# Patient Record
Sex: MALE | Race: WHITE | NOT HISPANIC OR LATINO | Employment: OTHER | URBAN - METROPOLITAN AREA
[De-identification: names, ages, dates, MRNs, and addresses within clinical notes are randomized per-mention and may not be internally consistent; named-entity substitution may affect disease eponyms.]

---

## 2022-11-01 ENCOUNTER — HOSPITAL ENCOUNTER (INPATIENT)
Facility: CLINIC | Age: 62
LOS: 4 days | Discharge: ANOTHER HOSPITAL | DRG: 057 | End: 2022-11-05
Attending: PHYSICAL MEDICINE & REHABILITATION | Admitting: PHYSICAL MEDICINE & REHABILITATION
Payer: COMMERCIAL

## 2022-11-01 DIAGNOSIS — I61.9 HEMORRHAGIC STROKE (H): Primary | ICD-10-CM

## 2022-11-01 PROCEDURE — 99207 PR SC NO CHARGE VISIT: CPT | Performed by: PHYSICIAN ASSISTANT

## 2022-11-01 PROCEDURE — 250N000013 HC RX MED GY IP 250 OP 250 PS 637: Performed by: PHYSICIAN ASSISTANT

## 2022-11-01 PROCEDURE — 250N000011 HC RX IP 250 OP 636: Performed by: PHYSICIAN ASSISTANT

## 2022-11-01 PROCEDURE — 99223 1ST HOSP IP/OBS HIGH 75: CPT | Performed by: PHYSICIAN ASSISTANT

## 2022-11-01 PROCEDURE — 128N000003 HC R&B REHAB

## 2022-11-01 RX ORDER — POLYETHYLENE GLYCOL 3350 17 G/17G
17 POWDER, FOR SOLUTION ORAL DAILY PRN
Status: DISCONTINUED | OUTPATIENT
Start: 2022-11-01 | End: 2022-11-05 | Stop reason: HOSPADM

## 2022-11-01 RX ORDER — FLUTICASONE PROPIONATE 50 MCG
1 SPRAY, SUSPENSION (ML) NASAL DAILY
Status: DISCONTINUED | OUTPATIENT
Start: 2022-11-02 | End: 2022-11-05 | Stop reason: HOSPADM

## 2022-11-01 RX ORDER — HEPARIN SODIUM 5000 [USP'U]/.5ML
5000 INJECTION, SOLUTION INTRAVENOUS; SUBCUTANEOUS EVERY 12 HOURS
Status: DISCONTINUED | OUTPATIENT
Start: 2022-11-01 | End: 2022-11-05 | Stop reason: HOSPADM

## 2022-11-01 RX ORDER — METOPROLOL TARTRATE 25 MG/1
25 TABLET, FILM COATED ORAL 2 TIMES DAILY
Status: DISCONTINUED | OUTPATIENT
Start: 2022-11-01 | End: 2022-11-05 | Stop reason: HOSPADM

## 2022-11-01 RX ORDER — AMOXICILLIN 250 MG
1 CAPSULE ORAL 2 TIMES DAILY
Status: DISCONTINUED | OUTPATIENT
Start: 2022-11-01 | End: 2022-11-05 | Stop reason: HOSPADM

## 2022-11-01 RX ORDER — METOPROLOL TARTRATE 25 MG/1
25 TABLET, FILM COATED ORAL
Status: ON HOLD | COMMUNITY
Start: 2022-11-01 | End: 2022-11-05

## 2022-11-01 RX ORDER — POLYETHYLENE GLYCOL 3350 17 G/17G
17 POWDER, FOR SOLUTION ORAL
Status: ON HOLD | COMMUNITY
Start: 2022-11-01 | End: 2022-11-05

## 2022-11-01 RX ORDER — LANOLIN ALCOHOL/MO/W.PET/CERES
3 CREAM (GRAM) TOPICAL AT BEDTIME
Status: DISCONTINUED | OUTPATIENT
Start: 2022-11-01 | End: 2022-11-05 | Stop reason: HOSPADM

## 2022-11-01 RX ORDER — SENNOSIDES A AND B 8.6 MG/1
1 TABLET, FILM COATED ORAL 2 TIMES DAILY
Status: ON HOLD | COMMUNITY
Start: 2022-11-01 | End: 2022-11-05

## 2022-11-01 RX ORDER — ASPIRIN 81 MG/1
81 TABLET ORAL DAILY
Status: DISCONTINUED | OUTPATIENT
Start: 2022-11-02 | End: 2022-11-05 | Stop reason: HOSPADM

## 2022-11-01 RX ORDER — ACETAMINOPHEN 325 MG/1
325-650 TABLET ORAL EVERY 4 HOURS PRN
Status: DISCONTINUED | OUTPATIENT
Start: 2022-11-01 | End: 2022-11-05 | Stop reason: HOSPADM

## 2022-11-01 RX ADMIN — HEPARIN SODIUM 5000 UNITS: 5000 INJECTION, SOLUTION INTRAVENOUS; SUBCUTANEOUS at 20:29

## 2022-11-01 RX ADMIN — SENNOSIDES AND DOCUSATE SODIUM 1 TABLET: 50; 8.6 TABLET ORAL at 20:29

## 2022-11-01 RX ADMIN — METOPROLOL TARTRATE 25 MG: 25 TABLET, FILM COATED ORAL at 20:29

## 2022-11-01 ASSESSMENT — ACTIVITIES OF DAILY LIVING (ADL)
FALL_HISTORY_WITHIN_LAST_SIX_MONTHS: YES
SWALLOWING: 2-->DIFFICULTY SWALLOWING LIQUIDS/FOODS
ADLS_ACUITY_SCORE: 35
ADLS_ACUITY_SCORE: 26
TOILETING_ISSUES: NO
EATING: 1-->ASSISTANCE (EQUIPMENT/PERSON) NEEDED
SWALLOWING: 2-->DIFFICULTY SWALLOWING LIQUIDS/FOODS
DIFFICULTY_EATING/SWALLOWING: YES
ADLS_ACUITY_SCORE: 43
NUMBER_OF_TIMES_PATIENT_HAS_FALLEN_WITHIN_LAST_SIX_MONTHS: 1
EATING/SWALLOWING: SWALLOWING LIQUIDS;SWALLOWING SOLID FOOD
EATING: 1-->ASSISTANCE (EQUIPMENT/PERSON) NEEDED (NOT DEVELOPMENTALLY APPROPRIATE)
ADLS_ACUITY_SCORE: 26

## 2022-11-01 NOTE — PLAN OF CARE
"Goal Outcome Evaluation: Initial Assessment    VS:    /85   Pulse 109   Temp (!) 96.4  F (35.8  C) (Oral)   Resp 16   Ht 1.88 m (6' 2\")   SpO2 95%      O2: RA, no SOB reported   Output: Incontinent of bladder and bowel, urinal and toiletting offered.   Last BM: Today 11/1 small BM. Senna given at HS   Activity: 2x Gol[place] for transfers, 1-2x for bed mobility   Skin: Large bruise over left shoulder/back/abdominal area   Pain: Denies   CMS: Alert, difficult to assess orientation d/t expressive aphasia/word finding difficulty   Dressing: NA   Diet: Level 6, moderate thick liquids. Meds crushed in applesauce   LDA: None   Plan: First day of admission     Additional Info: Wife Alix helpful with admission and cares        "

## 2022-11-01 NOTE — H&P
[facility]   Acute Rehabilitation Unit  Admission History and Physical    CHIEF COMPLAINT   01.2 right body involvement, left brain stroke: left ICA and L MCA ischemic stroke, c/b hemorrhagic transformation     HISTORY OF PRESENT ILLNESS  Tobin Hua is a 62 year old right hand dominant male with past medical history of mitral valve repair (2011) and allergic rhinitis who presented to ED on 10/21/22 after acute change in mental status followed by fall at home.  CT/CTA head/neck revealed complete occlusion of left ICA.  Labs notable for mild hyponatremia.  Patient was administered tenecteplase and admitted for further evaluation and management.  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  S/p mechanical thrombectomy with neuro IR with recanalization.    Further stroke work-up with echo with normal EF, moderate dilation of aorta; LDL 96.    Unfortunately, on 10/24, patient noted to have worsening stroke deficits (R hemiparesis).  CT head with hemorrhagic transformation of prior CVA with intraparenchymal (left basal ganglia, left frontal and temporal lobes) and intraventricular hemorrhage with associated mass effect.  Per neurosurgery, no urgent surgical intervention.  Aspirin reversed with ddAVP and patient was admitted to ICU.  Follow up head CT showed stable hemorrhage.    Hospital course was also complicated by anemia, hyponatremia and later hypernatremia, left neck hematoma, non-sustained ventricular tachycardia, low grade fever, constipation, malnutrition (NG tube removed on 10/30), headache, and dysphagia.    During acute hospitalization, patient was seen and evaluated by PT and OT, who collectively recommended that patient would benefit from ongoing therapies in the acute inpatient rehabilitation setting.      In review of the therapy notes, patient currently performing sit to stand transfers with mod A, ambulation 4 steps in parallel  "bars with assist of 2, needing total A to advance FLE.  He is performing bed mobility with mod A, needed total A to don sling.  He demonstrates aphasia with moderate deficit for auditory comprehension and moderate to severe for verbal expression.  Also with moderate to severe dysarthria and dysphagia.    Upon arrival to the rehab unit, patient is accompanied by his wife and son, who assist with providing historical information given patient's aphasia.  Patient notes frustration with right-sided weakness and language deficits.  Family reports he is usually a good \"water drinker\" but struggling with the thickened liquids.   He has had intermittent, brief episodes of headache.  Also some mild cough, but patient and his wife were just recovering from a respiratory illness at the time of his stroke, and feels his symptoms are now improving.  Notes a tendency to eat/drink quickly and may \"hiccup\" or cough after.  BMs have been less frequent than at home.  Urination has been more urgent, so has had some incontinence.  States he is sleeping well.  Notes some difficulty with mood, feeling anxious or worried especially during evenings or overnights.    PAST MEDICAL HISTORY   Reviewed and updated in Epic.  No past medical history on file.    SURGICAL HISTORY  Reviewed and updated in Epic.  No past surgical history on file.    SOCIAL HISTORY  Reviewed and updated in Epic.  Marital Status:   Living situation: lives with wife, Alix, in house with 3 stairs to enter, 12 stairs to basement  Family support: supportive  Vocational History: retired  and theater director  Tobacco use: none  Alcohol use: occasionally \"for fun\"  Illicit drug use: none  Social History     Socioeconomic History     Marital status:      Spouse name: Not on file     Number of children: Not on file     Years of education: Not on file     Highest education level: Not on file   Occupational History     Not on file   Tobacco Use     " Smoking status: Not on file     Smokeless tobacco: Not on file   Substance and Sexual Activity     Alcohol use: Not on file     Drug use: Not on file     Sexual activity: Not on file   Other Topics Concern     Not on file   Social History Narrative     Not on file     Social Determinants of Health     Financial Resource Strain: Not on file   Food Insecurity: Not on file   Transportation Needs: Not on file   Physical Activity: Not on file   Stress: Not on file   Social Connections: Not on file   Intimate Partner Violence: Not on file   Housing Stability: Not on file       FAMILY HISTORY  Reviewed and updated in Epic.  No family history on file.      PRIOR FUNCTIONAL HISTORY   Pt was independent with all ADLs/IADLs, transfers, mobility and gait.      MEDICATIONS  Scheduled meds  No medications prior to admission.       ALLERGIES   Not on File      REVIEW OF SYSTEMS  A 10 point ROS was performed and negative unless otherwise noted in HPI.     Constitutional: Negative for fever/chills.  Eyes: Negative for double vision, blurry vision, field cut, other visual disturbance.  Ears, Nose, Throat: Negative for nasal congestion, sore throat.  Cardiovascular: Negative for chest pain, palpitations.  Respiratory: Positive for intermittent cough.  Negative for shortness of breath.  Gastrointestinal: Positive for constipation.  Negative for abdominal pain, nausea, vomiting, diarrhea, appetite disturbance.  Genitourinary: Positive for urgency and some related incontinence.  Negative for dysuria, frequency.  Musculoskeletal: Negative for pain.  Neurologic: Positive for right-sided weakness, speech disturbance, swallow disturbance, intermittent/mild headache, impaired cognition.  Negative for dizziness or lightheadedness, numbness, tingling.  Psychiatric: Positive for some mood disturbance, difficulty coping, anxiety.  Negative for sleep disturbance.      PHYSICAL EXAM  VITAL SIGNS:  BP (!) 117/93 (BP Location: Left arm)   Pulse 115    Temp (!) 96.4  F (35.8  C) (Oral)   Resp 16   SpO2 95%   BMI:  There is no height or weight on file to calculate BMI.     General: NAD, lying in bed  HEENT: NC/AT, dry mucous membranes  Pulmonary: non-labored on room air, lungs CTA bilaterally  Cardiovascular: RRR, +murmur, Ziopatch  Abdominal: soft, non-tender, non-distended, bowel sounds present  Extremities: warm, well perfused, no edema in bilateral lower extremities, no tenderness in calves  MSK/neuro:   Mental Status:  alert    Cranial Nerves:   1. 2nd CN: Pupils equal, round, reactive to light and visual fields intact to confrontation.   2. 3rd,4th,6th CN:  EOMI  3. 5th CN: facial sensation intact   4. 7th CN: right facial droop  5. 8th CN: functional hearing bilaterally  6. 9th, 10th CN: palate elevates symmetrically   7. 11th CN: weak right trapezii    8. 12th CN: tongue midline and without fasciculations     Sensory: Normal to light touch in bilateral upper and lower extremities   Strength: 5/5 in all muscle groups of left upper and lower extremities.  RUE 0/5 throughout.  RLE trace proximal movement but no other volitional movement.   Tone per modified Joseph Scale: none   Abnormal movements: None   Coordination: No dysmetria on left finger to nose, unable to perform on right due to hemiparesis   Speech: expressive aphasia, frequent word substitutions, dysarthria   Cognition: difficult to assess given language deficits, able to follow simple commands but difficulty with multistep   Gait: not tested      LABS            CT Head WO IV Cont - 10/21/22  IMPRESSION:   1. Hyperdensity in the left cavernous ICA and MCA suggestive of thrombus.   2. No other acute intracranial findings.    CTA head/neck 10/21/22  IMPRESSION:   1. Complete occlusion of the left ICA beginning 1 cm distal to the carotid   bifurcation.   2. No appreciable contrast opacification involving the left M1 segment,   MCA bifurcation, and proximal M2 branches. More distal arborization    appears patent, which may be secondary to collateral flow.   3. Tortuosity of the right ICA with focal ectasia/fusiform aneurysmal   dilation at the level of C1.     MRI head 10/22/22  IMPRESSION:     1.  Multifocal acute/subacute infarcts throughout the left MCA distribution, most prominently involving the left basal ganglia.   2. Tiny punctate acute/subacute infarcts within the right frontal lobe and left cerebellum.    MRA head 10/22/22  IMPRESSION:   1. No flow-limiting stenosis involving the major intracranial arterial vasculature.   2. Flow signal has been reestablished throughout the distal left ICA and proximal left MCA.     MRA neck 10/22/22  IMPRESSION:     1. There has been interval recanalization of the left cervical ICA, which appears tortuous. Associated luminal irregularity is present within the mid cervical ICA, consider series 13, image [id number].   2. Persistent tortuosity of the right cervical ICA, with focal ectasia/fusiform aneurysmal dilation again noted at the approximate level of C1.     CT head w/o 10/24/22  IMPRESSION:   1. New dense intraparenchymal hemorrhage centered about the left basal ganglia, but also involving the left frontal and temporal lobes. Associated mass effect results in partial effacement of the anterior horn and body of the left lateral ventricle. 3 mm of rightward midline shift.  Supratentorial Intracerebral Hemorrhage with intraventricular extension as detailed above measures 4.0 x 3.4 x 3.8 cm.   Estimated Intracerebral Hemorrhage Volume equals 26 (A x B x C/2) mL.   2. New intraventricular hemorrhage involving both lateral ventricles as well as the aqueduct and fourth ventricle.  3. Ongoing hypodense infarction involving the left temporal lobe.    CT head w/o 10/24/22  IMPRESSION:     1. Redemonstration of hemorrhagic transformation of recent infarcts along the left basal ganglia and corona radiata, with intraparenchymal hemorrhage which is similar versus slightly  increased since most recent exam. Associated mass effect with partial effacement of the left lateral ventricle and mild rightward midline shift.   2. Redemonstration of intraventricular hemorrhage most pronounced within the fourth ventricle which extends posteriorly out the foramen of Magendie and foramen of Luschka at the craniocervical junction and into the upper cervical spine posteriorly.   3. Redemonstration of other recent infarcts most pronounced along the anterior left temporal lobe.    CT head w/o 10/26/22  IMPRESSION:   1.  No significant interval change compared to CT head 10/24/2022.          IMPRESSION/PLAN:  Tobin Hua is a 62 year old right hand dominant male with a past medical history of mitral valve repair (2011) and allergic rhinitis who was admitted on 10/21/22 with acute ischemic stroke due to dissection/occlusion of left ICA with hospital course complicated by hemorrhagic transformation with intraparenchymal and intraventricular hemorrhage, anemia, non-sustained ventricular tachycardia, hypo/hypernatremia, dysphagia, constipation, and headache.  He is now admitted to ARU on 11/1/22 for multidisciplinary rehabilitation and ongoing medical management.      Admission to acute inpatient rehab 11/01/22.    Impairment group code: 01.2 right body involvement, left brain stroke: left ICA and left MCA ischemic stroke, c/b hemorrhagic transformation      1. PT, OT and SLP 60 minutes of each on a daily basis, in addition to rehab nursing and close management of physiatrist.      2. Impairment of ADL's: Noted to have impaired strength, impaired balance, and impaired weight shifting, all affecting his ability to safely and independently perform basic ADLs.  Goal for mod I with basic ADLs, with assist for bathing and related transfers.    3. Impairment of mobility:  Noted to have impaired strength, impaired balance, and impaired weight shifting, all affecting his ability to safely and independently perform  basic mobility.  Goal for mod I with bed mobility and transfers, anticipate to require wheelchair-based mobility.    4. Impairment of cognition/language/swallow:  Noted to have dysphagia, dysarthria, aphasia with goals for safe tolerance of least restrictive diet and improved communication skills to meet basic needs.  Would benefit from full cognitive evaluation at ARU.    5. Medical Conditions    Acute ischemic stroke due to ICA dissection and thrombosis/occlusion s/p tenecteplase, mechanical thrombectomy  C/b hemorrhagic transformation with intraparenchymal and intraventricular hemorrhage   R hemiparesis, R facial droop, dysphagia, aphasia, dysarthria  - Continue ASA 81 mg daily (resumed 10/28), indefinitely per neurology  - LDL 96, no indication for statin per neuro, no atherosclerosis on MRAs  - SBP goal <140  - R AFO recommended while inpatient, consult to orthotist if appropriate at ARU  - Ziopatch in place for 2 weeks at ARU admission  - Continue PT/OT/SLP  - Follow up with neurology (Dr. Edgard Dave) in 6 weeks    Moderate dilation of ascending aorta   Incidentally noted on echo for CVA work-up, involving the sinuses of Valsalva, maximal dimension 4.6 cm.  - Needs outptient follow up in 6 months with CT imaging, can be followed by PCP    Episodes of non-sustained ventricular tachycardia  On telemetry during IP stay.  Started on metoprolol with reduced frequency.  - Continue metoprolol 25 mg BID  - Ziopatch in place at ARU admission    Dysphagia  At risk for malnutrition  - RD consulted, appreciate assist.  - Continue SLP  - Continue Soft and Bite Sized (Level 6) diet with moderately thick (level 3) liquids.  - Meds in puree  - Swallow Recommendations per SLP: Upright for all eating and drinking.  Relaxed, leisurely rate when eating and drinking.  Check for pocketing on the right after meals/snacks/medications.  Continue oral care.   - Repeat VFSS as indicated  - Monitor labs, vitals, clinically for signs  of dehydration while on thickened liuqids.  Encourage fluids.    Normocytic anemia  Noted drop from Hgb 13 to yohana of 9.9 on 10/23, most recently stable at 13 on 10/31.  - Trend CBC    Hypernatremia  S/p D5W infusion due to Na 146 on 10/31, improved to 144 on 11/1.  - Trend BMP    Hx mitral valve repair (2012)  Mild mitral regurgitation  - Noted.  Not on PTA anticoagulation, just aspirin, resumed as above    Allergic rhinitis  - Continue PTA Flonase daily    6. Adjustment to disability:  Clinical psychology to eval and treat if indicated  7. FEN: soft and bite-sized (level 6) diet, moderately thick (level 3) liquids  8. Bowel: continent, recent constipation, monitor, on scheduled and PRN bowel meds  9. Bladder: continent/incontinent due to urgency, monitor PVRs at admission  10. DVT Prophylaxis: subcutaneous Heparin (ok'd per neuro on 10/27)  11. GI Prophylaxis: not indicated  12. Code: full  13. Disposition: goal for home with 24/7 assist  14. ELOS:  3 weeks  15. Rehab prognosis:  fair  16. Follow up Appointments on Discharge: PCP, neurology (Dr. Edgard Dave) in 6 weeks        Patient discussed with Dr. Jori Troncoso, PM&R staff physician     Ashley Campoverde PA-C  Physical Medicine & Rehabilitation

## 2022-11-02 ENCOUNTER — APPOINTMENT (OUTPATIENT)
Dept: PHYSICAL THERAPY | Facility: CLINIC | Age: 62
DRG: 057 | End: 2022-11-02
Attending: PHYSICAL MEDICINE & REHABILITATION
Payer: COMMERCIAL

## 2022-11-02 ENCOUNTER — APPOINTMENT (OUTPATIENT)
Dept: SPEECH THERAPY | Facility: CLINIC | Age: 62
DRG: 057 | End: 2022-11-02
Attending: PHYSICAL MEDICINE & REHABILITATION
Payer: COMMERCIAL

## 2022-11-02 ENCOUNTER — APPOINTMENT (OUTPATIENT)
Dept: OCCUPATIONAL THERAPY | Facility: CLINIC | Age: 62
DRG: 057 | End: 2022-11-02
Attending: PHYSICAL MEDICINE & REHABILITATION
Payer: COMMERCIAL

## 2022-11-02 LAB
ANION GAP SERPL CALCULATED.3IONS-SCNC: 6 MMOL/L (ref 3–14)
BUN SERPL-MCNC: 32 MG/DL (ref 7–30)
CALCIUM SERPL-MCNC: 9.4 MG/DL (ref 8.5–10.1)
CHLORIDE BLD-SCNC: 111 MMOL/L (ref 94–109)
CO2 SERPL-SCNC: 26 MMOL/L (ref 20–32)
CREAT SERPL-MCNC: 0.74 MG/DL (ref 0.66–1.25)
ERYTHROCYTE [DISTWIDTH] IN BLOOD BY AUTOMATED COUNT: 13.4 % (ref 10–15)
GFR SERPL CREATININE-BSD FRML MDRD: >90 ML/MIN/1.73M2
GLUCOSE BLD-MCNC: 107 MG/DL (ref 70–99)
HCT VFR BLD AUTO: 39 % (ref 40–53)
HGB BLD-MCNC: 12.8 G/DL (ref 13.3–17.7)
MAGNESIUM SERPL-MCNC: 2.6 MG/DL (ref 1.6–2.3)
MCH RBC QN AUTO: 29.4 PG (ref 26.5–33)
MCHC RBC AUTO-ENTMCNC: 32.8 G/DL (ref 31.5–36.5)
MCV RBC AUTO: 90 FL (ref 78–100)
PHOSPHATE SERPL-MCNC: 3 MG/DL (ref 2.5–4.5)
PLATELET # BLD AUTO: 262 10E3/UL (ref 150–450)
POTASSIUM BLD-SCNC: 3.7 MMOL/L (ref 3.4–5.3)
RBC # BLD AUTO: 4.35 10E6/UL (ref 4.4–5.9)
SODIUM SERPL-SCNC: 143 MMOL/L (ref 133–144)
WBC # BLD AUTO: 11.5 10E3/UL (ref 4–11)

## 2022-11-02 PROCEDURE — 92523 SPEECH SOUND LANG COMPREHEN: CPT | Mod: GN | Performed by: SPEECH-LANGUAGE PATHOLOGIST

## 2022-11-02 PROCEDURE — 80048 BASIC METABOLIC PNL TOTAL CA: CPT | Performed by: PHYSICIAN ASSISTANT

## 2022-11-02 PROCEDURE — 97535 SELF CARE MNGMENT TRAINING: CPT | Mod: GO | Performed by: OCCUPATIONAL THERAPIST

## 2022-11-02 PROCEDURE — 84100 ASSAY OF PHOSPHORUS: CPT | Performed by: PHYSICIAN ASSISTANT

## 2022-11-02 PROCEDURE — 99232 SBSQ HOSP IP/OBS MODERATE 35: CPT | Performed by: PHYSICIAN ASSISTANT

## 2022-11-02 PROCEDURE — 83735 ASSAY OF MAGNESIUM: CPT | Performed by: PHYSICIAN ASSISTANT

## 2022-11-02 PROCEDURE — 85027 COMPLETE CBC AUTOMATED: CPT | Performed by: PHYSICIAN ASSISTANT

## 2022-11-02 PROCEDURE — 97530 THERAPEUTIC ACTIVITIES: CPT | Mod: GP

## 2022-11-02 PROCEDURE — 128N000003 HC R&B REHAB

## 2022-11-02 PROCEDURE — 36415 COLL VENOUS BLD VENIPUNCTURE: CPT | Performed by: PHYSICIAN ASSISTANT

## 2022-11-02 PROCEDURE — 97167 OT EVAL HIGH COMPLEX 60 MIN: CPT | Mod: GO | Performed by: OCCUPATIONAL THERAPIST

## 2022-11-02 PROCEDURE — 250N000013 HC RX MED GY IP 250 OP 250 PS 637: Performed by: PHYSICIAN ASSISTANT

## 2022-11-02 PROCEDURE — 250N000011 HC RX IP 250 OP 636: Performed by: PHYSICIAN ASSISTANT

## 2022-11-02 PROCEDURE — 97163 PT EVAL HIGH COMPLEX 45 MIN: CPT | Mod: GP

## 2022-11-02 RX ADMIN — METOPROLOL TARTRATE 25 MG: 25 TABLET, FILM COATED ORAL at 21:10

## 2022-11-02 RX ADMIN — HEPARIN SODIUM 5000 UNITS: 5000 INJECTION, SOLUTION INTRAVENOUS; SUBCUTANEOUS at 21:10

## 2022-11-02 RX ADMIN — HEPARIN SODIUM 5000 UNITS: 5000 INJECTION, SOLUTION INTRAVENOUS; SUBCUTANEOUS at 08:00

## 2022-11-02 RX ADMIN — SENNOSIDES AND DOCUSATE SODIUM 1 TABLET: 50; 8.6 TABLET ORAL at 08:00

## 2022-11-02 RX ADMIN — MELATONIN TAB 3 MG 3 MG: 3 TAB at 21:10

## 2022-11-02 RX ADMIN — SENNOSIDES AND DOCUSATE SODIUM 1 TABLET: 50; 8.6 TABLET ORAL at 21:10

## 2022-11-02 RX ADMIN — ASPIRIN 81 MG: 81 TABLET ORAL at 08:00

## 2022-11-02 RX ADMIN — METOPROLOL TARTRATE 25 MG: 25 TABLET, FILM COATED ORAL at 08:00

## 2022-11-02 RX ADMIN — FLUTICASONE PROPIONATE 1 SPRAY: 50 SPRAY, METERED NASAL at 08:00

## 2022-11-02 ASSESSMENT — ACTIVITIES OF DAILY LIVING (ADL)
IADLS,_PREVIOUS_FUNCTIONAL_LEVEL: INDEPENDENT
ADLS_ACUITY_SCORE: 54
ADLS_ACUITY_SCORE: 54
ADLS_ACUITY_SCORE: 41
ADLS_ACUITY_SCORE: 54
ADLS_ACUITY_SCORE: 41
ADLS_ACUITY_SCORE: 54
BADLS,_PREVIOUS_FUNCTIONAL_LEVEL: INDEPENDENT
ADLS_ACUITY_SCORE: 43
ADLS_ACUITY_SCORE: 41
ADLS_ACUITY_SCORE: 41
ADLS_ACUITY_SCORE: 54
BADLS,_PREVIOUS_FUNCTIONAL_LEVEL: INDEPENDENT
IADLS,_PREVIOUS_FUNCTIONAL_LEVEL: INDEPENDENT
ADLS_ACUITY_SCORE: 41
ADLS_ACUITY_SCORE: 54

## 2022-11-02 NOTE — PLAN OF CARE
Patient is alert but disoriented to time place and situation, also seem to have some word finding difficulties. Did not void until at about 1330, scan for 325, after 45 mins voided 350 and PVR for 250. Need assist with setting up meals, use call light appropriately, will continue Poc  Goal Outcome Evaluation:

## 2022-11-02 NOTE — CARE PLAN
Postural Assessment for Stroke Scale (PASS)    Maintaining posture -   1) Sitting without support - 2 - favors L side  2) Standing with support (feet position free) - 1  3) Standing without support (feet position free) - 0  4) Standing on nonparetic leg - 0  5) Standing on paretic leg - 0    Changing posture -  6) Rolling supine -> affected side - 2 - assist to control RLE  7) Rolling supine -> unaffected side - 2 - vc's for positioning and to assist with RLE placement over EOB while pt using LLE to assist RLE.  8) Sup->sitting edge of bed - 2 - vc's for positioning using LLE to assist RLE, able to prop through LUE to push into sitting.  9) Sitting edge of bed -> sup - 1 - vc's and assist for RLE positioning up into bed.  10) Sit->stand without support - 1  11) Stand->sit without support - 1  12) Standing,  pencil from floor without support - 0 - unsafe    Total Score - 12/36    The PASS assesses a patient's ability to change and maintain posture during different balance activities. It is not associated with falls risk, but is used to track progress with the patient's ability to control their posture and balance throughout the course of the patient's admission.

## 2022-11-02 NOTE — PLAN OF CARE
Discharge Planner Post-Acute Rehab SLP:     Discharge Plan: home with wife, ongoing SLP at next level    Precautions: right dominant side hemiparesis    Current Status:  Hearing: WFL  Vision: WFL with readers  Communication: Moderate-severe anomic aphasia, mild receptive language deficits, and mild dysarthria  Cognition: Not formally evaluated due to language impairment  Swallow: Soft and bite sized (6)/moderately thick liquid (3); to be evaluated 11/03.    Assessment: Pt presents with moderate-severe anomic aphasia, mild receptive language deficits, and mild dysarthria characterized by decreased articulatory precision. Skilled SLP services indicated to improve language function to increase communication of wants, needs, and medical information.    Other Barriers to Discharge (Family Training, etc): family training in communication strategies, supervision

## 2022-11-02 NOTE — PLAN OF CARE
FOCUS/GOAL  Bowel management, Bladder management, Pain management, Mobility, Cognition/Memory/Judgment/Problem solving, and Safety management    ASSESSMENT, INTERVENTIONS AND CONTINUING PLAN FOR GOAL:  Pt is alert, disoriented to place. VSS. Pt has right facial droop, speaks in a whisper and has word finding difficulties. Right side is flaccid. Continent and incontinent of bladder this shift. Incontinent of medium BM overnight. Denied pain or discomfort. Appeared to be sleeping during rounds. Uses call light appropriately, able to make needs known. Bed alarm on for safety.

## 2022-11-02 NOTE — PROGRESS NOTES
CLINICAL NUTRITION SERVICES - ASSESSMENT NOTE     Nutrition Prescription    RECOMMENDATIONS FOR MDs/PROVIDERS TO ORDER:  None today     Malnutrition Status:    Unable to fully determine due to missing information    Recommendations already ordered by Registered Dietitian (RD):  Re-ordered weekly weight order and requested nursing staff obtain new weight    Villa Magic Cup BID at lunch and dinner meals    Future/Additional Recommendations:  Monitor meal intakes, supplement acceptance, wt/lab trends  Diet per SLP      REASON FOR ASSESSMENT  Tobin Hua is a/an 62 year old male assessed by the dietitian for Provider Order - assess/optimize nutrition    NUTRITION/MEDICAL HISTORY  Per chart review: Pt admits to ARU for rehabilitation in the setting of acute ischemic stroke due to dissection/occlusion of left ICA with hospital course complicated by hemorrhagic transformation with intraparenchymal and intraventricular hemorrhage, anemia, non-sustained ventricular tachycardia, hypo/hypernatremia, dysphagia, constipation, and headache. Other past medical history noted for mitral valve repair (2011) and allergic rhinitis.    Per pt visit: RD visited pt/wife at bedside earlier today, reviewed no current weight available, pt/wife assumed around 180-190 lbs, discussed have orders requesting nursing obtain. Pt/wife note pt taking meals fairly well even with modified diet, does have difficulty at times with water, gave suggested for milk/juice and to ask for these fluids between meals from unit to encourage hydration. Reviewed option for supplement, agreeing to above. Reviewed the importance of adequate nutrition/fluid intakes for continued recovery.     CURRENT NUTRITION ORDERS  Diet: Soft and Bite Sized (level 6), Moderately Thick Liquids (level 3) - per SLP - Upright for all eating and drinking, relaxed, leisurely rate when eating and drinking, check for pocketing on the right after meals/snacks/medications, continue oral  "care  Intake/Tolerance: 75% thus far per flow sheets     LABS  Labs reviewed    MEDICATIONS  Senna-docusate    ANTHROPOMETRICS  Height: 188 cm (6' 2\")  Most Recent Weight: N/A   IBW: 86.3 kg  UBW: 180-190 lbs per pt/family reports   Weight History:   76.1 kg (167 lb 12.3 oz) 10/24/2022 - per CE     Weight assessment: Unable to assess with no current weight and varying weight from above compared to pt/wife reports.     ASSESSED NUTRITION NEEDS - deferred to next assessment/once weight available    MALNUTRITION  % Intake: Decreased intake does not meet criteria  % Weight Loss: Unable to assess  Subcutaneous Fat Loss: Facial region: mild vs age related   Muscle Loss: Temporal: mild vs age related   Fluid Accumulation/Edema: None noted  Malnutrition Diagnosis: Unable to fully determine due to missing information     NUTRITION DIAGNOSIS  Predicted inadequate nutrient intake related to chewing/swallowing difficulty as evidenced by continued need for modified diet       INTERVENTIONS  Implementation  Nutrition Education: RD reviewed the importance of adequate nutritional intakes with pt/wife at bedside    Medical food supplement therapy - ordered as above      Goals  Patient to consume % of nutritionally adequate meal trays TID, or the equivalent with supplements/snacks.     Monitoring/Evaluation  Progress toward goals will be monitored and evaluated per protocol.    Joy Borrego RD, CNSC, LD  ARU RD pager: [phone]  "

## 2022-11-02 NOTE — PROGRESS NOTES
11/02/22 0800   Appointment Info   Signing Clinician's Name / Credentials (OT) TIANA Pham   Student Supervision Direct supervision provided;Direct Patient Contact Provided;Line of sight supervision provided   Living Environment   People in Home spouse   Current Living Arrangements house   Home Accessibility stairs to enter home   Number of Stairs, Main Entrance 3   Stair Railings, Main Entrance railings on both sides of stairs   Transportation Anticipated family or friend will provide   Living Environment Comments OT: Pt lives with spouse in [place]. Pt can live on main level. Pt has tub/shower combo and a raised toilet with vanity on right side.   Self-Care   Usual Activity Tolerance excellent   Current Activity Tolerance fair   Regular Exercise Yes   Activity/Exercise Type walking   Exercise Amount/Frequency daily   Equipment Currently Used at Home none   Fall history within last six months yes   Number of times patient has fallen within last six months 1   Activity/Exercise/Self-Care Comment OT: Pt was previously very active with a 11,000 steps/day, active with grandkids. Retired .   Instrumental Activities of Daily Living (IADL)   Previous Responsibilities meal prep;housekeeping;shopping;yardwork;finances;driving   IADL Comments OT: pt shared IADL responsibilities with spouse; spouse managed laundry   Post-Acute Assessment Only   Post-Acute Functional Assessment See below   Previous Level of Function/Home Environm   Bathing/Grooming, Premorbid Functional Level independent   Dressing, Premorbid Functional Level independent   Eating/Feeding, Premorbid Functional Level independent   Toileting, Premorbid Functional Level independent   BADLs, Premorbid Functional Level independent   IADLs, Premorbid Functional Level independent   Bed Mobility, Premorbid Functional Level independent   Transfers, Premorbid Functional Level independent   Household Ambulation, Premorbid Functional Level  "independent   Stairs, Premorbid Functional Level independent   Community Ambulation, Premorbid Functional Level independent   General Information   Onset of Illness/Injury or Date of Surgery 10/21/22   Referring Physician Dr. Troncoso   Patient/Family Therapy Goal Statement (OT) OT: To gain IND   Additional Occupational Profile Info/Pertinent History of Current Problem OT: Per chart review, pt \"male with past medical history of mitral valve repair (2011) and allergic rhinitis who presented to ED on 10/21/22 after acute change in mental status followed by fall at home. CT/CTA head/neck revealed complete occlusion of left ICA. Labs notable for mild hyponatremia. Patient was administered tenecteplase and admitted for further evaluation and management. 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 S/p mechanical thrombectomy with neuro IR with recanalization.\"   Performance Patterns (Routines, Roles, Habits) OT: retired English/   Existing Precautions/Restrictions fall   Limitations/Impairments aphasia;sensory;swallowing;safety/cognitive   Heart Disease Risk Factors Age;Gender;Medical history   General Observations and Info OT: Right 2 finger width subluxation   Cognitive Status Examination   Orientation Status orientation to person, place and time   Follows Commands 50-74% accuracy;verbal cues/prompting required   Visual Perception   Visual Impairment/Limitations other (see comments)   Visual Motor Impairment convergence, right;convergence, left   Impact of Vision Impairment on Function (Vision) OT: R/L convergence impairment   Sensory   Sensory Comments OT: LUE sensation intact. RUE light touch impaired, 5.18 monofilament; thermal sensation intact with hot/cold   Pain Assessment   Patient Currently in Pain No   Posture   Posture protracted shoulders;forward head position;other (see comments)   Posture Comments OT: Left lateral propulsion and leaning laterally   Range of " Motion Comprehensive   Comment, General Range of Motion OT: LUE ROM WNL, RUE flaccid   Strength Comprehensive (MMT)   Comment, General Manual Muscle Testing (MMT) Assessment OT: LUE sh. flex/ext/abd 4/5; elbow flex/ext, wrist flex/ext 5/5. RUE 0/5 all planes   Hand Strength   Left hand  (pounds) 89   Right hand  (pounds) 0   Muscle Tone Assessment   Muscle Tone Comments OT: Right side flaccid, no developing tone noted at eval   Clinical Impression   Criteria for Skilled Therapeutic Interventions Met (OT) Yes, treatment indicated   OT Diagnosis OT: decreased safety and IND with ADLs/IADLs   Influenced by the following impairments OT: impaired RUE strength, impaired balance, impaired RUE sensation, aphasia, impaired cognition, and impaired trunk control   OT Problem List-Impairments impacting ADL problems related to;cognition;communication;coordination;eating/swallowing;mobility;motor control;muscle tone;range of motion (ROM);sensation;strength;postural control;sensory feedback;fear & anxiety;balance;activity tolerance impaired;inability to direct their own care;other (see comments);vision   Assessment of Occupational Performance 5 or more Performance Deficits   Identified Performance Deficits OT: Performance deficits include dressing, toileting, bathing, g/h, feeding, all IADLs   Planned Therapy Interventions (OT) ADL retraining;IADL retraining;balance training;bed mobility training;cognition;E-stim;fine motor coordination training;groups;joint mobilization;neuromuscular re-education;ROM;strengthening;stretching;transfer training;home program guidelines;motor coordination training;orthoic fitting/training;visual perception;progressive activity/exercise;risk factor education;other (see comments)   Clinical Decision Making Complexity (OT) high complexity   Anticipated Equipment Needs Upon Discharge (OT) bathing equipment;dressing equipment;toileting equipment;wheelchair;wheelchair cushion   Risk & Benefits of  therapy have been explained evaluation/treatment results reviewed;care plan/treatment goals reviewed;risks/benefits reviewed;current/potential barriers reviewed;participants voiced agreement with care plan;participants included;spouse/significant other;patient   Clinical Impression Comments OT: Pt would benefit from skilled OT services d/t recent decline with safety with ADLs/IADLs and functional mobility following left ICA resulting in impaired right side weakness, impaired balance, impaired RUE sensation, impaired trunk control, aphasia, and impaired cognition. Est LOS 3.5 weeks with HC OT services   OT Total Evaluation Time   OT Celia High Complexity Minutes (95813) 35   OT Goals   Therapy Frequency (OT) Daily   OT Predicted Duration/Target Date for Goal Attainment 11/25/22   OT: Hygiene/Grooming supervision/stand-by assist;from wheelchair   OT: Upper Body Dressing Supervision/stand-by assist;including set-up/clothing retrieval;from wheelchair   OT: Lower Body Dressing Supervision/stand-by assist;using adaptive equipment;from wheelchair   OT: Upper Body Bathing Minimal assist;using adaptive equipment   OT: Lower Body Bathing Minimal assist;using adaptive equipment   OT: Toilet Transfer/Toileting Minimal assist;toilet transfer;using adaptive equipment;cleaning and garment management  (W/C based)   OT: Meal Preparation with simple meal preparation;from wheelchair;Supervision/stand-by assist   OT: Home Management Supervision/stand-by assist;with light demand household tasks;from wheelchair   OT: Goal 1 OT: Pt will safely complete tub transfers with min A utilizing appropriate AE/DME   OT: Goal 2 OT: Pt will complete RUE HEP with supervision to increase RUE ROM, strengthening needed for ADLs/IADLs.   Self-Care/Home Management   Self-Care/Home Mgmt/ADL, Compensatory, Meal Prep Minutes (68718) 25   Treatment Detail/Skilled Intervention OT: Educated pt and spouse on role of OT and goals for OT POC. completed partial  morning ADL routine, see details below   OT Discharge Planning   OT Plan OT: bozena steady to/from grey SCI shower chair, GG bathing, toileting, and oral cares   Total Session Time   Timed Code Treatment Minutes 25   Total Session Time (sum of timed and untimed services) 60   Post Acute Settings Only   What unit is patient on? Acute Rehab   OT - Acute Rehab Center Time   Individual Time (minutes) - enter zero if not applicable - OT 60  (1 high complexity eval, 2 ADL)   Group Time (minutes) - enter zero if not applicable  - OT 0   Concurrent Time (minutes) - enter zero if not applicable  - OT 0   Co-Treatment Time (minutes) - enter zero if not applicable  - OT 0   ARC Total Session Time (minutes) - OT 60   Grooming (except oral cares)   Grooming Task Performed Hair grooming;Washing face   Functional Performance Set-up of environment/equipment required   Grooming Comment OT: Pt performed g/h tasks seated EOB  w/ setup and SBA   Upper Body Dressing   Describe performance OT: Pt performed UBD supine elevated HOB with setup and max A to thread R arm and don overhead   Completely independent with Upper Body Dressing no   Environmental Assistance for dressing and undressing clothing for upper body Set-up assistance prior to the activity   Physical assistance to don/doff clothing above the waist Requires maximal assistance for donning/doffing upper body items, 51-75% assist provided by staff   Lower Body Dressing (Pants/Undergarments)   Complete independence with Lower Body Dressing  Pants/Undergarments no   Describe performance OT: performed LBD w/ setup and total A supine with rolling side to side   Environmental Assistance for dressing and undressing clothing for upper body Set-up assistance prior to the activity   Physical assistance to don/doff clothing below the waist, excluding shoes Requires total assist for donning/doffing pants/undergarments, 76-99% assist provided by staff   Lower Body Dressing putting on/taking  off footwear   Complete independence with Lower Body Dressing Footwear no   Describe performance OT: Pt dependent donning socks   Physical assistance to don/doff socks/shoes and other foorwear [place] does ALL of the effort to don socks/shoes and other footwear   Roll Left and Right   Assistance Needed Adaptive equipment;Set-up / clean-up;Supervision   Physical Assistance Level 50%-74%   Roll Left to Right CARE Score 2   Sit to Lying   Assistance Needed Set-up / clean-up;Adaptive equipment;Supervision   Physical Assistance Level 75% or more   Comment OT: assistance with BLE's and trunk with EOB to supine   Sit to Lying CARE Score 2   Lying to Sitting on Side of Bed   Assistance Needed Adaptive equipment  (bed rail)   Physical Assistance Level 75% or more   Comment OT: Pt max A supine to sitting EOB to the right   Lying to Sitting CARE Score 2   Sit to Stand   Assistance Needed Adaptive equipment   Physical Assistance Level 25%-49%   Comment OT: pt mod A STS from elevated bed w/ bozena steady   Sitting to Standing CARE Score 3

## 2022-11-02 NOTE — PHARMACY-MEDICATION REGIMEN REVIEW
Pharmacy Medication Regimen Review  Tobin Hua is a 62 year old male who is currently in the Acute Rehab Unit.    Assessment:   All medications have appropriate indications, durations and no unnecessary use was found    Plan:   Attending provider will be sent this note for review.  If there are any emergent issues noted above, pharmacist will contact provider directly by phone.      Pharmacy will periodically review the resident's medication regimen for any PRN medications not administered in > 72 hours and discontinue them. The pharmacist will discuss gradual dose reductions of psychopharmacologic medications with interdisciplinary team on a regular basis.    Please contact pharmacy if the above does not answer specific medication questions/concerns.    Background:  A pharmacist has reviewed all medications and pertinent medical history today.  Medications were reviewed for appropriate use and any irregularities found are listed with recommendations.      Current Facility-Administered Medications:      acetaminophen (TYLENOL) tablet 325-650 mg, 325-650 mg, Oral, Q4H PRN, Ashley Campoverde PA-C     aspirin EC tablet 81 mg, 81 mg, Oral, Daily, Ashley Campoverde PA-C, 81 mg at 11/02/22 0800     fluticasone (FLONASE) 50 MCG/ACT spray 1 spray, 1 spray, Both Nostrils, Daily, Ashley Campoverde PA-C, 1 spray at 11/02/22 0800     heparin ANTICOAGULANT injection 5,000 Units, 5,000 Units, Subcutaneous, Q12H, Ashley Campoverde PA-C, 5,000 Units at 11/02/22 0800     melatonin tablet 3 mg, 3 mg, Oral, At Bedtime, Ashley Campoverde PA-C     metoprolol tartrate (LOPRESSOR) tablet 25 mg, 25 mg, Oral, BID, Ashley Campoverde PA-C, 25 mg at 11/02/22 0800     polyethylene glycol (MIRALAX) Packet 17 g, 17 g, Oral, Daily PRN, Ashley Campoverde PA-C     senna-docusate (SENOKOT-S/PERICOLACE) 8.6-50 MG per tablet 1 tablet, 1 tablet, Oral, BID, Ashley Campoverde PA-C, 1 tablet at 11/02/22 0800  No current outpatient  prescriptions on file.   Roselyn Mcdonough, [facility]  PharmD,BCPS  November 2, 2022

## 2022-11-02 NOTE — DISCHARGE INSTRUCTIONS
PCP --Pt PCP retired. Pt wife is going to look at PCPs at [facility] and see if there is a preference for who to follow up with. Claudia BROWNING         [place] [facility] [place] [facility] Association  Additional resources and contact information online   Www.[email or web address] & www.braininjurymn.org  Types of services that Stroke/Brain Injury Resource Facilitation offers include:  Emotional support in coping with a  new normal   Finding mental health support and counselors who understand brain injury and stroke  Finding a support group  Finding or re-connecting to rehabilitation services  Finding where and how to get a brain injury assessed  Finding or re-connecting with a primary care physician  Supporting caregivers by connecting them with resources  Education about diagnosis and symptoms -  Is this normal   Helping educate family and loved ones of the survivor s  invisible  symptoms  Figuring out the logistics of returning to work, vocational rehab and understanding ADA rights  If not going back to work, support in finding meaningful ways to structure your day and exploring volunteer options  Coaching on navigation the federal, state and [facility] health and disability service system with additional support and conference calls as needed  Help finding appropriate legal support for appealing and navigating Social Security Disability, providing advocacy on the individual s behalf as needed and connecting with cost effective alternatives to  as needed  Supporting parents/students with how to discuss return to school and sports with educators and connecting to a TBI specialist or parent advocate group if needed  Connecting to addiction (alcohol/drug/gambling/smoking) support and treatment services  Support with creative problem solving to life barriers.  *These are just a few examples of common things that Resource Facilitation can help with. They are not limited to what is listed here so if you are curious if  they can help, just ask.   Call us at [phone] or [phone].

## 2022-11-02 NOTE — PROGRESS NOTES
"   11/02/22 0912   Appointment Info   Signing Clinician's Name / Credentials (PT) Noe Waldrop DPT   Living Environment   People in Home spouse   Current Living Arrangements house   Home Accessibility stairs to enter home   Number of Stairs, Main Entrance 3   Stair Railings, Main Entrance railings on both sides of stairs  (can reach both sides)   Transportation Anticipated family or friend will provide   Living Environment Comments PT: bathroom - raised toilet, vanity on R side, tub shower with fixed shower head. Living room - couch that they report is \"relatively firm.\"   Self-Care   Usual Activity Tolerance excellent   Current Activity Tolerance fair   Regular Exercise Yes   Activity/Exercise Type walking   Exercise Amount/Frequency daily   Equipment Currently Used at Home none   Fall history within last six months yes   Number of times patient has fallen within last six months 1   Activity/Exercise/Self-Care Comment PT; goal had been to amb >11,000 steps daily, worked with yardwork.   Post-Acute Assessment Only   Post-Acute Functional Assessment See below   Previous Level of Function/Home Environm   Bathing/Grooming, Premorbid Functional Level independent   Dressing, Premorbid Functional Level independent   Eating/Feeding, Premorbid Functional Level independent   Toileting, Premorbid Functional Level independent   BADLs, Premorbid Functional Level independent   IADLs, Premorbid Functional Level independent   Bed Mobility, Premorbid Functional Level independent   Transfers, Premorbid Functional Level independent   Household Ambulation, Premorbid Functional Level independent   Stairs, Premorbid Functional Level independent   Community Ambulation, Premorbid Functional Level independent   General Information   Onset of Illness/Injury or Date of Surgery 10/21/22  (date of CVA)   Referring Physician Aba   Patient/Family Therapy Goals Statement (PT) To return home safely   Pertinent History of Current Problem " (include personal factors and/or comorbidities that impact the POC) CMH: 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 PMH: MVR 2011   Existing Precautions/Restrictions fall   Heart Disease Risk Factors Medical history;Gender;Age   Cognition   Affect/Mental Status (Cognition) WFL   Follows Commands (Cognition) WFL   Safety Deficit (Cognition) minimal deficit   Cognitive Status Comments PT: mild impulsivity.   Pain Assessment   Patient Currently in Pain No   Integumentary/Edema   Integumentary/Edema no deficits were identifed   Posture    Posture Forward head position;Protracted shoulders;Kyphosis   Posture Comments PT: flexed thoracic posture with ant subluxed R GH joint.   Strength (Manual Muscle Testing)   Strength (Manual Muscle Testing) MMT: Shoulder;MMT: Elbow/Forearm;MMT: Wrist;MMT: Hand (General);MMT: Hip;MMT: Knee;MMT: Ankle   Left Shoulder (Manual Muscle Testing)   Shoulder Flexion - Left Side (5/5) normal,left   Shoulder ABduction - Left Side (5/5) normal,left   Right Shoulder (Manual Muscle Testing)   Shoulder Flexion - Right Side (0/5) zero, right   Shoulder ABduction - Right Side (0/5) zero, right   Left Elbow/Forearm (Manual Muscle Testing)   Elbow Flexion - Left Side (5/5) normal,left   Elbow Extension - Left Side (5/5) normal,left   Right Elbow/Forearm (Manual Muscle Testing)   Elbow Flexion - Right Side (0/5) zero, right   Elbow Extension - Right Side (0/5) zero, right   Right Wrist (Manual Muscle Testing)   Wrist Muscle Testing Results: Right Wrist R 0/5   Hand (Manual Muscle Testing)   Hand Muscle Testing Results PT: R 0/5   Left Hip (Manual Muscle Testing)   Hip Flexion - Left Side (5/5) normal,left   Hip Extension - Left Side (5/5) normal,left   Hip ABduction - Left Side (5/5) normal,left   Hip ADduction - Left Side (5/5) normal,left   Right Hip (Manual Muscle Testing)   Hip Flexion - Right Side (1+/5) trace plus, right  (in 90/90 sup)   Hip Extension - Right Side (1/5) trace, right   Hip  ABduction - Right Side (0/5) zero, right   Hip ADduction - Right Side (0/5) zero, right   Left Knee (Manual Muscle Testing)   Knee Flexion - Left Side (5/5) normal,left   Knee Extension - Left Side (5/5) normal,left   Right Knee (Manual Muscle Testing)   Knee Flexion - Right Side (0/5) zero, right   Knee Extension - Right Side (0/5) zero, right   Left Ankle/Foot (Manual Muscle Testing)   Ankle Dorsiflexion - Left Side 5/5) normal,left   Ankle Plantarflexion - Left Side 5/5) normal,left   Right Ankle/Foot (Manual Muscle Testing)   Ankle Dorsiflexion - Right Side (0/5) zero, right   Ankle Plantarflexion - Right Side (0/5) zero, right   Balance   Balance other (describe)   Sitting Balance: Static   (sitting with slight lean to L, L hand on bed)   Sitting Balance: Dynamic   (fair pelvic trunk dissociation on L trunk, poor on R trunk)   Sit-to-Stand Balance   (leans R into standing, requires modA to correct)   Standing Balance: Static   (requires constant assist to maintain midline d/t R lean and needing to block R knee)   Standing Balance: Dynamic   (poor weightshifting ability over RLE during pivoting)   Systems Impairment Contributing to Balance Disturbance somatosensory;neuromuscular   Identified Impairments Contributing to Balance Disturbance impaired coordination;impaired motor control;impaired postural control;decreased ROM;decreased sensation;impaired sensory feedback;decreased strength   Balance Quick Add Sitting balance: Static;Sitting balance: dynamic;Sit to stand balance;Standing balance: static;Systems impairment contributing to balance disturbance;Standing balance: dynamic;Identified impairments contributing to balance disturbance   Sensory Examination   Sensory Perception other (describe)   Sensory Perception Quick Adds Proprioception   Sensation Light Touch   LUE w/i normal limits   LLE w/i normal limits   RUE mild impairment   RLE w/i normal limits   Head/Neck other (see comments)  (reports  hypersensitivity at R cheek)   Trunk other (see comments)  (Reports hypersensitivity at R pec)   Proprioception    LUE w/i normal limits   LLE w/i normal limits   RUE mild impairment  (4/6 at thumb, 5/5 wrist)   RLE w/i normal limits  (6/6 at great toe)   Coordination   Coordination other (see comments)   Coordination Comments PT: limited by R hemiplegia   Muscle Tone   Muscle Tone other (see comments)   Muscle Tone Comments PT: hypotonic on R side   Clinical Impression   Criteria for Skilled Therapeutic Intervention Yes, treatment indicated   PT Diagnosis (PT) R hemiplegia, balance and coordination deficits 2/2 L CVA   Influenced by the following impairments Hemiplegia, balance, coordination   Functional limitations due to impairments Bed mob, transfers, gait, stairs   Clinical Presentation (PT Evaluation Complexity) Evolving/Changing   Clinical Presentation Rationale R hemiplegia, home setup with stairs to enter home, past MVR   Clinical Decision Making (Complexity) high complexity   Planned Therapy Interventions (PT) balance training;bed mobility training;E-stim;gait training;groups;home exercise program;manual therapy techniques;neuromuscular re-education;orthotic fitting/training;patient/family education;postural re-education;ROM (range of motion);stair training;strengthening;stretching;transfer training;wheelchair management/propulsion training;progressive activity/exercise;risk factor education;home program guidelines   Anticipated Equipment Needs at Discharge (PT) wheelchair;wheelchair cushion;cane, quad   Risk & Benefits of therapy have been explained evaluation/treatment results reviewed;care plan/treatment goals reviewed;risks/benefits reviewed;current/potential barriers reviewed;participants included;participants voiced agreement with care plan;patient;spouse/significant other   Clinical Impression Comments Tobin will benefit from PT to progress his R sided strength, coordination, and balance to decrease  "assist needed to perform transfers. Goal for w/c based mobility at d/c. Will need to figure out if able to have ramp installed. Recommend  PT.   Physical Therapy Goals   PT Frequency Daily   PT Predicted Duration/Target Date for Goal Attainment 11/28/22   PT Goals Bed Mobility;Transfers;Gait;Stairs;Wheelchair Mobility;PT Goal 1   PT: Bed Mobility Rolling;Supine to/from sit;Supervision/stand-by assist  (bed rail)   PT: Transfers Sit to/from stand;Bed to/from chair;Supervision/stand-by assist  (squat pivot L and R)   PT: Gait Moderate assist;Quad cane;25 feet   PT: Stairs Moderate assist;3 stairs;Rail on left   PT: Wheelchair Mobility Greater than 500 feet;manual wheelchair   PT: Goal 1 Car transfer, WBQC, modA   Interventions   Interventions Quick Adds Neuromuscular Re-ed;Therapeutic Activity;Therapeutic Procedure   Therapeutic Activity   Treatment Detail/Skilled Intervention PT: time taken to appropriately size DME for pt - TIS w/c 18x18\" with Roho cushion, added R shayy lap tray and adjusted head rest position to properly support occiput when seated   Physical Performance Test or Measurement (Report Req)    Treatment Detail/Skilled Intervention PT: PASS, see note   PT Discharge Planning   PT Plan PT: initiate FES for RLE tomorrow or Friday. Tall kneeling on mat with hands on ex ball focusing on weightshifting laterally in tall kneel; standing weightshifting in plantigrade, STS from high mat table   PT - Acute Rehab Center Time   Individual Time (minutes) - enter zero if not applicable - PT 75  (45 EVAL, 30 TA)   Group Time (minutes) - enter zero if not applicable  - PT 0   Concurrent Time (minutes) - enter zero if not applicable  - PT 0   Co-Treatment Time (minutes) - enter zero if not applicable  - PT 0   ARC Total Session Time (minutes) - PT 75   Roll Left and Right   Assistance Needed Adaptive equipment;Set-up / clean-up;Verbal cues   Physical Assistance Level 25%-49%   Comment PT: modA with vc's for sequencing " to roll to R, bed rail for support.   Roll Left to Right CARE Score 3   Sit to Lying   Assistance Needed Set-up / clean-up;Verbal cues   Physical Assistance Level 50%-74%   Comment PT: modA for RLE placement then pt able to place L foot under RLE to bring RLE into bed before rolling onto back; maxA for RLE placement.   Sit to Lying CARE Score 2   Lying to Sitting on Side of Bed   Assistance Needed Set-up / clean-up;Verbal cues   Physical Assistance Level 50%-74%   Comment PT: Rabia for trunk support sitting up to EOB on R side and using LLE to place RLE over EOB; maxA for RLE management to L side   Lying to Sitting CARE Score 2   Sit to Stand   Assistance Needed Adaptive equipment;Set-up / clean-up;Verbal cues   Physical Assistance Level 25%-49%   Comment PT: modA to stand and maintain midline   Sitting to Standing CARE Score 3   Locomotion   Locomotion Comment PT: unsafe d/t R hemiparesis   Walk 10 Feet   Reason if not Attempted Safety concerns   Walk 10 Ft. CARE Score 88   Walk 50 Feet with Two Turns   Reason if not Attempted Safety concerns   Walk 50 Ft. CARE Score 88   Walk 150 Feet   Reason if not Attempted Safety concerns   Walk 150 Ft. CARE Score 88   Walking 10 Feet on Uneven Surfaces   Reason if not Attempted Safety concerns   Walking 10 Feet on Uneven Surfaces CARE Score 88   Wheel 50 Feet with Two Turns   Assistance Needed Adaptive equipment   Physical Assistance Level Total assistance   Comment PT: using TIS for positioning seated in chair during day, totalA to propel down to gym   Wheel 50 Feet with Two Turns CARE Score 1   Wheel 150 Feet   Assistance Needed Adaptive equipment   Physical Assistance Level Total assistance   Wheel 150 Feet CARE Score 1   1 Step (Curb)   Reason if not Attempted Safety concerns   1 Step CARE Score 88   4 Steps   Reason if not Attempted Safety concerns   4 Steps CARE Score 88   12 Steps   Reason if not Attempted Safety concerns   12 Steps CARE Score 88   Picking Up Object    Reason if not Attempted Safety concerns    CARE Score 88   Car Transfer   Reason if not Attempted Safety concerns   Car Transfer CARE Score 88

## 2022-11-02 NOTE — CARE PLAN
Discharge Planner Post-Acute Rehab OT:     Discharge Plan: discharge w/ HC OT and assistance. ELOS: 3 1/2 weeks    Precautions: falls, aphasia, dysarthria, right side weakness, do not leave alone at EOB or commode    Current Status:  ADLs:    Mobility: Bed mobility- max A supine <>sit , STS mod A w/ bozena steady. W/C based and bozena steady Ax2 for transfers with nsg.     Grooming: setup and seated EOB w/ SBA, oral cares TBA    Dressing: UBD- max A to thread and don overhead supine w/ elevated HOB; LBD- total A to thread and don over hips supine, Feet- dependent    Bathing: TBA    Toileting: Ax2 with bozena steady to [place]from [facility]; toilet hygiene TBA  IADLs: Pt physically IND with all IADLs prior to admission, shared IADL tasks w/ spouse. Spouse is supportive and can assist upon discharge.   Vision/Cognition: impaired convergence. Limited assessment d/t aphasia    Assessment:  Pt would benefit from skilled OT services d/t recent decline with safety and IND with ADLs/IADLs following left ICA resulting in impaired right side weakness, impaired balance, impaired RUE sensation, impaired trunk control, aphasia, and impaired cognition. Est LOS 3.5 weeks with HC OT services    Other Barriers to Discharge (DME, Family Training, etc):  AE/DME likely needed prior to discharge: wheelchair, extended tub bench, toilet grab bars, shower grab bars.    Family training prior to discharge with spouse

## 2022-11-02 NOTE — CONSULTS
"Social Work: Initial Assessment with Discharge Plan    Patient Name: Tobin Hua  : 1960  Age: 62 year old  MRN: [id number]  Completed assessment with: Chart review and interview with patient and wife at bedside.   Admitted to ARU: 2022    Presenting Information   Date of SW assessment: 2022  Health Care Directive: Health Care Directive Agent (if patient not able to make decisions)  Primary Health Care Agent: Patient/self   Secondary Health Care Agent: Pt spouse, Alix NICHOLS   Living Situation: Lives in a home with spouse in [place], [place]. 3 YULIANA and 12 STI to the basement. Doesn't need to access basement, bed/bath on main level. Tub/shower. No pets in the home.   Previous Functional Status: Indep with all ADLs and IADLs PTA. 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 Enjoys walking and reading in free-time. Manages own medications, finances, and drives PTA. Denied any falls in last 6 months.   DME available: See therapy evaluation for more information   Patient and family understanding of hospitalization: Alert but cognition and speech appear impaired.   Cultural/Language/Spiritual Considerations: 63 y/o  male, english speaking, , and Gnosticist-tenzin.     Physical Health  Reason for admission: 2 right body involvement, left brain stroke: left ICA and L MCA ischemic stroke, c/b hemorrhagic transformation     Provider Information   Primary Care Physician: Pt wife shared that pt's PCP retired and pt had two physicals scheduled but got COVID and stroke resulted in cancelling both apts. Pt wife is going to look at providers at  [facility] and will update [facility] on preferred provider. [facility] notified of this by this writer.   [facility] will schedule PCP apt at discharge.   : None reported     Mental Health/Chemical Dependency:   Diagnosis: Pt denied. Per H&P--Notes some difficulty with mood, feeling anxious or worried especially " during evenings or overnights.  Alcohol/Tobacco/Narcotis: Pt denied   Support/Services in Place: None reported   Services Needed/Recommended: [facility] support while on ARU available.   Sexuality/Intimacy: Not discussed     Support System  Marital Status: , wife supportive and able to A at discharge. Retired in March. Appears healthy and well.   Family support: 2 adult children. Son and dtr, both local and supportive/involved. Two grandchildren, 3 and 2 y/o.    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   Income Source: not discussed   Education: not discussed   Financial Concerns: pt denied   Insurance: [employer] OPEN ACCESS    Discharge Plan   Patient and family discharge goal: Home   Provided Education on discharge plan: Evaluations and discharge recommendations pending.   Patient agreeable to discharge plan:  Pending further discussion. Evaluations and discharge recommendations pending.   Provided education and attained signature for Medicare IM and IRF Patient Rights and Privacy Information provided to patient : N/A  Provided patient with [facility]: YES-agreeable to referral, will complete closer to discharge.   Barriers to discharge: None identified     Discharge Recommendations   Disposition: See above   Transportation Needs: Patient, family/friends, paid transport, insurance transport (if applicable)     Additional comments   Discharge [place] [facility]. Alejandra cruzussed team rounds with pt wife. Pt wife given SW direct contact information if needs arise. No immediate needs at this time. SW will remain available and continue to follow as needs  "arise.    -------------------------------------------------------------------------------------------------------------  JERAMY Pain Assessment    Pain Effect on Sleep  Over the past 5 days, how much of the time has pain made it hard for you to sleep at night?\"    1. Rarely or not at all    Pain Interference with Therapy Activities  \"Over the past 5 days, how often have you limited your participation in rehabilitation therapy sessions due to pain?\"  1. Rarely or not at all    Pain Interference with Day-to-Day Activities  \"Over the past 5 days, how often have you limited your day-to-day activities (excluding rehabilitation therapy sessions) because of pain?\"  1. Rarely or not at all  -------------------------------------------------------------------------------------------------------------    Claudia Gonzáles [facility], Acute Inpatient Rehab Unit   [address] [place] [address], [address] [place] [address]  Phone: [phone], Fax: [phone], Pager: [phone]               "

## 2022-11-02 NOTE — PROGRESS NOTES
"   11/02/22 1030   Appointment Info   Signing Clinician's Name / Credentials (SLP) Lynette Muniz MS, CCC-SLP   General Information   Onset of Illness/Injury or Date of Surgery 10/21/22   Referring Physician Ashley Campoverde PA-C   Pertinent History of Current Problem Per H&P: Patient is \"62 year old right hand dominant male with past medical history of mitral valve repair (2011) and allergic rhinitis who presented to ED on 10/21/22 after acute change in mental status followed by fall at home.  CT/CTA head/neck revealed complete occlusion of left ICA.  Labs notable for mild hyponatremia.  Patient was administered tenecteplase and admitted for further evaluation and management.  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  S/p mechanical thrombectomy with neuro IR with recanalization. Unfortunately, on 10/24, patient noted to have worsening stroke deficits (R hemiparesis).  CT head with hemorrhagic transformation of prior CVA with intraparenchymal (left basal ganglia, left frontal and temporal lobes) and intraventricular hemorrhage with associated mass effect.  Per neurosurgery, no urgent surgical intervention.  Aspirin reversed with ddAVP and patient was admitted to ICU.  Follow up head CT showed stable hemorrhage.\" SLP Consult received for dysphagia, speech/language evaluations and treatment.   General Observations Pt sitting up in wheelchair finishing with physical therapy when SLP arrived, wife Alix at bedside. Pt pleasant and cooperative with speech and language evaluation tasks.   Type of Evaluation   Type of Evaluation Speech, Language, Cognition   Oral Motor   Oral Musculature generally intact   Dentition (Oral Motor)   Dentition (Oral Motor) natural dentition;adequate dentition   Facial Symmetry (Oral Motor)   Facial Symmetry (Oral Motor) right side impairment   Comment, Facial Symmetry (Oral Motor) At  rest   Right Side Facial Asymmetry minimal impairment   Lip Function " (Oral Motor)   Lip Range of Motion (Oral Motor) retraction impairment   Lip Coordination (Oral Motor) minimal impairment   Comment, Lip Function (Oral Motor) No air loss right labials with cheek puff task.   Retraction, Lip Range of Motion right side;moderate impairment   Tongue Function (Oral Motor)   Tongue Sensitivity (Oral Motor) right side decreased   Tongue Coordination/Speed (Oral Motor) reduced rate   Tongue ROM (Oral Motor) lateralization is impaired   Lateralization, Tongue ROM Impairment (Oral Motor) right side   Jaw Function (Oral Motor)   Jaw Function (Oral Motor) WNL   Cough/Swallow/Gag Reflex (Oral Motor)   Soft Palate/Velum (Oral Motor) WNL   Vocal Quality/Secretion Management (Oral Motor)   Vocal Quality (Oral Motor) WNL   Motor Speech   Vocal Loudness (Motor Speech) reduced loudness   Speech Intelligibility (Motor Speech) word level;phrase/sentence level;conversational level   Speech Fluency (Motor Speech) nonfluent, moderate impairment   Articulation (Motor Speech) imprecise articulation   Word Level, Speech Intelligibility (Motor Speech) intact   Conversational Level, Speech Intelligibility (Motor Speech) moderate impairment   Phrase/Sentence Level, Speech Intelligibility (Motor Speech) minimal impairment   Western Aphasia Battery- Revised Bedside Record From   Spontaneous Speech Content Score (out of 10) 4   Spontaneous Speech Fluency Score (out of 10) 3   Auditory Verbal Comprehension Score (out of 10) 9   Sequential Commands Score (out of 10) 8   Repetition Score (out of 10) 10   Object Naming Score (out of 10) 10   Bedside Aphasia Sum 44   WAB-R Bedside Aphasia Score 73.33   Reading Score (out of 10) 10   Writing Score (out of 10) 0   Bedside Language Sum 54   Bedside Language Score 67.5   Bedside Aphasia Classification Anomic Aphasia   Aphasia Severity Level Moderate Aphasia   Auditory Comprehension   Follows Commands (Auditory Comprehension) 1-step command;2-step commands   Yes/No Questions  (Auditory Comprehension) WFL;simple/factual questions;biographical/personal questions   1 Step, Follows Commands (Auditory Comprehension) intact   2 Step, Follows Commands (Auditory Comprehension) intact  (at basic, sequential level.)   Biographical/Personal Questions (Auditory Comprehension) intact   Simple/Factual Questions (Auditory Comprehension) intact   Verbal Expression   Confrontational Naming (Verbal Expression) objects;body parts   Conversational Speech (Verbal Expression) word level;phrase level;sentence level   Automatic Speech (Verbal Expression) WFL   Repetition Skills (Verbal Expression) words;phrases;sentences   Objects, Confrontational Naming (Verbal Expression) impaired   Sentences, Repetition Skills (Verbal Expression) intact   Words, Repetition Skills (Verbal Expression) intact   Phrases, Repetition Skills (Verbal Expression) intact   Sentence Level, Conversational Speech (Verbal Expression) severe impairment   Phrase Level, Conversational Speech (Verbal Expression) moderate impairment   Word Level, Conversational Speech (Verbal Expression) intact   Reading Comprehension   Oral Reading Ability (Reading Comprehension) WFL;paragraph level   Comprehension Level (Reading Comprehension) paragraph level tasks   Paragraph Level, Oral Reading Ability (Reading Comprehension) intact   Paragraph Level, Comprehension Level (Reading Comprehension) intact   Written Language   Written Expression (Written Language) word level;phrase level   Phrase Level, Written Expression (Written Language) impaired   Word Level, Written Expression (Written Language) impaired   Cognition   Orientation Status (Cognition) oriented to;person   General Therapy Interventions   Planned Therapy Interventions Language   Language Verbal expression;Auditory comprehension   Clinical Impression   Criteria for Skilled Therapeutic Interventions Met (SLP Eval) Yes, treatment indicated   SLP Diagnosis Mild dysarthria, moderate-severe  expressive aphasia, mild receptive aphasia.   Functional Limitations Related to Problem List (SLP) Communication of wants/needs   Risks & Benefits of therapy have been explained evaluation/treatment results reviewed;risks/benefits reviewed;participants voiced agreement with care plan;participants included;patient;spouse/significant other   Clinical Impression Comments SLP: Pt presents with moderate-severe anomic aphasia, mild receptive language deficits, and mild dysarthria characterized by decreased articulatory precision. Skilled SLP services indicated to improve language function to increase communication of wants, needs, and medical information.   SLP Total Evaluation Time   Eval: Sound production with lang comprehension and expression Minutes (68746) 60   SLP Goals   Therapy Frequency (SLP Eval) daily   SLP Predicted Duration/Target Date for Goal Attainment 11/30/22   SLP Goals Communication;SLP Goal 1   SLP: Communicate basic wants and needs moderate assist;verbally   SLP: Goal 1 With use of trained motor speech strategies, pt will increase speech intelligibility to 85% or greater at connected speech level.   SLP Discharge Planning   SLP Plan SLP: swallow evaluation, pull to note. Train pt and wife in word finding, dysarthria strategies. Tx basic level expressive language- RET, LILIBETH, VNeST may be appropriate.   Total Session Time   Total Session Time (sum of timed and untimed services) 60   Post Acute Settings Only   What unit is patient on? Acute Rehab   SLP - Acute Rehab Center Time   Individual Time (minutes) - enter zero if not applicable - SLP 60   Group Time (minutes) - enter zero if not applicable  - SLP 0   Concurrent Time (minutes) - enter zero if not applicable  - SLP 0   Co-Treatment Time (minutes) - enter zero if not applicable  - SLP 0   ARC Total Session Time (minutes) - SLP 60

## 2022-11-02 NOTE — PROGRESS NOTES
"  [facility]   Acute Rehabilitation Unit  Daily progress note    INTERVAL HISTORY  Tobin Hua was seen and examined at bedside.  No acute events reported overnight.  Patient was sleeping at the time of my visit, wife was present.  Notes patient had a full morning of therapies and was quite tired afterwards.  He had reported not having a great night of sleep last night, as he had noted yesterday some increased anxiety during night.  Noted that patient continues to urgent urination, and this afternoon, was unable to void despite that urge.  Per nursing, he was eventually able to void but still with some residual (~250ml).  Wife otherwise denied additional concerns or questions at this time.    Functionally, therapy evals underway today.    MEDICATIONS    aspirin  81 mg Oral Daily     fluticasone  1 spray Both Nostrils Daily     heparin ANTICOAGULANT  5,000 Units Subcutaneous Q12H     melatonin  3 mg Oral At Bedtime     metoprolol tartrate  25 mg Oral BID     senna-docusate  1 tablet Oral BID        acetaminophen, polyethylene glycol     PHYSICAL EXAM  BP (!) 122/92 (BP Location: Left arm)   Pulse 104   Temp 98.3  F (36.8  C) (Oral)   Resp 16   Ht 1.88 m (6' 2\")   SpO2 94%   Gen: NAD, lying in bed  HEENT: NC/AT  Cardio: appears well-perfused  Pulm: non-labored on room air  Abd: soft, non-distended  Ext: no appreciable edema in lower extremities  Neuro/MSK: sleeping, snoring    LABS  CBC RESULTS: Recent Labs   Lab Test 11/02/22  0658   WBC 11.5*   RBC 4.35*   HGB 12.8*   HCT 39.0*   MCV 90   MCH 29.4   MCHC 32.8   RDW 13.4        Last Basic Metabolic Panel:  Recent Labs   Lab Test 11/02/22  0658      POTASSIUM 3.7   CHLORIDE 111*   CO2 26   ANIONGAP 6   *   BUN 32*   CR 0.74   GFRESTIMATED >90   JAILENE 9.4       Rehabilitation - continue comprehensive acute inpatient rehabilitation program with multidisciplinary approach including therapies, rehab nursing, and " physiatry following. See interval history for updates.      ASSESSMENT AND PLAN  Tobin Hua is a 62 year old right hand dominant male with a past medical history of mitral valve repair (2011) and allergic rhinitis who was admitted on 10/21/22 with acute ischemic stroke due to dissection/occlusion of left ICA with hospital course complicated by hemorrhagic transformation with intraparenchymal and intraventricular hemorrhage, anemia, non-sustained ventricular tachycardia, hypo/hypernatremia, dysphagia, constipation, and headache.  He is now admitted to ARU on 11/1/22 for multidisciplinary rehabilitation and ongoing medical management.        Admission to acute inpatient rehab 11/01/22.    Impairment group code: 01.2 right body involvement, left brain stroke: left ICA and left MCA ischemic stroke, c/b hemorrhagic transformation        1. PT, OT and SLP 60 minutes of each on a daily basis, in addition to rehab nursing and close management of physiatrist.       2. Impairment of ADL's: Noted to have impaired strength, impaired balance, and impaired weight shifting, all affecting his ability to safely and independently perform basic ADLs.  Goal for mod I with basic ADLs, with assist for bathing and related transfers.     3. Impairment of mobility:  Noted to have impaired strength, impaired balance, and impaired weight shifting, all affecting his ability to safely and independently perform basic mobility.  Goal for mod I with bed mobility and transfers, anticipate to require wheelchair-based mobility.     4. Impairment of cognition/language/swallow:  Noted to have dysphagia, dysarthria, aphasia with goals for safe tolerance of least restrictive diet and improved communication skills to meet basic needs.  Would benefit from full cognitive evaluation at ARU.     5. Medical Conditions  New actions/orders/updates for today are in blue.     Acute ischemic stroke due to ICA dissection and thrombosis/occlusion s/p tenecteplase,  mechanical thrombectomy  C/b hemorrhagic transformation with intraparenchymal and intraventricular hemorrhage   R hemiparesis, R facial droop, dysphagia, aphasia, dysarthria  - Continue ASA 81 mg daily (resumed 10/28), indefinitely per neurology  - LDL 96, no indication for statin per neuro, no atherosclerosis on MRAs  - SBP goal <140  - R AFO recommended while inpatient, consult to orthotist if appropriate at ARU  - Ziopatch in place for 2 weeks at ARU admission  - Continue PT/OT/SLP  - Follow up with neurology (Dr. Edgard Dave) in 6 weeks     Moderate dilation of ascending aorta   Incidentally noted on echo for CVA work-up, involving the sinuses of Valsalva, maximal dimension 4.6 cm.  - Needs outptient follow up in 6 months with CT imaging, can be followed by PCP     Episodes of non-sustained ventricular tachycardia  On telemetry during IP stay.  Started on metoprolol with reduced frequency.  - Continue metoprolol 25 mg BID  - Ziopatch in place at ARU admission     Dysphagia  At risk for malnutrition  - RD consulted, appreciate assist.  - Continue SLP  - Continue Soft and Bite Sized (Level 6) diet with moderately thick (level 3) liquids.  - Meds in puree  - Swallow Recommendations per SLP: Upright for all eating and drinking.  Relaxed, leisurely rate when eating and drinking.  Check for pocketing on the right after meals/snacks/medications.  Continue oral care.   - Repeat VFSS as indicated  - Monitor labs, vitals, clinically for signs of dehydration while on thickened liuqids.  Encourage fluids.     Normocytic anemia  Noted drop from Hgb 13 to yohana of 9.9 on 10/23, most recently stable at 13 on 10/31.  - Trend CBC.  Hgb stable today at 12.8     Hypernatremia  S/p D5W infusion due to Na 146 on 10/31, improved to 144 on 11/1.  - Trend BMP.  Na improved to 143 today     Mild leukocytosis  WBC 11.5 on 11/1.  Afebrile, VSS.  No localizing sign/symptoms of infection with exception of mild urinary urgency/retention.   Suspect 2/2 stress, dehydration.  - Consider UA/UC if ongoing urinary symptoms, though suspect moreso neurogenic  - Encourage fluids  - Monitor vitals, clinically for signs of infection.  Repeat CBC to trend    Hx mitral valve repair (2012)  Mild mitral regurgitation  - Noted.  Not on PTA anticoagulation, just aspirin, resumed as above     Allergic rhinitis  - Continue PTA Flonase daily       6. Adjustment to disability:  Clinical psychology to eval and treat if indicated  7. FEN: soft and bite-sized (level 6) diet, moderately thick (level 3) liquids  8. Bowel: continent, recent constipation, monitor, on scheduled and PRN bowel meds  9. Bladder: continent/incontinent due to urgency, PVRs at admission with some mild retention, continue to monitor  10. DVT Prophylaxis: subcutaneous Heparin (ok'd per neuro on 10/27)  11. GI Prophylaxis: not indicated  12. Code: full  13. Disposition: goal for home with 24/7 assist  14. ELOS:  3 weeks  15. Rehab prognosis:  fair  16. Follow up Appointments on Discharge: PCP, neurology (Dr. Edgard Dave) in 6 weeks        Patient discussed with Dr. Jori Troncoso, PM&R Staff Physician who did evaluate patient on this date.    Ashley Campoverde PA-C  Physical Medicine & Rehabilitation

## 2022-11-03 ENCOUNTER — APPOINTMENT (OUTPATIENT)
Dept: OCCUPATIONAL THERAPY | Facility: CLINIC | Age: 62
DRG: 057 | End: 2022-11-03
Attending: PHYSICAL MEDICINE & REHABILITATION
Payer: COMMERCIAL

## 2022-11-03 ENCOUNTER — APPOINTMENT (OUTPATIENT)
Dept: PHYSICAL THERAPY | Facility: CLINIC | Age: 62
DRG: 057 | End: 2022-11-03
Attending: PHYSICAL MEDICINE & REHABILITATION
Payer: COMMERCIAL

## 2022-11-03 ENCOUNTER — APPOINTMENT (OUTPATIENT)
Dept: SPEECH THERAPY | Facility: CLINIC | Age: 62
DRG: 057 | End: 2022-11-03
Attending: PHYSICAL MEDICINE & REHABILITATION
Payer: COMMERCIAL

## 2022-11-03 LAB
ALBUMIN UR-MCNC: 10 MG/DL
ANION GAP SERPL CALCULATED.3IONS-SCNC: 6 MMOL/L (ref 3–14)
APPEARANCE UR: CLEAR
BACTERIA #/AREA URNS HPF: ABNORMAL /HPF
BILIRUB UR QL STRIP: NEGATIVE
BUN SERPL-MCNC: 23 MG/DL (ref 7–30)
CALCIUM SERPL-MCNC: 9 MG/DL (ref 8.5–10.1)
CHLORIDE BLD-SCNC: 110 MMOL/L (ref 94–109)
CO2 SERPL-SCNC: 25 MMOL/L (ref 20–32)
COLOR UR AUTO: YELLOW
CREAT SERPL-MCNC: 0.69 MG/DL (ref 0.66–1.25)
ERYTHROCYTE [DISTWIDTH] IN BLOOD BY AUTOMATED COUNT: 13.5 % (ref 10–15)
GFR SERPL CREATININE-BSD FRML MDRD: >90 ML/MIN/1.73M2
GLUCOSE BLD-MCNC: 133 MG/DL (ref 70–99)
GLUCOSE UR STRIP-MCNC: NEGATIVE MG/DL
HCT VFR BLD AUTO: 37.5 % (ref 40–53)
HGB BLD-MCNC: 12.3 G/DL (ref 13.3–17.7)
HGB UR QL STRIP: NEGATIVE
HOLD SPECIMEN: NORMAL
HOLD SPECIMEN: NORMAL
KETONES UR STRIP-MCNC: NEGATIVE MG/DL
LACTATE SERPL-SCNC: 2 MMOL/L (ref 0.7–2)
LACTATE SERPL-SCNC: 2 MMOL/L (ref 0.7–2)
LEUKOCYTE ESTERASE UR QL STRIP: NEGATIVE
MAGNESIUM SERPL-MCNC: 2.4 MG/DL (ref 1.6–2.3)
MCH RBC QN AUTO: 29.5 PG (ref 26.5–33)
MCHC RBC AUTO-ENTMCNC: 32.8 G/DL (ref 31.5–36.5)
MCV RBC AUTO: 90 FL (ref 78–100)
MUCOUS THREADS #/AREA URNS LPF: PRESENT /LPF
NITRATE UR QL: NEGATIVE
PH UR STRIP: 6 [PH] (ref 5–7)
PHOSPHATE SERPL-MCNC: 2.6 MG/DL (ref 2.5–4.5)
PLATELET # BLD AUTO: 226 10E3/UL (ref 150–450)
POTASSIUM BLD-SCNC: 3.7 MMOL/L (ref 3.4–5.3)
RBC # BLD AUTO: 4.17 10E6/UL (ref 4.4–5.9)
RBC URINE: 1 /HPF
SODIUM SERPL-SCNC: 141 MMOL/L (ref 133–144)
SP GR UR STRIP: 1.03 (ref 1–1.03)
UROBILINOGEN UR STRIP-MCNC: NORMAL MG/DL
WBC # BLD AUTO: 12.3 10E3/UL (ref 4–11)
WBC URINE: 1 /HPF

## 2022-11-03 PROCEDURE — 83605 ASSAY OF LACTIC ACID: CPT | Performed by: PHYSICIAN ASSISTANT

## 2022-11-03 PROCEDURE — 92610 EVALUATE SWALLOWING FUNCTION: CPT | Mod: GN

## 2022-11-03 PROCEDURE — 36415 COLL VENOUS BLD VENIPUNCTURE: CPT | Performed by: PHYSICAL MEDICINE & REHABILITATION

## 2022-11-03 PROCEDURE — 128N000003 HC R&B REHAB

## 2022-11-03 PROCEDURE — 80048 BASIC METABOLIC PNL TOTAL CA: CPT | Performed by: PHYSICIAN ASSISTANT

## 2022-11-03 PROCEDURE — 36415 COLL VENOUS BLD VENIPUNCTURE: CPT | Performed by: PHYSICIAN ASSISTANT

## 2022-11-03 PROCEDURE — 97112 NEUROMUSCULAR REEDUCATION: CPT | Mod: GP

## 2022-11-03 PROCEDURE — 83735 ASSAY OF MAGNESIUM: CPT | Performed by: PHYSICIAN ASSISTANT

## 2022-11-03 PROCEDURE — 250N000013 HC RX MED GY IP 250 OP 250 PS 637: Performed by: PHYSICIAN ASSISTANT

## 2022-11-03 PROCEDURE — 250N000009 HC RX 250: Performed by: STUDENT IN AN ORGANIZED HEALTH CARE EDUCATION/TRAINING PROGRAM

## 2022-11-03 PROCEDURE — 92507 TX SP LANG VOICE COMM INDIV: CPT | Mod: GN

## 2022-11-03 PROCEDURE — 81001 URINALYSIS AUTO W/SCOPE: CPT | Performed by: PHYSICIAN ASSISTANT

## 2022-11-03 PROCEDURE — 85027 COMPLETE CBC AUTOMATED: CPT | Performed by: PHYSICIAN ASSISTANT

## 2022-11-03 PROCEDURE — 86140 C-REACTIVE PROTEIN: CPT | Performed by: PHYSICIAN ASSISTANT

## 2022-11-03 PROCEDURE — 97535 SELF CARE MNGMENT TRAINING: CPT | Mod: GO | Performed by: OCCUPATIONAL THERAPIST

## 2022-11-03 PROCEDURE — 99232 SBSQ HOSP IP/OBS MODERATE 35: CPT | Performed by: PHYSICIAN ASSISTANT

## 2022-11-03 PROCEDURE — 83605 ASSAY OF LACTIC ACID: CPT | Performed by: PHYSICAL MEDICINE & REHABILITATION

## 2022-11-03 PROCEDURE — 250N000011 HC RX IP 250 OP 636: Performed by: PHYSICIAN ASSISTANT

## 2022-11-03 PROCEDURE — 84100 ASSAY OF PHOSPHORUS: CPT | Performed by: PHYSICIAN ASSISTANT

## 2022-11-03 PROCEDURE — 258N000003 HC RX IP 258 OP 636: Performed by: PHYSICIAN ASSISTANT

## 2022-11-03 RX ORDER — LIDOCAINE 40 MG/G
CREAM TOPICAL
Status: DISCONTINUED | OUTPATIENT
Start: 2022-11-03 | End: 2022-11-05 | Stop reason: HOSPADM

## 2022-11-03 RX ORDER — BISACODYL 10 MG
10 SUPPOSITORY, RECTAL RECTAL DAILY
Status: DISCONTINUED | OUTPATIENT
Start: 2022-11-03 | End: 2022-11-05 | Stop reason: HOSPADM

## 2022-11-03 RX ORDER — LIDOCAINE HYDROCHLORIDE 20 MG/ML
JELLY TOPICAL EVERY 4 HOURS PRN
Status: DISCONTINUED | OUTPATIENT
Start: 2022-11-03 | End: 2022-11-05 | Stop reason: HOSPADM

## 2022-11-03 RX ADMIN — LIDOCAINE HYDROCHLORIDE: 20 JELLY TOPICAL at 18:59

## 2022-11-03 RX ADMIN — FLUTICASONE PROPIONATE 1 SPRAY: 50 SPRAY, METERED NASAL at 08:00

## 2022-11-03 RX ADMIN — SENNOSIDES AND DOCUSATE SODIUM 1 TABLET: 50; 8.6 TABLET ORAL at 20:06

## 2022-11-03 RX ADMIN — SODIUM CHLORIDE 1000 ML: 9 INJECTION, SOLUTION INTRAVENOUS at 10:35

## 2022-11-03 RX ADMIN — ASPIRIN 81 MG: 81 TABLET ORAL at 07:38

## 2022-11-03 RX ADMIN — MELATONIN TAB 3 MG 3 MG: 3 TAB at 20:06

## 2022-11-03 RX ADMIN — SENNOSIDES AND DOCUSATE SODIUM 1 TABLET: 50; 8.6 TABLET ORAL at 08:00

## 2022-11-03 RX ADMIN — LIDOCAINE HYDROCHLORIDE: 20 JELLY TOPICAL at 09:33

## 2022-11-03 RX ADMIN — METOPROLOL TARTRATE 25 MG: 25 TABLET, FILM COATED ORAL at 08:00

## 2022-11-03 RX ADMIN — HEPARIN SODIUM 5000 UNITS: 5000 INJECTION, SOLUTION INTRAVENOUS; SUBCUTANEOUS at 20:06

## 2022-11-03 RX ADMIN — HEPARIN SODIUM 5000 UNITS: 5000 INJECTION, SOLUTION INTRAVENOUS; SUBCUTANEOUS at 07:39

## 2022-11-03 RX ADMIN — METOPROLOL TARTRATE 25 MG: 25 TABLET, FILM COATED ORAL at 20:06

## 2022-11-03 ASSESSMENT — ACTIVITIES OF DAILY LIVING (ADL)
ADLS_ACUITY_SCORE: 52
ADLS_ACUITY_SCORE: 50
ADLS_ACUITY_SCORE: 54
ADLS_ACUITY_SCORE: 52
ADLS_ACUITY_SCORE: 50
ADLS_ACUITY_SCORE: 50
ADLS_ACUITY_SCORE: 52
ADLS_ACUITY_SCORE: 48
ADLS_ACUITY_SCORE: 50
ADLS_ACUITY_SCORE: 54
ADLS_ACUITY_SCORE: 50
ADLS_ACUITY_SCORE: 50

## 2022-11-03 NOTE — PROGRESS NOTES
"   11/03/22 0834   Appointment Info   Signing Clinician's Name / Credentials (SLP) Cieloisaiah Johnson MS CCC-SLP   General Information   Onset of Illness/Injury or Date of Surgery 10/21/22   Referring Physician Ashley Campoverde PA-C   Pertinent History of Current Problem Per H&P: Patient is \"62 year old right hand dominant male with past medical history of mitral valve repair (2011) and allergic rhinitis who presented to ED on 10/21/22 after acute change in mental status followed by fall at home.  CT/CTA head/neck revealed complete occlusion of left ICA.  Labs notable for mild hyponatremia.  Patient was administered tenecteplase and admitted for further evaluation and management.  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  S/p mechanical thrombectomy with neuro IR with recanalization. Unfortunately, on 10/24, patient noted to have worsening stroke deficits (R hemiparesis).  CT head with hemorrhagic transformation of prior CVA with intraparenchymal (left basal ganglia, left frontal and temporal lobes) and intraventricular hemorrhage with associated mass effect.  Per neurosurgery, no urgent surgical intervention.  Aspirin reversed with ddAVP and patient was admitted to ICU.  Follow up head CT showed stable hemorrhage.\" SLP Consult received for dysphagia, speech/language evaluations and treatment.   General Observations Pt upright in bed upon arrival, agreeable and participatory in assessment. Pt seen by SLP for dysphagia and aphasia during acute hospitalization. Repeated VFSS completed 11/1, see below for details. Pt admitted to inpatient rehab on soft bite size (6) texture, moderately thick (3) liquid. NO straw   Pain Assessment   Patient Currently in Pain No   Type of Evaluation   Type of Evaluation Swallow Evaluation   Oral Motor   Oral Musculature generally intact   Dentition (Oral Motor)   Dentition (Oral Motor) natural dentition;adequate dentition   Facial Symmetry (Oral Motor) " "  Facial Symmetry (Oral Motor) right side impairment   Right Side Facial Asymmetry minimal impairment   Lip Function (Oral Motor)   Lip Range of Motion (Oral Motor) retraction impairment   Lip Sensitivity (Oral Motor) right lower lip decreased   Lip Coordination (Oral Motor) minimal impairment   Retraction, Lip Range of Motion right side;moderate impairment   Tongue Function (Oral Motor)   Tongue Sensitivity (Oral Motor) right side decreased   Tongue Strength (Oral Motor) strength decreased   Tongue Coordination/Speed (Oral Motor) reduced rate   Tongue ROM (Oral Motor) lateralization is impaired   Lateralization, Tongue ROM Impairment (Oral Motor) right side   Jaw Function (Oral Motor)   Jaw Function (Oral Motor) WNL   Cough/Swallow/Gag Reflex (Oral Motor)   Soft Palate/Velum (Oral Motor) WNL   Volitional Throat Clear/Cough (Oral Motor) WNL   Vocal Quality/Secretion Management (Oral Motor)   Vocal Quality (Oral Motor) WNL   General Swallowing Observations   Past History of Dysphagia VFSS repeated 11/1 indicating:\"Silent aspiration of thin liquid. Deep laryngeal penetration of mildly thick liquid. No penetration or aspiration of moderately thick liquid, puree, and solids.\" pt advanced to softe bite size (6) texture, level 3 liquid   Respiratory Support (General Swallowing Observations) none   Current Diet/Method of Nutritional Intake (General Swallowing Observations, NIS) soft & bite-sized (level 6);moderately thick liquids/liquidized (level 3)   Swallowing Evaluation Clinical swallow evaluation   Clinical Swallow Evaluation   Feeding Assistance set up only required   Additional evaluation(s) completed today Recommended   Rationale for completing additional evaluation VFSS\   Clinical Swallow Evaluation Textures Trialed moderately thick liquids/liquidized;soft & bite-sized   Clinical Swallow Eval: Moderately Thick Liquids   Mode of Presentation self-fed   Volume Presented 12 oz   Oral Phase delayed AP movement   Oral " Residue right lip drooling   Pharyngeal Phase intact   Clinical Swallow Eval: Soft & Bite Sized   Mode of Presentation self-fed   Oral Phase impaired mastication;delayed AP movement;residue in oral cavity   Oral Residue right lip drooling   Pharyngeal Phase intact   Esophageal Phase of Swallow   Patient reports or presents with symptoms of esophageal dysphagia No   Swallowing Recommendations   Diet Consistency Recommendations soft & bite-sized (level 6);moderately thick liquids/liquidized (level 3)   Supervision Level for Intake close supervision needed   Mode of Delivery Recommendations bolus size, small;no straws;slow rate of intake;place food on left side of mouth   Swallowing Maneuver Recommendations alternate food and liquid intake   Monitoring/Assistance Required (Eating/Swallowing) check mouth frequently for oral residue/pocketing;stop eating activities when fatigue is present;cue for finger/lingual sweep if oral pocketing present;monitor for cough or change in vocal quality with intake   Recommended Feeding/Eating Techniques (Swallow Eval) maintain upright sitting position for eating;minimize distractions during oral intake;set-up and prepare tray   Medication Administration Recommendations, Swallowing (SLP) crushed in mazin   Instrumental Assessment Recommendations VFSS (videofluoroscopic swallowing study)   General Therapy Interventions   Planned Therapy Interventions Dysphagia Treatment   Dysphagia treatment Oropharyngeal exercise training;Modified diet education;Instruction of safe swallow strategies;Compensatory strategies for swallowing   Clinical Impression   Criteria for Skilled Therapeutic Interventions Met (SLP Eval) Yes, treatment indicated   SLP Diagnosis mod oropharyngeal dysphagia   Functional Limitations Related to Problem List (SLP) PO intake, aspiration   Risks & Benefits of therapy have been explained evaluation/treatment results reviewed;risks/benefits reviewed;participants voiced  agreement with care plan;participants included;patient;spouse/significant other   Clinical Impression Comments SLP: Pt seen for dysphagia assessment following L ICA + MCA CVA. Oral motor functions notable for reduced R oral movement/sensation. Pt seen with meal soft bite size (6) texture, moderately thick (3) liquid by cup. Pt with prolonged mastication, delayed AP transit, mild-mod oral residuals. Pt able to clear residuals with min cues from SLP to alternate solids/liquids and wait until oral cavity clearance prior to another bite solid. Pt able to follow these directions but required intermittent cues to continue through course of meal. no notable impulivity noted, likely inattention to R buccal cavity. no s/sx aspiration. Demonstrates below baseline level of function and would benefit from skilled SLP to tx dysphagia and modify diet as clinically and instrumentally indicated. Recommend pt continue level 6 texture, 3 liquid. NO straw. Ensure upright all PO, set up assist and intermittent cueing during meals for oral clearance. Pt educated re: clinical impressions and plan for therapy. Recommend repeat VFSS in ~2 weeks or earlier pending dc date and progress.   SLP Total Evaluation Time   Eval: oral/pharyngeal swallow function, clinical swallow Minutes (87451) 45   SLP Goals   Therapy Frequency (SLP Eval) daily   SLP Predicted Duration/Target Date for Goal Attainment 11/30/22   SLP Goals Swallow   SLP Discharge Planning   SLP Plan SLP: intro pharyngeal exercises, f/u diet with strategies. intro word finding strategies, dysarthria strategies. Tx basic level expressive language- RET, LILIBETH, VNeST may be appropriate.   Total Session Time   Total Session Time (sum of timed and untimed services) 45   SLP - Acute Rehab Center Time   Individual Time (minutes) - enter zero if not applicable - SLP 45   Group Time (minutes) - enter zero if not applicable  - SLP 0   Concurrent Time (minutes) - enter zero if not applicable  -  SLP 0   Co-Treatment Time (minutes) - enter zero if not applicable  - SLP 0   ARC Total Session Time (minutes) - SLP 45

## 2022-11-03 NOTE — PLAN OF CARE
Goal Outcome Evaluation:      Plan of Care Reviewed With: spouse    Overall Patient Progress: no changeOverall Patient Progress: no change    Outcome Evaluation: Pt using call light appropriately, and has waited for assistance with transfers. No new skin issues noted. Pt did not report any pain tonight. Remains alert and oriented to self only. He has continued to retain urine, PVRs monitored per orders. SIC completed at 1945, 500 ml output.

## 2022-11-03 NOTE — PROGRESS NOTES
Individualized Overall Plan Of Care (IOPOC)      Rehab diagnosis/Impairment Group Code: 01.2 right body involvement, left brain stroke: left ica and l mca ischemic stroke, c/b hemorrhagic transformation.  Acute ischemic left ica stroke (h)       Expected functional outcome: reach a level of mod I     Clinical Impression Comments:  R hemiplegia and aphasia post CVA    Mobility: Tobin will benefit from PT to progress his R sided strength, coordination, and balance to decrease assist needed to perform transfers. Goal for w/c based mobility at d/c. Will need to figure out if able to have ramp installed. Recommend HH PT.    ADL: OT: Pt would benefit from skilled OT services d/t recent decline with safety with ADLs/IADLs and functional mobility following left ICA resulting in impaired right side weakness, impaired balance, impaired RUE sensation, impaired trunk control, aphasia, and impaired cognition. Est LOS 3.5 weeks with HC OT services    Communication/Cognition/Swallow: SLP: Pt seen for dysphagia assessment following L ICA + MCA CVA. Oral motor functions notable for reduced R oral movement/sensation. Pt seen with meal soft bite size (6) texture, moderately thick (3) liquid by cup. Pt with prolonged mastication, delayed AP transit, mild-mod oral residuals. Pt able to clear residuals with min cues from SLP to alternate solids/liquids and wait until oral cavity clearance prior to another bite solid. Pt able to follow these directions but required intermittent cues to continue through course of meal. no notable impulivity noted, likely inattention to R buccal cavity. no s/sx aspiration. Demonstrates below baseline level of function and would benefit from skilled SLP to tx dysphagia and modify diet as clinically and instrumentally indicated. Recommend pt continue level 6 texture, 3 liquid. NO straw. Ensure upright all PO, set up assist and intermittent cueing during meals for oral clearance. Pt educated re: clinical  impressions and plan for therapy. Recommend repeat VFSS in ~2 weeks or earlier pending dc date and progress.     Intensity of therapy:   PT 60 minutes, Daily, for 20 days   OT 60 minutes, Daily, for 20 days   SLP 60 minutes, daily, for 20 days     Orthotics AFO  Education stroke  Neuropsychology Testing: No        Medical Prognosis: good     Physician summary statement: R hemiplegia and aphasia post CVA, goal is to reach a level of mod I     Discharge destination: prior home  Discharge rehabilitation needs: home care, PT, OT and SLP      Estimated length of stay: 20 days       Rehabilitation Physician Jori Troncoso DO

## 2022-11-03 NOTE — PLAN OF CARE
Discharge Planner Post-Acute Rehab PT:     Discharge Plan: home with HH PT, w/c based, assist from wife    Precautions: falls with intermittent R lean.    Current Status:  Bed Mobility: sup<>sit modA, pt using LLE well to move RLE  Transfer: Florence Paez Ax2 with RNs; in therapies, mod-maxAx1 with SPT and skilled setup  Gait: unsafe  Stairs: unsafe    PASS:  11/2 - 12/36 indicating w/c based at d/c    Assessment:  Tobin will benefit from PT to progress his R sided strength, coordination, and balance to decrease assist needed to perform transfers. Goal for w/c based mobility at d/c. Will need to figure out if able to have ramp installed. Recommend HH PT.    Other Barriers to Discharge (DME, Family Training, etc):   Family training - to be scheduled  DME - needs K4 rental w/c, possibly ramp, possibly hospital bed

## 2022-11-03 NOTE — PROGRESS NOTES
Discharge Planner Post-Acute Rehab OT:      Discharge Plan: discharge w/ HC OT and assistance. ELOS: 3 1/2 weeks     Precautions: falls, aphasia, dysarthria, right side weakness, do not leave alone at EOB or commode     Current Status:  ADLs:    Mobility: Bed mobility- max A supine <>sit , STS mod A w/ bozena steady. W/C based and bozena steady Ax2 for transfers with nsg.     Grooming: setup and seated EOB w/ min A, oral cares with min A seated    Dressing: UBD- max A to thread and don overhead supine w/ elevated HOB; LBD-Ax2 with bozena steady, Feet- dependent    Bathing: Ax2 with bozena steady to/from grey dependent SCI shower chair/commode. Pt requires assistance with washing/rinsing/drying 6/10 body parts.     Toileting: Ax2 with bozena steady to /from BSC; toilet hygiene with Ax2 with bozena steady  IADLs: Pt physically IND with all IADLs prior to admission, shared IADL tasks w/ spouse. Spouse is supportive and can assist upon discharge.   Vision/Cognition: impaired convergence. Limited assessment d/t aphasia     Assessment: Completed full shower with use of grey dependent shower chair/commode for TIS component with bozena steady Ax2 for transfers. Will set-up FES tomorrow for RUE neuro re-education and sensorimotor skills.      Other Barriers to Discharge (DME, Family Training, etc):  AE/DME likely needed prior to discharge: wheelchair, extended tub bench, toilet grab bars, shower grab bars.     Family training prior to discharge with spouse

## 2022-11-03 NOTE — PLAN OF CARE
Discharge Planner Post-Acute Rehab SLP:     Discharge Plan: home with wife, ongoing SLP at next level    Precautions: right dominant side hemiparesis    Current Status:  Hearing: WFL  Vision: WFL with readers  Communication: Moderate-severe anomic aphasia, mild receptive language deficits, and mild dysarthria  Cognition: Not formally evaluated d/t extent of language impairments  Swallow: Soft and bite sized (6), moderately thick  (3) liquid. NO straw. Ensure upright all PO, set up assist and intermittent cueing during meals for oral clearance.    Assessment:  Family education re: appropriate types of tasks patient can complete in a more independent setting. Discussed music, poetry and singing/music influences being positive. Patient engaged in choral singing with historically favorite music to which he was able to sing along. When unable to express a choice, successful and accurate in selecting from written options re: music he enjoys.    Other Barriers to Discharge (Family Training, etc): family training in communication strategies, supervision

## 2022-11-03 NOTE — PLAN OF CARE
Discharge Planner Post-Acute Rehab SLP:     Discharge Plan: home with wife, ongoing SLP at next level    Precautions: right dominant side hemiparesis    Current Status:  Hearing: WFL  Vision: WFL with readers  Communication: Moderate-severe anomic aphasia, mild receptive language deficits, and mild dysarthria  Cognition: Not formally evaluated d/t extent of language impairments  Swallow: Soft and bite sized (6), moderately thick  (3) liquid. NO straw. Ensure upright all PO, set up assist and intermittent cueing during meals for oral clearance.    Assessment:  Pt seen for dysphagia assessment following L ICA + MCA CVA. Oral motor functions notable for reduced R oral movement/sensation. Pt seen with meal soft bite size (6) texture, moderately thick (3) liquid by cup. Pt with prolonged mastication, delayed AP transit, mild-mod oral residuals. Pt able to clear residuals with min cues from SLP to alternate solids/liquids and wait until oral cavity clearance prior to another bite solid. Pt able to follow these directions but required intermittent cues to continue through course of meal. no notable impulivity noted, likely inattention to R buccal cavity. no s/sx aspiration. Demonstrates below baseline level of function and would benefit from skilled SLP to tx dysphagia and modify diet as clinically and instrumentally indicated. Recommend pt continue level 6 texture, 3 liquid. NO straw. Ensure upright all PO, set up assist and intermittent cueing during meals for oral clearance. Pt educated re: clinical impressions and plan for therapy. Recommend repeat VFSS in ~2 weeks or earlier pending dc date and progress.    Other Barriers to Discharge (Family Training, etc): family training in communication strategies, supervision

## 2022-11-03 NOTE — PLAN OF CARE
"Goal Outcome Evaluation: No change       VS:     Pt A/O to self further orientation difficult to assess due to expressive aphasia. Afebrile. Lungs- clear bilaterally with both anterior and lateral. Denies nausea, dizziness, fever,shortness of breath, chest pain, or newly occurring numbness/tingling. /85   Pulse 102   Temp 97  F (36.1  C) (Axillary)   Resp 16   Ht 1.88 m (6' 2\")   Wt 75 kg (165 lb 5.5 oz)   SpO2 96%   BMI 21.23 kg/m        Output:     Bowels- active in all four quadrants. Last BM 11/2. Pt as been unable to void and needed ISC x 2. Difficult catheterization, coude catheter should be used. Provider paged and URO-Jet order placed to prevent pain during ISC. Notable blood at tip of catheter and coming from meatus post Catheterization for 450 dark julio urine.    Activity:     A of 2 with Florence Paez to transfer but can turn in bed using side rail without physical assist.      Skin: Skin intact     Pain:     Pain reported during ISC. URO-Jet now prescribed to prevent further pt discomfort during ISC. No other pain noted.    CMS:     Pt reports tingling on right upper and lower extremities, present since admission.      Dressing:     No dressings in place     Diet:     Level 6 food with level 3 moderately thickened fluids. Medications crushed in applesauce.      LDA:     None      Equipment:     Florence Paez      Plan:     Call light in reach,  continue to monitor.       Additional Info:              "

## 2022-11-03 NOTE — PLAN OF CARE
"Discharge Planner Post-Acute Rehab PT:     Discharge Plan: home with HH PT, w/c based, assist from wife    Precautions: falls with intermittent R lean.    Current Status:  Bed Mobility: sup<>sit modA, pt using LLE well to move RLE  Transfer: Florence Paez Ax2 with RNs; in therapies, mod-maxAx1 with SPT and skilled setup  Gait: unsafe  Stairs: unsafe    PASS:  11/2 - 12/36 indicating w/c based at d/c    Assessment:  Focused on STS with assist from [employer]. Noting good RLE muscle activation with stim.    X[employer] stim unit for Activity Based Therapy.     Skilled set up to place electrodes on R gluts, quads, hams, tib ant and gastroc; determined appropriate FES parameters for each muscle group based off of strong tetanic response of muscle test and/or sensory feedback.    [employer] Activities Included: STS from 27\" mat    Intervention and patient response: no adverse reaction to stim. Noting good muscle activation with stim. Posting over LLE, unable to functionally extend R knee in stance to weightbear without assist.    Please see [email or web address] for further details on patient's stimulation parameters.       Other Barriers to Discharge (DME, Family Training, etc):   Family training - to be scheduled  DME - needs K4 rental w/c, possibly ramp, possibly hospital bed    "

## 2022-11-03 NOTE — PROGRESS NOTES
"  [facility]   Acute Rehabilitation Unit  Daily progress note    INTERVAL HISTORY  Tobin Hua was seen and examined at bedside this morning.  No acute events reported overnight.  However, patient did require straight catheterization due to urinary retention.  Today, he reports that he slept well but did have some discomfort with catheterization.  Notes \"a little cough\" and some stomach upset earlier.  He was unsure when last BM, per nursing was on 11/2.  He denies any chest pain, palpitations, shortness of breath, dizziness or lightheadedness.  He agrees that it has been difficult to keep up with hydration on thickened liquids, agreeable to IVF bolus today.  Triggered sepsis/SIRS protocol due to mild leukocytosis and mild tachycardia, lactic acid returned at 2.0.    Functionally, he is currently needing mod A for sup<>sit, bozena judd with assist of 2 with nursing for transfers, but able to do pivot with mod to max assist with skilled therapist.  PASS 12.36 indicating wheelchair based at discharge.    MEDICATIONS    aspirin  81 mg Oral Daily     fluticasone  1 spray Both Nostrils Daily     heparin ANTICOAGULANT  5,000 Units Subcutaneous Q12H     melatonin  3 mg Oral At Bedtime     metoprolol tartrate  25 mg Oral BID     senna-docusate  1 tablet Oral BID        acetaminophen, lidocaine, polyethylene glycol     PHYSICAL EXAM  /80 (BP Location: Left arm)   Pulse 104   Temp 96.9  F (36.1  C) (Oral)   Resp 16   Ht 1.88 m (6' 2\")   Wt 75 kg (165 lb 5.5 oz)   SpO2 97%   BMI 21.23 kg/m     Gen: NAD, lying in bed  HEENT: NC/AT, dry mucous membranes  Cardio: RRR, +murmur, Ziopatch  Pulm: non-labored on room air, lungs CTA bilaterally  Abd: soft, non-distended, non-tender, bowel sounds present  Ext: no appreciable edema in lower extremities  Neuro/MSK: sleepy but responds appropriately to voice, follows simple commands, +aphasia, +dysarthria, PERRL, EOMI, right facial droop, " RUE 0/5, trace movement proximally in RLE    LABS  CBC RESULTS:   Recent Labs   Lab Test 11/03/22  0721 11/02/22  0658   WBC 12.3* 11.5*   RBC 4.17* 4.35*   HGB 12.3* 12.8*   HCT 37.5* 39.0*   MCV 90 90   MCH 29.5 29.4   MCHC 32.8 32.8   RDW 13.5 13.4    262     Last Basic Metabolic Panel:  Recent Labs   Lab Test 11/03/22  0721 11/02/22  0658    143   POTASSIUM 3.7 3.7   CHLORIDE 110* 111*   CO2 25 26   ANIONGAP 6 6   * 107*   BUN 23 32*   CR 0.69 0.74   GFRESTIMATED >90 >90   JAILENE 9.0 9.4       Rehabilitation - continue comprehensive acute inpatient rehabilitation program with multidisciplinary approach including therapies, rehab nursing, and physiatry following. See interval history for updates.      ASSESSMENT AND PLAN  Tobin Hua is a 62 year old right hand dominant male with a past medical history of mitral valve repair (2011) and allergic rhinitis who was admitted on 10/21/22 with acute ischemic stroke due to dissection/occlusion of left ICA with hospital course complicated by hemorrhagic transformation with intraparenchymal and intraventricular hemorrhage, anemia, non-sustained ventricular tachycardia, hypo/hypernatremia, dysphagia, constipation, and headache.  He is now admitted to ARU on 11/1/22 for multidisciplinary rehabilitation and ongoing medical management.        Admission to acute inpatient rehab 11/01/22.    Impairment group code: 01.2 right body involvement, left brain stroke: left ICA and left MCA ischemic stroke, c/b hemorrhagic transformation        1. PT, OT and SLP 60 minutes of each on a daily basis, in addition to rehab nursing and close management of physiatrist.       2. Impairment of ADL's: Noted to have impaired strength, impaired balance, and impaired weight shifting, all affecting his ability to safely and independently perform basic ADLs.  Goal for mod I with basic ADLs, with assist for bathing and related transfers.     3. Impairment of mobility:  Noted to have  impaired strength, impaired balance, and impaired weight shifting, all affecting his ability to safely and independently perform basic mobility.  Goal for mod I with bed mobility and transfers, anticipate to require wheelchair-based mobility.     4. Impairment of cognition/language/swallow:  Noted to have dysphagia, dysarthria, aphasia with goals for safe tolerance of least restrictive diet and improved communication skills to meet basic needs.  Would benefit from full cognitive evaluation at ARU.     5. Medical Conditions  New actions/orders/updates for today are in blue.     Acute ischemic stroke due to ICA dissection and thrombosis/occlusion s/p tenecteplase, mechanical thrombectomy  C/b hemorrhagic transformation with intraparenchymal and intraventricular hemorrhage   R hemiparesis, R facial droop, dysphagia, aphasia, dysarthria  - Continue ASA 81 mg daily (resumed 10/28), indefinitely per neurology  - LDL 96, no indication for statin per neuro, no atherosclerosis on MRAs  - SBP goal <140  - R AFO recommended while inpatient, consult to orthotist if appropriate at ARU  - Ziopatch in place for 2 weeks at ARU admission  - Continue PT/OT/SLP  - Follow up with neurology (Dr. Edgard Dave) in 6 weeks     Moderate dilation of ascending aorta   Incidentally noted on echo for CVA work-up, involving the sinuses of Valsalva, maximal dimension 4.6 cm.  - Needs outptient follow up in 6 months with CT imaging, can be followed by PCP     Episodes of non-sustained ventricular tachycardia  On telemetry during IP stay.  Started on metoprolol with reduced frequency.  - Continue metoprolol 25 mg BID  - Ziopatch in place at ARU admission     Dysphagia  At risk for malnutrition  - RD consulted, appreciate assist.  - Continue SLP  - Continue soft and bite-sized (level 6) diet with moderately thick (level 3) liquids.  - Meds in puree  - Swallow recs per SLP: Upright for all eating and drinking.  Relaxed, leisurely rate when eating  and drinking.  Check for pocketing on the right after meals/snacks/medications.  Continue oral care.   - Repeat VFSS as indicated  - Monitor labs, vitals, clinically for signs of dehydration while on thickened liuqids.  Encourage fluids.     Normocytic anemia  Noted drop from Hgb 13 to yohana of 9.9 on 10/23, most recently stable at 13 on 10/31.  - Trend CBC.  Hgb stable today at 12.3     Hypernatremia  S/p D5W infusion due to Na 146 on 10/31 -> 144 -> 143.  - Trend BMP.  Na improved to 141 today     Mild leukocytosis  WBC 11.5 on 11/1.  Afebrile, VSS.  No localizing sign/symptoms of infection with exception of mild urinary urgency/retention.  Suspect 2/2 stress, dehydration.  - WBC slightly uptrending at 12.3 today.  Mild tachycardia.  Afebrile.  - Obtain UA/UC  - Triggered sepsis/SIRS protocol, lactic acid 2.0.  Suspect dehydration in setting of thickened liquids.  Will give 1L NS IV, recheck lactic acid at 1500.  Encourage fluids  - Monitor vitals, clinically for signs of infection.  Consider further infectious work-up if not improved with hydration    Urinary retention  Noted since ARU admission, requiring intermittent straight catheterization.  Wife had noted urinary urgency and some incontinence while at hospital.  - Continue bladder scans and ISC, Urojet added for comfort  - Obtain UA/UC as above to rule out infection  - If negative for infection, can consider trial of Flomax    Hx mitral valve repair (2012)  Mild mitral regurgitation  - Noted.  Not on PTA anticoagulation, just aspirin, resumed as above     Allergic rhinitis  - Continue PTA Flonase daily       6. Adjustment to disability:  Clinical psychology to eval and treat if indicated  7. FEN: soft and bite-sized (level 6) diet, moderately thick (level 3) liquids  8. Bowel: continent, recent constipation, monitor, on scheduled and PRN bowel meds  9. Bladder: continent/incontinent due to urgency, now with some retention as above, continue to  monitor  10. DVT Prophylaxis: subcutaneous Heparin (ok'd per neuro on 10/27)  11. GI Prophylaxis: not indicated  12. Code: full  13. Disposition: goal for home with 24/7 assist  14. ELOS:  4 weeks  15. Rehab prognosis:  fair  16. Follow up Appointments on Discharge: PCP, neurology (Dr. Edgard Dave) in 6 weeks        Patient discussed with Dr. Jori Troncoso, PM&R Staff Physician    ESTELA Benavides-C  Physical Medicine & Rehabilitation

## 2022-11-03 NOTE — PLAN OF CARE
Goal Outcome Evaluation:      Plan of Care Reviewed With: patient    Overall Patient Progress: no changeOverall Patient Progress: no change         VS Vital signs:  Temp: (!) 96  F (35.6  C) Temp src: Oral BP: 122/74 Pulse: 94   Resp: 16 SpO2: 98 % O2 Device: None (Room air)        Activity : A-2 with bozena pleitezdy      Diet : Soft bite with moderately thick liquid. Takes pill as whole w/ thick liquid     Skin : Skin dry and intact.     Neuro : Pt is alert, [place] orientation due to aphasia. Difficulty finding words.      Cardiac : WDL, denies CP     Respiratory : WDL, denies SOB     GI/ : Incontinent of bowel and bladder. Bowel sounds active in all quadrants. Brief in place. Bedside urinal available. LBM 11/3/2022. Pt has two loose BM with therapy. UA collected this shift. Plan to start bowel program in the evening.      Lines :  L forearm PIV. Saline locked.      MISC :  Bladder scan for 384. Uro-Jet used prior to straight cath. Output 400. Sepsis protocol trigger. Lactic Acid 2.0. One time IV bolus administered. Wife at bedside.

## 2022-11-04 ENCOUNTER — APPOINTMENT (OUTPATIENT)
Dept: OCCUPATIONAL THERAPY | Facility: CLINIC | Age: 62
DRG: 057 | End: 2022-11-04
Attending: PHYSICAL MEDICINE & REHABILITATION
Payer: COMMERCIAL

## 2022-11-04 ENCOUNTER — APPOINTMENT (OUTPATIENT)
Dept: GENERAL RADIOLOGY | Facility: CLINIC | Age: 62
DRG: 057 | End: 2022-11-04
Attending: PHYSICIAN ASSISTANT
Payer: COMMERCIAL

## 2022-11-04 ENCOUNTER — APPOINTMENT (OUTPATIENT)
Dept: SPEECH THERAPY | Facility: CLINIC | Age: 62
DRG: 057 | End: 2022-11-04
Attending: PHYSICAL MEDICINE & REHABILITATION
Payer: COMMERCIAL

## 2022-11-04 ENCOUNTER — APPOINTMENT (OUTPATIENT)
Dept: PHYSICAL THERAPY | Facility: CLINIC | Age: 62
DRG: 057 | End: 2022-11-04
Attending: PHYSICAL MEDICINE & REHABILITATION
Payer: COMMERCIAL

## 2022-11-04 LAB
ALBUMIN SERPL-MCNC: 3.1 G/DL (ref 3.4–5)
ALP SERPL-CCNC: 99 U/L (ref 40–150)
ALT SERPL W P-5'-P-CCNC: 133 U/L (ref 0–70)
ANION GAP SERPL CALCULATED.3IONS-SCNC: 6 MMOL/L (ref 3–14)
AST SERPL W P-5'-P-CCNC: 50 U/L (ref 0–45)
BASOPHILS # BLD AUTO: 0 10E3/UL (ref 0–0.2)
BASOPHILS NFR BLD AUTO: 0 %
BILIRUB SERPL-MCNC: 0.5 MG/DL (ref 0.2–1.3)
BUN SERPL-MCNC: 22 MG/DL (ref 7–30)
CALCIUM SERPL-MCNC: 9.5 MG/DL (ref 8.5–10.1)
CHLORIDE BLD-SCNC: 106 MMOL/L (ref 94–109)
CO2 SERPL-SCNC: 26 MMOL/L (ref 20–32)
CREAT SERPL-MCNC: 0.65 MG/DL (ref 0.66–1.25)
CRP SERPL-MCNC: 12 MG/L (ref 0–8)
EOSINOPHIL # BLD AUTO: 0.1 10E3/UL (ref 0–0.7)
EOSINOPHIL NFR BLD AUTO: 0 %
ERYTHROCYTE [DISTWIDTH] IN BLOOD BY AUTOMATED COUNT: 13.7 % (ref 10–15)
GFR SERPL CREATININE-BSD FRML MDRD: >90 ML/MIN/1.73M2
GLUCOSE BLD-MCNC: 125 MG/DL (ref 70–99)
HCT VFR BLD AUTO: 37.1 % (ref 40–53)
HGB BLD-MCNC: 12.4 G/DL (ref 13.3–17.7)
HOLD SPECIMEN: NORMAL
IMM GRANULOCYTES # BLD: 0.2 10E3/UL
IMM GRANULOCYTES NFR BLD: 1 %
LACTATE SERPL-SCNC: 1.4 MMOL/L (ref 0.7–2)
LYMPHOCYTES # BLD AUTO: 1.4 10E3/UL (ref 0.8–5.3)
LYMPHOCYTES NFR BLD AUTO: 9 %
MCH RBC QN AUTO: 30 PG (ref 26.5–33)
MCHC RBC AUTO-ENTMCNC: 33.4 G/DL (ref 31.5–36.5)
MCV RBC AUTO: 90 FL (ref 78–100)
MONOCYTES # BLD AUTO: 1.4 10E3/UL (ref 0–1.3)
MONOCYTES NFR BLD AUTO: 9 %
NEUTROPHILS # BLD AUTO: 12.2 10E3/UL (ref 1.6–8.3)
NEUTROPHILS NFR BLD AUTO: 81 %
NRBC # BLD AUTO: 0 10E3/UL
NRBC BLD AUTO-RTO: 0 /100
PLATELET # BLD AUTO: 229 10E3/UL (ref 150–450)
POTASSIUM BLD-SCNC: 4.3 MMOL/L (ref 3.4–5.3)
PROCALCITONIN SERPL-MCNC: <0.05 NG/ML
PROT SERPL-MCNC: 7 G/DL (ref 6.8–8.8)
RBC # BLD AUTO: 4.14 10E6/UL (ref 4.4–5.9)
SODIUM SERPL-SCNC: 138 MMOL/L (ref 133–144)
WBC # BLD AUTO: 13.8 10E3/UL (ref 4–11)
WBC # BLD AUTO: 15.2 10E3/UL (ref 4–11)

## 2022-11-04 PROCEDURE — 84145 PROCALCITONIN (PCT): CPT | Performed by: PHYSICIAN ASSISTANT

## 2022-11-04 PROCEDURE — 97110 THERAPEUTIC EXERCISES: CPT | Mod: GP | Performed by: STUDENT IN AN ORGANIZED HEALTH CARE EDUCATION/TRAINING PROGRAM

## 2022-11-04 PROCEDURE — 92507 TX SP LANG VOICE COMM INDIV: CPT | Mod: GN | Performed by: SPEECH-LANGUAGE PATHOLOGIST

## 2022-11-04 PROCEDURE — 250N000011 HC RX IP 250 OP 636: Performed by: PHYSICIAN ASSISTANT

## 2022-11-04 PROCEDURE — 99232 SBSQ HOSP IP/OBS MODERATE 35: CPT | Performed by: PHYSICIAN ASSISTANT

## 2022-11-04 PROCEDURE — 97112 NEUROMUSCULAR REEDUCATION: CPT | Mod: GO | Performed by: OCCUPATIONAL THERAPIST

## 2022-11-04 PROCEDURE — 85048 AUTOMATED LEUKOCYTE COUNT: CPT | Performed by: PHYSICIAN ASSISTANT

## 2022-11-04 PROCEDURE — 71046 X-RAY EXAM CHEST 2 VIEWS: CPT

## 2022-11-04 PROCEDURE — 85014 HEMATOCRIT: CPT | Performed by: PHYSICIAN ASSISTANT

## 2022-11-04 PROCEDURE — 250N000009 HC RX 250: Performed by: STUDENT IN AN ORGANIZED HEALTH CARE EDUCATION/TRAINING PROGRAM

## 2022-11-04 PROCEDURE — 71046 X-RAY EXAM CHEST 2 VIEWS: CPT | Mod: 26 | Performed by: STUDENT IN AN ORGANIZED HEALTH CARE EDUCATION/TRAINING PROGRAM

## 2022-11-04 PROCEDURE — 97530 THERAPEUTIC ACTIVITIES: CPT | Mod: GP | Performed by: STUDENT IN AN ORGANIZED HEALTH CARE EDUCATION/TRAINING PROGRAM

## 2022-11-04 PROCEDURE — 82040 ASSAY OF SERUM ALBUMIN: CPT | Performed by: PHYSICIAN ASSISTANT

## 2022-11-04 PROCEDURE — 83605 ASSAY OF LACTIC ACID: CPT | Performed by: PHYSICIAN ASSISTANT

## 2022-11-04 PROCEDURE — 36415 COLL VENOUS BLD VENIPUNCTURE: CPT | Performed by: PHYSICIAN ASSISTANT

## 2022-11-04 PROCEDURE — 92526 ORAL FUNCTION THERAPY: CPT | Mod: GN

## 2022-11-04 PROCEDURE — 80053 COMPREHEN METABOLIC PANEL: CPT | Performed by: PHYSICIAN ASSISTANT

## 2022-11-04 PROCEDURE — 128N000003 HC R&B REHAB

## 2022-11-04 PROCEDURE — 250N000013 HC RX MED GY IP 250 OP 250 PS 637: Performed by: PHYSICIAN ASSISTANT

## 2022-11-04 RX ADMIN — ASPIRIN 81 MG: 81 TABLET ORAL at 07:40

## 2022-11-04 RX ADMIN — HEPARIN SODIUM 5000 UNITS: 5000 INJECTION, SOLUTION INTRAVENOUS; SUBCUTANEOUS at 07:40

## 2022-11-04 RX ADMIN — HEPARIN SODIUM 5000 UNITS: 5000 INJECTION, SOLUTION INTRAVENOUS; SUBCUTANEOUS at 20:48

## 2022-11-04 RX ADMIN — LIDOCAINE HYDROCHLORIDE: 20 JELLY TOPICAL at 12:11

## 2022-11-04 RX ADMIN — METOPROLOL TARTRATE 25 MG: 25 TABLET, FILM COATED ORAL at 20:55

## 2022-11-04 RX ADMIN — METOPROLOL TARTRATE 25 MG: 25 TABLET, FILM COATED ORAL at 07:41

## 2022-11-04 RX ADMIN — SENNOSIDES AND DOCUSATE SODIUM 1 TABLET: 50; 8.6 TABLET ORAL at 07:41

## 2022-11-04 RX ADMIN — FLUTICASONE PROPIONATE 1 SPRAY: 50 SPRAY, METERED NASAL at 07:41

## 2022-11-04 RX ADMIN — SENNOSIDES AND DOCUSATE SODIUM 1 TABLET: 50; 8.6 TABLET ORAL at 20:55

## 2022-11-04 RX ADMIN — MELATONIN TAB 3 MG 3 MG: 3 TAB at 20:55

## 2022-11-04 RX ADMIN — LIDOCAINE HYDROCHLORIDE: 20 JELLY TOPICAL at 03:17

## 2022-11-04 RX ADMIN — BISACODYL 10 MG: 10 SUPPOSITORY RECTAL at 19:26

## 2022-11-04 ASSESSMENT — ACTIVITIES OF DAILY LIVING (ADL)
ADLS_ACUITY_SCORE: 48

## 2022-11-04 NOTE — PLAN OF CARE
Discharge Planner Post-Acute Rehab PT:     Discharge Plan: home with HH PT, w/c based, assist from wife    Precautions: swallow - moderately thick, falls, intermittent R lean. Orthostatic- encourage fluids, TEDs and ab.binder OOB    Current Status:  Bed Mobility: sup<>sit modA, pt using LLE well to move RLE  Transfer: Florence Paez Ax2 with RNs; in therapies, mod-maxAx1 with SPT and skilled setup  Gait: unsafe  Stairs: unsafe    PASS:  11/2 - 12/36 indicating w/c based at d/c    Assessment:  Pt motivated, participates well. Standing limited today d/t orthostatic. Completed SPT with MIN A today.    Other Barriers to Discharge (DME, Family Training, etc):   Family training - to be scheduled  DME - needs K4 rental w/c, possibly ramp, possibly hospital bed

## 2022-11-04 NOTE — PLAN OF CARE
Discharge Planner Post-Acute Rehab SLP:     Discharge Plan: home with wife, ongoing SLP at next level    Precautions: right dominant side hemiparesis    Current Status:  Hearing: WFL  Vision: WFL with readers  Communication: Moderate-severe anomic aphasia, mild receptive language deficits, and mild dysarthria  Cognition: Not formally evaluated d/t extent of language impairments  Swallow: Soft and bite sized (6), moderately thick  (3) liquid. NO straw. Ensure upright all PO, set up assist and intermittent cueing during meals for oral clearance. Upright in WC all meals    Assessment:   Pt seen with meal current diet soft bite size (6) texture, moderately thick (3) liquid by cup. Pt with extended mastication, delayed AP transit, mild-mod collection oral residuals post initial swallow. Prior to PO, SLP reviewed strategies to ensure oral clearance. Pt able to implement following education and re education during session. Spouse also present for session and educated re: strategies to ensure clearance during meals. Recommend continue current diet (6,3), NO straws, upright WC all meals, intermittent cues + set up assist, alternate solids/liquids, check oral cavity before reclining pt. Educated spouse on pharyngeal exercise for effortful swallow and axel. Encouraged  pt and spouse tocomplete desired # total/day    Other Barriers to Discharge (Family Training, etc): family training in communication strategies, supervision

## 2022-11-04 NOTE — PLAN OF CARE
Pt is alert and oriented with severe expressive aphasia, correct when answering yes/no orientation questions and is able to follow directions , denies fever, chills, chest pain, SOB, N/V, abdominal pain, or new weakness/numbness/tingling. Unable to void overnight. ISC for 650 at 3 am.  continent of bowel and bladder, active bowel sounds and flatulence. Transferring with assist of 1-2 with bozena whaley, sleeping well throughout the night, PIV patent in LUE, dressing reinforced.  no further care concerns at this time continue with POC.

## 2022-11-04 NOTE — PLAN OF CARE
Goal Outcome Evaluation:      Plan of Care Reviewed With: patient    Overall Patient Progress: no changeOverall Patient Progress: no change    Outcome Evaluation: Pt uses call light to make needs known. He did not report any pain this shift. Continues to be unable to void, PVRs completed per orders. SIC completed x1 with 550 ml output.

## 2022-11-04 NOTE — PLAN OF CARE
Goal Outcome Evaluation:      Plan of Care Reviewed With: patient, spouse    Overall Patient Progress: no changeOverall Patient Progress: no change    Outcome Evaluation: Still no void, SIC x1 this shift.  continent results of bowel on BSC this evening.    Orientation:self, hard to assess rest due to expressive aphasia.  VSS on RA.    Bowel:continent this shift on BSC.   Bladder: no void, per orders straight catheterized at 1900 for 550.    Pain:no c/o pain, no prn meds given.    Ambulation/Transfers:up with assist two stand pivot or assist two with bozena whaley.  W/c based. Tolerated assist two stand pivot from bed to BSC this evening but was max assist two.    Diet/ Liquids: soft and bite size, moderately thick liquids. Takes pills well whole crushed in applesauce.    Tubes/ Lines/ Drains: Left FA PIV SL.    Bed alarm on for safety, call light within reach. Continue with POC.

## 2022-11-04 NOTE — PROGRESS NOTES
Discharge Planner Post-Acute Rehab OT:      Discharge Plan: discharge w/ HC OT and assistance.     Precautions: falls, aphasia, dysarthria, right side weakness, do not leave alone at EOB or commode     Current Status:  ADLs:    Mobility: Bed mobility- max A supine <>sit , STS mod A w/ bozena steady. W/C based and bozena steady Ax2 for transfers with nsg.     Grooming: setup and seated EOB w/ min A, oral cares with min A seated    Dressing: UBD- max A to thread and don overhead supine w/ elevated HOB; LBD-Ax2 with bozena steady, Feet- dependent    Bathing: Ax2 with bozena steady to/from grey dependent SCI shower chair/commode. Pt requires assistance with washing/rinsing/drying 6/10 body parts.     Toileting: Ax2 with bozena steady to /from BSC; toilet hygiene with Ax2 with bozena steady  IADLs: Pt physically IND with all IADLs prior to admission, shared IADL tasks w/ spouse. Spouse is supportive and can assist upon discharge.   Vision/Cognition: impaired convergence. Limited assessment d/t aphasia     Assessment: Initiated FES bike for RUE sensorimotor retraining. See POC note for further information.      Other Barriers to Discharge (DME, Family Training, etc):  AE/DME likely needed prior to discharge: wheelchair, extended tub bench, toilet grab bars, shower grab bars.     Family training prior to discharge with spouse

## 2022-11-04 NOTE — PLAN OF CARE
Skilled set up on :  Pt dependent for proper placement of electrodes on RUE for optimum muscle contraction, safe positioning on w/c within frame and cushion for prevention of skin breakdown, UE secured to arm support.  Passive motion assessed to ensure proper positioning. R arm trough and coban to secure UE.     Pt performed 32 minutes of active FES ergometry with 0-100% stimulation applied to above muscles at 35 rpm with .5-.81 nm resistance.  This OT adjusted e-stim and cycling parameters in real-time to ensure palpable muscle contractions throughout session.  Please see [email or web address] for further details on patient's stimulation parameters and ergometry outcomes.  Constant monitoring of R elbow during cycling to avoid friction against the wheelchair lateral support.     Changes in parameters that were part of this treatment:   -resistance  -amplitude  -pedal speed    Functional outcomes from this intervention include:   -improved sensory awareness and proprioception  -improved muscle strength  -improved motor coordination

## 2022-11-04 NOTE — PROGRESS NOTES
"  [facility]   Acute Rehabilitation Unit  Daily progress note    INTERVAL HISTORY  Tobin Hua was seen and examined at bedside this morning.  No acute events reported overnight.  Patient does continue to have urinary retention requiring intermittent catheterization.  He states he did not sleep very well last night, could not identify any particular reason, just waking frequently.  He denies chest pain, palpitations, shortness of breath, headache, fever/chills, cough, sore throat, abdominal pain, nausea.  PT noted suspected orthostasis this morning but unable to obtain BP reading due to patient feeling lightheaded.  Patient notes having a few bowel movements.  He is trying his best to keep up with hydration on thickened liquids.    Functionally, he was able to complete pivot transfer with min A today with PT although standing limited by orthostasis.  OT initiated FES bike for RUE sensorimotor retraining.    MEDICATIONS    aspirin  81 mg Oral Daily     bisacodyl  10 mg Rectal Daily     fluticasone  1 spray Both Nostrils Daily     heparin ANTICOAGULANT  5,000 Units Subcutaneous Q12H     melatonin  3 mg Oral At Bedtime     metoprolol tartrate  25 mg Oral BID     senna-docusate  1 tablet Oral BID     sodium chloride (PF)  3 mL Intracatheter Q8H        acetaminophen, lidocaine 4%, lidocaine, lidocaine (buffered or not buffered), polyethylene glycol, sodium chloride (PF)     PHYSICAL EXAM  /83 (BP Location: Left arm, Patient Position: Semi-Walton's, Cuff Size: Adult Regular)   Pulse 98   Temp (!) 96  F (35.6  C) (Oral)   Resp 16   Ht 1.88 m (6' 2\")   Wt 75 kg (165 lb 5.5 oz)   SpO2 95%   BMI 21.23 kg/m     Gen: NAD, sitting up in chair  HEENT: NC/AT, MMM  Cardio: RRR, +murmur, Ziopatch  Pulm: non-labored on room air, lungs CTA bilaterally  Abd: soft, non-distended, non-tender, bowel sounds present  Ext: no appreciable edema in lower extremities  Neuro/MSK: awake, alert, " follows simple commands, +aphasia, +dysarthria, PERRL, EOMI, right facial droop, R hemiparesis    LABS  CBC RESULTS:   Recent Labs   Lab Test 11/03/22  0721 11/02/22  0658   WBC 12.3* 11.5*   RBC 4.17* 4.35*   HGB 12.3* 12.8*   HCT 37.5* 39.0*   MCV 90 90   MCH 29.5 29.4   MCHC 32.8 32.8   RDW 13.5 13.4    262     Last Basic Metabolic Panel:  Recent Labs   Lab Test 11/03/22  0721 11/02/22  0658    143   POTASSIUM 3.7 3.7   CHLORIDE 110* 111*   CO2 25 26   ANIONGAP 6 6   * 107*   BUN 23 32*   CR 0.69 0.74   GFRESTIMATED >90 >90   JAILENE 9.0 9.4       Rehabilitation - continue comprehensive acute inpatient rehabilitation program with multidisciplinary approach including therapies, rehab nursing, and physiatry following. See interval history for updates.      ASSESSMENT AND PLAN  Tobin Hua is a 62 year old right hand dominant male with a past medical history of mitral valve repair (2011) and allergic rhinitis who was admitted on 10/21/22 with acute ischemic stroke due to dissection/occlusion of left ICA with hospital course complicated by hemorrhagic transformation with intraparenchymal and intraventricular hemorrhage, anemia, non-sustained ventricular tachycardia, hypo/hypernatremia, dysphagia, constipation, and headache.  He is now admitted to ARU on 11/1/22 for multidisciplinary rehabilitation and ongoing medical management.        Admission to acute inpatient rehab 11/01/22.    Impairment group code: 01.2 right body involvement, left brain stroke: left ICA and left MCA ischemic stroke, c/b hemorrhagic transformation        1. PT, OT and SLP 60 minutes of each on a daily basis, in addition to rehab nursing and close management of physiatrist.       2. Impairment of ADL's: Noted to have impaired strength, impaired balance, and impaired weight shifting, all affecting his ability to safely and independently perform basic ADLs.  Goal for mod I with basic ADLs, with assist for bathing and related  transfers.     3. Impairment of mobility:  Noted to have impaired strength, impaired balance, and impaired weight shifting, all affecting his ability to safely and independently perform basic mobility.  Goal for mod I with bed mobility and transfers, anticipate to require wheelchair-based mobility.     4. Impairment of cognition/language/swallow:  Noted to have dysphagia, dysarthria, aphasia with goals for safe tolerance of least restrictive diet and improved communication skills to meet basic needs.  Would benefit from full cognitive evaluation at ARU.     5. Medical Conditions  New actions/orders/updates for today are in blue.     Acute ischemic stroke due to ICA dissection and thrombosis/occlusion s/p tenecteplase, mechanical thrombectomy  C/b hemorrhagic transformation with intraparenchymal and intraventricular hemorrhage   R hemiparesis, R facial droop, dysphagia, aphasia, dysarthria  - Continue ASA 81 mg daily (resumed 10/28), indefinitely per neurology  - LDL 96, no indication for statin per neuro, no atherosclerosis on MRAs  - SBP goal <140  - R AFO recommended while inpatient, consult to orthotist if appropriate at ARU  - Ziopatch in place for 2 weeks at ARU admission  - Continue PT/OT/SLP  - Follow up with neurology (Dr. Edgard Dave) in 6 weeks     Moderate dilation of ascending aorta   Incidentally noted on echo for CVA work-up, involving the sinuses of Valsalva, maximal dimension 4.6 cm.  - Needs outptient follow up in 6 months with CT imaging, can be followed by PCP     Episodes of non-sustained ventricular tachycardia  Demonstrated on telemetry during IP stay.  Started on metoprolol with reduced frequency.  - Continue metoprolol 25 mg BID  - Ziopatch in place at ARU admission     Moderate oropharyngeal dysphagia  At risk for malnutrition  - RD consulted, appreciate assist.  - Continue SLP  - Continue soft and bite-sized (level 6) diet with moderately thick (level 3) liquids.  - Meds in puree  -  "Swallow recs per SLP: Upright for all eating and drinking.  Relaxed, leisurely rate when eating and drinking.  Check for pocketing on the right after meals/snacks/medications.  Continue oral care.   - Repeat VFSS as indicated  - Monitor labs, vitals, clinically for signs of dehydration while on thickened liuqids.  S/p 1L IVF on 11/3.  Continue to encourage fluids.     Normocytic anemia  Noted drop from Hgb 13 to yohana of 9.9 on 10/23, most recently stable at 13 on 10/31.  - Trend CBC.  Hgb stable today at 12.4     Hypernatremia  S/p D5W infusion due to Na 146 on 10/31 -> 144 -> 143.  - Trend BMP.  Na improved to 141 on 11/3     Mild leukocytosis  WBC 11.5 on 11/1 -> 12.3 on 11/3.  Afebrile, VSS.  No localizing sign/symptoms of infection with exception of mild urinary urgency/retention.   - Triggered sepsis/SIRS protocol 11/3, lactic acid 2.0.  Suspect dehydration in setting of thickened liquids.  S/p 1L NS IV on 11/3, recheck lactic acid yesterday afternoon stable at 2.0.  Repeat today improved to 1.4  Encourage fluids.  Low threshold to repeat IVF bolus while on moderately thick.  - WBC 13.8 -> 15.2 today, neutrophillic.   - Remains afebrile, clinically feels/looks well, no localizing signs of infection  - UA with no evidence of infection.  Procal negative.  CRP mildly elevated at 12.0.  ALT/AST very mildly elevated.  CXR with impression \"mild diffuse streaky interstitial appearing nonspecific opacities may represent atelectasis and/or pulmonary edema or  inflammatory/infective interstitial thickening. No focal consolidation.\"  - Repeat CBC with diff, procal, CRP tomorrow to trend  - If uptrending, consider further work-up to include U/S BLE to rule-out DVT  - Monitor vitals, clinically for signs of infection    Urinary retention  Noted since ARU admission, requiring intermittent straight catheterization.  Wife had noted urinary urgency and some incontinence while at hospital.  - Continue bladder scans and ISC, " Urojet added for comfort  - 11/3 UA negative for infection  - Had considered trial of Flomax but patient with orthostasis this morning limiting in standing.  Can consider in next 1-2 days if BP allows.    Hx mitral valve repair (2012)  Mild mitral regurgitation  - Noted.  Not on PTA anticoagulation, just aspirin, resumed as above     Allergic rhinitis  - Continue PTA Flonase daily       6. Adjustment to disability:  Clinical psychology to eval and treat if indicated  7. FEN: soft and bite-sized (level 6) diet, moderately thick (level 3) liquids  8. Bowel: incontinent, recent constipation, monitor, on scheduled and PRN bowel meds.  Started on formal bowel program with nightly suppository on 11/3 with goal to promote regularity/continence.  9. Bladder: continent/incontinent due to urgency, now with retention as above, continue to monitor  10. DVT Prophylaxis: subcutaneous Heparin (ok'd per neuro on 10/27)  11. GI Prophylaxis: not indicated  12. Code: full  13. Disposition: goal for home with 24/7 assist  14. ELOS:  4 weeks  15. Rehab prognosis:  fair  16. Follow up Appointments on Discharge: PCP, neurology (Dr. Edgard Dave) in 6 weeks        Patient discussed with Dr. Jori Troncoso, PM&R Staff Physician    Ashley Campoverde PA-C  Physical Medicine & Rehabilitation

## 2022-11-05 ENCOUNTER — HOSPITAL ENCOUNTER (INPATIENT)
Facility: CLINIC | Age: 62
LOS: 2 days | Discharge: NURSING, REHAB OR LONG-TERM CARE | DRG: 300 | End: 2022-11-07
Attending: PHYSICAL MEDICINE & REHABILITATION | Admitting: FAMILY MEDICINE
Payer: COMMERCIAL

## 2022-11-05 ENCOUNTER — APPOINTMENT (OUTPATIENT)
Dept: PHYSICAL THERAPY | Facility: CLINIC | Age: 62
DRG: 057 | End: 2022-11-05
Attending: PHYSICAL MEDICINE & REHABILITATION
Payer: COMMERCIAL

## 2022-11-05 ENCOUNTER — APPOINTMENT (OUTPATIENT)
Dept: CT IMAGING | Facility: CLINIC | Age: 62
DRG: 057 | End: 2022-11-05
Attending: PHYSICAL MEDICINE & REHABILITATION
Payer: COMMERCIAL

## 2022-11-05 ENCOUNTER — APPOINTMENT (OUTPATIENT)
Dept: CT IMAGING | Facility: CLINIC | Age: 62
DRG: 300 | End: 2022-11-05
Attending: PHYSICAL MEDICINE & REHABILITATION
Payer: COMMERCIAL

## 2022-11-05 ENCOUNTER — APPOINTMENT (OUTPATIENT)
Dept: ULTRASOUND IMAGING | Facility: CLINIC | Age: 62
DRG: 057 | End: 2022-11-05
Attending: PHYSICAL MEDICINE & REHABILITATION
Payer: COMMERCIAL

## 2022-11-05 VITALS
BODY MASS INDEX: 21.22 KG/M2 | SYSTOLIC BLOOD PRESSURE: 114 MMHG | DIASTOLIC BLOOD PRESSURE: 71 MMHG | HEIGHT: 74 IN | RESPIRATION RATE: 16 BRPM | WEIGHT: 165.34 LBS | HEART RATE: 91 BPM | TEMPERATURE: 95.6 F | OXYGEN SATURATION: 98 %

## 2022-11-05 DIAGNOSIS — I61.9 HEMORRHAGIC STROKE (H): ICD-10-CM

## 2022-11-05 PROBLEM — I82.409 DVT (DEEP VENOUS THROMBOSIS) (H): Status: ACTIVE | Noted: 2022-11-05

## 2022-11-05 LAB
ANION GAP SERPL CALCULATED.3IONS-SCNC: 12 MMOL/L (ref 3–14)
BASOPHILS # BLD AUTO: 0.1 10E3/UL (ref 0–0.2)
BASOPHILS NFR BLD AUTO: 0 %
BUN SERPL-MCNC: 17 MG/DL (ref 7–30)
CALCIUM SERPL-MCNC: 9.1 MG/DL (ref 8.5–10.1)
CHLORIDE BLD-SCNC: 100 MMOL/L (ref 94–109)
CO2 SERPL-SCNC: 25 MMOL/L (ref 20–32)
CREAT SERPL-MCNC: 0.67 MG/DL (ref 0.66–1.25)
CRP SERPL-MCNC: 86 MG/L (ref 0–8)
EOSINOPHIL # BLD AUTO: 0 10E3/UL (ref 0–0.7)
EOSINOPHIL NFR BLD AUTO: 0 %
ERYTHROCYTE [DISTWIDTH] IN BLOOD BY AUTOMATED COUNT: 13.9 % (ref 10–15)
ERYTHROCYTE [DISTWIDTH] IN BLOOD BY AUTOMATED COUNT: 14 % (ref 10–15)
GFR SERPL CREATININE-BSD FRML MDRD: >90 ML/MIN/1.73M2
GLUCOSE BLD-MCNC: 105 MG/DL (ref 70–99)
HCT VFR BLD AUTO: 34.4 % (ref 40–53)
HCT VFR BLD AUTO: 37.7 % (ref 40–53)
HGB BLD-MCNC: 11.5 G/DL (ref 13.3–17.7)
HGB BLD-MCNC: 12.6 G/DL (ref 13.3–17.7)
IMM GRANULOCYTES # BLD: 0.2 10E3/UL
IMM GRANULOCYTES NFR BLD: 2 %
LACTATE SERPL-SCNC: 1.9 MMOL/L (ref 0.7–2)
LACTATE SERPL-SCNC: 2.2 MMOL/L (ref 0.7–2)
LYMPHOCYTES # BLD AUTO: 1.5 10E3/UL (ref 0.8–5.3)
LYMPHOCYTES NFR BLD AUTO: 11 %
MCH RBC QN AUTO: 29.6 PG (ref 26.5–33)
MCH RBC QN AUTO: 30.1 PG (ref 26.5–33)
MCHC RBC AUTO-ENTMCNC: 33.4 G/DL (ref 31.5–36.5)
MCHC RBC AUTO-ENTMCNC: 33.4 G/DL (ref 31.5–36.5)
MCV RBC AUTO: 89 FL (ref 78–100)
MCV RBC AUTO: 90 FL (ref 78–100)
MONOCYTES # BLD AUTO: 1.7 10E3/UL (ref 0–1.3)
MONOCYTES NFR BLD AUTO: 12 %
NEUTROPHILS # BLD AUTO: 10.7 10E3/UL (ref 1.6–8.3)
NEUTROPHILS NFR BLD AUTO: 75 %
NRBC # BLD AUTO: 0 10E3/UL
NRBC BLD AUTO-RTO: 0 /100
PLATELET # BLD AUTO: 227 10E3/UL (ref 150–450)
PLATELET # BLD AUTO: 238 10E3/UL (ref 150–450)
POTASSIUM BLD-SCNC: 4.4 MMOL/L (ref 3.4–5.3)
PROCALCITONIN SERPL-MCNC: 0.05 NG/ML
RADIOLOGIST FLAGS: ABNORMAL
RBC # BLD AUTO: 3.88 10E6/UL (ref 4.4–5.9)
RBC # BLD AUTO: 4.18 10E6/UL (ref 4.4–5.9)
SODIUM SERPL-SCNC: 137 MMOL/L (ref 133–144)
WBC # BLD AUTO: 13.5 10E3/UL (ref 4–11)
WBC # BLD AUTO: 14.3 10E3/UL (ref 4–11)

## 2022-11-05 PROCEDURE — 86140 C-REACTIVE PROTEIN: CPT | Performed by: PHYSICIAN ASSISTANT

## 2022-11-05 PROCEDURE — 71275 CT ANGIOGRAPHY CHEST: CPT | Mod: 26 | Performed by: STUDENT IN AN ORGANIZED HEALTH CARE EDUCATION/TRAINING PROGRAM

## 2022-11-05 PROCEDURE — 99222 1ST HOSP IP/OBS MODERATE 55: CPT | Mod: AI | Performed by: FAMILY MEDICINE

## 2022-11-05 PROCEDURE — 92507 TX SP LANG VOICE COMM INDIV: CPT | Mod: GN

## 2022-11-05 PROCEDURE — 71275 CT ANGIOGRAPHY CHEST: CPT

## 2022-11-05 PROCEDURE — 74177 CT ABD & PELVIS W/CONTRAST: CPT

## 2022-11-05 PROCEDURE — 83605 ASSAY OF LACTIC ACID: CPT | Performed by: PHYSICAL MEDICINE & REHABILITATION

## 2022-11-05 PROCEDURE — 83605 ASSAY OF LACTIC ACID: CPT | Performed by: PHYSICIAN ASSISTANT

## 2022-11-05 PROCEDURE — 74177 CT ABD & PELVIS W/CONTRAST: CPT | Mod: 26 | Performed by: STUDENT IN AN ORGANIZED HEALTH CARE EDUCATION/TRAINING PROGRAM

## 2022-11-05 PROCEDURE — 250N000009 HC RX 250: Performed by: FAMILY MEDICINE

## 2022-11-05 PROCEDURE — 250N000013 HC RX MED GY IP 250 OP 250 PS 637: Performed by: STUDENT IN AN ORGANIZED HEALTH CARE EDUCATION/TRAINING PROGRAM

## 2022-11-05 PROCEDURE — 36415 COLL VENOUS BLD VENIPUNCTURE: CPT | Performed by: PHYSICAL MEDICINE & REHABILITATION

## 2022-11-05 PROCEDURE — 120N000002 HC R&B MED SURG/OB UMMC

## 2022-11-05 PROCEDURE — U0003 INFECTIOUS AGENT DETECTION BY NUCLEIC ACID (DNA OR RNA); SEVERE ACUTE RESPIRATORY SYNDROME CORONAVIRUS 2 (SARS-COV-2) (CORONAVIRUS DISEASE [COVID-19]), AMPLIFIED PROBE TECHNIQUE, MAKING USE OF HIGH THROUGHPUT TECHNOLOGIES AS DESCRIBED BY CMS-2020-01-R: HCPCS | Performed by: STUDENT IN AN ORGANIZED HEALTH CARE EDUCATION/TRAINING PROGRAM

## 2022-11-05 PROCEDURE — 97112 NEUROMUSCULAR REEDUCATION: CPT | Mod: GP

## 2022-11-05 PROCEDURE — 84145 PROCALCITONIN (PCT): CPT | Performed by: PHYSICIAN ASSISTANT

## 2022-11-05 PROCEDURE — 250N000011 HC RX IP 250 OP 636: Performed by: PHYSICAL MEDICINE & REHABILITATION

## 2022-11-05 PROCEDURE — 250N000009 HC RX 250: Performed by: PHYSICAL MEDICINE & REHABILITATION

## 2022-11-05 PROCEDURE — 85027 COMPLETE CBC AUTOMATED: CPT | Performed by: STUDENT IN AN ORGANIZED HEALTH CARE EDUCATION/TRAINING PROGRAM

## 2022-11-05 PROCEDURE — 87040 BLOOD CULTURE FOR BACTERIA: CPT | Performed by: PHYSICIAN ASSISTANT

## 2022-11-05 PROCEDURE — 70450 CT HEAD/BRAIN W/O DYE: CPT | Mod: 26 | Performed by: RADIOLOGY

## 2022-11-05 PROCEDURE — 85004 AUTOMATED DIFF WBC COUNT: CPT | Performed by: PHYSICIAN ASSISTANT

## 2022-11-05 PROCEDURE — 258N000003 HC RX IP 258 OP 636: Performed by: STUDENT IN AN ORGANIZED HEALTH CARE EDUCATION/TRAINING PROGRAM

## 2022-11-05 PROCEDURE — 258N000003 HC RX IP 258 OP 636: Performed by: PHYSICIAN ASSISTANT

## 2022-11-05 PROCEDURE — 70450 CT HEAD/BRAIN W/O DYE: CPT

## 2022-11-05 PROCEDURE — 99239 HOSP IP/OBS DSCHRG MGMT >30: CPT | Performed by: PHYSICAL MEDICINE & REHABILITATION

## 2022-11-05 PROCEDURE — 93970 EXTREMITY STUDY: CPT | Mod: 26 | Performed by: RADIOLOGY

## 2022-11-05 PROCEDURE — 97530 THERAPEUTIC ACTIVITIES: CPT | Mod: GP

## 2022-11-05 PROCEDURE — 250N000011 HC RX IP 250 OP 636: Performed by: PHYSICIAN ASSISTANT

## 2022-11-05 PROCEDURE — 93970 EXTREMITY STUDY: CPT

## 2022-11-05 PROCEDURE — 36415 COLL VENOUS BLD VENIPUNCTURE: CPT | Performed by: STUDENT IN AN ORGANIZED HEALTH CARE EDUCATION/TRAINING PROGRAM

## 2022-11-05 PROCEDURE — 250N000011 HC RX IP 250 OP 636: Performed by: FAMILY MEDICINE

## 2022-11-05 PROCEDURE — 36415 COLL VENOUS BLD VENIPUNCTURE: CPT | Performed by: PHYSICIAN ASSISTANT

## 2022-11-05 PROCEDURE — 80048 BASIC METABOLIC PNL TOTAL CA: CPT | Performed by: STUDENT IN AN ORGANIZED HEALTH CARE EDUCATION/TRAINING PROGRAM

## 2022-11-05 PROCEDURE — 250N000013 HC RX MED GY IP 250 OP 250 PS 637: Performed by: PHYSICIAN ASSISTANT

## 2022-11-05 RX ORDER — ONDANSETRON 4 MG/1
4 TABLET, ORALLY DISINTEGRATING ORAL EVERY 6 HOURS PRN
Status: DISCONTINUED | OUTPATIENT
Start: 2022-11-05 | End: 2022-11-07 | Stop reason: HOSPADM

## 2022-11-05 RX ORDER — POLYETHYLENE GLYCOL 3350 17 G/17G
17 POWDER, FOR SOLUTION ORAL DAILY PRN
Status: ON HOLD | DISCHARGE
Start: 2022-11-05 | End: 2022-11-30

## 2022-11-05 RX ORDER — IOPAMIDOL 755 MG/ML
50 INJECTION, SOLUTION INTRAVASCULAR ONCE
Status: COMPLETED | OUTPATIENT
Start: 2022-11-05 | End: 2022-11-05

## 2022-11-05 RX ORDER — ACETAMINOPHEN 325 MG/1
325-650 TABLET ORAL EVERY 4 HOURS PRN
DISCHARGE
Start: 2022-11-05

## 2022-11-05 RX ORDER — IOPAMIDOL 755 MG/ML
100 INJECTION, SOLUTION INTRAVASCULAR ONCE
Status: COMPLETED | OUTPATIENT
Start: 2022-11-05 | End: 2022-11-05

## 2022-11-05 RX ORDER — ONDANSETRON 2 MG/ML
4 INJECTION INTRAMUSCULAR; INTRAVENOUS EVERY 6 HOURS PRN
Status: DISCONTINUED | OUTPATIENT
Start: 2022-11-05 | End: 2022-11-05

## 2022-11-05 RX ORDER — AMOXICILLIN 250 MG
2 CAPSULE ORAL 2 TIMES DAILY
Status: DISCONTINUED | OUTPATIENT
Start: 2022-11-05 | End: 2022-11-07 | Stop reason: HOSPADM

## 2022-11-05 RX ORDER — AMOXICILLIN 250 MG
1 CAPSULE ORAL 2 TIMES DAILY PRN
Status: DISCONTINUED | OUTPATIENT
Start: 2022-11-05 | End: 2022-11-07 | Stop reason: HOSPADM

## 2022-11-05 RX ORDER — LIDOCAINE HYDROCHLORIDE 20 MG/ML
JELLY TOPICAL EVERY 4 HOURS PRN
Status: ON HOLD | DISCHARGE
Start: 2022-11-05 | End: 2022-11-30

## 2022-11-05 RX ORDER — LIDOCAINE 40 MG/G
CREAM TOPICAL
Status: DISCONTINUED | OUTPATIENT
Start: 2022-11-05 | End: 2022-11-07 | Stop reason: HOSPADM

## 2022-11-05 RX ORDER — AMOXICILLIN 250 MG
1 CAPSULE ORAL 2 TIMES DAILY
Status: DISCONTINUED | OUTPATIENT
Start: 2022-11-05 | End: 2022-11-07 | Stop reason: HOSPADM

## 2022-11-05 RX ORDER — ONDANSETRON 2 MG/ML
4 INJECTION INTRAMUSCULAR; INTRAVENOUS EVERY 6 HOURS PRN
Status: DISCONTINUED | OUTPATIENT
Start: 2022-11-05 | End: 2022-11-07 | Stop reason: HOSPADM

## 2022-11-05 RX ORDER — AMOXICILLIN 250 MG
1 CAPSULE ORAL 2 TIMES DAILY
Status: ON HOLD | DISCHARGE
Start: 2022-11-05 | End: 2022-11-07

## 2022-11-05 RX ORDER — LANOLIN ALCOHOL/MO/W.PET/CERES
3 CREAM (GRAM) TOPICAL AT BEDTIME
Status: ON HOLD | DISCHARGE
Start: 2022-11-05 | End: 2022-11-30

## 2022-11-05 RX ORDER — LIDOCAINE 40 MG/G
CREAM TOPICAL
Status: ON HOLD | DISCHARGE
Start: 2022-11-05 | End: 2022-11-30

## 2022-11-05 RX ORDER — PROCHLORPERAZINE MALEATE 10 MG
10 TABLET ORAL EVERY 6 HOURS PRN
Status: DISCONTINUED | OUTPATIENT
Start: 2022-11-05 | End: 2022-11-07 | Stop reason: HOSPADM

## 2022-11-05 RX ORDER — HEPARIN SODIUM 5000 [USP'U]/.5ML
5000 INJECTION, SOLUTION INTRAVENOUS; SUBCUTANEOUS EVERY 12 HOURS
Status: ON HOLD | DISCHARGE
Start: 2022-11-05 | End: 2022-11-07

## 2022-11-05 RX ORDER — ONDANSETRON 4 MG/1
4 TABLET, ORALLY DISINTEGRATING ORAL EVERY 6 HOURS PRN
Status: DISCONTINUED | OUTPATIENT
Start: 2022-11-05 | End: 2022-11-05

## 2022-11-05 RX ORDER — SODIUM CHLORIDE, SODIUM LACTATE, POTASSIUM CHLORIDE, CALCIUM CHLORIDE 600; 310; 30; 20 MG/100ML; MG/100ML; MG/100ML; MG/100ML
INJECTION, SOLUTION INTRAVENOUS CONTINUOUS
Status: DISCONTINUED | OUTPATIENT
Start: 2022-11-05 | End: 2022-11-07 | Stop reason: HOSPADM

## 2022-11-05 RX ORDER — PROCHLORPERAZINE 25 MG
25 SUPPOSITORY, RECTAL RECTAL EVERY 12 HOURS PRN
Status: DISCONTINUED | OUTPATIENT
Start: 2022-11-05 | End: 2022-11-07 | Stop reason: HOSPADM

## 2022-11-05 RX ORDER — FLUTICASONE PROPIONATE 50 MCG
1 SPRAY, SUSPENSION (ML) NASAL DAILY
Status: ON HOLD | DISCHARGE
Start: 2022-11-06 | End: 2022-11-30

## 2022-11-05 RX ORDER — METOPROLOL TARTRATE 25 MG/1
25 TABLET, FILM COATED ORAL 2 TIMES DAILY
Status: ON HOLD | DISCHARGE
Start: 2022-11-05 | End: 2022-11-30

## 2022-11-05 RX ORDER — IBUPROFEN 600 MG/1
600 TABLET, FILM COATED ORAL EVERY 6 HOURS PRN
Status: DISCONTINUED | OUTPATIENT
Start: 2022-11-05 | End: 2022-11-07 | Stop reason: HOSPADM

## 2022-11-05 RX ORDER — AMOXICILLIN 250 MG
2 CAPSULE ORAL 2 TIMES DAILY PRN
Status: DISCONTINUED | OUTPATIENT
Start: 2022-11-05 | End: 2022-11-07 | Stop reason: HOSPADM

## 2022-11-05 RX ORDER — BISACODYL 10 MG
10 SUPPOSITORY, RECTAL RECTAL DAILY
Status: ON HOLD | DISCHARGE
Start: 2022-11-05 | End: 2022-11-07

## 2022-11-05 RX ORDER — ACETAMINOPHEN 325 MG/1
975 TABLET ORAL EVERY 8 HOURS
Status: DISCONTINUED | OUTPATIENT
Start: 2022-11-05 | End: 2022-11-07 | Stop reason: HOSPADM

## 2022-11-05 RX ADMIN — Medication 25 MG: at 22:59

## 2022-11-05 RX ADMIN — FLUTICASONE PROPIONATE 1 SPRAY: 50 SPRAY, METERED NASAL at 08:21

## 2022-11-05 RX ADMIN — IOPAMIDOL 80 ML: 755 INJECTION, SOLUTION INTRAVENOUS at 20:44

## 2022-11-05 RX ADMIN — ASPIRIN 81 MG: 81 TABLET ORAL at 08:14

## 2022-11-05 RX ADMIN — SENNOSIDES AND DOCUSATE SODIUM 1 TABLET: 50; 8.6 TABLET ORAL at 08:14

## 2022-11-05 RX ADMIN — SODIUM CHLORIDE, POTASSIUM CHLORIDE, SODIUM LACTATE AND CALCIUM CHLORIDE 500 ML: 600; 310; 30; 20 INJECTION, SOLUTION INTRAVENOUS at 10:22

## 2022-11-05 RX ADMIN — SODIUM CHLORIDE 84 ML: 9 INJECTION, SOLUTION INTRAVENOUS at 13:48

## 2022-11-05 RX ADMIN — ACETAMINOPHEN 975 MG: 325 TABLET, FILM COATED ORAL at 22:29

## 2022-11-05 RX ADMIN — SODIUM CHLORIDE, POTASSIUM CHLORIDE, SODIUM LACTATE AND CALCIUM CHLORIDE: 600; 310; 30; 20 INJECTION, SOLUTION INTRAVENOUS at 22:26

## 2022-11-05 RX ADMIN — METOPROLOL TARTRATE 25 MG: 25 TABLET, FILM COATED ORAL at 08:14

## 2022-11-05 RX ADMIN — HEPARIN SODIUM 5000 UNITS: 5000 INJECTION, SOLUTION INTRAVENOUS; SUBCUTANEOUS at 08:14

## 2022-11-05 RX ADMIN — IOPAMIDOL 64 ML: 755 INJECTION, SOLUTION INTRAVENOUS at 13:41

## 2022-11-05 RX ADMIN — SENNOSIDES AND DOCUSATE SODIUM 1 TABLET: 50; 8.6 TABLET ORAL at 22:29

## 2022-11-05 RX ADMIN — SODIUM CHLORIDE 60 ML: 9 INJECTION, SOLUTION INTRAVENOUS at 20:45

## 2022-11-05 ASSESSMENT — ACTIVITIES OF DAILY LIVING (ADL)
ADLS_ACUITY_SCORE: 50
ADLS_ACUITY_SCORE: 42
ADLS_ACUITY_SCORE: 54
ADLS_ACUITY_SCORE: 50
ADLS_ACUITY_SCORE: 48
ADLS_ACUITY_SCORE: 54
ADLS_ACUITY_SCORE: 42
ADLS_ACUITY_SCORE: 52

## 2022-11-05 NOTE — PROGRESS NOTES
Pt arrived on 6 MS unit at about 1750 via hospital bed and being transported by x1 staff and followed by x2 family members (wife and son). Pt admitted to room 632 and oriented to call light and environment. Pt family in room and appears stable. Primary nurse notified of pt's arrival.     Christian COOK RN

## 2022-11-05 NOTE — CODE/RAPID RESPONSE
Rapid Response Team Note    Assessment   In assessment a rapid response was called on Tobin Hua due to SIRS/Sepsis trigger. This presentation is likely due to possible UTI and dehydration.     Plan   -  LR bolus 500mL  -  Blood culture x2. Hold off abx given absence of fever and normal procal  -  UA/reflex  -  Repeat lactate noon  - possible FES, video swallow as pt does have hx of stroke, silent aspiration may possible.   - The primary team was in person via communication. .  -  Disposition: The patient will remain on the current unit. We will continue to monitor this patient closely.  -  Reassessment and plan follow-up will be performed by the primary team    Tobin is critically ill with signs of possible sepsis. These features are alarming and indicate that the patient is at imminent risk of life-threatening organ failure. Acute deterioration is being managed by the following critical interventions: IV fluids    Total Critical Care time spent by me, excluding procedures, was 30  Minutes.    REYNALDO Rojo [facility] RRT [facility] Job Code Contact #[phone]  [facility] Paging/Directory    Hospital Course   Brief Summary of events leading to rapid response:   Tobin Hua was seen and examined at bedside this morning for lab values concerning for sepsis. Patient was currently working with therapies in [facility] at time of my arrival and return to bed at request for RRT evaluation.     He had increasing WBC and CRP and was currently evaluated by his medicine team for this. Today, he triggered sepsis alert for WBC of 14.3 down from day prior of 15.2 and tachycardia with HR of 111.  Blood pressure and normal temp. CRP increased 87 (day prior of 12).     Per discussion with patient he feels well. Denies  no fever, chills or sweats, no diarrhea or overt signs of aspiration noted. Per bedside nursing, he  Has had urinary retention for pass several days requiring multiple straight caths and fluid bolus in recent days for poor  fluid intake.     Admission Diagnosis:   Acute ischemic left ICA stroke (H) [I63.232]    Physical Exam   Temp: (!) 95.7  F (35.4  C) Temp  Min: 95.7  F (35.4  C)  Max: 96.4  F (35.8  C)  Resp: 16 Resp  Min: 16  Max: 16  SpO2: 99 % SpO2  Min: 94 %  Max: 99 %  Pulse: 111 Pulse  Min: 104  Max: 111    No data recorded  BP: 123/80 Systolic (24hrs), Av , Min:111 , Max:132   Diastolic (24hrs), Av, Min:70, Max:80     I/Os: I/O last 3 completed shifts:  In: 600 [P.O.:600]  Out: 550 [Urine:550]     Exam:   General: in no acute distress, awake, alert  Mental Status: some aphasia but able to answer questions correctly, HU .  CV: RRR, pulse normal   PULM: BBS, on RA, no wheezes   Abd: soft non-tender,   MSK: calves soft and non-tender     Significant Results and Procedures   Lactic Acid:   Recent Labs   Lab Test 22  0835 22  0828 22  1449   LACT 2.2* 1.4 2.0     CBC:   Recent Labs   Lab Test 22  0546 22  1048 22  0828 22  0721   WBC 14.3* 15.2* 13.8* 12.3*   HGB 12.6*  --  12.4* 12.3*   HCT 37.7*  --  37.1* 37.5*     --  229 226        Sepsis Evaluation   The patient is not known to have an infection.  NO EVIDENCE OF SEPSIS at this time.  Vital sign, physical exam, and lab findings are due to dehydration.

## 2022-11-05 NOTE — H&P
[facility]    History and Physical - [facility] Service       Date of Admission:  11/5/2022    Assessment & Plan      Tobin Hua is a 62 year old male admitted on 11/5/2022, with a PMH of mitral valve repair (2011) and allergic rhinitis, recent ischemic CVA with hemorrhagic conversion, admitted at  [facility] from 10/21-11/1, followed by ARU stay from 11/1 onwards. He was found to have an uptrending WBC count since admission to ARU, triggering sepsis protocol numerous times, with negative infectious work up.  On 11/5/2022, patient was found to have an uptrending CRP, with a lactic acid of 2.2, and was found to have a large occlusive DVT in right femoral vein. Patient is now admitted to [facility] for placement of IVC filter.     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 Admitted to [place] [facility] on 11/1/2022. On 11/5/2022, patient was found to have an uptrending CRP, with a lactic acid of 2.2. RLEsignificantly swollen compared with LLE. Bilateral lower extremity Doppler ultrasounds were obtained and revealed a large occlusive DVT in the right femoral vein extending to the deep veins below the knee and nonocclusive deep venous thrombosis in the right common femoral vein. CTPE negative for PE. Head CT demonstrates likely stable status of hemorrhagic conversion when compared with read of previous CT Head from [facility], though official comparison is pending.   - pain control with tylenol  and ibuprofen  - planned IR procedure for IVC filter placement 11/6 AM   - NPO at midnight   - CTA/P for evaluation of clot extension and relevant anatomy   - consider Neurology consult prior to discharge to determine if/when future anticoagulation is appropriate    - PT/OT consulted while inpatient    #Leukocytosis  #Elevated CRP  Recent persistent leukocytosis at ARU; WBC 13.5 on admission. Likely elevated in the setting of acute DVT.  Afebrile, VSS.  No localizing sign/symptoms of infection. CXR without consolidation. No urinary symptoms at this time.  - f/u blood cultures    #Moderate oropharyngeal dysphagia  #At risk for malnutrition  - continue soft and bite-sized (level 6) diet with moderately thick (level 3) liquids, determined per SLP eval at ARU  - patient care order: upright for all eating and drinking, check for pocketing on the right after meals/snacks/medications    # Episodes of NSVT during prior hospitalization  Several episodes of NSVT noted on tele during [facility] hospitalization. After metoprolol was initiated, frequency of episodes decreased.   - PTA metoprolol 25mg BID, hold for systolic BP<110, HR<60   - telemetry, especially due to risk for PE prior to IVC filter placement     #Moderate dilation of ascending aorta   Incidentally noted on echo for CVA work-up, involving the sinuses of Valsalva, maximal dimension 4.6 cm.  - Needs outptient follow up in 6 months with CT imaging, can be followed by PCP       Diet: NPO per Anesthesia Guidelines for Procedure/Surgery Except for: Meds  Soft & Bite Sized Diet (level 6) Liquidized/Moderately Thick (level 3)    DVT Prophylaxis: VTE Prophylaxis contraindicated due to recent intracranial hemorrhage and current RLE DVT  Chávez Catheter: Not present  Fluids: LR 100cc/hr  Central Lines: None  Cardiac Monitoring: ACTIVE order. Indication: current DVT without anticoagulation, risk for PE  Code Status: Full Code        Disposition Plan      Expected Discharge  Date: 11/07/2022                The patient's care was discussed with the Attending Physician, Dr. Mariel Murray.    Jing Busby MD  [facility]  [facility]  Securely message with the Vocera Web Console (learn more here)  Text page via [facility] Paging/Directory   Please see signed in provider for up to date coverage information    ______________________________________________________________________    Chief Complaint   RLE DVT in the context of recent ischemic stroke complicated by hemorrhagic conversion.     History of Present Illness   Tobin Hua is a 62 year old male who was admitted to [facility] from 10/21/2022-11/1/2022, with acute ischemic stroke due to dissection/occlusion of left ICA, which was complicated by hemorrhagic conversion with intraparenchymal and intraventricular hemorrhage. [facility]. On 11/5/2022, patient was found to have an uptrending CRP, with a lactic acid of 2.2. RLE significantly swollen compared with LLE. Bilateral lower extremity Doppler ultrasounds were obtained and revealed a large occlusive DVT in the right femoral vein extending to the deep veins below the knee and nonocclusive deep venous thrombosis in the right common femoral vein. CTPE negative for PE. Head CT demonstrates likely stable status of hemorrhagic conversion when compared with read of previous CT Head from [facility], though official comparison is pending.     Patient is denying any chest pain, SOB, headache, nausea, or vomiting.    Review of Systems     ROS: 10 point ROS neg other than the symptoms noted above in the HPI.    Past Medical History    I have reviewed this patient's medical history and updated it with pertinent information if needed.     Past Surgical History   I have reviewed this patient's surgical history and updated it with pertinent information if needed.    Social History   I have reviewed  this patient's social history and updated it with pertinent information if needed.    Family History   No significant family history    Prior to Admission Medications   Prior to Admission Medications   Prescriptions Last Dose Informant Patient Reported? Taking?   Heparin Sodium, Porcine, (HEPARIN ANTICOAGULANT) 5000 UNIT/0.5ML injection   No No   Sig: Inject 0.5 mLs (5,000 Units) Subcutaneous every 12 hours   acetaminophen (TYLENOL) 325 MG tablet   No No   Sig: Take 1-2 tablets (325-650 mg) by mouth every 4 hours as needed for mild pain or fever   aspirin (ASA) 81 MG EC tablet   No No   Sig: Take 1 tablet (81 mg) by mouth daily   bisacodyl (DULCOLAX) 10 MG suppository   No No   Sig: Place 1 suppository (10 mg) rectally daily   fluticasone (FLONASE) 50 MCG/ACT nasal spray   No No   Sig: Spray 1 spray into both nostrils daily   lidocaine (LMX4) 4 % external cream   No No   Sig: Apply topically every hour as needed for pain (with VAD insertion)   lidocaine (XYLOCAINE) 2 % external gel   No No   Sig: Place into the urethra every 4 hours as needed for moderate pain   lidocaine 1 % SOLN   No No   Si.1-1 mLs by Other route every hour as needed (mild pain with VAD insertion)   melatonin 3 MG tablet   No No   Sig: Take 1 tablet (3 mg) by mouth At Bedtime   metoprolol tartrate (LOPRESSOR) 25 MG tablet   No No   Sig: Take 1 tablet (25 mg) by mouth 2 times daily   polyethylene glycol (MIRALAX) 17 g packet   No No   Sig: Take 17 g by mouth daily as needed for constipation   senna-docusate (SENOKOT-S/PERICOLACE) 8.6-50 MG tablet   No No   Sig: Take 1 tablet by mouth 2 times daily      Facility-Administered Medications: None     Allergies   No Known Allergies    Physical Exam   Vital Signs: Temp: 98.6  F (37  C) Temp src: Oral BP: 121/73 Pulse: 102   Resp: 21 SpO2: 97 % O2 Device: None (Room air)    Weight: 0 lbs 0 oz    Physical Exam  Constitutional:       General: He is not in acute distress.  HENT:      Head: Atraumatic.       Mouth/Throat:      Mouth: Mucous membranes are moist.   Eyes:      Extraocular Movements: Extraocular movements intact.      Pupils: Pupils are equal, round, and reactive to light.   Cardiovascular:      Rate and Rhythm: Tachycardia present.      Heart sounds: Normal heart sounds.   Pulmonary:      Effort: No respiratory distress.      Breath sounds: Normal breath sounds.   Abdominal:      General: Abdomen is flat.      Palpations: Abdomen is soft.   Musculoskeletal:         General: Swelling (RLE) present.   Skin:     General: Skin is warm and dry.   Neurological:      Mental Status: He is alert and oriented to person, place, and time.      Sensory: No sensory deficit.      Comments: Right hemiparesis, dysarthria, right facial droop (baseline since CVA 10/2022)       Data   Data reviewed today: I reviewed all medications, new labs and imaging results over the last 24 hours.    Recent Labs   Lab 11/05/22 2025 11/05/22  0546 11/04/22  1048 11/04/22  0828 11/03/22  0721   WBC 13.5* 14.3* 15.2* 13.8* 12.3*   HGB 11.5* 12.6*  --  12.4* 12.3*   MCV 89 90  --  90 90    227  --  229 226     --  138  --  141   POTASSIUM 4.4  --  4.3  --  3.7   CHLORIDE 100  --  106  --  110*   CO2 25  --  26  --  25   BUN 17  --  22  --  23   CR 0.67  --  0.65*  --  0.69   ANIONGAP 12  --  6  --  6   JAILENE 9.1  --  9.5  --  9.0   *  --  125*  --  133*   ALBUMIN  --   --  3.1*  --   --    PROTTOTAL  --   --  7.0  --   --    BILITOTAL  --   --  0.5  --   --    ALKPHOS  --   --  99  --   --    ALT  --   --  133*  --   --    AST  --   --  50*  --   --      Recent Results (from the past 24 hour(s))   US Lower Extremity Venous Duplex Bilateral   Result Value    Radiologist flags (Urgent)     Occlusive right lower extremity deep venous thrombus    Narrative    EXAMINATION: DOPPLER VENOUS ULTRASOUND OF BILATERAL LOWER EXTREMITIES,  11/5/2022 10:54 AM     COMPARISON: None.    HISTORY: Rule out BLE DVT    TECHNIQUE:  Gray-scale  evaluation with compression, spectral flow and  color Doppler assessment of the deep venous system of both legs from  groin to knee, and then at the ankles.    FINDINGS:  Right: There is a nonocclusive thrombus in the partially compressible  common femoral vein. There is occlusive thrombus in the  noncompressible femoral vein, popliteal vein, posterior tibial, and  peroneal veins. Normal compressibility and blood flow within the  greater saphenous and deep femoral veins. Normal color Doppler flow in  the right external iliac vein.    Left: The left common femoral, femoral, popliteal, posterior tibial  and peroneal veins demonstrate normal compressibility and blood flow.      Impression    IMPRESSION:    1. Occlusive deep venous thrombus in the right femoral vein extending  to the deep veins below the knee. Nonocclusive deep venous thrombosis  in the right common femoral vein. No appreciable thrombus in the right  external iliac vein.  2. No evidence of deep venous thrombosis in left lower extremity.    [Urgent Result: Occlusive right lower extremity deep venous thrombus]    Finding was identified 11/5/2022 11:08 AM AM. Dr. Weinstein was  contacted by [facility] 11/5/2022 11:08 AM and verbalized understanding of  the urgent finding.     I have personally reviewed the examination and initial interpretation  and I agree with the findings.    BARRETT PAEZ,          SYSTEM ID:  [id number]   CT Chest Pulmonary Embolism w Contrast    Narrative    EXAMINATION: CT CHEST PULMONARY EMBOLISM W CONTRAST, 11/5/2022 2:21 PM    CLINICAL HISTORY: Male sex; No imaging to r/o PE in the last 24 hours;  Pulmonary Embolism Rule-Out Criteria (PERC) score > 0; Revised Montgomery  Score (RGS) not >= 11; No D-dimer result available; D-dimer not  ordered    COMPARISON: None.    TECHNIQUE: CT imaging obtained through the chest with contrast.  Coronal and axial MIP reformatted images obtained. Three-dimensional  (3D) post-processed angiographic images  were reconstructed, archived  to PACS and used in interpretation of this study.     CONTRAST: 64 ml isovue 370 IV    FINDINGS:    Lines and tubes: None.    Pulmonary vasculature: Opacification of the pulmonary arteries is  adequate. No central filling defect consistent with a pulmonary  embolism.  The main pulmonary artery is normal in caliber.     Heart: Normal in size. Mitral valve prosthesis. No pericardial  effusion. No paradoxical septal bowing. No reflux of contrast into the  IVC.    Lungs: No focal consolidation. No pleural effusion or pneumothorax.  Bibasilar atelectasis. Indeterminate 2 mm pulmonary nodule in the  right upper lobe (series 7, image 97). Basal predominant reticulation  and groundglass density. Mild mosaic attenuation    Thyroid: No suspicious nodules.    Mediastinum: Prior median sternotomy. Fractured inferior most median  sternotomy wire, similar to prior. Central tracheobronchial tree is  patent.  The thoracic esophagus is within normal limits. No thoracic  lymphadenopathy.     Bones and soft tissues: No suspicious osseous lesion. Age  indeterminate compression fracture of T8 and T9. Postsurgical changes  of median sternotomy. Left chest wall cardiac device.    Upper abdomen: Limited.No acute findings in the upper abdomen.      Impression    IMPRESSION:     1. No central filling defect consistent with a pulmonary embolism.   2. Indeterminate 2 mm pulmonary nodule in the right upper lobe.  Consider follow-up at 12 months if patient is at high risk for lung  cancer according to Fleischner Society recommendations.  3. Basal predominant groundglass density and reticulation, favor  subsegmental atelectasis. Mild mosaic attenuation, nonspecific may  represent small areas of the ulnar small vessel disease.  4. Age-indeterminate, favor non -acute, compression deformity of T8  and T9. Postsurgical changes of median sternotomy.    I have personally reviewed the examination and initial  interpretation  and I agree with the findings.    TRACEY ALVAREZ MD         SYSTEM ID:  [id number]   CT Head w/o Contrast    Narrative    EXAM: CT HEAD W/O CONTRAST 11/5/2022 2:22 PM    HISTORY: 62 years Male Patient is in need of anticoagulation dt large  occlusive thrombus - has hx of hemorrhagic transformation of ischemic  stroke. Evaluate extent of hemorrhage     COMPARISON: Outside hospital head CT without contrast 10/21/2022,  10/24/2022, 10/26/2022 (only interpretation available, no images).  Outside hospital brain MR, MRA head/neck 10/22/2022 (only  interpretation available, no images).     TECHNIQUE: Using multidetector thin collimation helical acquisition  technique, axial, coronal and sagittal CT images from the skull base  to the vertex were obtained without intravenous contrast.   (topogram) image(s) also obtained and reviewed.    FINDINGS:   There is a left middle cerebral hemisphere hyperdense intraparenchymal  hemorrhage involving the left basal ganglia measuring 3.6 x 2.6 cm  with 1 mm rightward midline shift and mild mass effect on the left  lateral ventricle and left caudate head. There is mild associated  vasogenic edema and loss of gray-white differentiation.     Small amount of anterior left lateral intraventricular hemorrhage.  Bilateral lateral intraventricular calcifications, probable sequelae  of prior hemorrhage. Additional ill-defined parenchymal hypodensities  in the left temporal and occipital lobes and probable punctate  hyperdensities in the bilateral frontal lobes. No extra-axial fluid  collection. The basal cisterns are clear. Minimal periventricular  hypodensities.    The bony calvaria and the bones of the skull base are normal. The  orbits appear unremarkable. The visualized portions of the paranasal  sinuses and mastoid air cells are clear. Unremarkable soft tissues.  Dental hardware.      Impression    IMPRESSION: Evolving 3.6 cm left MCA intraparenchymal  hemorrhage  involving the left basal ganglia with intraventricular extension, mild  rightward midline shift, and mild mass effect. Reportedly measured 3.8  cm on outside hospital CT 10/24/2022. Additional left MCA infarcts are  probably unchanged.    I have personally reviewed the examination and initial interpretation  and I agree with the findings.    MARGARET CARLSON MD         SYSTEM ID:  [id number]

## 2022-11-05 NOTE — PLAN OF CARE
Goal Outcome Evaluation:  Overall Patient Progress: no changeOverall Patient Progress: no change    Patient is alert and oriented. Able to answer orientation questions as well as converse with staff. Denied pain, sob, headache or any new weakness. Right side remains flaccid. Elevated RUE with pillows. Patient was incontinent at the start of the shift. PVR was 436ml. Attempt to straight cath but voided in the process. PVR was 193ml. Pt did not feel the urge to void then. Incontinent of bladder and bowel. Had a small bm. In the morning, was again incontinent with pvr of 162ml. No other concerns so far.

## 2022-11-05 NOTE — DISCHARGE SUMMARY
[facility]   Acute Rehabilitation Unit  Discharge summary     Date of Admission: 11/1/2022  Date of Discharge: 11/5/2022  Disposition: [facility]  Primary Care Physician: No primary care provider on file.  Attending physician: Jori Troncoso DO  Other significant physician provider(s): Zoila Weinstein MD - covering provider at time of discharge      discharge diagnosis      Ischemic stroke of left ICA with hemorrhagic conversion    Hemorrhagic conversion of ischemic stroke with intraparenchymal and intraventricular hemorrhage    Right occlusive DVT of right femoral vein      brief summary    Tobin Hua is a 62-year-old male who was admitted to [facility] on 10/21/2022 with acute ischemic stroke due to dissection/occlusion of left ICA, which was complicated by hemorrhagic conversion with intraparenchymal and intraventricular hemorrhage, anemia, nonsustained V. tach, hypo and hypernatremia, dysphagia, constipation, and headaches.  The patient was admitted to [place] acute rehab on 11/1/2022 for multidisciplinary rehab.  He was found to have an uptrending WBC count since admission to ARU, triggering sepsis protocol numerous times.  Infectious work-up essentially negative.  On 11/5/2022, patient was found to have an uptrending CRP, with a lactic acid of 2.2.  Right lower extremity slightly edematous.  Bilateral lower extremity Doppler ultrasounds were obtained and revealed a large occlusive deep venous thrombosis in the right femoral vein extending to the deep veins below the knee and nonocclusive deep venous thrombosis in the right common femoral vein.  Upon discussion with neurology and medicine teams, decision was made to obtain head CT to evaluate status of hemorrhagic conversion and compare to outside imaging from [facility] (these images have been pushed through), as well as a chest CT to ensure no pulmonary emboli (negative for PE). Please refer to  results from 11/5/2022.     Since the hemorrhagic conversion of his ischemic stroke was less than 2 weeks ago, he is at of rebleeding with further anticoagulation, therefore recommendation would be to undergo IVC filter, and therefore necessitating transfer to medicine floor for interventional radiology procedure.      dISCHARGE MEDICATIONS  Current Discharge Medication List      CONTINUE these medications which have NOT CHANGED    Details   metoprolol tartrate (LOPRESSOR) 25 MG tablet Take 25 mg by mouth      polyethylene glycol (MIRALAX) 17 GM/Dose powder Take 17 g by mouth      senna (SENOKOT) 8.6 MG tablet Take 1 tablet by mouth 2 times daily               DISCHARGE INSTRUCTIONS AND FOLLOW UP  No discharge procedures on file.       physical examination    Most recent Vital Signs:   Vitals:    11/04/22 2053 11/05/22 0740 11/05/22 0821 11/05/22 0830   BP: 132/76 111/70 123/80 109/69   BP Location: Left arm Left arm Right arm Right arm   Patient Position: Semi-Walton's  Sitting    Cuff Size: Adult Regular  Adult Regular    Pulse: 109 106 111 102   Resp:  16 16    Temp:  (!) 96.4  F (35.8  C) (!) 95.7  F (35.4  C)    TempSrc:  Oral Axillary    SpO2: 98% 94% 99%    Weight:       Height:         GEN: Pleasant and cooperative, alert  HEENT: No facial asymmetry  RESP: Breathing unlabored on room air      Discharge summary was forwarded to No primary care provider on file. (PCP) at the time of discharge, so as to bridge from hospital to outpatient care.     It was our pleasure to care for Tobin Hua during this hospitalization. Please do not hesitate to contact me should there be questions regarding the hospital course or discharge plan.        Zoila Weinstein MD  Physical Medicine & Rehabilitation      I, Zoila Weinstein MD, saw and evaluated this patient prior to discharge. 120 minutes spent in discharge, including >50% in counseling and coordination of care, medication review and plan of care recommended on  follow up.

## 2022-11-05 NOTE — PROGRESS NOTES
Received call from medicine about this patient with recent ischemic stroke s/p tenectaplase and thrombectomy who developed hemorrhagic transformation.  Now in ARU.  We do not have images to review.  Hemorrhage described as being reasonably large and with IVH.  Now medicine tells me was found to have very large occlusive DVT and working up for PE.   Questioning anticoagulation.       Patient needs updated head CT prior to any formal recommendations.  Suspect based on size with IVH and relatively recent hemorrhage that may need to avoid anticoagulants and consider IVC filter although depending on characteristics of Head CT could be considered.  Certianly will need good risk-benefit discussion with patient in either case as appears both options would be reasonably high risk.  I did discuss with stroke team as well who is in agreement with above and need for updated head CT prior to any treatment recommendations.  If in person evaluation is desired please put in formal neurology consult.      Mandeep Marcos, DO

## 2022-11-05 NOTE — PHARMACY-ADMISSION MEDICATION HISTORY
Patient admitted from [facility], where he was admitted from 11/1-11/5. See pharmacy medication regimen review on 11/1/2022 for details on medication history.

## 2022-11-05 NOTE — PLAN OF CARE
Goal Outcome Evaluation:       Overall Patient Progress: no changeOverall Patient Progress: no change    Outcome Evaluation: Denies pain. Taked to writer with 1 to 2 words appropriately. Had a large bm after suppository insertion. With mixed continence and incontinence of bowel. No void this shift, random bladder scan =366ml, reported to night shift to follow up. PIV on R forearm patent and flushed. Assisted with 2 SPT to commode then bozena stedy used for safety, pt fatigued and unsteady. Drinking more tonight, had a total of 600ml fluid intake.

## 2022-11-05 NOTE — PLAN OF CARE
Discharge Planner Post-Acute Rehab SLP:     Discharge Plan: home with wife, ongoing SLP at next level    Precautions: right dominant side hemiparesis    Current Status:  Hearing: WFL  Vision: WFL with readers  Communication: Moderate-severe anomic aphasia, mild receptive language deficits, and mild dysarthria  Cognition: Not formally evaluated d/t extent of language impairments  Swallow: Soft and bite sized (6), moderately thick  (3) liquid. NO straw. Ensure upright all PO, set up assist and intermittent cueing during meals for oral clearance. Upright in WC all meals    Assessment  -30min d/t medical status/nurse orders    Other Barriers to Discharge (Family Training, etc): family training in communication strategies, supervision

## 2022-11-05 NOTE — PLAN OF CARE
Goal Outcome Evaluation:      Plan of Care Reviewed With: patient    Overall Patient Progress: no changeOverall Patient Progress: no change    Outcome Evaluation: Pt denies pain this shift. Using call light appropriately to make needs known. Pt triggered sepsis at start of shift with vitals and increased WBC count. Lactic resulted as 2.2, 500 ml bolus LR given per orders. RN and pt's spouse then noted swelling and skin hot to the touch on the RLE ankle. Provider notified, CT scans ordered and occlusive DVT confirmed. He denies any chest pain, shortness of breath, numbness/tingling, or pain. Plan is to transfer pt back to [facility], awaiting a bed at this time.

## 2022-11-05 NOTE — PLAN OF CARE
Discharge Planner Post-Acute Rehab PT:     Discharge Plan: home with HH PT, w/c based, assist from wife    Precautions: swallow - moderately thick, falls, intermittent R lean. Orthostatic- encourage fluids, TEDs and ab.binder OOB    Current Status:  Bed Mobility: sup<>sit modA, pt using LLE well to move RLE  Transfer: [facility] Ax2 with RNs; in therapies, mod-maxAx1 with SPT and skilled setup  Gait: unsafe  Stairs: unsafe    PASS:  11/2 - 12/36 indicating w/c based at d/c    Assessment:  Ulices steve and abd binder with standing activities today, mildly orthostatic and complaints of mild dizziness 2/5 but this improves throughout therapy session. Encouraged fluids.  Improved standing tolerance and sequencing with STS but difficulties with wt shifting to RLE.     Other Barriers to Discharge (DME, Family Training, etc):   Family training - to be scheduled  DME - needs K4 rental w/c, possibly ramp, possibly hospital bed

## 2022-11-05 NOTE — CONSULTS
INTERVENTIONAL RADIOLOGY CONSULT NOTE    Name: Tobin Hua  Age: 62 year old       SUBJECTIVE: Consult for temporary IVC filter placement because of an inability to anticoagulate in the setting of hemorrhagic transformation after CVA. Asymptomatic right iliofemoral DVT found on US. He has been transferred from rehab to the [facility].    IMAGING: Bilateral lower extremity venous duplex US from 11/01/22 showed extensive DVT up to the right CFV that does not clearly extend into the right external iliac vein. CTA of the chest did not show significant PE.    ASSESSMENT/PLAN:   - Obtain Abd/pelvic CT with contrast now to better evaluate clot extension and relevant anatomy.  - Plan for IVC filter placement (temporary filter) tomorrow morning, tentatively scheduled for 9:30am.   - NPO after midnight.  - Consent to be obtained on the day of the procedure.  - This filter will need to be retrieved by IR once fully anticoagulated.    Thank you for this consultation:    Abelino Mclean DO, MS                PGY-6 Interventional Radiology   [facility]   6:36 PM November 5, 2022

## 2022-11-05 NOTE — PROGRESS NOTES
"[employer] [employer]  Transfer Triage Note    Date of call: 11/05/22  Time of call: 2:40 PM    Current Patient Location:  ARU  Current Level of Care: Outpatient    Vitals: Temp: (!) 95.7  F (35.4  C) Temp src: Axillary BP: 109/69 Pulse: 102   Resp: 16 SpO2: 99 % Height: 188 cm (6' 2\") Weight: 75 kg (165 lb 5.5 oz)  O2 Device: None (Room air) at    Diagnosis: RLE occlusive DVT in setting of recent CVA c/b hemorrhagic conversion  Is COVID-19 a concern? No  Reason for requested transfer: Further diagnostic work up, management, and consultation for specialized care   Isolation Needs: None    Outside Records: Available  Additional records may be faxed to [phone].    Transfer accepted: Yes  Stability of Patient: Patient is vitally stable, with no critical labs, and will likely remain stable throughout the transfer process  Level of Care Needed: Med Surg  Telemetry Needed:  Med (Remote) Telemetry  Expected Time of Arrival for Transfer: 8-24 hours  Arrival Location:  [facility]    Recommendations for Management and Stabilization: Given    Additional Comments:     61 yo M with recent stay at [facility] for CVA c/b hemorrhagic conversion with intraparenchymal and intraventricular hemorrhage who was improving at ARU. However over last fewd days was triggering sepsis despite negative infectious work up. Large RLE DVT found on US with limited anticoagulation options given recent acute bleed. Neurology recommending IR consultation for IVC placement. CT head and CT chest PE protocol pending. CRP rising as well as WBC. Mildly tachycardic to 102 and not hypotensive. Hgb stable. [facility].     Patient is admitted to [facility] for IR consultation, stroke evaluation, and management of new acute DVT. Discussed with [facility] team who have accepted the patient.     IR on the call - requesting CT abdomen pelvis with " contrast to evaluate IVC involvement.     Myrna Rollins MD

## 2022-11-06 ENCOUNTER — APPOINTMENT (OUTPATIENT)
Dept: INTERVENTIONAL RADIOLOGY/VASCULAR | Facility: CLINIC | Age: 62
DRG: 300 | End: 2022-11-06
Attending: PHYSICAL MEDICINE & REHABILITATION
Payer: COMMERCIAL

## 2022-11-06 LAB
ANION GAP SERPL CALCULATED.3IONS-SCNC: 8 MMOL/L (ref 3–14)
BUN SERPL-MCNC: 15 MG/DL (ref 7–30)
CALCIUM SERPL-MCNC: 9 MG/DL (ref 8.5–10.1)
CHLORIDE BLD-SCNC: 102 MMOL/L (ref 94–109)
CO2 SERPL-SCNC: 29 MMOL/L (ref 20–32)
CREAT SERPL-MCNC: 0.63 MG/DL (ref 0.66–1.25)
ERYTHROCYTE [DISTWIDTH] IN BLOOD BY AUTOMATED COUNT: 14.2 % (ref 10–15)
GFR SERPL CREATININE-BSD FRML MDRD: >90 ML/MIN/1.73M2
GLUCOSE BLD-MCNC: 105 MG/DL (ref 70–99)
HCT VFR BLD AUTO: 37.4 % (ref 40–53)
HGB BLD-MCNC: 12.1 G/DL (ref 13.3–17.7)
MCH RBC QN AUTO: 29.3 PG (ref 26.5–33)
MCHC RBC AUTO-ENTMCNC: 32.4 G/DL (ref 31.5–36.5)
MCV RBC AUTO: 91 FL (ref 78–100)
PLATELET # BLD AUTO: 250 10E3/UL (ref 150–450)
POTASSIUM BLD-SCNC: 4.6 MMOL/L (ref 3.4–5.3)
RADIOLOGIST FLAGS: NORMAL
RBC # BLD AUTO: 4.13 10E6/UL (ref 4.4–5.9)
SARS-COV-2 RNA RESP QL NAA+PROBE: NEGATIVE
SODIUM SERPL-SCNC: 139 MMOL/L (ref 133–144)
WBC # BLD AUTO: 13.9 10E3/UL (ref 4–11)

## 2022-11-06 PROCEDURE — 255N000002 HC RX 255 OP 636: Performed by: RADIOLOGY

## 2022-11-06 PROCEDURE — 250N000009 HC RX 250: Performed by: PHYSICIAN ASSISTANT

## 2022-11-06 PROCEDURE — 250N000013 HC RX MED GY IP 250 OP 250 PS 637: Performed by: STUDENT IN AN ORGANIZED HEALTH CARE EDUCATION/TRAINING PROGRAM

## 2022-11-06 PROCEDURE — C1880 VENA CAVA FILTER: HCPCS

## 2022-11-06 PROCEDURE — 80048 BASIC METABOLIC PNL TOTAL CA: CPT | Performed by: STUDENT IN AN ORGANIZED HEALTH CARE EDUCATION/TRAINING PROGRAM

## 2022-11-06 PROCEDURE — 99232 SBSQ HOSP IP/OBS MODERATE 35: CPT | Mod: GC | Performed by: FAMILY MEDICINE

## 2022-11-06 PROCEDURE — 250N000011 HC RX IP 250 OP 636: Performed by: PHYSICIAN ASSISTANT

## 2022-11-06 PROCEDURE — 85027 COMPLETE CBC AUTOMATED: CPT | Performed by: STUDENT IN AN ORGANIZED HEALTH CARE EDUCATION/TRAINING PROGRAM

## 2022-11-06 PROCEDURE — 120N000002 HC R&B MED SURG/OB UMMC

## 2022-11-06 PROCEDURE — 37191 INS ENDOVAS VENA CAVA FILTR: CPT | Mod: GC | Performed by: RADIOLOGY

## 2022-11-06 PROCEDURE — 99152 MOD SED SAME PHYS/QHP 5/>YRS: CPT | Mod: GC | Performed by: RADIOLOGY

## 2022-11-06 PROCEDURE — 06H03DZ INSERTION OF INTRALUMINAL DEVICE INTO INFERIOR VENA CAVA, PERCUTANEOUS APPROACH: ICD-10-PCS | Performed by: RADIOLOGY

## 2022-11-06 PROCEDURE — C1769 GUIDE WIRE: HCPCS

## 2022-11-06 PROCEDURE — 36415 COLL VENOUS BLD VENIPUNCTURE: CPT | Performed by: STUDENT IN AN ORGANIZED HEALTH CARE EDUCATION/TRAINING PROGRAM

## 2022-11-06 PROCEDURE — 258N000003 HC RX IP 258 OP 636: Performed by: STUDENT IN AN ORGANIZED HEALTH CARE EDUCATION/TRAINING PROGRAM

## 2022-11-06 PROCEDURE — 272N000504 HC NEEDLE CR4

## 2022-11-06 PROCEDURE — 99152 MOD SED SAME PHYS/QHP 5/>YRS: CPT

## 2022-11-06 PROCEDURE — 37191 INS ENDOVAS VENA CAVA FILTR: CPT

## 2022-11-06 RX ORDER — NALOXONE HYDROCHLORIDE 0.4 MG/ML
0.2 INJECTION, SOLUTION INTRAMUSCULAR; INTRAVENOUS; SUBCUTANEOUS
Status: DISCONTINUED | OUTPATIENT
Start: 2022-11-06 | End: 2022-11-07 | Stop reason: HOSPADM

## 2022-11-06 RX ORDER — NALOXONE HYDROCHLORIDE 0.4 MG/ML
0.4 INJECTION, SOLUTION INTRAMUSCULAR; INTRAVENOUS; SUBCUTANEOUS
Status: DISCONTINUED | OUTPATIENT
Start: 2022-11-06 | End: 2022-11-07 | Stop reason: HOSPADM

## 2022-11-06 RX ORDER — FENTANYL CITRATE 50 UG/ML
25-50 INJECTION, SOLUTION INTRAMUSCULAR; INTRAVENOUS EVERY 5 MIN PRN
Status: DISCONTINUED | OUTPATIENT
Start: 2022-11-06 | End: 2022-11-06 | Stop reason: CLARIF

## 2022-11-06 RX ORDER — IODIXANOL 320 MG/ML
50 INJECTION, SOLUTION INTRAVASCULAR ONCE
Status: COMPLETED | OUTPATIENT
Start: 2022-11-06 | End: 2022-11-06

## 2022-11-06 RX ORDER — FLUMAZENIL 0.1 MG/ML
0.2 INJECTION, SOLUTION INTRAVENOUS
Status: DISCONTINUED | OUTPATIENT
Start: 2022-11-06 | End: 2022-11-07 | Stop reason: HOSPADM

## 2022-11-06 RX ADMIN — ACETAMINOPHEN 975 MG: 325 TABLET, FILM COATED ORAL at 22:43

## 2022-11-06 RX ADMIN — ACETAMINOPHEN 975 MG: 325 TABLET, FILM COATED ORAL at 14:28

## 2022-11-06 RX ADMIN — FENTANYL CITRATE 50 MCG: 50 INJECTION, SOLUTION INTRAMUSCULAR; INTRAVENOUS at 12:25

## 2022-11-06 RX ADMIN — ACETAMINOPHEN 975 MG: 325 TABLET, FILM COATED ORAL at 05:56

## 2022-11-06 RX ADMIN — SENNOSIDES AND DOCUSATE SODIUM 2 TABLET: 50; 8.6 TABLET ORAL at 20:46

## 2022-11-06 RX ADMIN — LIDOCAINE HYDROCHLORIDE 5 ML: 10 INJECTION, SOLUTION EPIDURAL; INFILTRATION; INTRACAUDAL; PERINEURAL at 12:42

## 2022-11-06 RX ADMIN — MIDAZOLAM HYDROCHLORIDE 1 MG: 1 INJECTION, SOLUTION INTRAMUSCULAR; INTRAVENOUS at 12:27

## 2022-11-06 RX ADMIN — SODIUM CHLORIDE, POTASSIUM CHLORIDE, SODIUM LACTATE AND CALCIUM CHLORIDE: 600; 310; 30; 20 INJECTION, SOLUTION INTRAVENOUS at 20:40

## 2022-11-06 RX ADMIN — Medication 25 MG: at 20:45

## 2022-11-06 RX ADMIN — SODIUM CHLORIDE, POTASSIUM CHLORIDE, SODIUM LACTATE AND CALCIUM CHLORIDE: 600; 310; 30; 20 INJECTION, SOLUTION INTRAVENOUS at 07:51

## 2022-11-06 RX ADMIN — IODIXANOL 20 ML: 320 INJECTION, SOLUTION INTRAVASCULAR at 12:44

## 2022-11-06 ASSESSMENT — ACTIVITIES OF DAILY LIVING (ADL)
ADLS_ACUITY_SCORE: 58
ADLS_ACUITY_SCORE: 50
ADLS_ACUITY_SCORE: 58

## 2022-11-06 NOTE — PLAN OF CARE
Occupational Therapy Discharge Summary    Reason for therapy discharge:    Change in medical status.    Progress towards therapy goal(s). See goals on Care Plan in [facility] electronic health record for goal details.  Goals not met.  Barriers to achieving goals:   discharge from facility.    Therapy recommendation(s):    Pt discharged to hospital due to change in medical status      Precautions: falls, aphasia, dysarthria, right side weakness, do not leave alone at EOB or commode     Current Status:  ADLs:    Mobility: Bed mobility- max A supine <>sit , STS mod A w/ bozena steady. W/C based and bozena steady Ax2 for transfers with nsg.     Grooming: setup and seated EOB w/ min A, oral cares with min A seated    Dressing: UBD- max A to thread and don overhead supine w/ elevated HOB; LBD-Ax2 with bozena steady, Feet- dependent    Bathing: Ax2 with bozena steady to/from grey dependent SCI shower chair/commode. Pt requires assistance with washing/rinsing/drying 6/10 body parts.     Toileting: Ax2 with bozena steady to /from [facility] [place]; toilet hygiene with Ax2 with bozena steady  IADLs: Pt physically IND with all IADLs prior to admission, shared IADL tasks w/ spouse. Spouse is supportive and can assist upon discharge.   Vision/Cognition: impaired convergence. Limited assessment d/t aphasia

## 2022-11-06 NOTE — PLAN OF CARE
A/Ox 4 with some difficulty in answering some assessment questions d/t problems with word finding and aphasia. VSS with temperature trending down. On RA with sats >90% Denies SOB, CP, N/V. Continent of B/B. LBM 11/5/2022 per pt report. Pt uses the urinal and bedpan. Reports pain of 8/10, managed with scheduled tylenol that was effective per pt report; pain is now 2/10. R lower leg skin hot to touch with US confirming occlusive DVT. Right side remains flaccid. BUE and BLE elevated with pillows.    On continuous tele monitoring. VS and neuro checks Q4H. CT abdomen pelvis w/ contrast completed with results pending. Negative test result for COVID. L PIV patent and infusing with LR @100 mL/hr. R PIV patent and saline locked.     Remained NPO at midnight for IVC filter placement on 11/6/2022. Appears to be sleeping during rounds. Call light within reach. Bed alarm maintained for safety. Continue with POC.

## 2022-11-06 NOTE — PROGRESS NOTES
/85   Pulse 96   Temp 98.7  F (37.1  C) (Oral)   Resp 12   SpO2 94%     VSS, A&Ox2, disorientated to situation and place. Calm and cooperative. R side mobility absent due to hemorrhagic stroke. Denies chest pain, SOB, n/v, dizziness. Tingling/numbness present in RUE and RLE. Neuro check q4h. On tele monitoring and continuous pulse ox  LS clear bilaterally, on room air.   BS active, LBM 11/05/2022. Incontinent of bowels and bladder  Pt remained in bed during shift, repositioned and pillows under R extremities.   R PIV SL, L PIV infusing LR @ 100mL/hr  Pt denied any pain  Level 6 (soft and bite sized) diet, level 3 liquids. Medication given whole in applesauce, tolerated well by pt.  IVC filter placed with IR 11/06, returned to floor around 1320.   Call light in reach, bed alarm on, continue POC

## 2022-11-06 NOTE — PROCEDURES
[facility]    Procedure: Temp IVC filter placement    Date/Time: 11/6/2022 12:42 PM  Performed by: Jose E Howard MD  Authorized by: Jose E Howard MD   IR Fellow Physician: Cale Mclean      UNIVERSAL PROTOCOL   Site Marked: NA  Prior Images Obtained and Reviewed:  Yes  Required items: Required blood products, implants, devices and special equipment available    Patient identity confirmed:  Verbally with patient, arm band, provided demographic data and hospital-assigned identification number  Patient was reevaluated immediately before administering moderate or deep sedation or anesthesia  Confirmation Checklist:  Patient's identity using two indicators, relevant allergies, procedure was appropriate and matched the consent or emergent situation and correct equipment/implants were available  Time out: Immediately prior to the procedure a time out was called    Universal Protocol: the Joint Commission Universal Protocol was followed    Preparation: Patient was prepped and draped in usual sterile fashion       ANESTHESIA    Anesthesia: Local infiltration  Local Anesthetic:  Lidocaine 1% without epinephrine      SEDATION  Patient Sedated: Yes    Sedation:  Midazolam and fentanyl  Vital signs: Vital signs monitored during sedation    See dictated procedure note for full details.  Findings: ALN filter placed infrarenal via RIJV approach.    Specimens: none    Complications: None    Condition: Stable      PROCEDURE    Patient Tolerance:  Patient tolerated the procedure well with no immediate complications  Length of time physician/provider present for 1:1 monitoring during sedation: 15

## 2022-11-06 NOTE — PLAN OF CARE
Physical Therapy Discharge Summary    Reason for therapy discharge:    Change in medical status. Pt returned to hospital related to DVT.     Progress towards therapy goal(s). See goals on Care Plan in [facility] electronic health record for goal details.  Goals not met.  Barriers to achieving goals:   discharge from facility.    Therapy recommendation(s):    Therapy remains appropriate when pt is medically stable.

## 2022-11-06 NOTE — PROGRESS NOTES
[facility]    Progress Note - [facility]       Main Plans Today:  -IVC filter with IR    Date of Admission:  11/5/2022    Assessment & Plan   Tobin Hua is a 62 year old male admitted on 11/5/2022, with a PMH of mitral valve repair (2011) and allergic rhinitis, recent ischemic CVA with hemorrhagic conversion, admitted at  [facility] from 10/21-11/1, followed by ARU stay from 11/1 onwards. He was found to have an uptrending WBC count since admission to ARU, triggering sepsis protocol numerous times, with negative infectious work up.  On 11/5/2022, patient was found to have an uptrending CRP, with a lactic acid of 2.2, and was found to have a large occlusive DVT in right femoral vein. Patient is now admitted to [facility] for placement of IVC filter.      #Right LE DVT  #Hx of recent ischemic CVA due to ICA dissection and thrombosis/occlusion s/p tenecteplase, mechanical thrombectomy  #C/b hemorrhagic transformation with intraparenchymal and intraventricular hemorrhage   #R hemiparesis, R facial droop, dysphagia, dysarthria  Admitted to [facility] from 10/21/2022-11/1/2022, with acute ischemic stroke due to dissection/occlusion of left ICA, which was complicated by hemorrhagic conversion with intraparenchymal and intraventricular hemorrhage. Admitted to [facility] on 11/1/2022. On 11/5/2022, patient was found to have an uptrending CRP, with a lactic acid of 2.2. RLEsignificantly swollen compared with LLE. Bilateral lower extremity Doppler ultrasounds were obtained and revealed a large occlusive DVT in the right femoral vein extending to the deep veins below the knee and nonocclusive deep venous thrombosis in the right common femoral vein. CTPE negative for PE. Head CT demonstrates likely stable status of hemorrhagic conversion when compared with read of previous CT Head from [facility], though official comparison is pending.    - pain control with tylenol and ibuprofen  - planned IR procedure for IVC filter placement 11/6 AM   - Neurology consult prior to discharge to determine if/when future anticoagulation is appropriate    - PT/OT     #Leukocytosis  #Elevated CRP  Recent persistent leukocytosis at ARU; WBC 13.5 on admission. Likely elevated in the setting of acute DVT.  Afebrile, VSS.  No localizing sign/symptoms of infection. CXR without consolidation. No urinary symptoms at this time.  - f/u blood cultures     #Moderate oropharyngeal dysphagia  #At risk for malnutrition  - continue soft and bite-sized (level 6) diet with moderately thick (level 3) liquids, determined per SLP eval at ARU post-op  - patient care order: upright for all eating and drinking, check for pocketing on the right after meals/snacks/medications     # Episodes of NSVT during prior hospitalization  NSVT documented during [facility] (although discharge summary notes SVT). After metoprolol was initiated, frequency of episodes decreased.   - PTA metoprolol 25mg BID, hold for systolic BP<110, HR<60   - telemetry, especially due to risk for PE prior to IVC filter placement      #Moderate dilation of ascending aorta   Incidentally noted on echo for CVA work-up, involving the sinuses of Valsalva, maximal dimension 4.6 cm.  - Needs outptient follow up in 6 months with CT imaging, can be followed by PCP         Diet: NPO per Anesthesia Guidelines for Procedure/Surgery Except for: Meds    DVT Prophylaxis: VTE Prophylaxis contraindicated due to recent intracranial hemorrhage, current RLE DVT  Chávez Catheter: Not present  Fluids: LR@100cc/hr  Central Lines: None  Cardiac Monitoring: ACTIVE order. Indication: current DVT without anticoagulation, risk for PE  Code Status: Full Code      Disposition Plan      Expected Discharge Date: 11/07/2022                The patient's care was discussed with the Attending Physician, Dr. Murray .    Rajesh Lr,  MD [facility]  [facility]  Securely message with the [employer] Web Console (learn more here)  Text page via [employer] Paging/Directory   Please see signed in provider for up to date coverage information      Clinically Significant Risk Factors Present on Admission               # Drug Induced Coagulation Defect: home medication list includes an anticoagulant medication                ______________________________________________________________________    Interval History   NAEO. Patient with no concerns overall. Reports pain in leg improved, currently none. Denies chest pain, palpitations, dyspnea.    10-point review of systems per HPI, otherwise negative.     Data reviewed today: I reviewed all medications, new labs and imaging results over the last 24 hours. I personally reviewed no images or EKG's today.    Physical Exam   Vital Signs: Temp: 98.7  F (37.1  C) Temp src: Oral BP: 101/70 Pulse: 92   Resp: 18 SpO2: 94 % O2 Device: None (Room air)    Weight: 0 lbs 0 oz  Physical Exam  Vitals reviewed.   Constitutional:       General: He is not in acute distress.     Appearance: Normal appearance. He is not ill-appearing.   HENT:      Head: Normocephalic and atraumatic.   Eyes:      Conjunctiva/sclera: Conjunctivae normal.   Cardiovascular:      Rate and Rhythm: Regular rhythm. Tachycardia present.      Heart sounds: No murmur heard.  Pulmonary:      Effort: Pulmonary effort is normal. No respiratory distress.   Musculoskeletal:         General: Swelling (mild, RLE) present. No deformity. Normal range of motion.   Skin:     General: Skin is warm and dry.   Neurological:      Mental Status: He is alert.      Comments: R hemiparesis, dysarthria, facial droop (baseline since 10/2022 CVA)   Psychiatric:         Behavior: Behavior normal.         Thought Content: Thought content normal.         Data   Recent Labs   Lab 11/06/22  0557 11/05/22 2025  11/05/22  0546 11/04/22  1048   WBC 13.9* 13.5* 14.3* 15.2*   HGB 12.1* 11.5* 12.6*  --    MCV 91 89 90  --     238 227  --     137  --  138   POTASSIUM 4.6 4.4  --  4.3   CHLORIDE 102 100  --  106   CO2 29 25  --  26   BUN 15 17  --  22   CR 0.63* 0.67  --  0.65*   ANIONGAP 8 12  --  6   JAILENE 9.0 9.1  --  9.5   * 105*  --  125*   ALBUMIN  --   --   --  3.1*   PROTTOTAL  --   --   --  7.0   BILITOTAL  --   --   --  0.5   ALKPHOS  --   --   --  99   ALT  --   --   --  133*   AST  --   --   --  50*     Recent Results (from the past 24 hour(s))   CT Chest Pulmonary Embolism w Contrast    Narrative    EXAMINATION: CT CHEST PULMONARY EMBOLISM W CONTRAST, 11/5/2022 2:21 PM    CLINICAL HISTORY: Male sex; No imaging to r/o PE in the last 24 hours;  Pulmonary Embolism Rule-Out Criteria (PERC) score > 0; Revised Estill  Score (RGS) not >= 11; No D-dimer result available; D-dimer not  ordered    COMPARISON: None.    TECHNIQUE: CT imaging obtained through the chest with contrast.  Coronal and axial MIP reformatted images obtained. Three-dimensional  (3D) post-processed angiographic images were reconstructed, archived  to PACS and used in interpretation of this study.     CONTRAST: 64 ml isovue 370 IV    FINDINGS:    Lines and tubes: None.    Pulmonary vasculature: Opacification of the pulmonary arteries is  adequate. No central filling defect consistent with a pulmonary  embolism.  The main pulmonary artery is normal in caliber.     Heart: Normal in size. Mitral valve prosthesis. No pericardial  effusion. No paradoxical septal bowing. No reflux of contrast into the  IVC.    Lungs: No focal consolidation. No pleural effusion or pneumothorax.  Bibasilar atelectasis. Indeterminate 2 mm pulmonary nodule in the  right upper lobe (series 7, image 97). Basal predominant reticulation  and groundglass density. Mild mosaic attenuation    Thyroid: No suspicious nodules.    Mediastinum: Prior median sternotomy. Fractured  inferior most median  sternotomy wire, similar to prior. Central tracheobronchial tree is  patent.  The thoracic esophagus is within normal limits. No thoracic  lymphadenopathy.     Bones and soft tissues: No suspicious osseous lesion. Age  indeterminate compression fracture of T8 and T9. Postsurgical changes  of median sternotomy. Left chest wall cardiac device.    Upper abdomen: Limited.No acute findings in the upper abdomen.      Impression    IMPRESSION:     1. No central filling defect consistent with a pulmonary embolism.   2. Indeterminate 2 mm pulmonary nodule in the right upper lobe.  Consider follow-up at 12 months if patient is at high risk for lung  cancer according to Fleischner Society recommendations.  3. Basal predominant groundglass density and reticulation, favor  subsegmental atelectasis. Mild mosaic attenuation, nonspecific may  represent small areas of the ulnar small vessel disease.  4. Age-indeterminate, favor non -acute, compression deformity of T8  and T9. Postsurgical changes of median sternotomy.    I have personally reviewed the examination and initial interpretation  and I agree with the findings.    TRACEY ALVAREZ MD         SYSTEM ID:  [id number]   CT Head w/o Contrast    Narrative    EXAM: CT HEAD W/O CONTRAST 11/5/2022 2:22 PM    HISTORY: 62 years Male Patient is in need of anticoagulation dt large  occlusive thrombus - has hx of hemorrhagic transformation of ischemic  stroke. Evaluate extent of hemorrhage     COMPARISON: Outside hospital head CT without contrast 10/21/2022,  10/24/2022, 10/26/2022 (only interpretation available, no images).  Outside hospital brain MR, MRA head/neck 10/22/2022 (only  interpretation available, no images).     TECHNIQUE: Using multidetector thin collimation helical acquisition  technique, axial, coronal and sagittal CT images from the skull base  to the vertex were obtained without intravenous contrast.   (topogram) image(s) also obtained and  reviewed.    FINDINGS:   There is a left middle cerebral hemisphere hyperdense intraparenchymal  hemorrhage involving the left basal ganglia measuring 3.6 x 2.6 cm  with 1 mm rightward midline shift and mild mass effect on the left  lateral ventricle and left caudate head. There is mild associated  vasogenic edema and loss of gray-white differentiation.     Small amount of anterior left lateral intraventricular hemorrhage.  Bilateral lateral intraventricular calcifications, probable sequelae  of prior hemorrhage. Additional ill-defined parenchymal hypodensities  in the left temporal and occipital lobes and probable punctate  hyperdensities in the bilateral frontal lobes. No extra-axial fluid  collection. The basal cisterns are clear. Minimal periventricular  hypodensities.    The bony calvaria and the bones of the skull base are normal. The  orbits appear unremarkable. The visualized portions of the paranasal  sinuses and mastoid air cells are clear. Unremarkable soft tissues.  Dental hardware.      Impression    IMPRESSION: Evolving 3.6 cm left MCA intraparenchymal hemorrhage  involving the left basal ganglia with intraventricular extension, mild  rightward midline shift, and mild mass effect. Reportedly measured 3.8  cm on outside hospital CT 10/24/2022. Additional left MCA infarcts are  probably unchanged.    I have personally reviewed the examination and initial interpretation  and I agree with the findings.    MARGARET CARLSON MD         SYSTEM ID:  [id number]   CT Abdomen Pelvis w Contrast    Narrative    EXAMINATION: CT ABDOMEN PELVIS W CONTRAST, 11/5/2022 8:59 PM    TECHNIQUE:  Helical CT images from the lung bases through the  symphysis pubis were obtained with IV contrast. Contrast dose:  mls of  Isov 370    COMPARISON: none    HISTORY: Please evaluate IVC and R proximal DVT extension, in  preparation for IVC filter placement 11/6.    FINDINGS: Moderate amount of respiratory motion.    Liver: Normal  parenchymal attenuation without focal mass.  Biliary system: Gallbladder is within normal limits. No intrahepatic  or extrahepatic biliary ductal dilatation.  Pancreas: No focal mass or dilation of the main pancreatic duct.  Stomach: Within normal limits.  Spleen: Within normal limits.  Adrenal glands: Within normal limits.  Kidneys: Numerous renal cysts. No nephrolithiasis. No hydronephrosis.  Renal excreted contrast material within the ureters..  Bladder: Fill with contrast material, no focal filling defect..  Reproductive organs: Coarse prostate calcifications.  Colon: Large rectal stool burden, colon is filled with dense material.  Diverticulosis without signs of acute diverticulitis.  Appendix: Normal  Small Bowel: Within normal limits.  Lymph nodes: No intra-abdominal or pelvic lymphadenopathy.  Vasculature: Abdominal aorta is normal in caliber. Branches appear  patent. Peritoneum: No intraabdominal free fluid or air. Small  fat-containing umbilical hernia.    Lung bases: Small amount of basilar atelectasis. Partially visualized  surgical changes of sternotomy, fracture of the inferior most  sternotomy wire..    Bones and soft tissues: No suspicious soft tissue mass or fluid  collection. No suspicious osseous lesion. Age-indeterminate  compression deformities of T9, L1 and L3 with approximately 30%, 50%,  and 50% height loss respectively.      Impression    IMPRESSION:   1. Suboptimal evaluation of the veins due to phase of enhancement.  2 Age-indeterminate compression deformities of T9, L1, and L3.

## 2022-11-06 NOTE — PLAN OF CARE
Pt arrived to unit around 1800. Accompanied by spouse and son. Pt settled in room. Given bill of rights, shown call light system and educated about safety. VSS. CT called, pt needs CT abdomen pelvis w/ contrast. Per report, pt is not supposed to have much movement, provider (james Resident) consulted, per provider, pt needs CT to be done before IR places IVC filter so its ok for him to move. D/t shift change, oncoming RN updated to update CT department that pt can go down whenever they are ready. Pt started c/o warmness. Cold packs applied, room temp turned down. Fan ordered. Pt temp trending up 98.7, 99.2 and 99.4. Provider notified. Oncoming RN notified to continue to monitor. Continent of bowel and bladder. Uses urinal and bedpan. Call light is in reach. Family at bedside.

## 2022-11-06 NOTE — SEDATION DOCUMENTATION
Patient Name: Tobin Hua  Medical Record Number: [id number]  Today's Date: 11/6/2022    Procedure: IVC filter placement   Proceduralist: Dr. Howard and Dr. Mclean     Procedure Start: 1225   Procedure end: 1245  Sedation medications administered: 50mcg Fentanyl and 1mg Versed     Report given to: CLAIRE Dewitt    Other Notes: Pt arrived to IR room 1 from 6th floor. Consent reviewed. Pt denies any questions or concerns regarding procedure. Pt positioned supine and monitored per protocol. Pt tolerated procedure without any noted complications. Pt transferred back to 6th floor.

## 2022-11-06 NOTE — PROGRESS NOTES
[facility]   [facility]    SUBJECTIVE  Post-op check. Patient back from IVC filter placement by IR. Overall feeling well with no concerns. Denies pain in neck, chest, dyspnea. Patient feeling hungry.    OBJECTIVE:  /85   Pulse 96   Temp 98.7  F (37.1  C) (Oral)   Resp 12   SpO2 94%     Exam:   General: Alert and oriented, in no acute distress.  CV: RRR. No cyanosis or pallor, warm and well perfused.  Respiratory: Lungs CTAB. No respiratory distress, no accessory muscle use.  Abdomen: soft, nontender, nondistended  Psychiatric: Mood and affect appear normal.  Skin: small clean dry RIJ puncture from IR procedure with tegaderm overlaying   Extremities: Warm, able to move all four extremities at will.      ASSESSMENT/PLAN:    # s/p IVC filter  Patient underwent successful IVC filter placement today with IR for R DVT with recent hemorrhagic stroke.    1. Discuss anticoag recs with neuro and pharmacy  2. Resume diet  3. IVC to be removed in 3-6 months as soon as anticoagulation is established    Please see daily rounding note for full A/P.  Rajesh Lr MD  2:40 PM

## 2022-11-06 NOTE — PLAN OF CARE
Speech Language Therapy Discharge Summary    Reason for therapy discharge:    Discharged to hospital.    Progress towards therapy goal(s). See goals on Care Plan in [facility] electronic health record for goal details.  Goals partially met.  Barriers to achieving goals:   discharge from facility.    Therapy recommendation(s):    Continued therapy is recommended.  Rationale/Recommendations:  Pt presents with moderate-severe anomic aphasia, mild receptive language deficits, and mild dysarthria characterized by decreased articulatory precision. Skilled SLP services indicated to improve language function to increase communication of wants, needs, and medical information. Pt seen for dysphagia. Pt current diet is soft bite size (6) texture, moderately thick (3) liquid by cup. Pt with extended mastication, delayed AP transit, mild-mod collection oral residuals post initial swallow. Prior to PO, SLP reviewed strategies to ensure oral clearance. Pt able to implement following education and re education during session. Spouse also present for session and educated re: strategies to ensure clearance during meals. Recommend continue current diet (6,3), NO straws, upright WC all meals, intermittent cues + set up assist, alternate solids/liquids, check oral cavity before reclining pt. Educated spouse on pharyngeal exercise for effortful swallow and axel. .

## 2022-11-06 NOTE — PLAN OF CARE
Denies pain,SOB. Alert and unable to assess orientation related to aphasia, trying to answer questions but with word finding difficulty. R lower leg swollen, denies pain on that leg. With PIV on the L and R arm intact. VSS. Trasferred to 6  Med Surg through patient's bed with accompanying transport  of 2 and family (wife and son).at 1730H for further management of DVT. All belongings brought with him by family.

## 2022-11-07 ENCOUNTER — HOSPITAL ENCOUNTER (INPATIENT)
Facility: CLINIC | Age: 62
LOS: 24 days | Discharge: HOME HEALTH | DRG: 056 | End: 2022-12-01
Attending: PHYSICAL MEDICINE & REHABILITATION | Admitting: PHYSICAL MEDICINE & REHABILITATION
Payer: COMMERCIAL

## 2022-11-07 ENCOUNTER — APPOINTMENT (OUTPATIENT)
Dept: PHYSICAL THERAPY | Facility: CLINIC | Age: 62
DRG: 300 | End: 2022-11-07
Attending: STUDENT IN AN ORGANIZED HEALTH CARE EDUCATION/TRAINING PROGRAM
Payer: COMMERCIAL

## 2022-11-07 VITALS
RESPIRATION RATE: 16 BRPM | OXYGEN SATURATION: 96 % | SYSTOLIC BLOOD PRESSURE: 110 MMHG | TEMPERATURE: 97.7 F | DIASTOLIC BLOOD PRESSURE: 74 MMHG | HEART RATE: 98 BPM

## 2022-11-07 DIAGNOSIS — I63.232 CEREBROVASCULAR ACCIDENT (CVA) DUE TO OCCLUSION OF LEFT CAROTID ARTERY (H): ICD-10-CM

## 2022-11-07 DIAGNOSIS — K59.01 SLOW TRANSIT CONSTIPATION: ICD-10-CM

## 2022-11-07 DIAGNOSIS — Z95.828 S/P IVC FILTER: ICD-10-CM

## 2022-11-07 DIAGNOSIS — I82.411 ACUTE DEEP VEIN THROMBOSIS (DVT) OF FEMORAL VEIN OF RIGHT LOWER EXTREMITY (H): Primary | ICD-10-CM

## 2022-11-07 LAB
ANION GAP SERPL CALCULATED.3IONS-SCNC: 10 MMOL/L (ref 3–14)
BUN SERPL-MCNC: 15 MG/DL (ref 7–30)
CALCIUM SERPL-MCNC: 8.7 MG/DL (ref 8.5–10.1)
CHLORIDE BLD-SCNC: 105 MMOL/L (ref 94–109)
CO2 SERPL-SCNC: 25 MMOL/L (ref 20–32)
CREAT SERPL-MCNC: 0.55 MG/DL (ref 0.66–1.25)
CRP SERPL-MCNC: 150 MG/L (ref 0–8)
ERYTHROCYTE [DISTWIDTH] IN BLOOD BY AUTOMATED COUNT: 14 % (ref 10–15)
GFR SERPL CREATININE-BSD FRML MDRD: >90 ML/MIN/1.73M2
GLUCOSE BLD-MCNC: 106 MG/DL (ref 70–99)
HCT VFR BLD AUTO: 32.9 % (ref 40–53)
HGB BLD-MCNC: 10.9 G/DL (ref 13.3–17.7)
MCH RBC QN AUTO: 29.3 PG (ref 26.5–33)
MCHC RBC AUTO-ENTMCNC: 33.1 G/DL (ref 31.5–36.5)
MCV RBC AUTO: 88 FL (ref 78–100)
PLATELET # BLD AUTO: 270 10E3/UL (ref 150–450)
POTASSIUM BLD-SCNC: 4 MMOL/L (ref 3.4–5.3)
RBC # BLD AUTO: 3.72 10E6/UL (ref 4.4–5.9)
SODIUM SERPL-SCNC: 140 MMOL/L (ref 133–144)
WBC # BLD AUTO: 12.6 10E3/UL (ref 4–11)

## 2022-11-07 PROCEDURE — 258N000003 HC RX IP 258 OP 636: Performed by: STUDENT IN AN ORGANIZED HEALTH CARE EDUCATION/TRAINING PROGRAM

## 2022-11-07 PROCEDURE — 99223 1ST HOSP IP/OBS HIGH 75: CPT | Performed by: PHYSICIAN ASSISTANT

## 2022-11-07 PROCEDURE — 128N000003 HC R&B REHAB

## 2022-11-07 PROCEDURE — 97161 PT EVAL LOW COMPLEX 20 MIN: CPT | Mod: GP | Performed by: PHYSICAL THERAPIST

## 2022-11-07 PROCEDURE — 99222 1ST HOSP IP/OBS MODERATE 55: CPT | Performed by: PSYCHIATRY & NEUROLOGY

## 2022-11-07 PROCEDURE — 86140 C-REACTIVE PROTEIN: CPT | Performed by: STUDENT IN AN ORGANIZED HEALTH CARE EDUCATION/TRAINING PROGRAM

## 2022-11-07 PROCEDURE — 99238 HOSP IP/OBS DSCHRG MGMT 30/<: CPT | Mod: GC | Performed by: FAMILY MEDICINE

## 2022-11-07 PROCEDURE — 85027 COMPLETE CBC AUTOMATED: CPT | Performed by: STUDENT IN AN ORGANIZED HEALTH CARE EDUCATION/TRAINING PROGRAM

## 2022-11-07 PROCEDURE — 80048 BASIC METABOLIC PNL TOTAL CA: CPT | Performed by: STUDENT IN AN ORGANIZED HEALTH CARE EDUCATION/TRAINING PROGRAM

## 2022-11-07 PROCEDURE — 97530 THERAPEUTIC ACTIVITIES: CPT | Mod: GP | Performed by: PHYSICAL THERAPIST

## 2022-11-07 PROCEDURE — 250N000013 HC RX MED GY IP 250 OP 250 PS 637: Performed by: PHYSICIAN ASSISTANT

## 2022-11-07 PROCEDURE — 250N000013 HC RX MED GY IP 250 OP 250 PS 637: Performed by: STUDENT IN AN ORGANIZED HEALTH CARE EDUCATION/TRAINING PROGRAM

## 2022-11-07 PROCEDURE — 36415 COLL VENOUS BLD VENIPUNCTURE: CPT | Performed by: STUDENT IN AN ORGANIZED HEALTH CARE EDUCATION/TRAINING PROGRAM

## 2022-11-07 PROCEDURE — 99207 PR SC NO CHARGE VISIT: CPT | Performed by: PHYSICIAN ASSISTANT

## 2022-11-07 RX ORDER — ACETAMINOPHEN 325 MG/1
975 TABLET ORAL 3 TIMES DAILY
Status: DISCONTINUED | OUTPATIENT
Start: 2022-11-07 | End: 2022-11-10

## 2022-11-07 RX ORDER — ACETAMINOPHEN 325 MG/1
325-650 TABLET ORAL EVERY 4 HOURS PRN
Status: DISCONTINUED | OUTPATIENT
Start: 2022-11-07 | End: 2022-11-10

## 2022-11-07 RX ORDER — LIDOCAINE HYDROCHLORIDE 20 MG/ML
JELLY TOPICAL EVERY 4 HOURS PRN
Status: DISCONTINUED | OUTPATIENT
Start: 2022-11-07 | End: 2022-12-01 | Stop reason: HOSPADM

## 2022-11-07 RX ORDER — AMOXICILLIN 250 MG
1 CAPSULE ORAL 2 TIMES DAILY
Status: ON HOLD | DISCHARGE
Start: 2022-11-07 | End: 2022-11-30

## 2022-11-07 RX ORDER — AMOXICILLIN 250 MG
1-2 CAPSULE ORAL 2 TIMES DAILY
Status: DISCONTINUED | OUTPATIENT
Start: 2022-11-07 | End: 2022-11-28

## 2022-11-07 RX ORDER — METOPROLOL TARTRATE 25 MG/1
25 TABLET, FILM COATED ORAL 2 TIMES DAILY
Status: DISCONTINUED | OUTPATIENT
Start: 2022-11-07 | End: 2022-12-01 | Stop reason: HOSPADM

## 2022-11-07 RX ORDER — LANOLIN ALCOHOL/MO/W.PET/CERES
3 CREAM (GRAM) TOPICAL AT BEDTIME
Status: DISCONTINUED | OUTPATIENT
Start: 2022-11-07 | End: 2022-11-09

## 2022-11-07 RX ORDER — BISACODYL 10 MG
10 SUPPOSITORY, RECTAL RECTAL DAILY
Status: DISCONTINUED | OUTPATIENT
Start: 2022-11-07 | End: 2022-12-01 | Stop reason: HOSPADM

## 2022-11-07 RX ORDER — BISACODYL 10 MG
10 SUPPOSITORY, RECTAL RECTAL DAILY PRN
Status: ON HOLD | DISCHARGE
Start: 2022-11-07 | End: 2022-11-30

## 2022-11-07 RX ORDER — BISACODYL 10 MG
10 SUPPOSITORY, RECTAL RECTAL DAILY PRN
Status: DISCONTINUED | OUTPATIENT
Start: 2022-11-07 | End: 2022-12-01 | Stop reason: HOSPADM

## 2022-11-07 RX ORDER — ASPIRIN 81 MG/1
81 TABLET ORAL DAILY
Status: DISCONTINUED | OUTPATIENT
Start: 2022-11-07 | End: 2022-12-01 | Stop reason: HOSPADM

## 2022-11-07 RX ADMIN — ACETAMINOPHEN 975 MG: 325 TABLET, FILM COATED ORAL at 20:13

## 2022-11-07 RX ADMIN — Medication 25 MG: at 09:06

## 2022-11-07 RX ADMIN — MELATONIN TAB 3 MG 3 MG: 3 TAB at 20:03

## 2022-11-07 RX ADMIN — BISACODYL 10 MG: 10 SUPPOSITORY RECTAL at 18:00

## 2022-11-07 RX ADMIN — SENNOSIDES AND DOCUSATE SODIUM 2 TABLET: 50; 8.6 TABLET ORAL at 09:06

## 2022-11-07 RX ADMIN — SODIUM CHLORIDE, POTASSIUM CHLORIDE, SODIUM LACTATE AND CALCIUM CHLORIDE: 600; 310; 30; 20 INJECTION, SOLUTION INTRAVENOUS at 06:23

## 2022-11-07 RX ADMIN — ACETAMINOPHEN 975 MG: 325 TABLET, FILM COATED ORAL at 15:32

## 2022-11-07 RX ADMIN — SENNOSIDES AND DOCUSATE SODIUM 1 TABLET: 50; 8.6 TABLET ORAL at 20:03

## 2022-11-07 RX ADMIN — ACETAMINOPHEN 975 MG: 325 TABLET, FILM COATED ORAL at 05:29

## 2022-11-07 RX ADMIN — ASPIRIN 81 MG: 81 TABLET, COATED ORAL at 15:32

## 2022-11-07 ASSESSMENT — ACTIVITIES OF DAILY LIVING (ADL)
ADLS_ACUITY_SCORE: 44
DIFFICULTY_EATING/SWALLOWING: YES
ADLS_ACUITY_SCORE: 58
ADLS_ACUITY_SCORE: 44
TOILETING_ASSISTANCE: TOILETING DIFFICULTY, ASSISTANCE 1 PERSON
HEARING_DIFFICULTY_OR_DEAF: NO
NUMBER_OF_TIMES_PATIENT_HAS_FALLEN_WITHIN_LAST_SIX_MONTHS: 1
SWALLOWING: 2-->DIFFICULTY SWALLOWING LIQUIDS/FOODS
FALL_HISTORY_WITHIN_LAST_SIX_MONTHS: YES
TOILETING: 1-->ASSISTANCE (EQUIPMENT/PERSON) NEEDED
TOILETING: 1-->ASSISTANCE (EQUIPMENT/PERSON) NEEDED (NOT DEVELOPMENTALLY APPROPRIATE)
ADLS_ACUITY_SCORE: 53
EATING: 1-->ASSISTANCE (EQUIPMENT/PERSON) NEEDED
ADLS_ACUITY_SCORE: 58
ADLS_ACUITY_SCORE: 49
ADLS_ACUITY_SCORE: 58
ADLS_ACUITY_SCORE: 59
ADLS_ACUITY_SCORE: 49
EATING: 1-->ASSISTANCE (EQUIPMENT/PERSON) NEEDED (NOT DEVELOPMENTALLY APPROPRIATE)
ADLS_ACUITY_SCORE: 58
SWALLOWING: 2-->DIFFICULTY SWALLOWING LIQUIDS/FOODS
TOILETING_ISSUES: YES
WEAR_GLASSES_OR_BLIND: YES
ADLS_ACUITY_SCORE: 58
ADLS_ACUITY_SCORE: 58

## 2022-11-07 NOTE — LETTER
Recipient: Intake           Sender: Claudia Gonzáles PH: [phone]  Home Thursday 12/01/2022  RN, PT, OT, SLP, and HA recommended   Wife is primary contact. Establishing with new PCP, our provider can sign orders until PCP established.   LMK if able to consider           Date: November 28, 2022  Patient Name:  Tobin Hua  Patient YOB: 1960  Routing Message:          The documents accompanying this notice contain confidential information belonging to the sender.  This information is intended only for the use of the individual or entity named above.  The authorized recipient of this information is prohibited from disclosing this information to any other party and is required to destroy the information after its stated need has been fulfilled, unless otherwise required by state law.    If you are not the intended recipient, you are hereby notified that any disclosure, copy, distribution or action taken in reliance on the contents of these documents is strictly prohibited.  If you have received this document in error, please return it by fax to [phone] with a note on the cover sheet explaining why you are returning it (e.g. not your patient, not your provider, etc.).  If you need further assistance, please call .  Documents may also be returned by mail to [employer], , [address], [place], [place] [address].

## 2022-11-07 NOTE — CONSULTS
[facility]  Neurology Consultation    Patient Name:  Tobin Hua  MRN:  [id number]    :  1960  Date of Service:  2022  Primary care provider:  Clinic, [facility]      Neurology consultation service was asked to see Tobin Hua by Dr. Lr to evaluate stroke.    Chief Complaint:  Stroke and needs anticoagulation    History of Present Illness:   Tobin Hua is a 62 year old male with history of mitral valve repair who presents from rehab with a recent stroke.  He was found on the ground unable to rise or speak.  He was noted to be normal just a few minutes prior.  In the ER where he was noted to have hyperdense left ICA and MCA suggestive of thrombus, and CTA confirmed.  He subsequently got tenecteplase as well as mechanical thrombectomy.  Was evaluated by neurology with thought that this likely represented an ICA dissection.  Unfortunately, he developed hemorrhagic transformation which was noticed on .  He was managed conservatively and he was placed on a baby aspirin about 5 days after his hemorrhage.   Suspected etiology per neurologist notes at that time was left ICA dissection spontaneous.  He was subsequently transferred to rehab.  While in rehab he had persistent leukocytosis with a negative sepsis work-up.  Subsequently on evaluation he was found to have an extensive right lower extremity DVT.  Neurology was consulted for anticoagulation in the setting of his recent head bleed.  He subsequently had an IVC filter placed without complications.    He denied headache, vision changes, chest pain or shortness of breath.  He denies fevers, chills, recent travel    ROS  A comprehensive ROS was performed and pertinent findings were included in HPI.     PMH  Mitral valve replacement, acute ischemic stroke, ICA dissection, hemorrhagic transformation.  Past Surgical History:   Procedure Laterality Date     IR IVC FILTER PLACEMENT   11/6/2022       Medications   I have personally reviewed the patient's medication list.     Allergies  I have personally reviewed the patient's allergy list.     Social History  Reports minimal alcohol use.  Denies alcohol or illicit drug use.    Family History    States no family history of stroke, bleeding, or hypercoagulability.      Physical Examination   Vitals: /74 (BP Location: Left arm)   Pulse 98   Temp 97.7  F (36.5  C) (Oral)   Resp 16   SpO2 96%   General: Lying in bed, NAD  Head: NC/AT  Eyes: no icterus, op pink and moist  Cardiac: Mild systolic murmur.  Regular rate and rhythm.  Respiratory: non-labored on RA, no wheezing or rhonchi heard  GI: S/NT/ND  Skin: No rash or lesion on exposed skin  Psych: Mood pleasant  Neuro:  Mental status: Awake, alert, attentive oriented to self and hospital.  States month of September.  States age is 60.  Can name some simple objects such as glasses and thumb.  Cannot name and medical.  Has difficulty repeating a sentence.  Expressive greater than receptive aphasia noted.  Follows all simple commands.    Cranial nerves: VFf to confrontation, PERRL, conjugate gaze, EOMI,  right facial droop shoulder shrug strong,  no dysarthria.   Motor: Dense right hemiplegia and arm and leg.  0 out of 5 strength.  Left upper extremity and lower extremity are 5 out of 5 throughout.    Reflexes: Biceps and brachioradialis 2+ and symmetric bilaterally.  Patella 3+ on the right, 2+ on the left.  Brian's negative, but right toes upgoing.  Sensory: Reports sensation is intact to light touch throughout.  Grimaces strongly with noxious stimuli in the right.    Coordination/gait: Finger-nose-finger intact on the left.  Unable to assess on the right due to weakness.  Similarly unable to assess gait.      National [employer] Stroke Scale  Exam Interval:  2 weeks   Score    Level of consciousness: (0)   Alert, keenly responsive    LOC questions: (2)   Answers neither  question correctly    LOC commands: (0)   Performs both tasks correctly    Best gaze: (0)   Normal    Visual: (0)   No visual loss    Facial palsy: (2)   Partial paralysis (total/near total of lower face)    Motor arm (left): (0)   No drift    Motor arm (right): (4)   No movement    Motor leg (left): (0)   No drift    Motor leg (right): (4)   No movement    Limb ataxia: (0)   Absent    Sensory: (0)   Normal- no sensory loss    Best language: (2) aphasia    Dysarthria: (0)   Normal    Extinction and inattention: (0)   No abnormality        Total Score:  14     Investigations   I have personally reviewed pertinent labs, tests, and radiological imaging. Discussion of notable findings is included under Impression.     Was patient transferred from outside hospital?   Yes - I have personally reviewed pertinent notes, labs, and images (if available) from outside hospital.  [facility]    Impression  #Acute ischemic stroke d/t ICA dissection  #Intracerebral hemorrhage  #DVT    Mr. Rudd is a 62-year-old gentleman who unfortunately developed a pretty severe ischemic stroke in October, 2 weeks ago.  Etiology per neurologist there was felt to be due related to ICA dissection.  I cannot review CTA or angiogram at this time.  Patient unfortunately had a large occlusive DVT while in rehab.  Subsequently an IVC filter was placed.  Neurology is consulted for ongoing anticoagulation recommendations as it is apparently a high risk DVT.   With his relatively large intraparenchymal hemorrhage would be reluctant to initiate anticoagulation at this juncture.  Hopefully IVC filter will cause some level of protection.  In the setting if anticoagulation indicated as it seems it is from a medicine standpoint would consider initiating anticoagulation at approximately 4 weeks after his hemorrhage.  We will get repeat head CT around that time prior to initiating to ensure no further bleeding.  In the meantime patient can remain on aspirin  from a neurologic perspective given the concern for ICA dissection.  I did discuss risk and benefit and how there is likely a risk either way but risk of thromboembolic events is likely higher after the 4-week period.  Patient and seem to have a understanding, but this discussion should be had again prior to initiating anticoagulation.    Recommendations  -Can consider anticoagulation approximately 4 weeks from his hemorrhage from neurologic perspective.  Would get updated head CT at that time to show no further expansion of the bleed prior to initiating.  DOAC's would be preferred over warfarin  -Can continue aspirin from neurologic perspective  -Would recommend risk/benefit discussion again with patient and family prior to actual initiation of anticoagulation.  -Neurology to sign off    Thank you for involving Neurology in the care of Tobin Hua.     Mandeep Marcos,

## 2022-11-07 NOTE — LETTER
Recipient: Dianelys  Intake            Sender: Claudia Gonzáles PH: [phone]  Home Thursday 12/01/2022  RN, PT, OT, SLP, and HA recommended   Wife is primary contact   Establishing care with new PCP, our provider can sign orders until PCP Established   LMK if able to consider, thanks!           Date: November 28, 2022  Patient Name:  Tobin Hua  Patient YOB: 1960  Routing Message:          The documents accompanying this notice contain confidential information belonging to the sender.  This information is intended only for the use of the individual or entity named above.  The authorized recipient of this information is prohibited from disclosing this information to any other party and is required to destroy the information after its stated need has been fulfilled, unless otherwise required by state law.    If you are not the intended recipient, you are hereby notified that any disclosure, copy, distribution or action taken in reliance on the contents of these documents is strictly prohibited.  If you have received this document in error, please return it by fax to [phone] with a note on the cover sheet explaining why you are returning it (e.g. not your patient, not your provider, etc.).  If you need further assistance, please call .  Documents may also be returned by mail to [employer], , [address], [place], [place] [address].

## 2022-11-07 NOTE — PROGRESS NOTES
Care Management Follow Up    Length of Stay (days): 2    Expected Discharge Date: 11/08/2022     Concerns to be Addressed:       Patient plan of care discussed at interdisciplinary rounds: Yes    Anticipated Discharge Disposition:  TCU [facility] rehab     Anticipated Discharge Services:    Anticipated Discharge DME:      Patient/family educated on Medicare website which has current facility and service quality ratings:  NA  Education Provided on the Discharge Plan:    Patient/Family in Agreement with the Plan:      Referrals Placed by CM/SW:    Private pay costs discussed: Not applicable    Additional Information:  Call from [facility].  Tobin Came from there and will return to them. .Call [phone]  Call when ready he will return to them. They will need new auth.  Informed by [facility] that Tobin can discharge to ARU.  Call to ARU and he is accepted back.  Discharge orders and Discharge summary need to be complete.    Informed charge nurse that he will need transportation for a 1330 rollover to ARU.  She will set up.     Shanthi Molina RN  Care Coordinator  6 Med Surg  [phone], pager [phone]

## 2022-11-07 NOTE — PLAN OF CARE
FOCUS/GOAL  Bowel management, Bladder management, and Mobility    ASSESSMENT, INTERVENTIONS AND CONTINUING PLAN FOR GOAL:    Pt was readmitted to ARU via a hospital bed around 1350. Pt is alert and oriented x4, has word finding difficulty.   Denies pain, chest pain or SOB. VSS. Unable to obtain weight since bed scale was not accurate, needs to zero the bed.   Requires Ax2 w/ SPT or lift for transfers per report. Incontinent of bowel and bladder. LBM 11/5 per report.  Wife is present and very supportive.   Pt drinking moderately thickened liquids well, took medications crushed and mixed in pudding.

## 2022-11-07 NOTE — PROGRESS NOTES
VS: Blood pressure 105/70, pulse 95, temperature 98.7  F (37.1  C), temperature source Oral, resp. rate 16, SpO2 96 %.       O2: > 95% RA, Denies SOB   Output: Incontinent of bladder   Last BM: 11/5/22   Activity: Assist of two repositioning   Skin: Clean dry and intact, right neck incison   Pain: Denies pain or discomfort.   CMS: Alert, right side flaccid   Dressing: Tegaderm to right neck incision site   Diet: Level 6 diet with 3 thickened liquids   LDA: Bilateral arms PIV saline locked   Equipment: Personal belongings and IV pump    Plan: Continue current care plan   Additional Info: Take medications crush in apple sauce

## 2022-11-07 NOTE — DISCHARGE SUMMARY
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 Follow up with Neurology, in 4 weeks.  The following labs/tests are   recommended: CT head just prior to that visit.  2. Follow-up with IR in 3-6 months to have IVC filter removed once   anticoagulation plan is established (sooner is better)  3. Follow-up with PCP for hospital/ ARU follow-up once discharged.      A. Recommend non-urgent renal MRI for focal areas of increased   enhancement in the right renal cortex.     B. Recommend consideration of referral to spine ortho for incidentally   noted compression deformities at T9, L1 and L3.     C. Recommend CTA in 6 months for evaluation of incidentally noted   moderate ascending aortic dilation.             Unresulted Labs Ordered in the Past 30 Days of this Admission     Date and Time Order Name Status Description    11/5/2022  9:17 AM Blood Culture Arm, Right Preliminary     11/5/2022  9:17 AM Blood Culture Arm, Left Preliminary       These results will be followed up by ARU    Discharge Disposition   Discharged to  rehabilitation facility  Condition at discharge: Stable    Hospital Course      Tobin Hua is a 62 year old male admitted on 11/5/2022, with a PMH of mitral valve repair (2011) and allergic rhinitis, recent ischemic CVA with hemorrhagic conversion, admitted at  [facility] [place] [place] from 10/21-11/1, followed by ARU stay from 11/1 onwards. He was found to have an uptrending WBC count since admission to ARU, triggering sepsis protocol numerous times, with negative infectious work up.  On 11/5/2022, patient was found to have an uptrending CRP, with a lactic acid of 2.2, and was found to have a large occlusive DVT in right femoral vein. Patient is now admitted to [facility] for placement of IVC filter.      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 Admitted to [place] [facility] on 11/1/2022. On 11/5/2022, RLE significantly swollen compared with LLE. Bilateral lower extremity Doppler ultrasounds revealed a large occlusive DVT in the right femoral vein extending to the deep veins below the knee and nonocclusive deep venous thrombosis in the right common femoral vein. CTPE negative for PE. Head CT demonstrates likely stable status of hemorrhagic conversion when compared with read of previous CT Head from [facility], though official comparison is pending. Patient underwent IR placement of IVC filter on 11/6 and was stable at the time of discharge with pain reasonably controlled on tylenol.  - follow-up with Neurology in 4 weeks with repeat CT head at that time to reassess  anticoagulation risks/benefits  - follow-up with IR in 3-6 months to have IVC filter removed once anticoagulation plan is established (sooner is better)  - continue ASA 81mg daily     #Moderate dilation of ascending aorta   Incidentally noted on echo for CVA work-up, involving the sinuses of Valsalva, maximal dimension 4.6 cm.  - PCP to evaluate with CTA in 6 months      # Hx Episodes of NSVT during prior hospitalization  NSVT documented during [facility] (although discharge summary notes SVT). After metoprolol was initiated, frequency of episodes decreased. None noted this hospitalization.  - continue PTA metoprolol 25mg BID, hold for systolic BP<110, HR<60    #Right renal cortical enhancement  Incidentally noted on CT. Indeterminate, possibly vascular etiology.   - consider non emergent renal protocol MRI for further evaluation outpatient    #Compression deformities of T9, L1 and L3, age-indeterminate  Incidentally noted on CT. Non-painful. Patient's movement limited by CVA as above.  - follow-up with PCP to determine need for ortho spine involvement outpatient    #Leukocytosis  #Elevated CRP  Recent persistent leukocytosis in the setting of acute DVT.  Afebrile, VSS.  No localizing sign/symptoms of infection. CXR without consolidation. No urinary symptoms at this time. Blood cx negative to date. Down-trending on discharge.      #Moderate oropharyngeal dysphagia  #At risk for malnutrition  - continue soft and bite-sized (level 6) diet with moderately thick (level 3) liquids, determined per SLP eval at ARU post-op  - patient care order: upright for all eating and drinking, check for pocketing on the right after meals/snacks/medications          Consultations This Hospital Stay   INTERVENTIONAL RADIOLOGY ADULT/PEDS IP CONSULT  PHYSICAL THERAPY ADULT IP CONSULT  OCCUPATIONAL THERAPY ADULT IP CONSULT  NEUROLOGY GENERAL ADULT IP CONSULT  NEUROLOGY GENERAL ADULT IP CONSULT    Code Status   Full Code        The patient was discussed with MD Tracy Perez's Service  [facility] MED SURG  [address] [address]  Phone: [phone]  Fax: [phone]  ______________________________________________________________________    Physical Exam   Vital Signs: Temp: 97.7  F (36.5  C) Temp src: Oral BP: 110/74 Pulse: 98   Resp: 16 SpO2: 96 % O2 Device: None (Room air) Oxygen Delivery: 2 LPM  Weight: 0 lbs 0 oz  Physical Exam  Vitals reviewed.   Constitutional:       General: He is not in acute distress.     Appearance: Normal appearance. He is not ill-appearing.   HENT:      Head: Normocephalic and atraumatic.   Eyes:      Conjunctiva/sclera: Conjunctivae normal.   Cardiovascular:      Rate and Rhythm: Regular rhythm. Tachycardia present.      Heart sounds: No murmur heard.  Pulmonary:      Effort: Pulmonary effort is normal. No respiratory distress.   Musculoskeletal:         General: Swelling (mild, RLE) present. No deformity. Normal range of motion.   Skin:     General: Skin is warm and dry. small clean dry RIJ puncture from IR procedure with tegaderm overlaying    Neurological:      Mental Status: He is alert.      Comments: R hemiparesis, dysarthria, facial droop (baseline since 10/2022 CVA)   Psychiatric:         Behavior: Behavior normal.         Thought Content: Thought content normal.          Primary Care Physician   [facility]    Discharge Orders      General info for SNF    Length of Stay Estimate: Short Term Care: Estimated # of Days <30  Condition at Discharge: Stable  Level of care:skilled   Rehabilitation Potential: Fair  Admission H&P remains valid and up-to-date: Yes  Recent Chemotherapy: N/A  Use Nursing Home Standing Orders: Yes     Mantoux instructions    Give two-step Mantoux (PPD) Per Facility Policy Yes     Reason for your hospital stay    IVC filter placement for R leg DVT in the setting of recent ischemic turned  hemorrhagic stroke     Activity - Up with nursing assistance     Follow Up and recommended labs and tests    1. Follow up with Neurology, in 4 weeks.  The following labs/tests are recommended: CT head just prior to that visit.  2. Follow-up with IR in 3-6 months to have IVC filter removed once anticoagulation plan is established (sooner is better)  3. Follow-up with PCP for hospital/ ARU follow-up once discharged.      A. Recommend non-urgent renal MRI for focal areas of increased enhancement in the right renal cortex.     B. Recommend consideration of referral to spine ortho for incidentally noted compression deformities at T9, L1 and L3.     C. Recommend CTA in 6 months for evaluation of incidentally noted moderate ascending aortic dilation.     Full Code     Fall precautions     Diet    Follow this diet upon discharge: Orders Placed This Encounter      Combination Diet Soft and Bite Sized Diet (level 6); Liquidized/Moderately Thick (level 3)       Significant Results and Procedures   Most Recent 3 CBC's:  Recent Labs   Lab Test 11/07/22 0719 11/06/22 0557 11/05/22 2025   WBC 12.6* 13.9* 13.5*   HGB 10.9* 12.1* 11.5*   MCV 88 91 89    250 238     Most Recent 3 BMP's:  Recent Labs   Lab Test 11/07/22 0719 11/06/22 0557 11/05/22 2025    139 137   POTASSIUM 4.0 4.6 4.4   CHLORIDE 105 102 100   CO2 25 29 25   BUN 15 15 17   CR 0.55* 0.63* 0.67   ANIONGAP 10 8 12   JAILENE 8.7 9.0 9.1   * 105* 105*   ,   Results for orders placed or performed during the hospital encounter of 11/05/22   CT Abdomen Pelvis w Contrast     Value    Radiologist flags Focal areas of increased enhancement in the right    Narrative    EXAMINATION: CT ABDOMEN PELVIS W CONTRAST, 11/5/2022 8:59 PM    TECHNIQUE:  Helical CT images from the lung bases through the  symphysis pubis were obtained with IV contrast. Contrast dose:  mls of  Isov 370    COMPARISON: none    HISTORY: Please evaluate IVC and R proximal DVT extension,  in  preparation for IVC filter placement 11/6.    FINDINGS: Moderate amount of respiratory motion.    Liver: Normal parenchymal attenuation without focal mass.  Biliary system: Decompressed gallbladder No intrahepatic or  extrahepatic biliary ductal dilatation.  Pancreas: No focal mass or dilation of the main pancreatic duct.  Stomach: Within normal limits.  Spleen: Within normal limits.  Adrenal glands: Within normal limits.  Kidneys: Numerous renal cysts and few too small to trace renal  hypodensities. No nephrolithiasis. No hydronephrosis. Renal excreted  contrast material within the ureters. Focal areas of increased  enhancement in the right renal cortex (series 2, image 114, 106 and  90)  Bladder: Fill with contrast material, no focal filling defect..  Reproductive organs: Coarse prostate calcifications.  Colon: Large rectal stool burden, colon is filled with dense material.  Diverticulosis without signs of acute diverticulitis.  Appendix: Normal  Small Bowel: Within normal limits.  Lymph nodes: No intra-abdominal or pelvic lymphadenopathy.  Vasculature: Abdominal aorta is normal in caliber. Branches appear  patent.   Peritoneum: No intraabdominal free fluid or air. Small fat-containing  umbilical hernia.    Lung bases: Small amount of basilar atelectasis. Partially visualized  surgical changes of sternotomy, fracture of the inferior most  sternotomy wire..    Bones and soft tissues: No suspicious soft tissue mass or fluid  collection. No suspicious osseous lesion. Age-indeterminate  compression deformities of T9, L1 and L3 with approximately 30%, 50%,  and 50% height loss respectively.      Impression    IMPRESSION:     1. Suboptimal evaluation of the veins due to phase of enhancement.  Grossly no  expansile thrombus in IVC.  2. Focal areas of increased this right renal cortical enhancement,  indeterminate, possibly vascular etiology/phenomenon; consider  nonemergent renal protocol MRI for further  evaluation.  3 Age-indeterminate compression deformities of T9, L1, and L3  vertebral bodies.     [Consider Follow Up: Focal areas of increased enhancement in the right  renal cortex]    This report will be copied to the [facility] to ensure a  provider acknowledges the finding. Access Center is available Monday  through Friday 8am-3:30 pm.    I have personally reviewed the examination and initial interpretation  and I agree with the findings.    TRACEY ALVAREZ MD         SYSTEM ID:  [id number]   IR IVC Filter Placement    Narrative    Procedures 11/6/2022:  1. Ultrasound guidance for venous access  2. Inferior venacavogram pre and post IVC filter placement  3. Placement of a retrievable/temporal artery for vena cava filter    History: Intracranial hemorrhage due to hemorrhagic transformation  from CVA in the setting of right lower extremity DVT. IVC filter  requested.    Comparison: Abdominal CT performed yesterday.    Operators: Lula    Procedure performed by Dr. Mclean under my supervision. I, Dr. Jose E Howard, was present for the entire procedure.    Medications:   Fentanyl and Versed Moderate sedation administered by the IR nurse at  the supervision of the attending. Vital signs and oxygenation  continuously monitored. The patient remained stable throughout the  procedure.    Face to face sedation time: 15 minutes    Fluoroscopy time: .8    Contrast: Visipaque 320 - 20mL     Findings/procedure:    Prior to the procedure, both verbal and written informed consent  obtained from the patient. Patient was placed supine. Ultrasound  revealed a patent compressible right internal jugular vein. Under  direct ultrasound guidance, the right internal jugular vein was  accessed with a micropuncture needle, image of the needle in the vein  stored.    7 Mongolian sheath placed as part of the ALN jugular approach IVC filter  kit. The sheath was advanced the lower IVC. Contrast injection  inferior  venacavogram performed revealing a patent IVC and confirmed  renal vein anatomy.    ALN retrievable filter placed in an infrarenal location. Postplacement  inferior venacavogram revealed well-positioned filter. The sheath was  removed and hemostasis achieved with manual compression.      Impression    Impression:  Uncomplicated image guided placement of a retrievable IVC filter.  Filter should be removed as soon as the patient can resume  anticoagulation or therapeutic anticoagulation is no longer necessary.    I have personally reviewed the examination and initial interpretation  and I agree with the findings.    JESUS MCCARTHYA         SYSTEM ID:  [id number]       Discharge Medications   Current Discharge Medication List      CONTINUE these medications which have CHANGED    Details   bisacodyl (DULCOLAX) 10 MG suppository Place 1 suppository (10 mg) rectally daily as needed for constipation    Associated Diagnoses: Hemorrhagic stroke (H)      senna-docusate (SENOKOT-S/PERICOLACE) 8.6-50 MG tablet Take 1 tablet by mouth 2 times daily Hold for loose stool    Associated Diagnoses: Hemorrhagic stroke (H)         CONTINUE these medications which have NOT CHANGED    Details   acetaminophen (TYLENOL) 325 MG tablet Take 1-2 tablets (325-650 mg) by mouth every 4 hours as needed for mild pain or fever    Associated Diagnoses: Hemorrhagic stroke (H)      aspirin (ASA) 81 MG EC tablet Take 1 tablet (81 mg) by mouth daily    Associated Diagnoses: Hemorrhagic stroke (H)      fluticasone (FLONASE) 50 MCG/ACT nasal spray Spray 1 spray into both nostrils daily    Associated Diagnoses: Hemorrhagic stroke (H)      lidocaine (LMX4) 4 % external cream Apply topically every hour as needed for pain (with VAD insertion)    Associated Diagnoses: Hemorrhagic stroke (H)      lidocaine (XYLOCAINE) 2 % external gel Place into the urethra every 4 hours as needed for moderate pain    Associated Diagnoses: Hemorrhagic stroke (H)      lidocaine 1  % SOLN 0.1-1 mLs by Other route every hour as needed (mild pain with VAD insertion)    Associated Diagnoses: Hemorrhagic stroke (H)      melatonin 3 MG tablet Take 1 tablet (3 mg) by mouth At Bedtime    Associated Diagnoses: Hemorrhagic stroke (H)      metoprolol tartrate (LOPRESSOR) 25 MG tablet Take 1 tablet (25 mg) by mouth 2 times daily    Associated Diagnoses: Hemorrhagic stroke (H)      polyethylene glycol (MIRALAX) 17 g packet Take 17 g by mouth daily as needed for constipation    Associated Diagnoses: Hemorrhagic stroke (H)         STOP taking these medications       Heparin Sodium, Porcine, (HEPARIN ANTICOAGULANT) 5000 UNIT/0.5ML injection Comments:   Reason for Stopping:             Allergies   No Known Allergies

## 2022-11-07 NOTE — H&P
[facility]   Acute Rehabilitation Unit  Admission History and Physical    CHIEF COMPLAINT   Stroke 01.2 (R) Body Involvement (L) Brain: left ICA and L MCA ischemic stroke, c/b hemorrhagic transformation     HISTORY OF PRESENT ILLNESS  Tobin Hua is a 62 year old right hand dominant male with past medical history of mitral valve repair (2011) and allergic rhinitis who was recently admitted 10/21/22 with stroke 2/2 dissection/occlusion of cervical left ICA now s/p mechanical thrombectomy with initial course further complicated by hemorrhagic transformation of prior CVA, moderate aortic dilation, anemia, hyponatremia and later hypernatremia, left neck hematoma, non-sustained ventricular tachycardia, low grade fever, constipation, malnutrition (NG tube removed on 10/30), headache, and dysphagia.      Patient was admitted to ARU on 11/1/22, where he was noted to have uptrending WBC count, tachycardia, with infectious work-up unrevealing, found to have occlusive DVT in right femoral vein extending to the deep veins below the knee, as well as nonocclusive DVT in right common femoral vein.   Repeat head CT (to evaluate status of hemorrhagic conversion in setting of consideration for anticoagulation) revealed evolving 3.6 cm left MCA intraparenchymal hemorrhage involving the left basal ganglia with intraventricular extension, mild rightward midline shift, and mild mass effect.  CT chest negative for PE.  Given hemorrhagic conversion of his ischemic stroke less than 2 weeks prior, at risk of rebleeding with further anticoagulation.  Per discussion with IM and neurology, recommendation to undergo IVC filter and patient was transferred to acute hospital for this procedure, which he underwent with IR on 11/6.  Neurology was consulted to evaluate if/when anticoagulation could be considered, per their recs should wait at least 4 weeks given risk of rebleeding.  Patient's return hospital  "course was otherwise uncomplicated, did incidentally note on imaging right renal cortical enhancement, as well as age-indeterminate compression deformities of T9, L1, and L3.  Patient was assessed to be medically stable to return to ARU for ongoing rehabilitation course.    During acute hospitalization, patient was seen and evaluated by PT and OT, who collectively recommended that patient would benefit from ongoing therapies in the acute inpatient rehabilitation setting.      In review of the therapy notes, he is currently needing mod A for bed mobility, max A for sit to stand and pivot transfers with PT only, mod A with bozena whaley with OT, Ax2 with bozena whaley with nursing.  He scored 12/36 on PASS.  He performs grooming seated with min A, oral cares with min A, upper body dressing with max A, dependent for feet.  He performs bathing with Ax2.    Upon arrival to the rehab unit, patient is accompanied by his wife.  He reports that he is feeling well overall, though looking forward to getting back to more intensive therapy after a few days in the hospital.  He denies any pain, other than some discomfort in buttock from laying/sitting in one position too long.  States bladder is emptying better, though still going frequently and sometimes urgently.  He has been sensing urge to defecate, though quite urgent so has had some incontinence.  Still having multiple stools per day.  Agreeable to resuming suppository to promote regularity/continence.  He denies any dizziness or lightheadedness, wife reports PT checked orthostatics this morning, which were negative.  States he is not sleeping very well still.  Wife also notes he is struggling with food choices, getting \"sick of hospital food\" so she is hopeful to bring some home foods.  He has been getting IV fluids in hospital, but feels he is doing well with drinking thickened liquids.  He has noted some improvements with speech, though not with right-sided weakness.    PAST " "MEDICAL HISTORY   Reviewed and updated in Epic.  No past medical history on file.    SURGICAL HISTORY  Reviewed and updated in Epic.  Past Surgical History:   Procedure Laterality Date     IR IVC FILTER PLACEMENT  11/6/2022       SOCIAL HISTORY  Reviewed and updated in Epic.  Marital Status:   Living situation: lives with wife, Alix, in house with 3 stairs to enter, 12 stairs to basement  Family support: supportive  Vocational History: retired  and theater director  Tobacco use: none  Alcohol use: occasionally \"for fun\"  Illicit drug use: none  Social History     Socioeconomic History     Marital status:      Spouse name: Not on file     Number of children: Not on file     Years of education: Not on file     Highest education level: Not on file   Occupational History     Not on file   Tobacco Use     Smoking status: Not on file     Smokeless tobacco: Not on file   Substance and Sexual Activity     Alcohol use: Not on file     Drug use: Not on file     Sexual activity: Not on file   Other Topics Concern     Not on file   Social History Narrative     Not on file     Social Determinants of Health     Financial Resource Strain: Not on file   Food Insecurity: Not on file   Transportation Needs: Not on file   Physical Activity: Not on file   Stress: Not on file   Social Connections: Not on file   Intimate Partner Violence: Not on file   Housing Stability: Not on file       FAMILY HISTORY  Reviewed and updated in Epic.  No family history on file.      PRIOR FUNCTIONAL HISTORY   Pt was independent with all ADLs/IADLs, transfers, mobility and gait.      MEDICATIONS  Scheduled meds  Medications Prior to Admission   Medication Sig Dispense Refill Last Dose     acetaminophen (TYLENOL) 325 MG tablet Take 1-2 tablets (325-650 mg) by mouth every 4 hours as needed for mild pain or fever        aspirin (ASA) 81 MG EC tablet Take 1 tablet (81 mg) by mouth daily        bisacodyl (DULCOLAX) 10 MG " "suppository Place 1 suppository (10 mg) rectally daily as needed for constipation        fluticasone (FLONASE) 50 MCG/ACT nasal spray Spray 1 spray into both nostrils daily        lidocaine (LMX4) 4 % external cream Apply topically every hour as needed for pain (with VAD insertion)        lidocaine (XYLOCAINE) 2 % external gel Place into the urethra every 4 hours as needed for moderate pain        lidocaine 1 % SOLN 0.1-1 mLs by Other route every hour as needed (mild pain with VAD insertion)        melatonin 3 MG tablet Take 1 tablet (3 mg) by mouth At Bedtime        metoprolol tartrate (LOPRESSOR) 25 MG tablet Take 1 tablet (25 mg) by mouth 2 times daily        polyethylene glycol (MIRALAX) 17 g packet Take 17 g by mouth daily as needed for constipation        senna-docusate (SENOKOT-S/PERICOLACE) 8.6-50 MG tablet Take 1 tablet by mouth 2 times daily Hold for loose stool          ALLERGIES   No Known Allergies      REVIEW OF SYSTEMS  A 10 point ROS was performed and negative unless otherwise noted in HPI.     Constitutional: Negative for fever/chills.  Eyes: Negative for visual disturbance.  Ears, Nose, Throat: Negative for nasal congestion, sore throat.  Cardiovascular: Negative for chest pain, palpitations.  Respiratory: Positive for intermittent cough.  Negative for shortness of breath.  Gastrointestinal: Positive for bowel urgency, incontinence.  Negative for abdominal pain, nausea, vomiting, diarrhea, appetite disturbance.  Genitourinary: Positive for urgency.  Negative for dysuria.  Musculoskeletal: Negative for pain.  Neurologic: Positive for right-sided weakness, speech deficits, impaired swallow.  Negative for dizziness/lightheadedness, numbness, tingling.  Psychiatric: Positive for sleep and mood disturbance.      PHYSICAL EXAM  VITAL SIGNS:  /74 (BP Location: Left arm, Patient Position: Semi-Walton's)   Pulse 101   Temp 97.2  F (36.2  C) (Oral)   Resp 16   Ht 1.88 m (6' 2\")   SpO2 97%   BMI " "21.23 kg/m    BMI:  Estimated body mass index is 21.23 kg/m  as calculated from the following:    Height as of 11/1/22: 1.88 m (6' 2\").    Weight as of 11/2/22: 75 kg (165 lb 5.5 oz).     General: NAD, lying in bed  HEENT: NC/AT, MMM  Pulmonary: non-labored on room air, lungs CTA bilaterally  Cardiovascular: RRR, +murmur, Ziopatch  Abdominal: soft, non-tender, non-distended, bowel sounds present  Extremities: 2+ edema in RLE, warm to touch, no erythema.  No edema or calf tenderness in LLE.  MSK/neuro:   Mental Status:  alert and oriented to month/year, city   Cranial Nerves:  1. 2nd CN: Pupils equal, round, reactive to light and visual fields intact to confrontation.   2. 3rd,4th,6th CN:  EOMI  3. 5th CN: facial sensation intact   4. 7th CN: right facial droop  5. 8th CN: functional hearing bilaterally  6. 9th, 10th CN: palate elevates symmetrically   7. 11th CN: weak right trapezii  8. 12th CN: tongue midline and without fasciculations     Sensory: Normal to light touch in bilateral upper and lower extremities    Strength: 5/5 in all muscle groups of left upper and lower extremities.  RUE 0/5 throughout.  RLE 2/5 in hip flexor, 1/5 in quad, but no distal movement.   Tone per modified Joseph Scale: none   Abnormal movements: None   Speech: +dysarthria, +expressive>receptive aphasia, naming 1/3 correct, frequent word substitutions, repetition intact   Cognition: intact to conversation, able to follow simple commands, difficult to assess more complex due to aphasia   Gait: not tested      LABS  CBC RESULTS: Recent Labs   Lab Test 11/07/22  0719 11/06/22  0557 11/05/22 2025   WBC 12.6* 13.9* 13.5*   RBC 3.72* 4.13* 3.88*   HGB 10.9* 12.1* 11.5*   HCT 32.9* 37.4* 34.4*   MCV 88 91 89   MCH 29.3 29.3 29.6   MCHC 33.1 32.4 33.4   RDW 14.0 14.2 14.0    250 238     Last Basic Metabolic Panel:  Recent Labs   Lab Test 11/07/22  0719 11/06/22  0557 11/05/22 2025    139 137   POTASSIUM 4.0 4.6 4.4   CHLORIDE " 105 102 100   CO2 25 29 25   ANIONGAP 10 8 12   * 105* 105*   BUN 15 15 17   CR 0.55* 0.63* 0.67   GFRESTIMATED >90 >90 >90   JAILENE 8.7 9.0 9.1       DOPPLER VENOUS ULTRASOUND OF BILATERAL LOWER EXTREMITIES, 11/5/2022 10:54 AM   IMPRESSION:  1. Occlusive deep venous thrombus in the right femoral vein extending  to the deep veins below the knee. Nonocclusive deep venous thrombosis  in the right common femoral vein. No appreciable thrombus in the right  external iliac vein.  2. No evidence of deep venous thrombosis in left lower extremity.    CT CHEST PULMONARY EMBOLISM W CONTRAST, 11/5/2022 2:21 PM  IMPRESSION:   1. No central filling defect consistent with a pulmonary embolism.   2. Indeterminate 2 mm pulmonary nodule in the right upper lobe.  Consider follow-up at 12 months if patient is at high risk for lung  cancer according to Fleischner Society recommendations.  3. Basal predominant groundglass density and reticulation, favor  subsegmental atelectasis. Mild mosaic attenuation, nonspecific may  represent small areas of the ulnar small vessel disease.  4. Age-indeterminate, favor non -acute, compression deformity of T8  and T9. Postsurgical changes of median sternotomy.    CT HEAD W/O CONTRAST 11/5/2022 2:22 PM  IMPRESSION: Evolving 3.6 cm left MCA intraparenchymal hemorrhage  involving the left basal ganglia with intraventricular extension, mild  rightward midline shift, and mild mass effect. Reportedly measured 3.8  cm on outside hospital CT 10/24/2022. Additional left MCA infarcts are  probably unchanged.    CT ABDOMEN PELVIS W CONTRAST, 11/5/2022 8:59 PM  IMPRESSION:   1. Suboptimal evaluation of the veins due to phase of enhancement.  Grossly no  expansile thrombus in IVC.  2. Focal areas of increased this right renal cortical enhancement,  indeterminate, possibly vascular etiology/phenomenon; consider  nonemergent renal protocol MRI for further evaluation.  3 Age-indeterminate compression deformities  of T9, L1, and L3  vertebral bodies.       IMPRESSION/PLAN:  Tobin Hua is a 62 year old right hand dominant male with a past medical history of mitral valve repair (2011), allergic rhinitis, and recent admission 10/21/22-11/1/22 for acute ischemic stroke due to dissection/occlusion of left ICA complicated by hemorrhagic transformation with intraparenchymal and intraventricular hemorrhage, anemia, non-sustained ventricular tachycardia, hypo/hypernatremia, dysphagia, constipation, and headache, who was previously at ARU and discharged back to acute hospital on 11/5/22 due to occlusive RLE DVT, now s/p IVC filter.  He is now re-admitted to ARU on 11/7/22 for multidisciplinary rehabilitation and ongoing medical management.      Admission to acute inpatient rehab 11/07/22.    Impairment group code: Stroke 01.2 (R) Body Involvement (L) Brain: left ICA and L MCA ischemic stroke, c/b hemorrhagic transformation      1. PT, OT and SLP 60 minutes of each on a daily basis, in addition to rehab nursing and close management of physiatrist.      2. Impairment of ADL's: Noted to have R hemiplegia, impaired balance, impaired coordination, fatigue, R/L visual convergence impairment, impaired sensation, impaired postural control, all affecting his ability to safely and independently perform basic ADLs.  Goal for mod I with basic ADLs, anticipate need for assist with bathing/showering and related transfers.    3. Impairment of mobility:  Noted to have R hemiplegia, impaired balance, impaired coordination, fatigue, R/L visual convergence impairment, impaired sensation, impaired postural control, all affecting his ability to safely and independently perform basic mobility.  Goal for mod I with basic mobility, anticipate to be wheelchair based at discharge.    4. Impairment of cognition/language/swallow:  Noted to have dysphagia, dysarthria, and receptive and expressive aphasia with goals for safe tolerance of least restrictive diet  and improved cognitive-linguistic skills to meet/communicate basic needs.    5. Medical Conditions    Acute ischemic stroke due to ICA dissection and thrombosis/occlusion s/p tenecteplase, mechanical thrombectomy  C/b hemorrhagic transformation with intraparenchymal and intraventricular hemorrhage   R hemiparesis, R facial droop, dysphagia, aphasia, dysarthria  - Continue ASA 81 mg daily, indefinitely per neurology  - LDL 96, no indication for statin per neuro, no atherosclerosis on MRAs  - SBP goal <140  - Ziopatch in place for 2 weeks at ARU admission  - Continue PT/OT/SLP  - Follow up with neurology    Occlusive RLE DVT  On 11/5/22, noted to have significant edema in RLE compared with LLE.  Bilateral lower extremity Doppler ultrasounds revealed a large occlusive DVT in the right femoral vein extending to the deep veins below the knee and nonocclusive deep venous thrombosis in the right common femoral vein. CTPE negative for PE.  Head CT demonstrates likely stable status of hemorrhagic conversion when compared with previous.  Patient underwent IR placement of IVC filter on 11/6.  Neurology consulted 11/7 to discuss consideration of anticoagulation.  Per their recs, would consider at approximately 4 weeks after hemorrhage, after repeating head CT to evaluate for any further expansion of bleed.  - Per neurology, can consider anticoagulation approx 4 weeks (~11/21) from his hemorrhage from neurologic perspective.  Plan for updated head CT at that time to show no further expansion of the bleed prior to initiating.  DOAC's would be preferred over warfarin.  Neuro would recommend risk/benefit discussion again with patient and family prior to actual initiation of anticoagulation.  - Follow-up with IR in 3-6 months to have IVC filter removed once   anticoagulation plan is established (sooner is better)    Mild leukocytosis  Elevated CRP  WBC peaked at 1.52 on 11/4, infectious work-up with UA negative, CXR negative, procal  negative, CRP up-trending 150 on 11/7, blood cultures 11/5 NGTD.  Suspect 2/2 DVT, WBC down-trending since, stable at 12.6 on 11/7.  - Repeat CRP on Thursday to trend  - Follow up blood cultures  - Trend CBC     Moderate dilation of ascending aorta   Incidentally noted on echo for CVA work-up, involving the sinuses of Valsalva, maximal dimension 4.6 cm.  - Needs outptient follow up in 6 months with CT imaging, can be followed by PCP     Episodes of non-sustained ventricular tachycardia  Demonstrated on telemetry during IP stay.  Started on metoprolol with reduced frequency.  - Continue metoprolol 25 mg BID  - Ziopatch in place at ARU admission     Moderate oropharyngeal dysphagia  At risk for malnutrition  - RD consulted, appreciate assist.  - Continue SLP  - Continue soft and bite-sized (level 6) diet with moderately thick (level 3) liquids.  - Meds in puree  - Swallow recs per SLP: Upright for all eating and drinking.  Relaxed, leisurely rate when eating and drinking.  Check for pocketing on the right after meals/snacks/medications.  Continue oral care.   - Repeat VFSS as indicated  - On continuous fluids while inpatient 11/5-11/7.  Does ok with thickened liquids if present and reminded, so will not initially start IV fluids.  Continue to encourage PO fluids.  Monitor labs, vitals, clinically for signs of dehydration while on thickened liuqids.      Normocytic anemia  Noted drop from Hgb 13 to yohana of 9.9 on 10/23, most recently stable at 10.9 on 11/7.  - Trend CBC     Urinary retention  Noted since ARU admission, requiring intermittent straight catheterization. Wife had noted urinary urgency and some incontinence while at hospital.  11/3 UA negative for infection.  Not noted during recurrent inpatient stay.  - Monitor PVRs at ARU re-admission,  ISC as indicated     Hx mitral valve repair (2012)  Mild mitral regurgitation  - Noted.  Not on PTA anticoagulation, just aspirin, resumed as above     Allergic  rhinitis  - Continue PTA Flonase daily    Right renal cortical enhancement  Incidentally noted on CT.  Indeterminate, possibly vascular etiology.   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  Non-painful.  Patient's movement limited by CVA as above.  - Follow-up with PCP to determine need for ortho spine involvement outpatient    6. Adjustment to disability:  Clinical psychology to eval and treat if indicated  7. FEN: soft & bite-sized (level 6); moderately thick (level 3) liquids  8. Bowel: incontinent, recent constipation, monitor, on scheduled and PRN bowel meds, continue bowel program with nightly suppository  9. Bladder: continent/incontinent due to urgency, recent retention at ARU, monitor PVRs at admission  10. DVT Prophylaxis: currently no chemical or mechanical prophylaxis per discharging team  11. GI Prophylaxis: not indicated  12. Code: full  13. Disposition: goal for home with 24/7 assist  14. ELOS:  24 days  15. Rehab prognosis:  fair  16. Follow up Appointments on Discharge: PCP, neurology (Dr. Edgard Dave) in 6 weeks, IR in 3-6 months        Patient discussed with Dr. Jori Troncoso, PM&R staff physician     Ashley Campoverde PA-C  Physical Medicine & Rehabilitation

## 2022-11-07 NOTE — DISCHARGE INSTRUCTIONS
PCP  You are scheduled to see Dr. Armando Jason on 12/02/2022 at 9:20AM.    [address] [place] [address]       Phone  [phone]  Fax   [phone]      Neurology  You are scheduled to see Irma Kirkpatrick CNP on 12/07/2022 at 8:50 AM. Dr. Dave is no longer with this clinic. You have been scheduled with an alternative provider.    Address [address]    [address]  Phone  [phone]    IR   You will need to follow up with IR regarding IVC filter removal in 3-6 months. You referral has been placed and is currently being reviewed. You will receive a call regarding scheduling      Phone  [phone]      Home Health Care:   [facility]  PH: [phone]  Fax: [phone]   Nurse, physical therapy, occupational therapy, speech therapy, home health aide (2x/week)   Intake will call you (Alix) to schedule the first visit to the home, first visit anticipated to be early next week.       [place] [facility] [place] [facility]  Additional resources and contact information online   Www.[email or web address] & [email or web address]  Types of services that Stroke/Brain Injury Resource Facilitation offers include:  Emotional support in coping with a  new normal   Finding mental health support and counselors who understand brain injury and stroke  Finding a support group  Finding or re-connecting to rehabilitation services  Finding where and how to get a brain injury assessed  Finding or re-connecting with a primary care physician  Supporting caregivers by connecting them with resources  Education about diagnosis and symptoms -  Is this normal   Helping educate family and loved ones of the survivor s  invisible  symptoms  Figuring out the logistics of returning to work, vocational rehab and understanding ADA rights  If not going back to work, support in finding meaningful ways to structure your day and exploring volunteer options  Coaching on navigation the  federal, state and [facility] health and disability service system with additional support and conference calls as needed  Help finding appropriate legal support for appealing and navigating Social Security Disability, providing advocacy on the individual s behalf as needed and connecting with cost effective alternatives to  as needed  Supporting parents/students with how to discuss return to school and sports with educators and connecting to a TBI specialist or parent advocate group if needed  Connecting to addiction (alcohol/drug/gambling/smoking) support and treatment services  Support with creative problem solving to life barriers.  *These are just a few examples of common things that Resource Facilitation can help with. They are not limited to what is listed here so if you are curious if they can help, just ask.   Call us at [phone] or [phone].      To reduce the risk of subsequent stroke there are several important factors including optimal management of anticoagulants, blood pressure, cholesterol, diabetes and smoking abstinence.    Anticoagulation:  You are on Aspirin and Xarelto.    Blood Pressure:  Keeping your blood pressures less than 130/80 has been shown to reduce risk of recurrent stroke. Recording your blood pressure and heart rate once daily in a log book can help you and your providers make decisions on optimal management. You are encouraged to bring your log book with you to your primary physician and/or cardiology doctor visits.    You are currently on metoprolol to help control your blood pressure. Several lifestyle modifications have been associated with blood pressure reduction and are an important part of a comprehensive plan. These include: weight loss (if over-weight); a diet low in salt and cholesterol and rich in fruits and vegetables; regular aerobic physical activity and limited alcohol consumption.    Diabetes:  You do not have diabetes though it is important to continue  "monitoring for this in the future with your primary provider.    Diet:  Currently  you're on regular diet.  Diet can significantly affect your levels and should be part of your plan. Please see additional information from dietitian about this.    Cholesterol:  Traditional target levels for LDL cholesterol or \"bad cholesterol\" is less than 130 however once you have had a stroke, your target LDL level is now less than 70. Additional recommendations such as increasing your HDL or \"good\" cholesterol and lowering your triglyceride level can also be important.    Your most recent lipid panel was   No results found for: CHOL  No results found for: HDL  No results found for: LDL  No results found for: TRIG  No results found for: CHOLHDLRATIO    This should be followed up in 2-3 months with your primary provider.    Smoking:  Finally one of the most important modifiable risk factors is to not smoke. This includes cigarettes, pipes, cigars, chewing tobacco and second hand smoke. Support through counseling, nicotine replacement, and oral smoking-cessation medications may all be helpful. Often people have been able to quit during their hospitalization but once returning to their familiar environment, the urges can be stronger. If this is the case, we encourage you to get support. There are numerous options, start by talking with your doctor.    "

## 2022-11-07 NOTE — LETTER
Recipient: [facility] intake           Sender: Claudia Gonzáles PH: [phone]  Home Thurs 12/01/2022  RN, PT, OT, SLP, and WILLIAMSON   Wife is primary contact  Will establish care with new PCP, previous one retired, our provider can sign orders until PCP established.   Let me know if you can consider, thanks!           Date: November 28, 2022  Patient Name:  Tobin Hua  Patient YOB: 1960  Routing Message:          The documents accompanying this notice contain confidential information belonging to the sender.  This information is intended only for the use of the individual or entity named above.  The authorized recipient of this information is prohibited from disclosing this information to any other party and is required to destroy the information after its stated need has been fulfilled, unless otherwise required by state law.    If you are not the intended recipient, you are hereby notified that any disclosure, copy, distribution or action taken in reliance on the contents of these documents is strictly prohibited.  If you have received this document in error, please return it by fax to [phone] with a note on the cover sheet explaining why you are returning it (e.g. not your patient, not your provider, etc.).  If you need further assistance, please call .  Documents may also be returned by mail to Health Information Management, , [address], [place], [place] [address].

## 2022-11-07 NOTE — INTERIM SUMMARY
[facility] Pre-Admission Screen    Referral Source:  [facility] MED SURG B632-01  Admit date to referring facility: 11/5/2022    Physical Medicine and Rehab Consult Completed: No    Rehab Diagnosis:    Stroke 01.2 (R) Body Involvement (L) Brain: left ICA and L MCA ischemic stroke, c/b hemorrhagic transformation    Justification for Acute Inpatient Rehabilitation  Tobin Hua is a 62 year old right handed male w/ PMH of mitral valve repair, who was found down by his wife on 10/21/22. He presented to ED via EMS w/ right sided weakness, facial droop, aphasia. Pt found to have acute ischemic stroke due to occlusion of the left ICA and L MCA M1 segment. Angiogram revealed dissection and occlussion of cervical left ICA. He received tenecteplase in ED, then mechanical thrombectomy. His R hemiparesis resolved post intervention, but expressive aphasia persisted.  Unfortunately on 10/24 had significant neurologic change and found to have hemorrhagic transformation of prior CVA w/ intraparenchymal and intraventricular hemorrhage. He was given DDAVP. Repeat head CT showed stable hemorrhages. He has also required medical mgmt of his anticoagulation needs, nutritional status in setting of dysphagia requiring supplemental tube feedings, as well as nonsustained ventricular tachycardia, anemia (resolved), and constipation. He admitted to acute inpatient rehab on 11/1/2022, however needed to be readmitted to the hospital on 11/5/22 d/t large occlusive RLE DVT in right femoral vein, requiring IV filter placement on 11/6/22. Pt also found to have Compression deformities of T9, L1 and L3, age-indeterminate.  He is now medically stable and ready to discharge to acute inpatient rehab for ongoing medical mgmt and multidisciplinary rehabilitation.  Patient requires an intensive inpatient rehab program to address the following acute impairments: R hemiplegia, moderate oropharyngeal dysphagia, impaired  balance, impaired coordination, fatigue, R/L visual convergence impairment, impaired sensation, impaired postural control, dysarthria, and receptive & expressive aphasia.  These impairments are contributing to functional limitations impacting his safety and IND w/ mobility, I/ADLs, communication, and eating. The pt would benefit from admission to inpatient acute rehab to receive intense therapies to address above functional impairments as well as daily medical mgmt and rehab nursing to facilitate disch to the community.     Current Active Medical Management Needs/Risks for Clinical Complications  The patient requires the high level of rehabilitation physician supervision that accompanies the provision of intensive rehabilitation therapy.  The patient needs the services of the rehabilitation physician to assess the patient medically and functionally and to modify the course of treatment as needed to maximize the patient's capacity to benefit from the rehabilitation process.  The pt requires physician oversight at least 3x/wk to medically manage and assess:     Neurologic status in setting of L MCA and ICA ischemic stroke c/b hemorrhagic transformation: assess for neurologic recovery; provide stroke education & risk factor reduction strategies. Pt w/ R hemiplegia - at risk for altered skin integrity and impaired tone; pt w/ R shoulder subluxation. Pt at risk for further strokes, neurologic decline, and pain.     Anticoagulation: Pt's anticoagulation needs are complicated in setting of ischemic stroke c/b hemorrhagic conversion and new RLE DVT requiring IVC filter placement.  Need to balance risk for further bleeding w/ risk for further DVTs. Currently on ASA 81 mg daily. Further anticoagulation on hold until 4 weeks post - hemorrhagic transformation. Neurology recommends repeat head CT prior to initiating anticoagulation to ensure no further bleeding. DOAC's would be preferred over Warfarin.  Note: DVT prophylaxis w/  subcutaneous Heparin has been discontinued.     Mental health: He is at risk for mood and sleep disorders in setting of CVA. He was previously on Melatonin to aid in sleep prior to readmission to hospital.  Promote effective coping in pt w/ significant change in functional status.     Nutritional status d/t dysphagia: work w/ dietician and SLP to advance diet as appropriate and ensure adequate nutrition. Pt at risk for dehydration and aspiration pneumonia in setting of dysphagia.  Pt has expressed difficulty keeping up w/ hydration while on thickened liquids. Conintues to assess labs, vitals and clinical signs of dehydration. Encourage fluids. Assess for pocketing on the right after meals/snacks/medications.     Cardiac status in setting of elevated BPs and VT - initiated on Metoprolol Tartrate - hold for systolic BP <110mmHG, HR <60bpm. On Ziopatch x2 weeks from initial [facility] admission on 11/1/22. SBP goal <140mmHg.     Bowel regimen d/t constipation: on Senokot and Miralax.    Urinary retention: pt intermittently requiring straight catheterization. Pt also w/ continence/incontinence d/t urgency. Continue bladder scans and ISC, Urojet added for comfort.     Pain: on Tylenol.  Patient will need ongoing assessment and adjustment of pain medications for optimal participation in therapies.     Fluid and electrolyte balance in setting of hypernatremia: trending BMP.     Past Medical/Surgical History  Surgery in the past 100 days: Yes  Additional relevant past medical history: Mitral valve regurgitation s/p mitral valve repair, allergic rhinitis, s/p Lasik; stroke    Level of Functioning Prior to Admission:  LIVING ENVIRONMENT and SELF-CARE  He lives w/ his wife Alix in a home.  3 [address] w/ rail +12 to basement w/ rail. Bed/bath available on main level of home.   Prior to admission, pt was fully IND w/ mobility & ADLs.  Usual Activity Tolerance: excellent  Equipment Currently Used at Home: none  Additional Comments: He is a  retired  and theater director.     Level of Function: GG Scale (Section GG Functional Ability and Goals; CMS's SMITH Version 3.0 Manual effective 10.1.2019):  PT Current Function Goals for Rehab   Bed Rolling 3 Partial/moderate assistance 6 Independent   Supine to Sit 3 Partial/moderate assistance 6 Independent   Sit to Stand 2 Substantial/maximal assistance 6 Independent   Transfer 1 Dependent 4 Supervision or touching assitance   Ambulation 88 Not attempted due to safety Not Applicable: anticipate w/c based mobility at disch   Stairs 88 Not attempted due to safety 1 Dependent: w/c based mobility on ramp.     OT Current Function Goals for Rehab   Feeding 3 Partial/moderate assistance 6 Independent   Grooming 3 Partial/moderate assistance 6 Independent   Bathing 1 Dependent 3 Partial/moderate assistance   Upper Body Dressing 2 Substantial/maximal assistance 6 Independent   Lower Body Dressing 1 Dependent 6 Independent   Toileting 1 Dependent 4 Supervision or touching assitance   Toilet Transfer 1 Dependent 6 Independent   Tub/Shower Transfer 1 Dependent 3 Partial/moderate assistance   Cognition Not Assessed Supervision     SLP Current Function Goals for Rehab   Swallow Impaired Tolerate least restrictive diet without signs & symptoms of aspiration and adhere to safe swallow strategies   Communication Impaired Communicate basic needs effectively     Current Diet:  soft & bite-sized (level 6);moderately thick liquids/liquidized (level 3)    Summary Statement:  The pt performs bed mobility supine <>sit w/ mod A, using LLE well to move RLE. He performs sit to stand pivot transfer w/ max Ax1 w/ PT only d/t R hemiplegia and impaired postural control. He requires STS mod A w/ bozena steady w/ OT, otherwise W/C based and bozena steady Ax2 for transfers with nsg. He scored 12/36 on postural assessment for stroke scale (PASS).  He performs grooming: setup and seated EOB w/ min A, oral cares with min A seated. He  performs Dressing: UBD- max A to thread and don overhead supine w/ elevated HOB; LBD-Ax2 with bozena steady, Feet- dependent. He performs bathing tasks w/ Ax2 with bozena steady to/from grey dependent SCI shower chair/commode. Pt requires assistance with washing/rinsing/drying 6/10 body parts. He performs toileting: Ax2 with bozena steady to /from BSC; toilet hygiene with Ax2 with bozena steady.  He needs a full cognitive linguistic assessment while admitted to acute inpt rehab.  In SLP, pt demos oral motor functions notable for reduced R oral movement/sensation. Pt seen with meal soft bite size (6) texture, moderately thick (3) liquid by cup. Pt with prolonged mastication, delayed AP transit, mild-mod oral residuals. Pt able to clear residuals with min cues from SLP to alternate solids/liquids and wait until oral cavity clearance prior to another bite solid. Pt able to follow these directions but required intermittent cues to continue through course of meal. no notable impulivity noted, likely inattention to R buccal cavity. No s/sx aspiration. SLP recommends repeat VFSS in ~2 weeks from 11/3/22. Patient is anticipated to continue making gains and achieve a level of independence w/ bed mobility, transfers, g/h, dressing, and require A for bathing and shower transfers. He will require w/c based mobility for in home and community based mobility, and require 24/7 A d/t expressive and receptive aphasia.     Expected Therapies and Services Required During Inpatient Rehab Admission  Intensity of Therapy: Patient requires intensive therapies not available in a lesser level of care. Patient is motivated, making gains, and can tolerate 3 hours of therapy a day.  Physical Therapy: 60 minutes per day, 7 days a week for 24 days  Occupational Therapy: 60 minutes per day, 7 days a week for 24 days  Speech and Language Therapy: 60 minutes per day, 7 days a week for 24 days  Rehabilitation Nursing Needs: Patient requires 24 hour Rehab  Nursing to manage and assess vitals, perform neuro checks in pt at risk for neurologic decline/repeat strokes, assess nutritional status in pt at risk for malnutrition, dehydration, and aspiration d/t dysphagia, assist w/ positioning in pt w/ R hemiplegia at risk for skin breakdown, provide stroke education, provide carryover of prior learning in rehab therapies into RN cares, provide caregiver training and assist w/ care mgmt needs; assist w/ bowel mgmt in setting of constipation; assist w/ bladder mgmt d/t incontinence and urinary retention.     Precautions/restrictions/special needs:  Precautions: fall precautions and aspiration precautions  Restrictions: none  Special Needs: lift    Expected Level of Improvement: Patient is anticipated to continue making gains and achieve a level of independence w/ bed mobility, transfers, g/h, dressing, and require A for bathing and shower transfers. He will require w/c based mobility for in home and community based mobility, and require 24/7 A d/t expressive and receptive aphasia.   Expected Length of time to achieve: 24 days.     Anticipated Discharge Needs:  Anticipated Discharge Destination: Home  Anticipated Discharge Support: Family member  24/7 support available : Yes  Identified caregiver(s):  Wife Alix  Anticipated Discharge Needs: Home with homecare; ramp will need to be installed to enter home.     Identified challenges/barriers: severity of stroke w/ expressive/receptive aphasia    Liaison signature/date/time:    Physician statement of review and agreement:  I have reviewed and am in agreement of the need for IRF stay to address above functional and medical needs. In addition to above statements address, Patient requires intensive active and ongoing therapeutic intervention and multiple therapies; Patient requires medical supervision; Expected to actively participate in the intensive rehab program; Sufficiently stable to actively participate; Expectation for  measurable improvement in functional capacity or adaption to impairments.    MD signature/date/time:

## 2022-11-07 NOTE — PLAN OF CARE
VS: Temp: 98.3  F (36.8  C) Temp src: Oral BP: 110/71 Pulse: 97   Resp: 16 SpO2: 97 % O2 Device: None (Room air)     Pt on tele monitoring w/ continuous pulse oxymetry monitoring   O2: >92% RA   Output: Incontinent    Last BM: 11/5/22   Activity: Not OOB, turned and reporsitioned   Skin: Intact, has Zio patch to L chest, bruised to L chest, near armpit area   Pain: Denies    CMS: [place] orientation to aphasia. Pt alert. Neuro checks q4h.   Dressing: R internal jugular dressing CDI   Diet: Soft and bite sized diet (L6) w/ thickened liquids (L3)   LDA: Bilateral arms PIVs  L PIV infusing continuous LR at 100ml/hr   Equipment: IV pole and pump, personal belongings   Plan: TBD, w/ neurology consult in place. Call light is in reach, continue w/ POC.   Additional Info: Pt had IVC filter placed today

## 2022-11-07 NOTE — PLAN OF CARE
Goal Outcome Evaluation: ongoing    VS: /67 (BP Location: Left arm)   Pulse 102   Temp 98.3  F (36.8  C) (Oral)   Resp 16   SpO2 97%    O2: 97% on RA, denies SOB or respiratory distress    Output: Voiding spontaneously with use of urinal   Last BM: 11/5    Activity: Needs assist of two with turning and reposition, did not get out of bed this shift    Up for meals? No, HOB elevated with all oral intake    Skin: Right neck surgical incision    Pain: Denies pain or discomfort, scheduled Tylenol given    CMS: Alert, aphasic, right side flaccid    Dressing: Tegaderm intact to right side of neck    Diet: Level 6 diet with level 3 thickened liquids    LDA: Bilateral arms PIVS, saline locked    Equipment: IV pump and personal belongings    Plan: Will continue plan of care    Additional Info: IVC filter placed today

## 2022-11-07 NOTE — CONSULTS
"Pt returns to ARU today after being sent back to the acute medical floor for medical w/u. No immediate needs, questions or concerns at this time. Pt wife has SW contact information if needs arise.     Original SW assessment from 2022 copied below, chart review completed, no significant changes or SW concerns.     ------------------------------------  JERAMY Pain Assessment  Pain Effect on Sleep  Over the past 5 days, how much of the time has pain made it hard for you to sleep at night?\"    1. Rarely or not at all     Pain Interference with Therapy Activities  \"Over the past 5 days, how often have you limited your participation in rehabilitation therapy sessions due to pain?\"  1. Rarely or not at all     Pain Interference with Day-to-Day Activities  \"Over the past 5 days, how often have you limited your day-to-day activities (excluding rehabilitation therapy sessions) because of pain?\"  1. Rarely or not at all  -------------------------------------------------------------------------------------------------------------    Social Work: Initial Assessment with Discharge Plan     Patient Name: Tobin Hua  : 1960  Age: 62 year old  MRN: [id number]  Completed assessment with: Chart review and interview with patient and wife at bedside.   Admitted to ARU: 2022     Presenting Information   Date of SW assessment: 2022  Health Care Directive: Health Care Directive Agent (if patient not able to make decisions)  Primary Health Care Agent: Patient/self   Secondary Health Care Agent: Pt spouse, Alix NICHOLS   Living Situation: Lives in a home with spouse in [place], [place]. 3 YULIANA and 12 STI to the basement. Doesn't need to access basement, bed/bath on main level. Tub/shower. No pets in the home.   Previous Functional Status: Indep with all ADLs and IADLs PTA. 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 Enjoys walking and reading in free-time. Manages own " medications, finances, and drives PTA. Denied any falls in last 6 months.   DME available: See therapy evaluation for more information   Patient and family understanding of hospitalization: Alert but cognition and speech appear impaired.   Cultural/Language/Spiritual Considerations: 63 y/o  male, english speaking, , and Restorationist-tenzin.      Physical Health  Reason for admission: 01.2 right body involvement, left brain stroke: left ICA and L MCA ischemic stroke, c/b hemorrhagic transformation      Provider Information   Primary Care Physician: Pt wife shared that pt's PCP retired and pt had two physicals scheduled but got COVID and stroke resulted in cancelling both apts. Pt wife is going to look at providers at  [facility] and will update [facility] on preferred provider. [facility] notified of this by this writer.   ARU [facility] will schedule PCP apt at discharge.   : None reported      Mental Health/Chemical Dependency:   Diagnosis: Pt denied. Per H&P--Notes some difficulty with mood, feeling anxious or worried especially during evenings or overnights.  Alcohol/Tobacco/Narcotis: Pt denied   Support/Services in Place: None reported   Services Needed/Recommended: [place] and [facility] Psychology support while on ARU available.   Sexuality/Intimacy: Not discussed      Support System  Marital Status: , wife supportive and able to A at discharge. Retired in March. Appears healthy and well.   Family support: 2 adult children. Son and dtr, both local and supportive/involved. Two grandchildren, 3 and 2 y/o.    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   Income Source: not discussed   Education: not discussed   Financial Concerns: pt denied  "  Insurance: dscout/dscout OPEN ACCESS     Discharge Plan   Patient and family discharge goal: Home   Provided Education on discharge plan: Evaluations and discharge recommendations pending.   Patient agreeable to discharge plan:  Pending further discussion. Evaluations and discharge recommendations pending.   Provided education and attained signature for Medicare IM and IRF Patient Rights and Privacy Information provided to patient : N/A  Provided patient with [facility] Resources: YES-agreeable to referral, will complete closer to discharge.   Barriers to discharge: None identified      Discharge Recommendations   Disposition: See above   Transportation Needs: Patient, family/friends, paid transport, insurance transport (if applicable)      Additional comments   Discharge [place]. Alejandra kovacsed team rounds with pt wife. Pt wife given SW direct contact information if needs arise. No immediate needs at this time. SW will remain available and continue to follow as needs arise.     -------------------------------------------------------------------------------------------------------------  JERAMY Pain Assessment     Pain Effect on Sleep  Over the past 5 days, how much of the time has pain made it hard for you to sleep at night?\"    1. Rarely or not at all     Pain Interference with Therapy Activities  \"Over the past 5 days, how often have you limited your participation in rehabilitation therapy sessions due to pain?\"  1. Rarely or not at all     Pain Interference with Day-to-Day Activities  \"Over the past 5 days, how often have you limited your day-to-day activities (excluding rehabilitation therapy sessions) because of pain?\"  1. Rarely or not at all  -------------------------------------------------------------------------------------------------------------     [facility]   [facility] [address], [address]th [place]   [place], [place] " [address]  Phone: [phone], Fax: [phone], Pager: [phone]

## 2022-11-08 ENCOUNTER — APPOINTMENT (OUTPATIENT)
Dept: SPEECH THERAPY | Facility: CLINIC | Age: 62
DRG: 056 | End: 2022-11-08
Attending: PHYSICIAN ASSISTANT
Payer: COMMERCIAL

## 2022-11-08 ENCOUNTER — APPOINTMENT (OUTPATIENT)
Dept: OCCUPATIONAL THERAPY | Facility: CLINIC | Age: 62
DRG: 056 | End: 2022-11-08
Attending: PHYSICAL MEDICINE & REHABILITATION
Payer: COMMERCIAL

## 2022-11-08 ENCOUNTER — APPOINTMENT (OUTPATIENT)
Dept: PHYSICAL THERAPY | Facility: CLINIC | Age: 62
DRG: 056 | End: 2022-11-08
Attending: PHYSICAL MEDICINE & REHABILITATION
Payer: COMMERCIAL

## 2022-11-08 ENCOUNTER — APPOINTMENT (OUTPATIENT)
Dept: SPEECH THERAPY | Facility: CLINIC | Age: 62
DRG: 056 | End: 2022-11-08
Attending: PHYSICAL MEDICINE & REHABILITATION
Payer: COMMERCIAL

## 2022-11-08 LAB
ANION GAP SERPL CALCULATED.3IONS-SCNC: 8 MMOL/L (ref 3–14)
BUN SERPL-MCNC: 16 MG/DL (ref 7–30)
CALCIUM SERPL-MCNC: 8.7 MG/DL (ref 8.5–10.1)
CHLORIDE BLD-SCNC: 102 MMOL/L (ref 94–109)
CO2 SERPL-SCNC: 28 MMOL/L (ref 20–32)
CREAT SERPL-MCNC: 0.56 MG/DL (ref 0.66–1.25)
ERYTHROCYTE [DISTWIDTH] IN BLOOD BY AUTOMATED COUNT: 14.1 % (ref 10–15)
GFR SERPL CREATININE-BSD FRML MDRD: >90 ML/MIN/1.73M2
GLUCOSE BLD-MCNC: 130 MG/DL (ref 70–99)
HCT VFR BLD AUTO: 32 % (ref 40–53)
HGB BLD-MCNC: 10.5 G/DL (ref 13.3–17.7)
LACTATE SERPL-SCNC: 0.6 MMOL/L (ref 0.7–2)
MCH RBC QN AUTO: 29.4 PG (ref 26.5–33)
MCHC RBC AUTO-ENTMCNC: 32.8 G/DL (ref 31.5–36.5)
MCV RBC AUTO: 90 FL (ref 78–100)
PLATELET # BLD AUTO: 265 10E3/UL (ref 150–450)
POTASSIUM BLD-SCNC: 3.8 MMOL/L (ref 3.4–5.3)
RBC # BLD AUTO: 3.57 10E6/UL (ref 4.4–5.9)
SODIUM SERPL-SCNC: 138 MMOL/L (ref 133–144)
WBC # BLD AUTO: 10.7 10E3/UL (ref 4–11)

## 2022-11-08 PROCEDURE — 97163 PT EVAL HIGH COMPLEX 45 MIN: CPT | Mod: GP

## 2022-11-08 PROCEDURE — 128N000003 HC R&B REHAB

## 2022-11-08 PROCEDURE — 97112 NEUROMUSCULAR REEDUCATION: CPT | Mod: GP

## 2022-11-08 PROCEDURE — 85027 COMPLETE CBC AUTOMATED: CPT | Performed by: PHYSICIAN ASSISTANT

## 2022-11-08 PROCEDURE — 97535 SELF CARE MNGMENT TRAINING: CPT | Mod: GO | Performed by: STUDENT IN AN ORGANIZED HEALTH CARE EDUCATION/TRAINING PROGRAM

## 2022-11-08 PROCEDURE — 92523 SPEECH SOUND LANG COMPREHEN: CPT | Mod: GN

## 2022-11-08 PROCEDURE — 97167 OT EVAL HIGH COMPLEX 60 MIN: CPT | Mod: GO | Performed by: STUDENT IN AN ORGANIZED HEALTH CARE EDUCATION/TRAINING PROGRAM

## 2022-11-08 PROCEDURE — 83605 ASSAY OF LACTIC ACID: CPT | Performed by: PHYSICAL MEDICINE & REHABILITATION

## 2022-11-08 PROCEDURE — 999N000150 HC STATISTIC PT MED CONFERENCE < 30 MIN

## 2022-11-08 PROCEDURE — 250N000013 HC RX MED GY IP 250 OP 250 PS 637: Performed by: PHYSICIAN ASSISTANT

## 2022-11-08 PROCEDURE — 80048 BASIC METABOLIC PNL TOTAL CA: CPT | Performed by: PHYSICIAN ASSISTANT

## 2022-11-08 PROCEDURE — 36415 COLL VENOUS BLD VENIPUNCTURE: CPT | Performed by: PHYSICIAN ASSISTANT

## 2022-11-08 PROCEDURE — 999N000125 HC STATISTIC PATIENT MED CONFERENCE < 30 MIN

## 2022-11-08 PROCEDURE — 99233 SBSQ HOSP IP/OBS HIGH 50: CPT | Performed by: PHYSICIAN ASSISTANT

## 2022-11-08 PROCEDURE — 92610 EVALUATE SWALLOWING FUNCTION: CPT | Mod: GN

## 2022-11-08 PROCEDURE — 97530 THERAPEUTIC ACTIVITIES: CPT | Mod: GP

## 2022-11-08 PROCEDURE — 36415 COLL VENOUS BLD VENIPUNCTURE: CPT | Performed by: PHYSICAL MEDICINE & REHABILITATION

## 2022-11-08 PROCEDURE — 999N000125 HC STATISTIC PATIENT MED CONFERENCE < 30 MIN: Performed by: STUDENT IN AN ORGANIZED HEALTH CARE EDUCATION/TRAINING PROGRAM

## 2022-11-08 RX ADMIN — SENNOSIDES AND DOCUSATE SODIUM 2 TABLET: 50; 8.6 TABLET ORAL at 09:36

## 2022-11-08 RX ADMIN — ACETAMINOPHEN 975 MG: 325 TABLET, FILM COATED ORAL at 21:08

## 2022-11-08 RX ADMIN — MELATONIN TAB 3 MG 3 MG: 3 TAB at 21:08

## 2022-11-08 RX ADMIN — METOPROLOL TARTRATE 25 MG: 25 TABLET, FILM COATED ORAL at 09:36

## 2022-11-08 RX ADMIN — ASPIRIN 81 MG: 81 TABLET, COATED ORAL at 09:36

## 2022-11-08 RX ADMIN — BISACODYL 10 MG: 10 SUPPOSITORY RECTAL at 18:57

## 2022-11-08 RX ADMIN — ACETAMINOPHEN 975 MG: 325 TABLET, FILM COATED ORAL at 04:53

## 2022-11-08 RX ADMIN — SENNOSIDES AND DOCUSATE SODIUM 2 TABLET: 50; 8.6 TABLET ORAL at 21:08

## 2022-11-08 RX ADMIN — METOPROLOL TARTRATE 25 MG: 25 TABLET, FILM COATED ORAL at 21:08

## 2022-11-08 ASSESSMENT — ACTIVITIES OF DAILY LIVING (ADL)
BADLS,_PREVIOUS_FUNCTIONAL_LEVEL: INDEPENDENT
ADLS_ACUITY_SCORE: 61
ADLS_ACUITY_SCORE: 63
ADLS_ACUITY_SCORE: 61
ADLS_ACUITY_SCORE: 61
ADLS_ACUITY_SCORE: 53
IADLS,_PREVIOUS_FUNCTIONAL_LEVEL: INDEPENDENT
BADLS,_PREVIOUS_FUNCTIONAL_LEVEL: INDEPENDENT
ADLS_ACUITY_SCORE: 61
ADLS_ACUITY_SCORE: 63
ADLS_ACUITY_SCORE: 57
ADLS_ACUITY_SCORE: 63
IADLS,_PREVIOUS_FUNCTIONAL_LEVEL: INDEPENDENT
ADLS_ACUITY_SCORE: 57

## 2022-11-08 NOTE — PLAN OF CARE
Discharge Planner Post-Acute Rehab SLP:     Discharge Plan: ongoing SLP     Precautions: aspiration, fall,     Current Status:  Hearing: WFL  Vision: reading glasses  Communication: mild-mod expressive-receptive language impairments c/b frequent paraphasia, word/sound/syllable substitutions, semantic substitutions within conversation. Expresses basic wants/needs WFL. Mild dysarthria   Cognition: Unable to assess d/t extent of language impairment. Suspect impaired reasoning/attention noted during assessment   Swallow: Soft bite size (6) texture, moderately thick (3) liquid. NO straw. Ensure upright all PO, set up assist and intermittent cueing during meals for oral clearance    Assessment:  Pt seen for dysphagia assessment following L ICA + MCA CVA. Oral motor functions notable for reduced R oral movement/sensation. Pt seen with trial soft bite size (6) texture, moderately thick (3) liquid by cup. Pt with prolonged mastication, delayed AP transit, mild-mod oral residuals. Pt able to clear residuals with min cues from SLP to alternate solids/liquids and wait until oral cavity clearance prior to another bite solid. Pt able to follow these directions. no impulivity noted or no s/sx aspiration. Demonstrates below baseline level of function and would benefit from skilled SLP to tx dysphagia and modify diet as clinically and instrumentally indicated. Recommend pt continue level 6 texture, 3 liquid. NO straw. Ensure upright all PO, set up assist and intermittent cueing during meals for oral clearance. Pt educated re: clinical impressions and plan for therapy. Recommend repeat VFSS in ~2 weeks or earlier pending dc date and progress. Oropharyngeal functions remains similar to previous admission upon review    Pt seen for language assessment following L ICA + MCA CVA. Pt completed WAB-B, comparing scores with prior admission last week. Pt with overall improved score within most areas of assessment. Despite improvement, pt  continues to demonstrate mild expressive-receptive language impairments c/b frequent paraphasia, word/sound/syllable substitutions, semantic substitutions within conversation. Continues to demonstrate impercise articulation and difficulty word finding. Pt demonstrates below baseline level of function and would benefit from skilled SLP to tx areas of impairment and improve overall functional communication. Able to express most wants/needs appropriately.    Other Barriers to Discharge (Family Training, etc): diet training pending progress, communication/lang strategies to spouse/family

## 2022-11-08 NOTE — PROGRESS NOTES
11/07/22 1018   Appointment Info   Signing Clinician's Name / Credentials (PT) Sydnee Schrader, PT   Living Environment   People in Home spouse   Current Living Arrangements house   Home Accessibility stairs to enter home   Number of Stairs, Main Entrance 4   Stair Railings, Main Entrance railings on both sides of stairs   Transportation Anticipated family or friend will provide   Self-Care   Usual Activity Tolerance excellent   Current Activity Tolerance fair   Regular Exercise Yes   Activity/Exercise Type walking   Exercise Amount/Frequency daily   Equipment Currently Used at Home none   Fall history within last six months yes   Number of times patient has fallen within last six months 1   General Information   Onset of Illness/Injury or Date of Surgery 11/05/22   Referring Physician Jing Busby MD   Patient/Family Therapy Goals Statement (PT) Improve strength & functional mobility   Pertinent History of Current Problem (include personal factors and/or comorbidities that impact the POC) Per medical record, 63 yo male admitted on 11/5/2022, with a PMH of mitral valve repair (2011), allergic rhinitis, recent ischemic CVA with hemorrhagic conversion (admitted at  [facility] from 10/21-11/1), admit to ARU on 11/1/22. He was found to have an uptrending WBC count since admission to ARU, triggering sepsis protocol numerous times, with negative infectious work up; on 11/5/2022, patient was found to have an uptrending CRP, with a lactic acid of 2.2, and was found to have a large occlusive DVT in right femoral vein. Patient is now admitted to [facility] for placement of IVC filter.   Existing Precautions/Restrictions fall   General Observations Activity: up with assist   Cognition   Affect/Mental Status (Cognition) confused   Orientation Status (Cognition) oriented to;person;situation   Follows Commands (Cognition) WFL   Pain Assessment   Patient Currently in Pain No   Posture    Posture Forward head  position;Protracted shoulders;Kyphosis   Range of Motion (ROM)   ROM Comment bilateral L/Es WFL; exhibits mild ham & gastroc tightness   Left Hip (Manual Muscle Testing)   Hip Flexion - Left Side (5/5) normal,left   Right Hip (Manual Muscle Testing)   Hip Flexion - Right Side (1+/5) trace plus, right   Left Knee (Manual Muscle Testing)   Knee Flexion - Left Side (5/5) normal,left   Knee Extension - Left Side (5/5) normal,left   Right Knee (Manual Muscle Testing)   Knee Flexion - Right Side (1+/5) trace plus, right   Knee Extension - Right Side (1+/5) trace plus, right   Left Ankle/Foot (Manual Muscle Testing)   Ankle Dorsiflexion - Left Side 5/5) normal,left   Ankle Plantarflexion - Left Side 5/5) normal,left   Right Ankle/Foot (Manual Muscle Testing)   Ankle Dorsiflexion - Right Side (0/5) zero, right   Ankle Plantarflexion - Right Side (0/5) zero, right   Bed Mobility   Comment, (Bed Mobility) supine to right sidelying to sitting with use of rail & min assist   Transfers   Comment, (Transfers) sit to stand from EOB with min/mod assist of 1; PT providing facilitation to left hip & knee ext; bed to recliner SPT with mod assist toward pt's right side   Gait/Stairs (Locomotion)   Comment, (Gait/Stairs) not assessed   Balance   Balance Comments sits at EOB with close SBA; able to weight shift for scooting   Clinical Impression   Criteria for Skilled Therapeutic Intervention Yes, treatment indicated   PT Diagnosis (PT) impaired functional mobility   Influenced by the following impairments decreased strength, decreased ROM, impaired coordination   Functional limitations due to impairments decreased independence with bed mobility, transfers, gait   Clinical Presentation (PT Evaluation Complexity) Stable/Uncomplicated   Clinical Presentation Rationale based upon subjective information provided, objective exam findings, medical history & clinical judgement   Clinical Decision Making (Complexity) low complexity   Planned  Therapy Interventions (PT) balance training;bed mobility training;gait training;strengthening;stretching;transfer training   Risk & Benefits of therapy have been explained evaluation/treatment results reviewed;care plan/treatment goals reviewed;participants voiced agreement with care plan;participants included;patient;spouse/significant other   PT Total Evaluation Time   PT Eval, Low Complexity Minutes (28306) 10   Physical Therapy Goals   PT Frequency Daily   PT Predicted Duration/Target Date for Goal Attainment 11/09/22   PT: Bed Mobility Supervision/stand-by assist;Rolling;Supine to/from sit   PT: Transfers Minimal assist;Sit to/from stand;Bed to/from chair   PT Discharge Planning   PT Plan PT: bed mobility, sit to stand; standing balance & tolerance   PT Discharge Recommendation (DC Rec) Acute Rehab Center-Motivated patient will benefit from intensive, interdisciplinary therapy.  Anticipate will be able to tolerate 3 hours of therapy per day   PT Rationale for DC Rec Pt admitted from ARU where he was receiving intensive rehab services to address deficits following CVA; recommend returning to ARU once medically appropriate   PT Brief overview of current status min assist bed mobility; mod assist bed to chair SPT   Total Session Time   Total Session Time (sum of timed and untimed services) 10

## 2022-11-08 NOTE — PROGRESS NOTES
"   11/08/22 0648   Appointment Info   Signing Clinician's Name / Credentials (OT) Jeannie Albert   Living Environment   People in Home spouse   Current Living Arrangements house   Transportation Anticipated family or friend will provide   Living Environment Comments 3 YULIANA and 12 to basement. Pt can stay on main level. Tub shower with comfort height toilet   Self-Care   Usual Activity Tolerance excellent   Current Activity Tolerance fair   Equipment Currently Used at Home none   Fall history within last six months yes   Number of times patient has fallen within last six months 1   Activity/Exercise/Self-Care Comment PLOF was IND and shared IADL responsibility, managed finances and meds. Pt provided  for grandkids.   Previous Level of Function/Home Environm   Bathing/Grooming, Premorbid Functional Level independent   Dressing, Premorbid Functional Level independent   Eating/Feeding, Premorbid Functional Level independent   Toileting, Premorbid Functional Level independent   BADLs, Premorbid Functional Level independent   IADLs, Premorbid Functional Level independent   Bed Mobility, Premorbid Functional Level independent   Transfers, Premorbid Functional Level independent   Household Ambulation, Premorbid Functional Level independent   Stairs, Premorbid Functional Level independent   Community Ambulation, Premorbid Functional Level independent   General Information   Onset of Illness/Injury or Date of Surgery 11/07/22   Referring Physician Ashley PALMER   Patient/Family Therapy Goal Statement (OT) to be able to get into the house. to be able to play trains with the grandkids and to get the right arm working.   Additional Occupational Profile Info/Pertinent History of Current Problem Per chart\" 62 year old right hand dominant male with past medical history of mitral valve repair (2011) and allergic rhinitis who was recently admitted 10/21/22 with stroke 2/2 dissection/occlusion of cervical left ICA now " "s/p mechanical thrombectomy with initial course further complicated by hemorrhagic transformation of prior CVA, moderate aortic dilation, anemia, hyponatremia and later hypernatremia, left neck hematoma, non-sustained ventricular tachycardia, low grade fever, constipation, malnutrition (NG tube removed on 10/30), headache, and dysphagia.  \"   Existing Precautions/Restrictions fall;swallowing  (level 6, mod thick liquid, no straw, aphasia)   Limitations/Impairments aphasia;sensory;swallowing;safety/cognitive   General Observations and Info wife present ad answered many questions   Cognitive Status Examination   Cognitive Status Comments hard to assess due to aphasia, followed easy directions but had a hard time verbalizing   Visual Perception   Impact of Vision Impairment on Function (Vision) wears reading glasses, R/L covergence impairments   Sensory   Sensory Comments pt feels light touch on right arm, proprioception intact, need to assess more   Pain Assessment   Patient Currently in Pain No   Range of Motion Comprehensive   Comment, General Range of Motion LUE intact, RUE intact to PROM   Strength Comprehensive (MMT)   Comment, General Manual Muscle Testing (MMT) Assessment LUE WFL, RUE flaccid   Coordination   Coordination Comments RUE flaccid and thus impaired coordination   Clinical Impression   Criteria for Skilled Therapeutic Interventions Met (OT) Yes, treatment indicated   OT Diagnosis ADL and mobility deficits   Influenced by the following impairments R side strength, sensation, aphasia, swollow   OT Problem List-Impairments impacting ADL problems related to;activity tolerance impaired;cognition;communication;coordination;mobility;sensation;strength;sensory feedback;vision   ADL comments/analysis Impairments impact all aspects of ADL   Assessment of Occupational Performance 5 or more Performance Deficits   Identified Performance Deficits All ADL and IADL   Planned Therapy Interventions (OT) ADL " retraining;E-stim;orthoic fitting/training;strengthening;transfer training;progressive activity/exercise;neuromuscular re-education;fine motor coordination training   Clinical Decision Making Complexity (OT) high complexity   Anticipated Equipment Needs Upon Discharge (OT)   (TBD)   Risk & Benefits of therapy have been explained evaluation/treatment results reviewed;care plan/treatment goals reviewed;spouse/significant other;patient   Clinical Impression Comments Pt is significantly below baseline due to impairments and will benefit from skilled OT to improve function, independence, determine DME and train family.   OT Total Evaluation Time   OT Eval, High Complexity Minutes (62533) 15   OT Goals   Therapy Frequency (OT) Daily   OT Predicted Duration/Target Date for Goal Attainment 12/05/22   OT: Hygiene/Grooming modified independent   OT: Upper Body Dressing Modified independent   OT: Lower Body Dressing Minimal assist   OT: Upper Body Bathing Supervision/stand-by assist   OT: Lower Body Bathing Minimal assist   OT: Transfer Supervision/stand-by assist  (tub transfer)   OT: Toilet Transfer/Toileting Supervision/stand-by assist;cleaning and garment management;toilet transfer   OT: Goal 1 Pt will be IND with HEP to improve RUE function for ADL   OT: Goal 2 Family will complete family training to demonstrate ability to assist pt as needed at discharge.   Self-Care/Home Management   Self-Care/Home Mgmt/ADL, Compensatory, Meal Prep Minutes (77324) 45   Treatment Detail/Skilled Intervention Educated pt and wife on OT role and POC. Ed on compensatroy strategies for moving LE and for one handed stretegies for toiletries. ADL completed and trialed transfer with transfer pole. Pt still to use bozena stedy for commode transfer.   Neuromuscular Re-Education   Treatment Detail/Skilled Intervention Initiated NMES for R shoulder and educated pt's wife on use. mA set at 27   OT Discharge Planning   OT Plan OT: shower can use  transfer pole to the left or bozena whaley to rolling grey shower chair , FES initiation on thursday   Total Session Time   Timed Code Treatment Minutes 45   Total Session Time (sum of timed and untimed services) 60   Post Acute Settings Only   What unit is patient on? Acute Rehab   OT - Acute Rehab Center Time   Individual Time (minutes) - enter zero if not applicable - OT 60   Group Time (minutes) - enter zero if not applicable  - OT 0   Concurrent Time (minutes) - enter zero if not applicable  - OT 0   Co-Treatment Time (minutes) - enter zero if not applicable  - OT 0   ARC Total Session Time (minutes) - OT 60   Expression   Expression Comment impaired   Social Interaction   Social Interaction Comment cooperative   Oral Hygiene   Describe performance set up assist   Grooming (except oral cares)   Grooming Comment set up assist   Chair/bed-to-chair Transfer   Describe performance MOD A tot he left, therapist blocking RLE using transfer pole

## 2022-11-08 NOTE — PROGRESS NOTES
"   11/08/22 8782   Appointment Info   Signing Clinician's Name / Credentials (SLP) Cielo Johnson MS CCC-SLP   General Information   Onset of Illness/Injury or Date of Surgery 09/21/22   Referring Physician Ashley Campoverde PA-C   Pertinent History of Current Problem per H&P:\"62 year old right hand dominant male with past medical history of mitral valve repair (2011) and allergic rhinitis who was recently admitted 10/21/22 with stroke 2/2 dissection/occlusion of cervical left ICA now s/p mechanical thrombectomy with initial course further complicated by hemorrhagic transformation of prior CVA, moderate aortic dilation, anemia, hyponatremia and later hypernatremia, left neck hematoma, non-sustained ventricular tachycardia, low grade fever, constipation, malnutrition (NG tube removed on 10/30), headache, and dysphagia. \" SLP consulted to re assess language and dysphagia   General Observations Pt known to SLP and this department, with spouse upon arrival. Pt upright, agreeable and participatory in assessment. Pt seen by SLP for dysphagia and aphasia during acute hospitalization. Repeated VFSS completed 11/1, see below for details. Pt admitted to inpatient rehab on soft bite size (6) texture, moderately thick (3) liquid. NO straw.   Pain Assessment   Patient Currently in Pain No   Type of Evaluation   Type of Evaluation Swallow Evaluation   Oral Motor   Oral Musculature generally intact   Mucosal Quality good   Dentition (Oral Motor)   Dentition (Oral Motor) natural dentition;adequate dentition   Facial Symmetry (Oral Motor)   Facial Symmetry (Oral Motor) right side impairment   Right Side Facial Asymmetry minimal impairment   Lip Function (Oral Motor)   Lip Range of Motion (Oral Motor) retraction impairment   Retraction, Lip Range of Motion right side;moderate impairment   Lip Coordination (Oral Motor) minimal impairment   Lip Sensitivity (Oral Motor) right lower lip decreased   Tongue Function (Oral Motor) " "  Tongue ROM (Oral Motor) lateralization is impaired   Lateralization, Tongue ROM Impairment (Oral Motor) right side   Tongue Strength (Oral Motor) strength decreased   Tongue Coordination/Speed (Oral Motor) reduced rate   Tongue Sensitivity (Oral Motor) right side decreased   Jaw Function (Oral Motor)   Jaw Function (Oral Motor) WNL   Cough/Swallow/Gag Reflex (Oral Motor)   Soft Palate/Velum (Oral Motor) WNL   Volitional Throat Clear/Cough (Oral Motor) WNL   Vocal Quality/Secretion Management (Oral Motor)   Vocal Quality (Oral Motor) WNL   General Swallowing Observations   Current Diet/Method of Nutritional Intake (General Swallowing Observations, NIS) soft & bite-sized (level 6);moderately thick liquids/liquidized (level 3)   Respiratory Support (General Swallowing Observations) none   Past History of Dysphagia VFSS repeated 11/1 indicating:\"Silent aspiration of thin liquid. Deep laryngeal penetration of mildly thick liquid. No penetration or aspiration of moderately thick liquid, puree, and solids.\" pt advanced to softe bite size (6) texture, level 3 liquid   Swallowing Evaluation Clinical swallow evaluation   Clinical Swallow Evaluation   Additional evaluation(s) completed today Yes   Clinical Swallow Evaluation Textures Trialed moderately thick liquids/liquidized;soft & bite-sized   Clinical Swallow Eval: Moderately Thick Liquids   Mode of Presentation self-fed   Volume Presented 3 oz   Oral Phase WFL   Pharyngeal Phase intact   Clinical Swallow Eval: Soft & Bite Sized   Mode of Presentation self-fed   Oral Phase impaired mastication;delayed AP movement;residue in oral cavity   Pharyngeal Phase intact   Esophageal Phase of Swallow   Patient reports or presents with symptoms of esophageal dysphagia No   Swallowing Recommendations   Diet Consistency Recommendations soft & bite-sized (level 6);moderately thick liquids/liquidized (level 3)   Supervision Level for Intake close supervision needed   Mode of Delivery " Recommendations bolus size, small;no straws;slow rate of intake;place food on left side of mouth   Swallowing Maneuver Recommendations alternate food and liquid intake   Monitoring/Assistance Required (Eating/Swallowing) check mouth frequently for oral residue/pocketing;stop eating activities when fatigue is present;cue for finger/lingual sweep if oral pocketing present;monitor for cough or change in vocal quality with intake   Recommended Feeding/Eating Techniques (Swallow Eval) maintain upright sitting position for eating;minimize distractions during oral intake;set-up and prepare tray   Medication Administration Recommendations, Swallowing (SLP) puree   Instrumental Assessment Recommendations VFSS (videofluoroscopic swallowing study)   Motor Speech   Speech Intelligibility (Motor Speech) word level;phrase/sentence level;conversational level   Word Level, Speech Intelligibility (Motor Speech) intact   Phrase/Sentence Level, Speech Intelligibility (Motor Speech) minimal impairment   Articulation (Motor Speech) imprecise articulation   Conversational Level, Speech Intelligibility (Motor Speech) moderate impairment   Speech Fluency (Motor Speech) nonfluent, moderate impairment   Vocal Loudness (Motor Speech) reduced loudness   Clinical Impression   Criteria for Skilled Therapeutic Interventions Met (SLP Eval) Yes, treatment indicated   SLP Diagnosis mild-mod orophryngeal dysphagia   Functional Limitations Related to Problem List (SLP) aspiration, PO intake   Risks & Benefits of therapy have been explained evaluation/treatment results reviewed;risks/benefits reviewed;participants voiced agreement with care plan;participants included;patient;spouse/significant other   Clinical Impression Comments SLP: Pt seen for dysphagia assessment following L ICA + MCA CVA. Oral motor functions notable for reduced R oral movement/sensation. Pt seen with trial soft bite size (6) texture, moderately thick (3) liquid by cup. Pt with  prolonged mastication, delayed AP transit, mild-mod oral residuals. Pt able to clear residuals with min cues from SLP to alternate solids/liquids and wait until oral cavity clearance prior to another bite solid. Pt able to follow these directions. no impulivity noted or no s/sx aspiration. Demonstrates below baseline level of function and would benefit from skilled SLP to tx dysphagia and modify diet as clinically and instrumentally indicated. Recommend pt continue level 6 texture, 3 liquid. NO straw. Ensure upright all PO, set up assist and intermittent cueing during meals for oral clearance. Pt educated re: clinical impressions and plan for therapy. Recommend repeat VFSS in ~2 weeks or earlier pending dc date and progress. Oropharyngeal functions remains similar to previous admission upon review   SLP Total Evaluation Time   Eval: oral/pharyngeal swallow function, clinical swallow Minutes (45264) 30   SLP Goals   Therapy Frequency (SLP Eval) daily   SLP Predicted Duration/Target Date for Goal Attainment 11/29/22   SLP Goals Swallow   Total Session Time   Total Session Time (sum of timed and untimed services) 30

## 2022-11-08 NOTE — PROGRESS NOTES
"   11/08/22 0848   Appointment Info   Signing Clinician's Name / Credentials (SLP) Cielo Johnson MS CCC-SLP   General Information   Onset of Illness/Injury or Date of Surgery 09/21/22   Referring Physician Ashley Campoverde PA-C   Pertinent History of Current Problem per H&P:\"62 year old right hand dominant male with past medical history of mitral valve repair (2011) and allergic rhinitis who was recently admitted 10/21/22 with stroke 2/2 dissection/occlusion of cervical left ICA now s/p mechanical thrombectomy with initial course further complicated by hemorrhagic transformation of prior CVA, moderate aortic dilation, anemia, hyponatremia and later hypernatremia, left neck hematoma, non-sustained ventricular tachycardia, low grade fever, constipation, malnutrition (NG tube removed on 10/30), headache, and dysphagia. \" SLP consulted to re assess language and dysphagia   General Observations Pt known to SLP and this department, with spouse upon arrival. Seen also on this date for language assessment.   Pain Assessment   Patient Currently in Pain No   Type of Evaluation   Type of Evaluation Speech, Language, Cognition   Motor Speech   Articulation (Motor Speech) imprecise articulation   Conversational Level, Speech Intelligibility (Motor Speech) moderate impairment   Speech Fluency (Motor Speech) nonfluent, moderate impairment   Vocal Loudness (Motor Speech) reduced loudness   Western Aphasia Battery- Revised Bedside Record From   Spontaneous Speech Content Score (out of 10) 7   Spontaneous Speech Fluency Score (out of 10) 5   Auditory Verbal Comprehension Score (out of 10) 8   Sequential Commands Score (out of 10) 8   Repetition Score (out of 10) 10   Object Naming Score (out of 10) 5   Bedside Aphasia Sum 43   WAB-R Bedside Aphasia Score 71.67   Auditory Comprehension   Follows Commands (Auditory Comprehension) 1-step command;2-step commands   2 Step, Follows Commands (Auditory Comprehension) intact   1 Step, " Follows Commands (Auditory Comprehension) intact   Yes/No Questions (Auditory Comprehension) WFL;simple/factual questions;biographical/personal questions   Simple/Factual Questions (Auditory Comprehension) intact   Biographical/Personal Questions (Auditory Comprehension) achieved with cues   Verbal Expression   Automatic Speech (Verbal Expression) WFL   Confrontational Naming (Verbal Expression) objects;body parts   Objects, Confrontational Naming (Verbal Expression) impaired   Repetition Skills (Verbal Expression) words;phrases;sentences   Words, Repetition Skills (Verbal Expression) intact   Phrases, Repetition Skills (Verbal Expression) intact   Sentences, Repetition Skills (Verbal Expression) intact   Conversational Speech (Verbal Expression) word level;phrase level;sentence level   Sentence Level, Conversational Speech (Verbal Expression) severe impairment   Phrase Level, Conversational Speech (Verbal Expression) minimal impairment   Word Level, Conversational Speech (Verbal Expression) intact   Narrative Speech (Verbal Expression) storytelling/retelling;picture description   Picture Description, Narrative Speech (Verbal Expression) minimal impairment;moderate impairment   Storytelling/Retelling, Narrative Speech (Verbal Expression) moderate impairment   Written Language   Written Expression (Written Language) word level;phrase level   Phrase Level, Written Expression (Written Language) impaired   Word Level, Written Expression (Written Language) impaired   General Therapy Interventions   Planned Therapy Interventions Language   Language Auditory comprehension;Reading comprehension;Verbal expression;Written expression   Clinical Impression   Criteria for Skilled Therapeutic Interventions Met (SLP Eval) Yes, treatment indicated   SLP Diagnosis mild-mod expressive receptive language impairmenet   Functional Limitations Related to Problem List (SLP) expression wants/needs, IADLs   Risks & Benefits of therapy have  been explained evaluation/treatment results reviewed;risks/benefits reviewed;participants voiced agreement with care plan;participants included;patient;spouse/significant other   Clinical Impression Comments SLP: Pt seen for language assessment following L ICA + MCA CVA. Pt completed WAB-B, comparing scores with prior admission last week. Pt with overall improved score within most areas of assessment. Despite improvement, pt continues to demonstrate mild expressive-receptive language impairments c/b frequent paraphasia, word/sound/syllable substitutions, semantic substitutions within conversation. Continues to demonstrate impercise articulation and difficulty word finding. Pt demonstrates below baseline level of function and would benefit from skilled SLP to tx areas of impairment and improve overall functional communication. Able to express most wants/needs appropriately.   SLP Total Evaluation Time   Eval: Sound production with lang comprehension and expression Minutes (34672) 30   SLP Goals   Therapy Frequency (SLP Eval) daily   SLP Predicted Duration/Target Date for Goal Attainment 11/29/22   SLP Goals Communication;SLP Goal 1   SLP: Communicate basic wants and needs minimal assist   SLP: Goal 1 Pt will name novel and functional information within task with min cues 90% accuracy   SLP Discharge Planning   SLP Plan SLP: see with AM meal, current diet. provide additional recs as needed. tx expressive/receptive language, trial LILIBETH + VNeST   Total Session Time   Total Session Time (sum of timed and untimed services) 30   SLP - Acute Rehab Center Time   Individual Time (minutes) - enter zero if not applicable - SLP 30   Group Time (minutes) - enter zero if not applicable  - SLP 0   Concurrent Time (minutes) - enter zero if not applicable  - SLP 0   Co-Treatment Time (minutes) - enter zero if not applicable  - SLP 0   ARC Total Session Time (minutes) - SLP 30

## 2022-11-08 NOTE — PROGRESS NOTES
"  [facility]   Acute Rehabilitation Unit  Daily progress note    INTERVAL HISTORY  Tobin Hua was seen and examined at bedside this morning during team rounds with wife present.  No acute events reported overnight.  Patient did trigger sepsis protocol overnight (mild tachycardia, mild leukocytosis which normalized today).  Lactic acid WNL at 0.6.    Today, patient reports that he is feeling well overall.  Looking forward to resuming therapies.    Functionally, therapy evals underway today.  For full functional updates, see team rounds note from today.    MEDICATIONS    acetaminophen  975 mg Oral TID     aspirin  81 mg Oral Daily     bisacodyl  10 mg Rectal Daily     - MEDICATION INSTRUCTIONS -   Does not apply See Admin Instructions     melatonin  3 mg Oral At Bedtime     metoprolol tartrate  25 mg Oral BID     senna-docusate  1-2 tablet Oral BID     sodium chloride (PF)  3 mL Intracatheter Q8H        acetaminophen, bisacodyl, lidocaine     PHYSICAL EXAM  /67 (BP Location: Left arm, Patient Position: Semi-Walton's)   Pulse 91   Temp 97.1  F (36.2  C) (Axillary)   Resp 16   Ht 1.88 m (6' 2\")   SpO2 97%   BMI 21.23 kg/m    Gen: NAD, lying in bed  HEENT: NC/AT, MMM  Cardio: appears well-perfused  Pulm: non-labored on room air  Abd: soft, non-distended  Ext: 1+ edema throughout RLE, no erythema  Neuro/MSK: AAO, global aphasia, +dysarthria, R facial droop, PERRL, EOMI, R hemiparesis    LABS  CBC RESULTS: Recent Labs   Lab Test 11/08/22  0550 11/07/22  0719 11/06/22  0557   WBC 10.7 12.6* 13.9*   RBC 3.57* 3.72* 4.13*   HGB 10.5* 10.9* 12.1*   HCT 32.0* 32.9* 37.4*   MCV 90 88 91   MCH 29.4 29.3 29.3   MCHC 32.8 33.1 32.4   RDW 14.1 14.0 14.2    270 250     Last Basic Metabolic Panel:  Recent Labs   Lab Test 11/08/22  0550 11/07/22 0719 11/06/22  0557    140 139   POTASSIUM 3.8 4.0 4.6   CHLORIDE 102 105 102   CO2 28 25 29   ANIONGAP 8 10 8   * 106* " 105*   BUN 16 15 15   CR 0.56* 0.55* 0.63*   GFRESTIMATED >90 >90 >90   JAILENE 8.7 8.7 9.0         Rehabilitation - continue comprehensive acute inpatient rehabilitation program with multidisciplinary approach including therapies, rehab nursing, and physiatry following. See interval history for updates.      ASSESSMENT AND PLAN  Tobin Hua is a 62 year old right hand dominant male with a past medical history of mitral valve repair (2011), allergic rhinitis, and recent admission 10/21/22-11/1/22 for acute ischemic stroke due to dissection/occlusion of left ICA complicated by hemorrhagic transformation with intraparenchymal and intraventricular hemorrhage, anemia, non-sustained ventricular tachycardia, hypo/hypernatremia, dysphagia, constipation, and headache, who was previously at ARU and discharged back to acute hospital on 11/5/22 due to occlusive RLE DVT, now s/p IVC filter.  He is now re-admitted to ARU on 11/7/22 for multidisciplinary rehabilitation and ongoing medical management.        Admission to acute inpatient rehab 11/07/22.    Impairment group code: Stroke 01.2 (R) Body Involvement (L) Brain: left ICA and L MCA ischemic stroke, c/b hemorrhagic transformation        1. PT, OT and SLP 60 minutes of each on a daily basis, in addition to rehab nursing and close management of physiatrist.       2. Impairment of ADL's: Noted to have R hemiplegia, impaired balance, impaired coordination, fatigue, R/L visual convergence impairment, impaired sensation, impaired postural control, all affecting his ability to safely and independently perform basic ADLs.  Goal for mod I with basic ADLs, anticipate need for assist with bathing/showering and related transfers.     3. Impairment of mobility:  Noted to have R hemiplegia, impaired balance, impaired coordination, fatigue, R/L visual convergence impairment, impaired sensation, impaired postural control, all affecting his ability to safely and independently perform basic  mobility.  Goal for mod I with basic mobility, anticipate to be wheelchair based at discharge.     4. Impairment of cognition/language/swallow:  Noted to have dysphagia, dysarthria, and receptive and expressive aphasia with goals for safe tolerance of least restrictive diet and improved cognitive-linguistic skills to meet/communicate basic needs.     5. Medical Conditions  New actions/orders/updates for today are in blue.     Acute ischemic stroke due to ICA dissection and thrombosis/occlusion s/p tenecteplase, mechanical thrombectomy  C/b hemorrhagic transformation with intraparenchymal and intraventricular hemorrhage   R hemiparesis, R facial droop, dysphagia, aphasia, dysarthria  - Continue ASA 81 mg daily, indefinitely per neurology  - LDL 96, no indication for statin per neuro, no atherosclerosis on MRAs  - SBP goal <140  - Ziopatch in place for 2 weeks at ARU admission  - Continue PT/OT/SLP  - Follow up with neurology     Occlusive RLE DVT  On 11/5/22, noted to have significant edema in RLE compared with LLE.  Bilateral lower extremity Doppler ultrasounds revealed a large occlusive DVT in the right femoral vein extending to the deep veins below the knee and nonocclusive deep venous thrombosis in the right common femoral vein. CTPE negative for PE.  Head CT demonstrates likely stable status of hemorrhagic conversion when compared with previous.  Patient underwent IR placement of IVC filter on 11/6.  Neurology consulted 11/7 to discuss consideration of anticoagulation.  Per their recs, would consider at approximately 4 weeks after hemorrhage, after repeating head CT to evaluate for any further expansion of bleed.  - Per neurology, can consider anticoagulation approx 4 weeks (~11/21) from his hemorrhage from neurologic perspective.  Plan for updated head CT at that time to show no further expansion of the bleed prior to initiating.  DOAC's would be preferred over warfarin.  Neuro would recommend risk/benefit  discussion again with patient and family prior to actual initiation of anticoagulation.  - Follow-up with IR in 3-6 months to have IVC filter removed once   anticoagulation plan is established (sooner is better)     Mild leukocytosis  Elevated CRP  WBC peaked at 1.52 on 11/4, infectious work-up with UA negative, CXR negative, procal negative, CRP up-trending 150 on 11/7, blood cultures 11/5 NGTD.  Suspect 2/2 DVT, WBC down-trending since, stable at 12.6 on 11/7.  - Repeat CRP on Thursday to trend  - Follow up blood cultures  - Trend CBC.  WBC normalized today at 10.7     Moderate dilation of ascending aorta   Incidentally noted on echo for CVA work-up, involving the sinuses of Valsalva, maximal dimension 4.6 cm.  - Needs outptient follow up in 6 months with CT imaging, can be followed by PCP     Episodes of non-sustained ventricular tachycardia  Demonstrated on telemetry during IP stay.  Started on metoprolol with reduced frequency.  - Continue metoprolol 25 mg BID  - Ziopatch in place at ARU admission  - Continues with mild tachycardia in setting of DVT, difficulty maintaining hydration with thickened liquids.  Monitor, can increase beta blocker if indicated.     Moderate oropharyngeal dysphagia  At risk for malnutrition  - RD consulted, appreciate assist.  - Continue SLP  - Continue soft and bite-sized (level 6) diet with moderately thick (level 3) liquids.  - Meds in puree  - Swallow recs per SLP: Upright for all eating and drinking.  Relaxed, leisurely rate when eating and drinking.  Check for pocketing on the right after meals/snacks/medications.  Continue oral care.   - Repeat VFSS as indicated - per SLP likely next week  - On continuous fluids while inpatient 11/5-11/7.  Does ok with thickened liquids if present and reminded, so will not initially start IV fluids.  Continue to encourage PO fluids.  Monitor labs, vitals, clinically for signs of dehydration while on thickened liuqids.      Normocytic  anemia  Noted drop from Hgb 13 to yohana of 9.9 on 10/23, most recently stable at 10.9 on 11/7.  - Trend CBC.  Hgb stable today at 10.5     Urinary retention  Noted since ARU admission, requiring intermittent straight catheterization. Wife had noted urinary urgency and some incontinence while at hospital.  11/3 UA negative for infection.  Not noted during recurrent inpatient stay.  - Monitor PVRs at ARU re-admission,  ISC as indicated  - No PVRs recorded, will ask nursing to monitor     Hx mitral valve repair (2012)  Mild mitral regurgitation  - Noted.  Not on PTA anticoagulation, just aspirin, resumed as above     Allergic rhinitis  - Continue PTA Flonase daily     Right renal cortical enhancement  Incidentally noted on CT.  Indeterminate, possibly vascular etiology.   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  Non-painful.  Patient's movement limited by CVA as above.  - Follow-up with PCP to determine need for ortho spine involvement outpatient       6. Adjustment to disability:  Clinical psychology to eval and treat if indicated  7. FEN: soft & bite-sized (level 6); moderately thick (level 3) liquids  8. Bowel: incontinent, recent constipation, monitor, on scheduled and PRN bowel meds, continue bowel program with nightly suppository.  BM s/p suppository on 11/7  9. Bladder: continent/incontinent due to urgency, recent retention at ARU, monitor PVRs at admission  10. DVT Prophylaxis: currently no chemical or mechanical prophylaxis per discharging team  11. GI Prophylaxis: not indicated  12. Code: full  13. Disposition: goal for home with 24/7 assist  14. ELOS:  24 days  15. Rehab prognosis:  fair  1. Follow up Appointments on Discharge: PCP, neurology (Dr. Edgard Dave) in 6 weeks, IR in 3-6 months        Patient seen and discussed with Dr. Jori Troncoso, PM&R Staff  Physician    ESTELA Benavides-C  Physical Medicine & Rehabilitation

## 2022-11-08 NOTE — PROGRESS NOTES
SPIRITUAL HEALTH SERVICES progress Note  [facility] Acute Rehab    Pt requesting  support. Pt's nurse assessed that it would be appropriate for me to check in on pt when I am back on unit on Wednesday. If pt desires a  visit previous to Wednesday, he can request through bedside nurse.     True Oneal) Miguelito Walker M.Div., [facility]  Staff   Pager [phone]      * [facility] remains available 24/7 for emergent requests/referrals, either by having the switchboard page the on-call  or by entering an ASAP/STAT consult in Epic (this will also page the on-call ). Routine Epic consults receive an initial response within 24 hours.*

## 2022-11-08 NOTE — PLAN OF CARE
Discharge Planner Post-Acute Rehab OT:     Discharge Plan: Home with assist as needed and Home care    Precautions: fall, R shayy, mod thick liquid, aphasia, NMES for R shoulder    Current Status:  ADLs:  Mobility:WC based, Ax1-2 for transfer with transfer pole to the left side, florence stedy A1-2  Grooming: Set up   Dressing: TBD  Bathing: TBD  Toileting: Florence stedy to commode, total A for cares  IADLs: wife can take care  Vision/Cognition: aphasia impacting communication    Assessment: Pt with significant deficits and will benefit from skilled OT.    Other Barriers to Discharge (DME, Family Training, etc): level of assist, aphasia

## 2022-11-08 NOTE — PLAN OF CARE
FOCUS/GOAL  Bowel management, Bladder management, Mobility, and Skin integrity    ASSESSMENT, INTERVENTIONS AND CONTINUING PLAN FOR GOAL:    Pt alert, disoriented to place and time. RSW and expressive aphasia continues. Ax2 w/ LIKO when weak, T Pole also in place if patient tolerates. Incontinent of B&B, LBM: 11/7/2022. Soft bite diet, moderate thickened liquids, takes pills whole with pudding or applesauce. VSS. Patient did not void today, PVR read 333 at 1230, fluids and voiding encouraged. Wife understands and is pushing both. Another PVR will be checked q4 per POC. Will continue w/ poc.

## 2022-11-08 NOTE — PLAN OF CARE
Physical Therapy Discharge Summary    Reason for therapy discharge:    Discharged to acute rehabilitation facility.    Progress towards therapy goal(s). See goals on Care Plan in [facility] electronic health record for goal details.  Goals not met.  Barriers to achieving goals:   discharge on same date as initial evaluation.    Therapy recommendation(s):    Continued therapy is recommended.  Rationale/Recommendations:  patient will benefit from continued skilled PT intervention at ARU to address mobility deficits, improve strength & activity tolerance.

## 2022-11-08 NOTE — PHARMACY-MEDICATION REGIMEN REVIEW
Pharmacy Medication Regimen Review  Tobin Hua is a 62 year old male who is currently in the Acute Rehab Unit.    Assessment: All medications have an appropriate indications, durations and no unnecessary use was found    Plan:     Attending provider will be sent this note for review.  If there are any emergent issues noted above, pharmacist will contact provider directly by phone.      Pharmacy will periodically review the resident's medication regimen for any PRN medications not administered in > 72 hours and discontinue them. The pharmacist will discuss gradual dose reductions of psychopharmacologic medications with interdisciplinary team on a regular basis.    Please contact pharmacy if the above does not answer specific medication questions/concerns.    Background:  A pharmacist has reviewed all medications and pertinent medical history today.  Medications were reviewed for appropriate use and any irregularities found are listed with recommendations.      Current Facility-Administered Medications:      acetaminophen (TYLENOL) tablet 325-650 mg, 325-650 mg, Oral, Q4H PRN, Ashley Campoverde PA-C     acetaminophen (TYLENOL) tablet 975 mg, 975 mg, Oral, TID, Ashley Campoverde PA-C, 975 mg at 11/08/22 0453     aspirin EC tablet 81 mg, 81 mg, Oral, Daily, Ashley Campoverde PA-C, 81 mg at 11/07/22 1532     bisacodyl (DULCOLAX) suppository 10 mg, 10 mg, Rectal, Daily PRN, Ashley Campoverde PA-C     bisacodyl (DULCOLAX) suppository 10 mg, 10 mg, Rectal, Daily, Ashley Campoverde PA-C, 10 mg at 11/07/22 1800     Contraindications to both pharmacological and mechanical prophylaxis (must document contraindications for both in this order), , Does not apply, See Admin Instructions, Ashley Campoverde PA-C     lidocaine (XYLOCAINE) 2 % external gel, , Urethral, Q4H PRN, Ashley Campoverde PA-C     melatonin tablet 3 mg, 3 mg, Oral, At Bedtime, Ashley Campoverde PA-C 3 mg at 11/07/22 2003     metoprolol  tartrate (LOPRESSOR) tablet 25 mg, 25 mg, Oral, BID, Ashley Campoverde PA-C     senna-docusate (SENOKOT-S/PERICOLACE) 8.6-50 MG per tablet 1-2 tablet, 1-2 tablet, Oral, BID, Ashley Campoverde PA-C, 1 tablet at 11/07/22 2003     sodium chloride (PF) 0.9% PF flush 3 mL, 3 mL, Intracatheter, Q8H, Jori Troncoso, DO, 3 mL at 11/08/22 0032  No current outpatient prescriptions on file.   Fior Betts, PharmD

## 2022-11-08 NOTE — PLAN OF CARE
"Goal Outcome Evaluation:    VS: /74 (BP Location: Left arm, Patient Position: Semi-Walton's)   Pulse 101   Temp 97.2  F (36.2  C) (Oral)   Resp 16   Ht 1.88 m (6' 2\")   SpO2 97%   BMI 21.23 kg/m       O2: RA, denies SOB   Output/LBM: Incontinent. Urinal offered. Had BM tonight after bowel program.   Activity: 2x/Florence Steady   Skin: Fading green bruise to L shoulder/back. Intact, no wounds   Pain: Denies   CMS: A/O, word finding difficulty   Diet: Level 6, Mod thick   LDA: None   Additional Info/Plan  LLE warm and  d/t known DVT       "

## 2022-11-08 NOTE — PLAN OF CARE
Acute Rehab Care Conference/Team Rounds      Type: Team Rounds    Present: Dr. Jori Troncoso, Ashley Campoverde PA, Noe Waldrop PT, Jeannie Lui OT, Cielo Johnson SLP, Claudia Gonzáles [facility], Gloria Fitch RD, Summer Lambert RN, and Tobin Hua Patient.    Discharge Barriers/Treatment/Education    Rehab Diagnosis: R hemiparesis post CVA       Active Medical Co-morbidities/Prognosis:     Patient Active Problem List   Diagnosis Code     DVT (deep venous thrombosis) (H) I82.409   post CVA  R hemiparesis   Aphasia  Gait instability     Safety: Patient is alert and oriented x4. Has word difficulty finding. Does not use the call light for needs. Staff to anticipate patient needs/cares. Wife wanted all 4 siderails up. Triggered the sepsis protocol tonight with lactic acid of 0.6 , vitals stable.    Pain: Denied pain.    Medications, Skin, Tubes/Lines: Takes medications whole with pudding. Skin is intact. Has a left forearm piv, patent.    Swallowing/Nutrition: soft bite size (6) texture, moderately thick (3) liquid. NO STRAW. SLP to reassess    Bowel/Bladder: Is incontinent of both bowel and bladder. On a BM program with positive results last night (11/7) using the bsc. Unable to feel the urge to void thus incontinence in bladder.    Psychosocial: , lives with spouse. Both pt and spouse are retired. Indep PTA. No substance abuse, mental health, or financial concerns. Adult children also supportive and local.     ADLs/IADLs: Eval pending    Mobility: Eval completed. Pt demo sensory deficits on R with light touch, hot/cold, and R hand proprioception. Will need K4 vs K5 w/c at discharge, possibly hospital bed vs portable bed rail, ramp to enter home. Recommend HH PT.  Bed mob - sup<>sit, modA for RLE assist  STS - Rabia with transfer pole    Cognition/Language: eval pending.    Community Re-Entry: w/c    Transportation: family assist or public transportation    Decision maker: self    Plan of Care and goals reviewed and  updated.    Discharge Plan/Recommendations    Fall Precautions: continue    Patient/Family input to goals: yes     Estimated length of stay: 20 days     Overall plan for the patient: reach a level of mod I       Utilization Review and Continued Stay Justification    Medical Necessity Criteria:    For any criteria that is not met, please document reason and plan for discharge, transfer, or modification of plan of care to address.    Requires intensive rehabilitation program to treat functional deficits?: Yes    Requires 3x per week or greater involvement of rehabilitation physician to oversee rehabilitation program?: Yes    Requires rehabilitation nursing interventions?: Yes    Patient is making functional progress?: Yes    There is a potential for additional functional progress? Yes    Patient is participating in therapy 3 hours per day a minimum of 5 days per week or 15 hours per week in 7 day period?:Yes    Has discharge needs that require coordinated discharge planning approach?:Yes      Barriers/Concerns related to meeting medical necessity criteria:  none    Team Plan to Address Concern:  As needed       Final Physician Sign off    Statement of Approval:  Jori Troncoso, DO      Patient Goals  Social Work Goals: Confirm discharge recommendations with therapy, coordinate safe discharge plan and remain available to support and assist as needed.    OT Predicted Duration/Target Date for Goal Attainment: 12/05/22  Therapy Frequency (OT): Daily  OT: Hygiene/Grooming: modified independent  OT: Upper Body Dressing: Modified independent  OT: Lower Body Dressing: Minimal assist  OT: Upper Body Bathing: Supervision/stand-by assist  OT: Lower Body Bathing: Minimal assist     OT: Transfer: Supervision/stand-by assist (tub transfer)  OT: Toilet Transfer/Toileting: Supervision/stand-by assist, cleaning and garment management, toilet transfer              OT: Goal 1: Pt will be IND with HEP to improve RUE function for  ADL  OT: Goal 2: Family will complete family training to demonstrate ability to assist pt as needed at discharge.                          PT Predicted Duration/Target Date for Goal Attainment: 12/02/22  PT Frequency: Daily  PT: Bed Mobility: Modified independent, Rolling, Supine to/from sit (bed rail)  PT: Transfers: Supervision/stand-by assist, Bed to/from chair, Assistive device (stand pivot vs SB transfer)        PT: Wheelchair Mobility: 150 feet, manual wheelchair  PT: Perform aerobic activity with stable cardiovascular response: continuous activity, 15 minutes, NuStep  PT: Goal 1: Car transfer, L WBQC, CGA              PT: Edema education to increase ability to manage edema after discharge from the hospital: Caregiver, Demonstrate, [place], wear schedule, limb positioning, garment/bandage care, Patient  PT: Management of edema bandages: Caregiver, Demonstrate, [place], garment(s)  PT: Functional edema exercise program to reduce limb volume, increase activity tolerance and improve independence with ADL: Patient, Demonstrates, [place], HEP        SLP Predicted Duration/Target Date for Goal Attainment: 11/29/22  Therapy Frequency (SLP Eval): daily                          SLP: Communicate basic wants and needs: minimal assist  SLP: Goal 1: Pt will name novel and functional information within task with min cues 90% accuracy              Nursing Goals  Bowel and Bladder care  Fall prevention   Medication Education  Skin Care protection            Overall Patient Progress: no changeOverall Patient Progress: no change

## 2022-11-08 NOTE — PLAN OF CARE
Discharge Planner Post-Acute Rehab PT:     Discharge Plan: home with home mods and HH PT, assist from wife.    Precautions: falls (leans R with STS, tends to lean to L to compensate for R lean when standing), aphasia (expressive > receptive)    Current Status:  Bed Mobility: modA for RLE assist sup<>sit  Transfer: Recommend RN staff do SPT Ax2 with transfer pole or Florence Stedy. In therapy, SPT, transfer pole, Rabia.  Gait: unsafe  Stairs: unsafe    PASS:  11/8 - 13/36    Assessment:  Tobin will benefit from PT to progress his R sided strength, balance, coordination, and consistency performing stand pivot vs SB transfers for d/c. Goal to be w/c based, will need K4 vs K5 w/c. Recommend HH PT. Will need ramp to enter home safely.    Postural Assessment for Stroke Scale (PASS)    Maintaining posture -   1) Sitting without support - 3  2) Standing with support (feet position free) - 1 - favors L side  3) Standing without support (feet position free) - 0 - falls to R, requires assist  4) Standing on nonparetic leg - 0  5) Standing on paretic leg - 0    Changing posture -  6) Rolling supine -> affected side - 3  7) Rolling supine -> unaffected side - 2 - Rabia for RLE management  8) Sup->sitting edge of bed - 2   9) Sitting edge of bed -> sup - 1 - modA to place RLE  10) Sit->stand without support - 0 - maxA  11) Stand->sit without support - 1 - holding mat table with LUE to control descent  12) Standing,  pencil from floor without support - 0    Total Score - 13/36    The PASS assesses a patient's ability to change and maintain posture during different balance activities. It is not associated with falls risk, but is used to track progress with the patient's ability to control their posture and balance throughout the course of the patient's admission.        Other Barriers to Discharge (DME, Family Training, etc):   Family training - to schedule    DME: K4 vs K5 w/c, hospital bed vs bed rail, ramp to enter home, SB vs  WBQC for transfers

## 2022-11-09 ENCOUNTER — APPOINTMENT (OUTPATIENT)
Dept: SPEECH THERAPY | Facility: CLINIC | Age: 62
DRG: 056 | End: 2022-11-09
Attending: PHYSICAL MEDICINE & REHABILITATION
Payer: COMMERCIAL

## 2022-11-09 ENCOUNTER — APPOINTMENT (OUTPATIENT)
Dept: PHYSICAL THERAPY | Facility: CLINIC | Age: 62
DRG: 056 | End: 2022-11-09
Attending: PHYSICAL MEDICINE & REHABILITATION
Payer: COMMERCIAL

## 2022-11-09 ENCOUNTER — APPOINTMENT (OUTPATIENT)
Dept: OCCUPATIONAL THERAPY | Facility: CLINIC | Age: 62
DRG: 056 | End: 2022-11-09
Attending: PHYSICAL MEDICINE & REHABILITATION
Payer: COMMERCIAL

## 2022-11-09 PROCEDURE — 250N000013 HC RX MED GY IP 250 OP 250 PS 637: Performed by: PHYSICIAN ASSISTANT

## 2022-11-09 PROCEDURE — 97530 THERAPEUTIC ACTIVITIES: CPT | Mod: GP

## 2022-11-09 PROCEDURE — 97112 NEUROMUSCULAR REEDUCATION: CPT | Mod: GP

## 2022-11-09 PROCEDURE — 92507 TX SP LANG VOICE COMM INDIV: CPT | Mod: GN

## 2022-11-09 PROCEDURE — 97140 MANUAL THERAPY 1/> REGIONS: CPT | Mod: GP

## 2022-11-09 PROCEDURE — 92526 ORAL FUNCTION THERAPY: CPT | Mod: GN | Performed by: SPEECH-LANGUAGE PATHOLOGIST

## 2022-11-09 PROCEDURE — 97535 SELF CARE MNGMENT TRAINING: CPT | Mod: GO

## 2022-11-09 PROCEDURE — 92507 TX SP LANG VOICE COMM INDIV: CPT | Mod: GN | Performed by: SPEECH-LANGUAGE PATHOLOGIST

## 2022-11-09 PROCEDURE — 128N000003 HC R&B REHAB

## 2022-11-09 PROCEDURE — 99232 SBSQ HOSP IP/OBS MODERATE 35: CPT | Performed by: PHYSICIAN ASSISTANT

## 2022-11-09 RX ADMIN — ACETAMINOPHEN 975 MG: 325 TABLET, FILM COATED ORAL at 07:00

## 2022-11-09 RX ADMIN — ASPIRIN 81 MG: 81 TABLET, COATED ORAL at 09:28

## 2022-11-09 RX ADMIN — METOPROLOL TARTRATE 25 MG: 25 TABLET, FILM COATED ORAL at 12:36

## 2022-11-09 RX ADMIN — ACETAMINOPHEN 975 MG: 325 TABLET, FILM COATED ORAL at 21:24

## 2022-11-09 RX ADMIN — SENNOSIDES AND DOCUSATE SODIUM 2 TABLET: 50; 8.6 TABLET ORAL at 20:36

## 2022-11-09 RX ADMIN — SENNOSIDES AND DOCUSATE SODIUM 2 TABLET: 50; 8.6 TABLET ORAL at 09:28

## 2022-11-09 RX ADMIN — Medication 5 MG: at 21:25

## 2022-11-09 RX ADMIN — METOPROLOL TARTRATE 25 MG: 25 TABLET, FILM COATED ORAL at 20:36

## 2022-11-09 ASSESSMENT — ACTIVITIES OF DAILY LIVING (ADL)
ADLS_ACUITY_SCORE: 63
ADLS_ACUITY_SCORE: 65
ADLS_ACUITY_SCORE: 63
ADLS_ACUITY_SCORE: 65
ADLS_ACUITY_SCORE: 63
ADLS_ACUITY_SCORE: 63
ADLS_ACUITY_SCORE: 65
ADLS_ACUITY_SCORE: 63
ADLS_ACUITY_SCORE: 65
ADLS_ACUITY_SCORE: 63
ADLS_ACUITY_SCORE: 65
ADLS_ACUITY_SCORE: 65

## 2022-11-09 NOTE — PLAN OF CARE
".Blood pressure 122/85, pulse 99, temperature (!) 96.1  F (35.6  C), temperature source Oral, resp. rate 18, height 1.88 m (6' 2\"), SpO2 94 %.    Pt A&OX2. Disoriented to time/place. Has some difficulty finding words. Denies pain, numbness, tingling.   Mixed continence, PVR this shift 234mL. Voided 400mL.    L6 diet/L3 thickened liquids. Takes meds whole with applesauce/pudding. Prefers applesauce.   Pt on continuous room service.    A2 using transfer pole to chair, or bozena stedy.     Pt refused 1400 Tylenol. Stated that he was not in pain and did not want to take it at this time.   LUE PIV, saline locked.   Focus/Goal: Bowl and Bladder management, mobility, skin integrity.       Plan: Continue POC.                         "

## 2022-11-09 NOTE — PLAN OF CARE
Goal Outcome Evaluation:      Plan of Care Reviewed With: patient    Overall Patient Progress: no change    FOCUS/GOAL  Bowel management, Bladder management, Pain management, Mobility, Skin integrity, and Safety management    ASSESSMENT, INTERVENTIONS AND CONTINUING PLAN FOR GOAL:    Orientation: A/O, disoriented to time  Ambulation/Transfers: A1-A2 using either transfer pole or bozena stedy  Bowel/Bladder: Mixed continence, PVR 280mL. Bowel program effective  Pain: Denies pain  Diet/Liquids/Pills: L6 diet/L3 liquids, takes meds whole in applesauce/pudding  Tubes/Lines/Drains: LUE PIV, saline locked  Skin:

## 2022-11-09 NOTE — PLAN OF CARE
"Discharge Planner Post-Acute Rehab SLP:      Discharge Plan: ongoing SLP      Precautions: aspiration, fall,      Current Status:  Hearing: WFL  Vision: reading glasses  Communication: mild-mod expressive-receptive language impairments c/b frequent paraphasia, word/sound/syllable substitutions, semantic substitutions within conversation. Expresses basic wants/needs WFL. Mild dysarthria   Cognition: Unable to assess d/t extent of language impairment. Suspect impaired reasoning/attention noted during assessment   Swallow: Soft bite size (6) texture, moderately thick (3) liquid. NO straw. Ensure upright all PO, set up assist and intermittent cueing during meals for oral clearance     Assessment:  Patient had just eaten breakfast upon SLP arrival but agreeable to 4 oz canned peaches and 4 oz moderately thick water. Patient repositioned fully upright in bed. Note one instance of throat clearing when chewing mixed consistency (peaches). No other overt aspiration signs occurred. Patient took appropriate size bites/sips and good pace. Note adequate mastication of soft solid texture; no oral residue remained. Provided further education about diet modifications and swallow strategies. Patient named items on breakfast tray or in room with approximately 30% accuracy. Patient benefitted from carrier phrase or phonemic cues. Note occasional semantic substitutions (e.g. washcloth instead of towel and \"microwaveable bowl\" viewing oatmeal in the bowl).     Other Barriers to Discharge (Family Training, etc): diet training pending progress, communication/lang strategies to spouse/family                           "

## 2022-11-09 NOTE — PLAN OF CARE
Goal Outcome Evaluation:  Alert and oriented X 2, disoriented to time and place.  Able to make most needs known. Word finding difficulty. Used call light appropriately. Denies pain/discomfort.  Incontinent of urine  X 2.  Last BM on 11/8. Appears to be sleeping during rounds. Bed alarm on for safety. Continue with plan of care.

## 2022-11-09 NOTE — PLAN OF CARE
Discharge Planner Post-Acute Rehab OT:     Discharge Plan: Home with assist as needed and Home care    Precautions: fall, R shayy, mod thick liquid, aphasia, NMES for R shoulder    Current Status:  ADLs:    Mobility:WC based, Ax1-2 for transfer with transfer pole to the left side, florence pleitezmarialuisa A1-2    Grooming: Set up     Dressing: UB Max A seated EOB, Max A shorts in bed with rolling and bridging technique, dependent footwear     Bathing: Max A    Toileting: Florence stedy to commode, total A for cares  IADLs: wife can take care  Vision/Cognition: aphasia impacting communication    Assessment: shower and dressing assessment completed and current status updated. Pt demonstrates motivation and self initiates problem solving tasks.     Other Barriers to Discharge (DME, Family Training, etc): level of assist, aphasia

## 2022-11-09 NOTE — PROGRESS NOTES
SPIRITUAL HEALTH SERVICES Progress Note  [facility] ([facility]) Acute Rehab    Brief unit  check-in with pt, per routine request at admission.  Pt welcomes  support, but said another time would be better. I let pt know I will back on Friday; pt said Friday would be fine for a visit.     True Oneal) Miguelito Walker M.Div., [facility]  Staff   Pager [phone]      * [facility] remains available 24/7 for emergent requests/referrals, either by having the switchboard page the on-call  or by entering an ASAP/STAT consult in Epic (this will also page the on-call ). Routine Epic consults receive an initial response within 24 hours.*

## 2022-11-09 NOTE — PROGRESS NOTES
"  [facility]   Acute Rehabilitation Unit  Daily progress note    INTERVAL HISTORY  Tobin Hua was seen and examined at bedside this morning.  No acute events reported overnight.  Patient reports that he is feeling well this morning.  Notes that he continues to have difficulty both falling and staying asleep, waking frequently during the night, only a few hours of sleep at a time.  He is unsure why he is waking, denies any issues with sleep PTA.  He is noting some progress with speech, those notes there are still \"quirky gaps\".  He also reports noticing \"some action\" in the right arm, proximally.  Denies any right shoulder pain.  Still having swelling in right leg, and weakness, but denies pain.  He also denies shortness of breath, chest pain, palpitations, nausea, abdominal pain.  Had BM last night after suppository, none since.  He reports that he does feel urge to urinate, but cannot hold it long so still having incontinence.  Haven't gotten true PVRs yet, so asked patient to contact nursing following incontinence to scan.  He feels he is drinking well despite thickened liquids.  Discussed DISCOVERY study and patient states would be willing to meet with study team to discuss further.  Denies other concerns or questions at this time.    Functionally, he is needing mod A for bed mobility, pivot transfers with assist of 2 and transfer pole or bozena stedy with nursing, able to perform with min A from therapies.  He scored 13/36 on PASS, anticipate wheelchair based at discharge.    MEDICATIONS    acetaminophen  975 mg Oral TID     aspirin  81 mg Oral Daily     bisacodyl  10 mg Rectal Daily     - MEDICATION INSTRUCTIONS -   Does not apply See Admin Instructions     melatonin  3 mg Oral At Bedtime     metoprolol tartrate  25 mg Oral BID     senna-docusate  1-2 tablet Oral BID     sodium chloride (PF)  3 mL Intracatheter Q8H        acetaminophen, bisacodyl, lidocaine     PHYSICAL " "EXAM  /71   Pulse 96   Temp 96.8  F (36  C) (Oral)   Resp 18   Ht 1.88 m (6' 2\")   SpO2 96%   BMI 21.23 kg/m     Gen: NAD, lying in bed  HEENT: NC/AT, MMM  Cardio: RRR, +murmur, Ziopatch L chest, ecchymoses across L upper chest  Pulm: non-labored on room air, lungs CTA bilaterally  Abd: soft, non-distended, non-tender, bowel sounds present  Ext: 1+ edema throughout RLE, no erythema, non-tender.  No edema in LLE.  Neuro/MSK: AAO, global aphasia but improving, +dysarthria, R facial droop, PERRL, EOMI, R hemiparesis - no volitional movement in RUE on exam today, some proximal RLE movement.    LABS  CBC RESULTS:   Recent Labs   Lab Test 11/08/22  0550 11/07/22  0719 11/06/22  0557   WBC 10.7 12.6* 13.9*   RBC 3.57* 3.72* 4.13*   HGB 10.5* 10.9* 12.1*   HCT 32.0* 32.9* 37.4*   MCV 90 88 91   MCH 29.4 29.3 29.3   MCHC 32.8 33.1 32.4   RDW 14.1 14.0 14.2    270 250     Last Basic Metabolic Panel:  Recent Labs   Lab Test 11/08/22  0550 11/07/22  0719 11/06/22  0557    140 139   POTASSIUM 3.8 4.0 4.6   CHLORIDE 102 105 102   CO2 28 25 29   ANIONGAP 8 10 8   * 106* 105*   BUN 16 15 15   CR 0.56* 0.55* 0.63*   GFRESTIMATED >90 >90 >90   JAILENE 8.7 8.7 9.0         Rehabilitation - continue comprehensive acute inpatient rehabilitation program with multidisciplinary approach including therapies, rehab nursing, and physiatry following. See interval history for updates.      ASSESSMENT AND PLAN  Tobin Hua is a 62 year old right hand dominant male with a past medical history of mitral valve repair (2011), allergic rhinitis, and recent admission 10/21/22-11/1/22 for acute ischemic stroke due to dissection/occlusion of left ICA complicated by hemorrhagic transformation with intraparenchymal and intraventricular hemorrhage, anemia, non-sustained ventricular tachycardia, hypo/hypernatremia, dysphagia, constipation, and headache, who was previously at ARU and discharged back to acute hospital on 11/5/22 " due to occlusive RLE DVT, now s/p IVC filter.  He is now re-admitted to ARU on 11/7/22 for multidisciplinary rehabilitation and ongoing medical management.        Admission to acute inpatient rehab 11/07/22.    Impairment group code: Stroke 01.2 (R) Body Involvement (L) Brain: left ICA and L MCA ischemic stroke, c/b hemorrhagic transformation        1. PT, OT and SLP 60 minutes of each on a daily basis, in addition to rehab nursing and close management of physiatrist.       2. Impairment of ADL's: Noted to have R hemiplegia, impaired balance, impaired coordination, fatigue, R/L visual convergence impairment, impaired sensation, impaired postural control, all affecting his ability to safely and independently perform basic ADLs.  Goal for mod I with basic ADLs, anticipate need for assist with bathing/showering and related transfers.     3. Impairment of mobility:  Noted to have R hemiplegia, impaired balance, impaired coordination, fatigue, R/L visual convergence impairment, impaired sensation, impaired postural control, all affecting his ability to safely and independently perform basic mobility.  Goal for mod I with basic mobility, anticipate to be wheelchair based at discharge.     4. Impairment of cognition/language/swallow:  Noted to have dysphagia, dysarthria, and receptive and expressive aphasia with goals for safe tolerance of least restrictive diet and improved cognitive-linguistic skills to meet/communicate basic needs.     5. Medical Conditions  New actions/orders/updates for today are in blue.     Acute ischemic stroke due to ICA dissection and thrombosis/occlusion s/p tenecteplase, mechanical thrombectomy  C/b hemorrhagic transformation with intraparenchymal and intraventricular hemorrhage   R hemiparesis, R facial droop, dysphagia, aphasia, dysarthria  - Continue ASA 81 mg daily, indefinitely per neurology  - LDL 96, no indication for statin per neuro, no atherosclerosis on MRAs  - SBP goal <140  -  Ziopatch in place for 2 weeks at ARU admission (11/1-11/15)  - Continue PT/OT/SLP  - Follow up with neurology     Occlusive RLE DVT  On 11/5/22, noted to have significant edema in RLE compared with LLE.  Bilateral lower extremity Doppler ultrasounds revealed a large occlusive DVT in the right femoral vein extending to the deep veins below the knee and nonocclusive deep venous thrombosis in the right common femoral vein. CTPE negative for PE.  Head CT demonstrates likely stable status of hemorrhagic conversion when compared with previous.  Patient underwent IR placement of IVC filter on 11/6.  Neurology consulted 11/7 to discuss consideration of anticoagulation.  Per their recs, would consider at approximately 4 weeks after hemorrhage, after repeating head CT to evaluate for any further expansion of bleed.  - Per neurology, can consider anticoagulation approx 4 weeks (~11/21) from his hemorrhage from neurologic perspective.  Plan for updated head CT at that time to show no further expansion of the bleed prior to initiating.  DOAC's would be preferred over warfarin.  Neuro would recommend risk/benefit discussion again with patient and family prior to actual initiation of anticoagulation.  - Follow-up with IR in 3-6 months to have IVC filter removed once   anticoagulation plan is established (sooner is better)     Mild leukocytosis, resolved  Elevated CRP  WBC peaked at 1.52 on 11/4, infectious work-up with UA negative, CXR negative, procal negative, CRP up-trending 150 on 11/7, blood cultures 11/5 NGTD.  Suspect 2/2 DVT, WBC down-trending since, normalized on 11/8.  - Repeat CRP on Thursday to trend  - Follow up blood cultures  - Trend CBC.       Moderate dilation of ascending aorta   Incidentally noted on echo for CVA work-up, involving the sinuses of Valsalva, maximal dimension 4.6 cm.  - Needs outptient follow up in 6 months with CT imaging, can be followed by PCP     Episodes of non-sustained ventricular  tachycardia  Demonstrated on telemetry during IP stay.  Started on metoprolol with reduced frequency.  - Continue metoprolol 25 mg BID  - Ziopatch in place at ARU admission  - Continues with intermittent mild tachycardia in setting of DVT, difficulty maintaining hydration with thickened liquids.  Monitor, can increase beta blocker if indicated.     Moderate oropharyngeal dysphagia  At risk for malnutrition  - RD consulted, appreciate assist.  - Continue SLP  - Continue soft and bite-sized (level 6) diet with moderately thick (level 3) liquids.  - Meds in puree  - Swallow recs per SLP: Upright for all eating and drinking.  Relaxed, leisurely rate when eating and drinking.  Check for pocketing on the right after meals/snacks/medications.  Continue oral care.   - Repeat VFSS as indicated - per SLP likely next week  - On continuous fluids while inpatient 11/5-11/7.  Does ok with thickened liquids if present and reminded, so will not initially start IV fluids.  Continue to encourage PO fluids.  Monitor labs, vitals, clinically for signs of dehydration while on thickened liuqids.      Normocytic anemia  Noted drop from Hgb 13 to yohana of 9.9 on 10/23, most recently stable at 10.9 on 11/7.  - Trend CBC.  Hgb stable 11/8 at 10.5     Urinary retention  Urinary incontinence  Noted since ARU admission, requiring intermittent straight catheterization. Wife had noted urinary urgency and some incontinence while at hospital.  11/3 UA negative for infection.  Not noted during recurrent inpatient stay.  - Monitor PVRs at ARU re-admission,  ISC as indicated  - So far at readmission to ARU, having urinary incontinence.  Notes urge to urinate but cannot hold it long, so incontinent.  No true PVRs obtained.  Discussed with patient and nursing, will obtain today to ensure adequate emptying.  Should also scan if no void >4 hours to eval for retention, possible overflow incontinence.     Hx mitral valve repair (2012)  Mild mitral  regurgitation  - Noted.  Not on PTA anticoagulation, just aspirin, resumed as above     Allergic rhinitis  - Continue PTA Flonase daily     Right renal cortical enhancement  Incidentally noted on CT.  Indeterminate, possibly vascular etiology.   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  Non-painful.  Patient's movement limited by CVA as above.  - Follow-up with PCP to determine need for ortho spine involvement outpatient    Insomnia  Patient reports difficulty both falling and staying asleep.  Denies any PTA issues with sleep.  - Increase melatonin to 5 mg  - Continue to monitor       6. Adjustment to disability:  Clinical psychology to eval and treat if indicated  7. FEN: soft & bite-sized (level 6); moderately thick (level 3) liquids  8. Bowel: incontinent, recent constipation, monitor, on scheduled and PRN bowel meds, continue bowel program with nightly suppository.  BM s/p suppository on 11/8  9. Bladder: continent/incontinent due to urgency, recent retention at ARU, monitor PVRs at admission, as above, still pending  10. DVT Prophylaxis: currently no chemical or mechanical prophylaxis per discharging team  11. GI Prophylaxis: not indicated  12. Code: full  13. Disposition: goal for home with 24/7 assist  14. ELOS:  24 days  15. Rehab prognosis:  fair  1. Follow up Appointments on Discharge: PCP, neurology (Dr. Edgard Dave) in 6 weeks, IR in 3-6 months        Patient discussed with Dr. Jori Troncoso, PM&R Staff Physician    Ashley Campoverde PA-C  Physical Medicine & Rehabilitation

## 2022-11-09 NOTE — PLAN OF CARE
Discharge Planner Post-Acute Rehab PT:     Discharge Plan: home with home mods and HH PT, assist from wife.    Precautions: falls (leans R with STS, tends to lean to L to compensate for R lean when standing), aphasia (expressive > receptive)    Current Status:  Bed Mobility: modA for RLE assist sup<>sit  Transfer: Recommend RN staff do SPT Ax2 with transfer pole or Florence Stedy. In therapy, SPT, transfer pole, Rabia.  Gait: unsafe  Stairs: unsafe    PASS:  11/8 - 13/36    Assessment:  Focused on standing midline/midstance posture and lat weightshifting over RLE with heavy assist to maintain straight RLE. Noting R oblique/QL activation, still lacks functional R glut med/max strength.    Other Barriers to Discharge (DME, Family Training, etc):   Family training - to schedule    DME: K4 vs K5 w/c, hospital bed vs bed rail, ramp to enter home, SB vs WBQC for transfers

## 2022-11-10 ENCOUNTER — APPOINTMENT (OUTPATIENT)
Dept: SPEECH THERAPY | Facility: CLINIC | Age: 62
DRG: 056 | End: 2022-11-10
Attending: PHYSICAL MEDICINE & REHABILITATION
Payer: COMMERCIAL

## 2022-11-10 ENCOUNTER — APPOINTMENT (OUTPATIENT)
Dept: OCCUPATIONAL THERAPY | Facility: CLINIC | Age: 62
DRG: 056 | End: 2022-11-10
Attending: PHYSICAL MEDICINE & REHABILITATION
Payer: COMMERCIAL

## 2022-11-10 ENCOUNTER — TELEPHONE (OUTPATIENT)
Dept: NEUROLOGY | Facility: CLINIC | Age: 62
End: 2022-11-10

## 2022-11-10 ENCOUNTER — APPOINTMENT (OUTPATIENT)
Dept: PHYSICAL THERAPY | Facility: CLINIC | Age: 62
DRG: 056 | End: 2022-11-10
Attending: PHYSICAL MEDICINE & REHABILITATION
Payer: COMMERCIAL

## 2022-11-10 LAB
ANION GAP SERPL CALCULATED.3IONS-SCNC: 10 MMOL/L (ref 3–14)
BACTERIA BLD CULT: NO GROWTH
BACTERIA BLD CULT: NO GROWTH
BUN SERPL-MCNC: 15 MG/DL (ref 7–30)
CALCIUM SERPL-MCNC: 9.3 MG/DL (ref 8.5–10.1)
CHLORIDE BLD-SCNC: 103 MMOL/L (ref 94–109)
CO2 SERPL-SCNC: 26 MMOL/L (ref 20–32)
CREAT SERPL-MCNC: 0.61 MG/DL (ref 0.66–1.25)
CRP SERPL-MCNC: 100 MG/L (ref 0–8)
ERYTHROCYTE [DISTWIDTH] IN BLOOD BY AUTOMATED COUNT: 14 % (ref 10–15)
GFR SERPL CREATININE-BSD FRML MDRD: >90 ML/MIN/1.73M2
GLUCOSE BLD-MCNC: 95 MG/DL (ref 70–99)
HCT VFR BLD AUTO: 33.3 % (ref 40–53)
HGB BLD-MCNC: 10.8 G/DL (ref 13.3–17.7)
MAGNESIUM SERPL-MCNC: 2.2 MG/DL (ref 1.6–2.3)
MCH RBC QN AUTO: 29.1 PG (ref 26.5–33)
MCHC RBC AUTO-ENTMCNC: 32.4 G/DL (ref 31.5–36.5)
MCV RBC AUTO: 90 FL (ref 78–100)
PHOSPHATE SERPL-MCNC: 3.8 MG/DL (ref 2.5–4.5)
PLATELET # BLD AUTO: 325 10E3/UL (ref 150–450)
POTASSIUM BLD-SCNC: 3.9 MMOL/L (ref 3.4–5.3)
RBC # BLD AUTO: 3.71 10E6/UL (ref 4.4–5.9)
SODIUM SERPL-SCNC: 139 MMOL/L (ref 133–144)
WBC # BLD AUTO: 9.5 10E3/UL (ref 4–11)

## 2022-11-10 PROCEDURE — 97530 THERAPEUTIC ACTIVITIES: CPT | Mod: GP

## 2022-11-10 PROCEDURE — 97112 NEUROMUSCULAR REEDUCATION: CPT | Mod: GO | Performed by: STUDENT IN AN ORGANIZED HEALTH CARE EDUCATION/TRAINING PROGRAM

## 2022-11-10 PROCEDURE — 92526 ORAL FUNCTION THERAPY: CPT | Mod: GN

## 2022-11-10 PROCEDURE — 84100 ASSAY OF PHOSPHORUS: CPT | Performed by: PHYSICIAN ASSISTANT

## 2022-11-10 PROCEDURE — 83735 ASSAY OF MAGNESIUM: CPT | Performed by: PHYSICIAN ASSISTANT

## 2022-11-10 PROCEDURE — 128N000003 HC R&B REHAB

## 2022-11-10 PROCEDURE — 36415 COLL VENOUS BLD VENIPUNCTURE: CPT | Performed by: PHYSICIAN ASSISTANT

## 2022-11-10 PROCEDURE — 250N000013 HC RX MED GY IP 250 OP 250 PS 637: Performed by: PHYSICIAN ASSISTANT

## 2022-11-10 PROCEDURE — 82310 ASSAY OF CALCIUM: CPT | Performed by: PHYSICIAN ASSISTANT

## 2022-11-10 PROCEDURE — 85027 COMPLETE CBC AUTOMATED: CPT | Performed by: PHYSICIAN ASSISTANT

## 2022-11-10 PROCEDURE — 99232 SBSQ HOSP IP/OBS MODERATE 35: CPT | Performed by: PHYSICIAN ASSISTANT

## 2022-11-10 PROCEDURE — 97112 NEUROMUSCULAR REEDUCATION: CPT | Mod: GP

## 2022-11-10 PROCEDURE — 86140 C-REACTIVE PROTEIN: CPT | Performed by: PHYSICIAN ASSISTANT

## 2022-11-10 PROCEDURE — 97535 SELF CARE MNGMENT TRAINING: CPT | Mod: GO | Performed by: STUDENT IN AN ORGANIZED HEALTH CARE EDUCATION/TRAINING PROGRAM

## 2022-11-10 PROCEDURE — 97110 THERAPEUTIC EXERCISES: CPT | Mod: GP

## 2022-11-10 RX ORDER — ACETAMINOPHEN 325 MG/1
325-975 TABLET ORAL EVERY 6 HOURS PRN
Status: DISCONTINUED | OUTPATIENT
Start: 2022-11-10 | End: 2022-12-01 | Stop reason: HOSPADM

## 2022-11-10 RX ADMIN — SENNOSIDES AND DOCUSATE SODIUM 1 TABLET: 50; 8.6 TABLET ORAL at 08:11

## 2022-11-10 RX ADMIN — ASPIRIN 81 MG: 81 TABLET, COATED ORAL at 08:10

## 2022-11-10 RX ADMIN — SENNOSIDES AND DOCUSATE SODIUM 1 TABLET: 50; 8.6 TABLET ORAL at 20:59

## 2022-11-10 RX ADMIN — ACETAMINOPHEN 975 MG: 325 TABLET, FILM COATED ORAL at 05:43

## 2022-11-10 RX ADMIN — METOPROLOL TARTRATE 25 MG: 25 TABLET, FILM COATED ORAL at 20:59

## 2022-11-10 RX ADMIN — Medication 25 MG: at 20:59

## 2022-11-10 RX ADMIN — BISACODYL 10 MG: 10 SUPPOSITORY RECTAL at 18:20

## 2022-11-10 RX ADMIN — Medication 5 MG: at 20:59

## 2022-11-10 RX ADMIN — METOPROLOL TARTRATE 25 MG: 25 TABLET, FILM COATED ORAL at 08:11

## 2022-11-10 ASSESSMENT — ACTIVITIES OF DAILY LIVING (ADL)
ADLS_ACUITY_SCORE: 65

## 2022-11-10 NOTE — PROGRESS NOTES
[facility]   Acute Rehabilitation Unit  Daily progress note    INTERVAL HISTORY  Tobin Hua was seen and examined at bedside this afternoon with wife present.  No acute events reported overnight.  Patient reports that he had a good day overall, but is feeling quite fatigued after a full day of therapy and additional doing some assessment with DISCOVERY study team.   He states that he still had a poor night of sleep last night, did not note significant change with increased dose of melatonin.  Reports only taking short naps during day.  Is wanting to go to sleep several hours earlier than typical for him due to fatigue, but then only sleeping 1-2 hours at a time.  He does report some racing thoughts or worries may be contributing.  Denies any physical symptoms interfering with sleep other than having to urinate a couple times.  States at home he urinate once at night.  Last night he went twice, but only very small amounts.  Discussed sleep hygiene and various strategies to improve sleep overnight.  Patient would like to trial sleep medication for longer, more restful nights.  He otherwise denies concerns or changes today.  Feels he is hydrating well.    Functionally, PT noting emerging R glut med/max and psoas activation, trace to no quad and trace ham activation in grav elim positions. Need to continue to focus on increasing proprioceptive feedback in functional weightbearing positions through RLE ie standing and tall kneel to continue to improve this emerging strength.    MEDICATIONS    acetaminophen  975 mg Oral TID     aspirin  81 mg Oral Daily     bisacodyl  10 mg Rectal Daily     - MEDICATION INSTRUCTIONS -   Does not apply See Admin Instructions     melatonin  5 mg Oral At Bedtime     metoprolol tartrate  25 mg Oral BID     senna-docusate  1-2 tablet Oral BID        acetaminophen, bisacodyl, lidocaine     PHYSICAL EXAM  /70 (BP Location: Left arm)   Pulse 91    "Temp (!) 96.1  F (35.6  C) (Oral)   Resp 18   Ht 1.88 m (6' 2\")   SpO2 99%   BMI 21.23 kg/m     Gen: NAD, sitting up in chair  HEENT: NC/AT, MMM  Cardio: RRR, +murmur, Ziopatch L chest, ecchymoses across L upper chest  Pulm: non-labored on room air  Abd: soft, non-distended, non-tender  Ext: 1+ edema throughout RLE, most prominent distally, no erythema, non-tender, no ongoing warmth.  No edema in LLE.  Neuro/MSK: AAO, global aphasia but improving, +dysarthria, R facial droop, PERRL, EOMI, R hemiparesis.    LABS  CBC RESULTS:   Recent Labs   Lab Test 11/10/22  0632 11/08/22  0550 11/07/22  0719   WBC 9.5 10.7 12.6*   RBC 3.71* 3.57* 3.72*   HGB 10.8* 10.5* 10.9*   HCT 33.3* 32.0* 32.9*   MCV 90 90 88   MCH 29.1 29.4 29.3   MCHC 32.4 32.8 33.1   RDW 14.0 14.1 14.0    265 270     Last Basic Metabolic Panel:  Recent Labs   Lab Test 11/10/22  0632 11/08/22  0550 11/07/22  0719    138 140   POTASSIUM 3.9 3.8 4.0   CHLORIDE 103 102 105   CO2 26 28 25   ANIONGAP 10 8 10   GLC 95 130* 106*   BUN 15 16 15   CR 0.61* 0.56* 0.55*   GFRESTIMATED >90 >90 >90   JAILENE 9.3 8.7 8.7         Rehabilitation - continue comprehensive acute inpatient rehabilitation program with multidisciplinary approach including therapies, rehab nursing, and physiatry following. See interval history for updates.      ASSESSMENT AND PLAN  Tobin Hua is a 62 year old right hand dominant male with a past medical history of mitral valve repair (2011), allergic rhinitis, and recent admission 10/21/22-11/1/22 for acute ischemic stroke due to dissection/occlusion of left ICA complicated by hemorrhagic transformation with intraparenchymal and intraventricular hemorrhage, anemia, non-sustained ventricular tachycardia, hypo/hypernatremia, dysphagia, constipation, and headache, who was previously at ARU and discharged back to acute hospital on 11/5/22 due to occlusive RLE DVT, now s/p IVC filter.  He is now re-admitted to ARU on 11/7/22 for " multidisciplinary rehabilitation and ongoing medical management.        Admission to acute inpatient rehab 11/07/22.    Impairment group code: Stroke 01.2 (R) Body Involvement (L) Brain: left ICA and L MCA ischemic stroke, c/b hemorrhagic transformation        1. PT, OT and SLP 60 minutes of each on a daily basis, in addition to rehab nursing and close management of physiatrist.       2. Impairment of ADL's: Noted to have R hemiplegia, impaired balance, impaired coordination, fatigue, R/L visual convergence impairment, impaired sensation, impaired postural control, all affecting his ability to safely and independently perform basic ADLs.  Goal for mod I with basic ADLs, anticipate need for assist with bathing/showering and related transfers.     3. Impairment of mobility:  Noted to have R hemiplegia, impaired balance, impaired coordination, fatigue, R/L visual convergence impairment, impaired sensation, impaired postural control, all affecting his ability to safely and independently perform basic mobility.  Goal for mod I with basic mobility, anticipate to be wheelchair based at discharge.     4. Impairment of cognition/language/swallow:  Noted to have dysphagia, dysarthria, and receptive and expressive aphasia with goals for safe tolerance of least restrictive diet and improved cognitive-linguistic skills to meet/communicate basic needs.     5. Medical Conditions  New actions/orders/updates for today are in blue.     Acute ischemic stroke due to ICA dissection and thrombosis/occlusion s/p tenecteplase, mechanical thrombectomy  C/b hemorrhagic transformation with intraparenchymal and intraventricular hemorrhage   R hemiparesis, R facial droop, dysphagia, aphasia, dysarthria  - Continue ASA 81 mg daily, indefinitely per neurology  - LDL 96, no indication for statin per neuro, no atherosclerosis on MRAs  - SBP goal <140  - Ziopatch in place for 2 weeks at ARU admission (11/1-11/15)  - Continue PT/OT/SLP  - Follow up  with neurology     Occlusive RLE DVT  On 11/5/22, noted to have significant edema in RLE compared with LLE.  Bilateral lower extremity Doppler ultrasounds revealed a large occlusive DVT in the right femoral vein extending to the deep veins below the knee and nonocclusive deep venous thrombosis in the right common femoral vein. CTPE negative for PE.  Head CT demonstrates likely stable status of hemorrhagic conversion when compared with previous.  Patient underwent IR placement of IVC filter on 11/6.  Neurology consulted 11/7 to discuss consideration of anticoagulation.  Per their recs, would consider at approximately 4 weeks after hemorrhage, after repeating head CT to evaluate for any further expansion of bleed.  - Per neurology, can consider anticoagulation approx 4 weeks (~11/21) from his hemorrhage from neurologic perspective.  Plan for updated head CT at that time to show no further expansion of the bleed prior to initiating.  DOAC's would be preferred over warfarin.  Neuro would recommend risk/benefit discussion again with patient and family prior to actual initiation of anticoagulation.  - Follow-up with IR in 3-6 months to have IVC filter removed once   anticoagulation plan is established (sooner is better)     Mild leukocytosis, resolved  Elevated CRP  WBC peaked at 1.52 on 11/4, infectious work-up with UA negative, CXR negative, procal negative, CRP up-trending 150 on 11/7, blood cultures 11/5 NGTD.  Suspect 2/2 DVT, WBC down-trending since, normalized on 11/8.  - Repeat CRP down-trending 150 -> 100 today  - Blood cultures NGTD  - Trend CBC.  WBC remain WNL today at 9.5     Moderate dilation of ascending aorta   Incidentally noted on echo for CVA work-up, involving the sinuses of Valsalva, maximal dimension 4.6 cm.  - Needs outptient follow up in 6 months with CT imaging, can be followed by PCP     Episodes of non-sustained ventricular tachycardia  Demonstrated on telemetry during IP stay.  Started on  metoprolol with reduced frequency.  - Continue metoprolol 25 mg BID  - Ziopatch in place at ARU admission     Moderate oropharyngeal dysphagia  At risk for malnutrition  - RD consulted, appreciate assist.  - Continue SLP  - Continue soft and bite-sized (level 6) diet with moderately thick (level 3) liquids.  - Meds in puree  - Swallow recs per SLP: Upright for all eating and drinking.  Relaxed, leisurely rate when eating and drinking.  Check for pocketing on the right after meals/snacks/medications.  Continue oral care.   - Repeat VFSS as indicated - per SLP likely next week  - Monitor labs, vitals, clinically for signs of dehydration while on thickened liuqids.  11/10: BMP stable, appears to be well-hydrated     Normocytic anemia  Noted drop from Hgb 13 to yohana of 9.9 on 10/23, most recently stable at 10.9 on 11/7.  - Trend CBC.  Hgb stable 11/10 at 10.8     Urinary retention  Urinary incontinence  Noted since ARU admission, requiring intermittent straight catheterization. Wife had noted urinary urgency and some incontinence while at hospital.  11/3 UA negative for infection.  Not noted during recurrent inpatient stay.  - Monitor PVRs at ARU re-admission,  ISC as indicated  - Ongoing urinary incontinence, some urgency.  PVRs do not seem to show any significant retention.  Is waking a few times per night to urinate, but small volumes, may be in part due to poor sleep.  Discussed limiting fluids close to bedtime, voiding before bed, double voids, etc.     Hx mitral valve repair (2012)  Mild mitral regurgitation  - Noted.  Not on PTA anticoagulation, just aspirin, resumed as above     Allergic rhinitis  - Continue PTA Flonase daily     Right renal cortical enhancement  Incidentally noted on CT.  Indeterminate, possibly vascular etiology.   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   Non-painful.  Patient's movement limited by CVA as above.  - Follow-up with PCP to determine need for ortho spine involvement outpatient    Insomnia  Patient reports difficulty both falling and staying asleep.  Denies any PTA issues with sleep.  Melatonin increased 3 mg to 5 mg on 11/9.  - Continue melatonin 5 mg  - Reviewed sleep hygiene with patient/wife.  Still with poor sleep initiation and moreso maintenance.  Will trial trazodone 50 mg at HS.  Mildly prolonged QTc in past (460), will repeat EKG tomorrow to monitor if planning for ongoing trazodone use.  - Continue to monitor       6. Adjustment to disability:  Clinical psychology to eval and treat if indicated  7. FEN: soft & bite-sized (level 6); moderately thick (level 3) liquids  8. Bowel: incontinent, recent constipation, monitor, on scheduled and PRN bowel meds, continue bowel program with nightly suppository.  BM x2 before suppository on 11/9 so declined evening suppository.  Plan to resume bowel program.  9. Bladder: continent/incontinent due to urgency  10. DVT Prophylaxis: currently no chemical or mechanical prophylaxis per discharging team  11. GI Prophylaxis: not indicated  12. Code: full  13. Disposition: goal for home with 24/7 assist  14. ELOS:  24 days  15. Rehab prognosis:  fair  1. Follow up Appointments on Discharge: PCP, neurology (Dr. Edgard Dave) in 6 weeks, IR in 3-6 months        Patient discussed with Dr. Jori Troncoso, PM&R Staff Physician    Ashley Campoverde PA-C  Physical Medicine & Rehabilitation

## 2022-11-10 NOTE — PROGRESS NOTES
Individualized Overall Plan Of Care (IOPOC)      Rehab diagnosis/Impairment Group Code: Stroke 01.2 (r) body involvement (l) brain: left ica and l mca ischemic stroke, c/b hemorrhagic transformation  Acute ischemic left ica stroke (h)       Expected functional outcome: reach a level of mod I    Clinical Impression Comments: patient with R hemiparesis and aphasia post CVA    Mobility: Tobin will benefit from PT to progress his R sided strength, balance, coordination, and consistency performing stand pivot vs SB transfers for d/c. Goal to be w/c based, will need K4 vs K5 w/c. Recommend HH PT. Will need ramp to enter home safely.    ADL: Pt is significantly below baseline due to impairments and will benefit from skilled OT to improve function, independence, determine DME and train family.    Communication/Cognition/Swallow: SLP: Pt seen for language assessment following L ICA + MCA CVA. Pt completed WAB-B, comparing scores with prior admission last week. Pt with overall improved score within most areas of assessment. Despite improvement, pt continues to demonstrate mild expressive-receptive language impairments c/b frequent paraphasia, word/sound/syllable substitutions, semantic substitutions within conversation. Continues to demonstrate impercise articulation and difficulty word finding. Pt demonstrates below baseline level of function and would benefit from skilled SLP to tx areas of impairment and improve overall functional communication. Able to express most wants/needs appropriately.     Intensity of therapy:   PT 60 minutes, Daily, for 20 days   OT 60 minutes, Daily, for 20 days   SLP 60 minutes, daily, for 20 days     Orthotics AFO  Education stroke  Neuropsychology Testing: No        Medical Prognosis: good       Physician summary statement:  patient with R hemiparesis and aphasia post CVA, goal is to reach a level of mod I     Discharge destination: prior home  Discharge rehabilitation needs: home care, RN, PT,  OT and SLP      Estimated length of stay: 20 days       Rehabilitation Physician Jori Troncoso DO

## 2022-11-10 NOTE — PLAN OF CARE
"VS:     /70 (BP Location: Left arm)   Pulse 91   Temp (!) 96.1  F (35.6  C) (Oral)   Resp 18   Ht 1.88 m (6' 2\")   SpO2 99%   BMI 21.23 kg/m      Pt A/O X 4. Afebrile. VSS. Denies nausea, shortness of breath, and chest pain.     Output:     Continent/'incontinent. BM today at 1 and 5 pm per pt and wife. Refused suppository   Activity:       Pt up w bozena steady or pole.      Skin: Bruising around Zio patch on left chest     Pain:     Pt denies pain     Diet:       Pt is on a thickened level 3 diet and appetite was adequate this shift.       LDA:       PIV was occluded and removed this shift     Plan:       Pt is able to make needs known and the call light is within the pt's reach. Continue to monitor.            "

## 2022-11-10 NOTE — PLAN OF CARE
Goal Outcome Evaluation:    Patient AOx2, disoriented to time and place, bed alarms on and call light within reach for safety. Patient has right sided hemiparesis and word finding difficulty. Takes medications whole in apple sauce or pudding. Patient is incontinent of B&B, random PVR at night was 3. Patient noted to be sleeping during safety checks, denied pain,cp or SOB, continue with POC

## 2022-11-10 NOTE — PLAN OF CARE
Discharge Planner Post-Acute Rehab SLP:      Discharge Plan:  SLP      Precautions: aspiration, fall,      Current Status:  Hearing: WFL  Vision: reading glasses  Communication: mild-mod expressive-receptive language impairments c/b frequent paraphasia, word/sound/syllable substitutions, semantic substitutions within conversation. Expresses basic wants/needs WFL. Mild dysarthria   Cognition: Unable to assess d/t extent of language impairment. Suspect impaired reasoning/attention noted during assessment   Swallow: Soft bite size (6) texture, moderately thick (3) liquid. NO straw. Ensure upright all PO, set up assist and intermittent cueing during meals for R oral clearance     Assessment:   Pt seen with meal current diet soft bite size (6) and easy chew (7) texture, moderately thick (3) liquid. Pt needing set up assist for positioning and opening items on tray. Pt with extended mastication (but functional), timely AP transit, min oral residuals. Pt using strategies for oral clearance (finger/tongue sweep R buccal cavity, liquid wash) throughout meal with overall min-mod cues. Improving towards IND but still benefits from min cues.     Other Barriers to Discharge (Family Training, etc): diet training pending progress, communication/lang strategies to spouse/family

## 2022-11-10 NOTE — PLAN OF CARE
"Blood pressure 112/73, pulse 92, temperature (!) 95.9  F (35.5  C), temperature source Oral, resp. rate 16, height 1.88 m (6' 2\"), SpO2 98 %.    Pt on room air, AOx2, disoriented to time and place.   On bed alarms on and call light within reach for safety. Patient has right sided hemiparesis and word finding difficulty. Takes medications whole in apple sauce. On L 6 diet, (soft, bite sized).  L3 liquids, (honey thick). He incontinent of B&B, PVR checks per orders.     Denies pain, SOB, numbness, tingling, dizziness.                           "

## 2022-11-10 NOTE — PLAN OF CARE
Discharge Planner Post-Acute Rehab OT:     Discharge Plan: Home with assist as needed and Home care    Precautions: fall, R shayy, mod thick liquid, aphasia, NMES for R shoulder    Current Status:  ADLs:    Mobility:WC based, Ax1-2 for transfer with transfer pole to the left side, florence whaley A1-2    Grooming: Set up     Dressing: UB Max A seated EOB, Max A shorts in bed with rolling and bridging technique, dependent footwear     Bathing: Max A    Toileting: Florence pricillady to commode, total A for cares  IADLs: wife can take care  Vision/Cognition: aphasia impacting communication    Assessment: Neuro re ed of RUE. Unable to use FES for RUE due to issues with controller. Used NMES for shoulder and WB tasks.     Other Barriers to Discharge (DME, Family Training, etc): level of assist, aphasia

## 2022-11-10 NOTE — PLAN OF CARE
Discharge Planner Post-Acute Rehab PT:     Discharge Plan: home with home mods and HH PT, assist from wife.    Precautions: falls (leans R with STS, tends to lean to L to compensate for R lean when standing), aphasia (expressive > receptive)    Current Status:  Bed Mobility: modA for RLE assist sup<>sit  Transfer: Recommend RN staff do SPT Ax2 with transfer pole or Florence Stedy. In therapy, SPT, transfer pole, Rabia.  Gait: unsafe  Stairs: unsafe    PASS:  11/8 - 13/36, compensates well using LUE/LLE    Assessment:  Emerging R glut med/max and psoas activation, trace to no quad and trace ham activation in grav elim positions. Need to continue to focus on increasing proprioceptive feedback in functional weightbearing positions through RLE ie standing and tall kneel to continue to improve this emerging strength.    Other Barriers to Discharge (DME, Family Training, etc):   Family training - to schedule    DME: K4 vs K5 w/c, hospital bed vs bed rail, ramp to enter home, SB vs WBQC for transfers

## 2022-11-11 ENCOUNTER — APPOINTMENT (OUTPATIENT)
Dept: SPEECH THERAPY | Facility: CLINIC | Age: 62
DRG: 056 | End: 2022-11-11
Attending: PHYSICAL MEDICINE & REHABILITATION
Payer: COMMERCIAL

## 2022-11-11 ENCOUNTER — APPOINTMENT (OUTPATIENT)
Dept: PHYSICAL THERAPY | Facility: CLINIC | Age: 62
DRG: 056 | End: 2022-11-11
Attending: PHYSICAL MEDICINE & REHABILITATION
Payer: COMMERCIAL

## 2022-11-11 ENCOUNTER — APPOINTMENT (OUTPATIENT)
Dept: OCCUPATIONAL THERAPY | Facility: CLINIC | Age: 62
DRG: 056 | End: 2022-11-11
Attending: PHYSICAL MEDICINE & REHABILITATION
Payer: COMMERCIAL

## 2022-11-11 PROCEDURE — 97112 NEUROMUSCULAR REEDUCATION: CPT | Mod: GP | Performed by: PHYSICAL THERAPIST

## 2022-11-11 PROCEDURE — 97530 THERAPEUTIC ACTIVITIES: CPT | Mod: GP | Performed by: PHYSICAL THERAPIST

## 2022-11-11 PROCEDURE — 92507 TX SP LANG VOICE COMM INDIV: CPT | Mod: GN

## 2022-11-11 PROCEDURE — 93010 ELECTROCARDIOGRAM REPORT: CPT | Performed by: INTERNAL MEDICINE

## 2022-11-11 PROCEDURE — 99001 SPECIMEN HANDLING PT-LAB: CPT | Performed by: PSYCHIATRY & NEUROLOGY

## 2022-11-11 PROCEDURE — 97535 SELF CARE MNGMENT TRAINING: CPT | Mod: GO | Performed by: STUDENT IN AN ORGANIZED HEALTH CARE EDUCATION/TRAINING PROGRAM

## 2022-11-11 PROCEDURE — 92526 ORAL FUNCTION THERAPY: CPT | Mod: GN

## 2022-11-11 PROCEDURE — 36415 COLL VENOUS BLD VENIPUNCTURE: CPT | Performed by: PSYCHIATRY & NEUROLOGY

## 2022-11-11 PROCEDURE — 93005 ELECTROCARDIOGRAM TRACING: CPT

## 2022-11-11 PROCEDURE — 250N000013 HC RX MED GY IP 250 OP 250 PS 637: Performed by: PHYSICIAN ASSISTANT

## 2022-11-11 PROCEDURE — 99232 SBSQ HOSP IP/OBS MODERATE 35: CPT | Performed by: PHYSICIAN ASSISTANT

## 2022-11-11 PROCEDURE — 92526 ORAL FUNCTION THERAPY: CPT | Mod: GN | Performed by: SPEECH-LANGUAGE PATHOLOGIST

## 2022-11-11 PROCEDURE — 128N000003 HC R&B REHAB

## 2022-11-11 PROCEDURE — 97112 NEUROMUSCULAR REEDUCATION: CPT | Mod: GO | Performed by: STUDENT IN AN ORGANIZED HEALTH CARE EDUCATION/TRAINING PROGRAM

## 2022-11-11 RX ADMIN — SENNOSIDES AND DOCUSATE SODIUM 1 TABLET: 50; 8.6 TABLET ORAL at 07:57

## 2022-11-11 RX ADMIN — SENNOSIDES AND DOCUSATE SODIUM 1 TABLET: 50; 8.6 TABLET ORAL at 21:58

## 2022-11-11 RX ADMIN — Medication 5 MG: at 21:58

## 2022-11-11 RX ADMIN — ASPIRIN 81 MG: 81 TABLET, COATED ORAL at 07:56

## 2022-11-11 RX ADMIN — BISACODYL 10 MG: 10 SUPPOSITORY RECTAL at 19:04

## 2022-11-11 RX ADMIN — Medication 25 MG: at 21:58

## 2022-11-11 ASSESSMENT — ACTIVITIES OF DAILY LIVING (ADL)
ADLS_ACUITY_SCORE: 64
ADLS_ACUITY_SCORE: 63
ADLS_ACUITY_SCORE: 65
ADLS_ACUITY_SCORE: 64
ADLS_ACUITY_SCORE: 64
ADLS_ACUITY_SCORE: 63
ADLS_ACUITY_SCORE: 65
ADLS_ACUITY_SCORE: 65
ADLS_ACUITY_SCORE: 63

## 2022-11-11 NOTE — PLAN OF CARE
Goal Outcome Evaluation:         Patient alert and oriented x4, word-finding difficulty. A2 pivot transfer, w/c based. Incontinent of bowel and bladder, LBM: 11/10. Level 6 diet, level 3 liquids, pills whole, no straws. Held morning metoprolol. VSS. Pt denies pain. Will continue with POC.

## 2022-11-11 NOTE — PLAN OF CARE
Discharge Planner Post-Acute Rehab PT:      Discharge Plan: home with home mods and HH PT, assist from wife.     Precautions: falls (leans R with STS, tends to lean to L to compensate for R lean when standing), aphasia (expressive > receptive)     Current Status:  Bed Mobility: modA for RLE assist sup<>sit  Transfer: Recommend RN staff do SPT Ax2 with transfer pole or Florence Stedy. Min<>mod A squat pivot to the L in therapies.  Gait: unsafe  Stairs: unsafe     PASS:  11/8 - 13/36, compensates well using LUE/LLE     Assessment: WC eval completed with Leonel Larson from [employer], with initial plans for K4 manual WC with R UE support considering hemiplegia. Remainder of session focus on R LE activation with STS and WS activities from raised EOM, min A from PT blocking at R knee.     Other Barriers to Discharge (DME, Family Training, etc):   Family training - to schedule. Home measurements sheet provided.  WC eval completed 11/11.  DME: K4 vs K5 w/c (pending progress), hospital bed vs bed rail, ramp to enter home, SB vs WBQC for transfers

## 2022-11-11 NOTE — PLAN OF CARE
"Discharge Planner Post-Acute Rehab SLP:      Discharge Plan:  SLP      Precautions: aspiration, fall,      Current Status:  Hearing: WFL  Vision: reading glasses  Communication: mild-mod expressive-receptive language impairments c/b frequent paraphasia, word/sound/syllable substitutions, semantic substitutions within conversation. Expresses basic wants/needs WFL. Mild-moderate dysarthria   Cognition: Unable to assess d/t extent of language impairment. Suspect impaired reasoning/attention noted during assessment   Swallow: Soft bite size (6) texture, moderately thick (3) liquid. NO straw. Ensure upright all PO, set up assist and intermittent cueing during meals for R oral clearance     Assessment: Reviewed pharyngeal exercise program. With sips of moderately thick liquid, the pt completed x24 reps of effortful swallow (3 swallows per sip) IND. He completed axel x10 with increased time and mod cues overall. He also completed CTAR x3 IND with great model for SLP. Discussed plan for VFSS next week and pt in agreement. Trialed ice chip x2 for comfort after oral cares, however he had coughing in 2/2 trials. SLP: Introduced compensatory speech strategies including acronym \"SLOB - slow down, loud voice, over-articulate, and breath support\". He was able to utlize strategies at 1-3 word level with 100% accuracy given min cues. He required overall mod cues in conversation, particularly to increase volume and exaggerate articulation. He feels that his speech is a larger impairment than language at this time.     Other Barriers to Discharge (Family Training, etc): diet training pending progress, communication/lang strategies to spouse/family                           "

## 2022-11-11 NOTE — PROGRESS NOTES
"SPIRITUAL HEALTH SERVICES  SPIRITUAL ASSESSMENT Progress Note  [facility] ([facility] [place]) Acute Rehab    REASON FOR CHAPLAINCY CARE: initial ARU unit  visit with pt Tobin Melita and spouse Alix, per pt request at admission.     ILLNESS CIRCUMSTANCES:   Reflective conversation shared with pt and spouse which integrated elements of illness and family narratives.   Context of Illness/Symptom(s) - pt attempted to shared details events leading up to this hospitalization; he struggled at times to find his words, particularly the word \"stroke\" - spouse appropriately helped pt find his words but for the most part pt spoke for himself and spouse for herself.  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   Mu-ism Struggle - Not Discussed   Social/Cultural/Economic Distress - Not Discussed    SPIRITUAL/Faith COPING:   Samaritan/Lillian - pt and spouse are active members of [employer] ([facility].   Spiritual Practice(s) -  visits with prayer will be appreciated. Pt/spouse welcomed prayer and sung blessing today. We talked about the potential of my sharing Holy Communion with pt sometime during his admission.   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     GOALS OF CARE:  Goals of Care - restorative - pt expressed his desire to work hard at his therapies.  Meaning/Sense-Making - pt did focus on why this has happened to him; his main focus is on recovery.    PLAN: continue to follow pt/family during this admission.    True Walker M.Div (Bill)., [facility]  Staff "   Pager [phone]      [facility] remains available 24/7 for emergent requests/referrals, either by having the switchboard page the on-call  or by entering an ASAP/STAT consult in Epic (this will also page the on-call ).

## 2022-11-11 NOTE — PLAN OF CARE
FOCUS/GOAL  Bowel management, Bladder management, Pain management, Mobility, Skin integrity, and Safety management    ASSESSMENT, INTERVENTIONS AND CONTINUING PLAN FOR GOAL:  Patient is alert and disoriented to time and place. Word finding difficulty. Denied pain, headache, dizziness, CP or SOB.Level 6/level 3 fair appetite. Incontinent of B/B last BM 11/10 after bowel program. A-2 SPT uses transfer pole/bozena steady w/c. VS WDL. Call light is in reach alarm is on. Staff will continue per POC.  Goal Outcome Evaluation:

## 2022-11-11 NOTE — PLAN OF CARE
Discharge Planner Post-Acute Rehab OT:     Discharge Plan: Home with assist as needed and Home care    Precautions: fall, R dave, mod thick liquid, aphasia, NMES for R shoulder    Current Status:  ADLs:    Mobility:WC based, Ax1 for transfer with transfer pole to the left side, bozena judd A1-2    Grooming: Set up     Dressing: UB Set up seated EOB, Max A shorts in bed with rolling and bridging technique, dependent footwear and in sitting    Bathing: Max A    Toileting: Bozena whaley to commode, total A for cares  IADLs: wife can take care  Vision/Cognition: aphasia impacting communication    Assessment: ADL completed with Dave techniques, pt making improvement in UB cares and dressing. LB still a challenge.     Other Barriers to Discharge (DME, Family Training, etc): level of assist, aphasia

## 2022-11-11 NOTE — TELEPHONE ENCOUNTER
Patient has been consented and enrolled into the DISCOVERY study. All questions were answered to the patient's satisfaction. Writer personally went through full consent process. Permission to approach was obtained from Ashley Campoverde

## 2022-11-11 NOTE — PLAN OF CARE
FOCUS/GOAL  Medical management    ASSESSMENT, INTERVENTIONS AND CONTINUING PLAN FOR GOAL:  Pt is alert w/ word finding difficulty. Use call light for needs. Florence whaley used to transfer to the BR. Mix cont and incontinent of bladder. No BM this morning. A1 w/ naomi cares. Right sided weakness. No complain of pain. Zio patch is intact. Slept intermittently. Cont w/ POC.  Goal Outcome Evaluation:      Plan of Care Reviewed With: patient    Overall Patient Progress: no changeOverall Patient Progress: no change

## 2022-11-11 NOTE — PLAN OF CARE
Discharge Planner Post-Acute Rehab SLP:      Discharge Plan:  SLP      Precautions: aspiration, fall,      Current Status:  Hearing: WFL  Vision: reading glasses  Communication: mild-mod expressive-receptive language impairments c/b frequent paraphasia, word/sound/syllable substitutions, semantic substitutions within conversation. Expresses basic wants/needs WFL. Mild-moderate dysarthria   Cognition: Unable to assess d/t extent of language impairment. Suspect impaired reasoning/attention noted during assessment   Swallow: Easy to chew (7)/ moderately thick (3) liquid. NO straw. Ensure upright all PO, set up assist and intermittent cueing during meals for R oral clearance     Assessment: Planned easy to chew (7) texture trial with meal, regular solid ([place]) requested by other SLP. Analzyed pt oropharyngeal swallow function with regular solid and moderately thick liquids, pt demo'd slighlty prolonged but functional mastication with advanced solids. No reflexive coughing, throat clearing, or wet vocal quality post-swallow of solid or liquid intake this session. Pt educated in recommendation to advance to easy to chew, pt agreeable. Menu for 7 provided, pt selected items for lunch f/u tomorrow.     Other Barriers to Discharge (Family Training, etc): diet training pending progress, communication/lang strategies to spouse/family

## 2022-11-11 NOTE — PROGRESS NOTES
"  [facility]   Acute Rehabilitation Unit  Daily progress note    INTERVAL HISTORY  Tobin Hua was seen and examined at bedside this morning resting between therapy sessions.  No acute events reported overnight.  Reports that he still did not have a great night of sleep, did not notice improvement with addition of trazodone.  Still woke a few times to urinate.  Reports that he is feeling a little more sleepy this morning, though has already had several therapy sessions as well.  He reports BM x2 after suppository last evening.  Denies any chest pain or other pain, palpitations, shortness of breath, cough, nausea.  Is noting small gradual improvements with therapies.  States he was able to get some movement in right arm, though could not replicate during my visit.    Functionally, ADL completed with Dave techniques, pt making improvement in UB cares and dressing.  LB still a challenge.     MEDICATIONS    aspirin  81 mg Oral Daily     bisacodyl  10 mg Rectal Daily     - MEDICATION INSTRUCTIONS -   Does not apply See Admin Instructions     melatonin  5 mg Oral At Bedtime     metoprolol tartrate  25 mg Oral BID     senna-docusate  1-2 tablet Oral BID     traZODone  25 mg Oral At Bedtime        acetaminophen, bisacodyl, lidocaine     PHYSICAL EXAM  /68 (BP Location: Left arm)   Pulse 106   Temp 97.5  F (36.4  C) (Oral)   Resp 16   Ht 1.88 m (6' 2\")   Wt 75.8 kg (167 lb 3.2 oz)   SpO2 94%   BMI 21.47 kg/m     Gen: NAD, lying in bed  HEENT: NC/AT, MMM  Cardio: RRR, +murmur, Ziopatch L chest, ecchymoses across L upper chest  Pulm: non-labored on room air  Abd: soft, non-distended, non-tender  Ext: 1+ edema throughout RLE, most prominent distally, no erythema, non-tender, no ongoing warmth.  No edema in LLE.  Neuro/MSK: AAO, global aphasia but improving, +dysarthria, R facial droop, PERRL, EOMI, R hemiparesis - no volitional movement in RUE, 2/5 in R HF/quad but no distal " movement.    LABS  CBC RESULTS:   Recent Labs   Lab Test 11/10/22  0632 11/08/22  0550 11/07/22  0719   WBC 9.5 10.7 12.6*   RBC 3.71* 3.57* 3.72*   HGB 10.8* 10.5* 10.9*   HCT 33.3* 32.0* 32.9*   MCV 90 90 88   MCH 29.1 29.4 29.3   MCHC 32.4 32.8 33.1   RDW 14.0 14.1 14.0    265 270     Last Basic Metabolic Panel:  Recent Labs   Lab Test 11/10/22  0632 11/08/22  0550 11/07/22  0719    138 140   POTASSIUM 3.9 3.8 4.0   CHLORIDE 103 102 105   CO2 26 28 25   ANIONGAP 10 8 10   GLC 95 130* 106*   BUN 15 16 15   CR 0.61* 0.56* 0.55*   GFRESTIMATED >90 >90 >90   JAILENE 9.3 8.7 8.7         Rehabilitation - continue comprehensive acute inpatient rehabilitation program with multidisciplinary approach including therapies, rehab nursing, and physiatry following. See interval history for updates.      ASSESSMENT AND PLAN  Tobin Hua is a 62 year old right hand dominant male with a past medical history of mitral valve repair (2011), allergic rhinitis, and recent admission 10/21/22-11/1/22 for acute ischemic stroke due to dissection/occlusion of left ICA complicated by hemorrhagic transformation with intraparenchymal and intraventricular hemorrhage, anemia, non-sustained ventricular tachycardia, hypo/hypernatremia, dysphagia, constipation, and headache, who was previously at ARU and discharged back to acute hospital on 11/5/22 due to occlusive RLE DVT, now s/p IVC filter.  He is now re-admitted to ARU on 11/7/22 for multidisciplinary rehabilitation and ongoing medical management.        Admission to acute inpatient rehab 11/07/22.    Impairment group code: Stroke 01.2 (R) Body Involvement (L) Brain: left ICA and L MCA ischemic stroke, c/b hemorrhagic transformation        1. PT, OT and SLP 60 minutes of each on a daily basis, in addition to rehab nursing and close management of physiatrist.       2. Impairment of ADL's: Noted to have R hemiplegia, impaired balance, impaired coordination, fatigue, R/L visual  convergence impairment, impaired sensation, impaired postural control, all affecting his ability to safely and independently perform basic ADLs.  Goal for mod I with basic ADLs, anticipate need for assist with bathing/showering and related transfers.     3. Impairment of mobility:  Noted to have R hemiplegia, impaired balance, impaired coordination, fatigue, R/L visual convergence impairment, impaired sensation, impaired postural control, all affecting his ability to safely and independently perform basic mobility.  Goal for mod I with basic mobility, anticipate to be wheelchair based at discharge.     4. Impairment of cognition/language/swallow:  Noted to have dysphagia, dysarthria, and receptive and expressive aphasia with goals for safe tolerance of least restrictive diet and improved cognitive-linguistic skills to meet/communicate basic needs.     5. Medical Conditions  New actions/orders/updates for today are in blue.     Acute ischemic stroke due to ICA dissection and thrombosis/occlusion s/p tenecteplase, mechanical thrombectomy  C/b hemorrhagic transformation with intraparenchymal and intraventricular hemorrhage   R hemiparesis, R facial droop, dysphagia, aphasia, dysarthria  - Continue ASA 81 mg daily, indefinitely per neurology  - LDL 96, no indication for statin per neuro, no atherosclerosis on MRAs  - SBP goal <140  - Ziopatch in place for 2 weeks at ARU admission (11/1-11/15)  - Continue PT/OT/SLP  - Follow up with neurology     Occlusive RLE DVT  On 11/5/22, noted to have significant edema in RLE compared with LLE.  Bilateral lower extremity Doppler ultrasounds revealed a large occlusive DVT in the right femoral vein extending to the deep veins below the knee and nonocclusive deep venous thrombosis in the right common femoral vein. CTPE negative for PE.  Head CT demonstrates likely stable status of hemorrhagic conversion when compared with previous.  Patient underwent IR placement of IVC filter on  11/6.  Neurology consulted 11/7 to discuss consideration of anticoagulation.  Per their recs, would consider at approximately 4 weeks after hemorrhage, after repeating head CT to evaluate for any further expansion of bleed.  - Per neurology, can consider anticoagulation approx 4 weeks (~11/21) from his hemorrhage from neurologic perspective.  Plan for updated head CT at that time to show no further expansion of the bleed prior to initiating.  DOAC's would be preferred over warfarin.  Neuro would recommend risk/benefit discussion again with patient and family prior to actual initiation of anticoagulation.  - Follow-up with IR in 3-6 months to have IVC filter removed once   anticoagulation plan is established (sooner is better)     Mild leukocytosis, resolved  Elevated CRP  WBC peaked at 1.52 on 11/4, infectious work-up with UA negative, CXR negative, procal negative, CRP up-trending 150 on 11/7, blood cultures 11/5 NGTD.  Suspect 2/2 DVT, WBC down-trending since, normalized on 11/8.  - Repeat CRP down-trending 150 -> 100 11/10  - Blood cultures NGTD  - Trend CBC.  WBC remain WNL 11/10 at 9.5     Moderate dilation of ascending aorta   Incidentally noted on echo for CVA work-up, involving the sinuses of Valsalva, maximal dimension 4.6 cm.  - Needs outptient follow up in 6 months with CT imaging, can be followed by PCP     Episodes of non-sustained ventricular tachycardia  Demonstrated on telemetry during IP stay.  Started on metoprolol with reduced frequency.  - Continue metoprolol 25 mg BID  - Ziopatch in place at ARU admission     Moderate oropharyngeal dysphagia  At risk for malnutrition  - RD consulted, appreciate assist.  - Continue SLP  - Continue soft and bite-sized (level 6) diet with moderately thick (level 3) liquids.  - Meds in puree  - Swallow recs per SLP: Upright for all eating and drinking.  Relaxed, leisurely rate when eating and drinking.  Check for pocketing on the right after  meals/snacks/medications.  Continue oral care.   - Repeat VFSS as indicated - per SLP likely next week  - Monitor labs, vitals, clinically for signs of dehydration while on thickened liuqids.  11/10: BMP stable, appears to be well-hydrated     Normocytic anemia  Noted drop from Hgb 13 to yohana of 9.9 on 10/23, most recently stable at 10.9 on 11/7.  - Trend CBC.  Hgb stable 11/10 at 10.8     Urinary retention  Urinary incontinence  Noted since ARU admission, requiring intermittent straight catheterization. Wife had noted urinary urgency and some incontinence while at hospital.  11/3 UA negative for infection.  Not noted during recurrent inpatient stay.  - Monitor PVRs at ARU re-admission,  ISC as indicated  - Ongoing urinary incontinence, some urgency.  PVRs do not seem to show any significant retention.  Is waking a few times per night to urinate, but small volumes, may be in part due to poor sleep.  Discussed limiting fluids close to bedtime, voiding before bed, double voids, etc.     Hx mitral valve repair (2012)  Mild mitral regurgitation  - Noted.  Not on PTA anticoagulation, just aspirin, resumed as above     Allergic rhinitis  - Continue PTA Flonase daily     Right renal cortical enhancement  Incidentally noted on CT.  Indeterminate, possibly vascular etiology.   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  Non-painful.  Patient's movement limited by CVA as above.  - Follow-up with PCP to determine need for ortho spine involvement outpatient    Insomnia  Patient reports difficulty both falling and staying asleep.  Denies any PTA issues with sleep.  Melatonin increased 3 mg to 5 mg on 11/9.  - Continue melatonin 5 mg  - Sleep hygiene  - Trial trazodone 25 mg at HS started 11/10.  No significant improvement noted, continue and can increase in next 1-2 days if still with sleep difficulty.  -  Mildly prolonged QTc in past (460), will repeat EKG today to monitor.  - Continue to monitor       6. Adjustment to disability:  Clinical psychology to eval and treat if indicated  7. FEN: soft & bite-sized (level 6); moderately thick (level 3) liquids  8. Bowel: incontinent, recent constipation, monitor, on scheduled and PRN bowel meds, continue bowel program with nightly suppository.    9. Bladder: continent/incontinent due to urgency  10. DVT Prophylaxis: currently no chemical or mechanical prophylaxis per discharging team  11. GI Prophylaxis: not indicated  12. Code: full  13. Disposition: goal for home with 24/7 assist  14. ELOS:  24 days  15. Rehab prognosis:  fair  1. Follow up Appointments on Discharge: PCP, neurology (Dr. Edgard Dave) in 6 weeks, IR in 3-6 months        Patient discussed with Dr. Jori Troncoso, PM&R Staff Physician    ESTELA Benavides-C  Physical Medicine & Rehabilitation

## 2022-11-12 ENCOUNTER — APPOINTMENT (OUTPATIENT)
Dept: OCCUPATIONAL THERAPY | Facility: CLINIC | Age: 62
DRG: 056 | End: 2022-11-12
Attending: PHYSICAL MEDICINE & REHABILITATION
Payer: COMMERCIAL

## 2022-11-12 ENCOUNTER — APPOINTMENT (OUTPATIENT)
Dept: PHYSICAL THERAPY | Facility: CLINIC | Age: 62
DRG: 056 | End: 2022-11-12
Attending: PHYSICAL MEDICINE & REHABILITATION
Payer: COMMERCIAL

## 2022-11-12 ENCOUNTER — APPOINTMENT (OUTPATIENT)
Dept: SPEECH THERAPY | Facility: CLINIC | Age: 62
DRG: 056 | End: 2022-11-12
Attending: PHYSICAL MEDICINE & REHABILITATION
Payer: COMMERCIAL

## 2022-11-12 PROCEDURE — 97110 THERAPEUTIC EXERCISES: CPT | Mod: GO

## 2022-11-12 PROCEDURE — 250N000013 HC RX MED GY IP 250 OP 250 PS 637: Performed by: PHYSICIAN ASSISTANT

## 2022-11-12 PROCEDURE — 128N000003 HC R&B REHAB

## 2022-11-12 PROCEDURE — 97535 SELF CARE MNGMENT TRAINING: CPT | Mod: GO

## 2022-11-12 PROCEDURE — 99233 SBSQ HOSP IP/OBS HIGH 50: CPT | Performed by: PHYSICAL MEDICINE & REHABILITATION

## 2022-11-12 PROCEDURE — 97530 THERAPEUTIC ACTIVITIES: CPT | Mod: GP

## 2022-11-12 PROCEDURE — 92526 ORAL FUNCTION THERAPY: CPT | Mod: GN | Performed by: SPEECH-LANGUAGE PATHOLOGIST

## 2022-11-12 PROCEDURE — 92526 ORAL FUNCTION THERAPY: CPT | Mod: GN

## 2022-11-12 PROCEDURE — 97112 NEUROMUSCULAR REEDUCATION: CPT | Mod: GP

## 2022-11-12 PROCEDURE — 97530 THERAPEUTIC ACTIVITIES: CPT | Mod: GO

## 2022-11-12 RX ADMIN — ASPIRIN 81 MG: 81 TABLET, COATED ORAL at 08:04

## 2022-11-12 RX ADMIN — METOPROLOL TARTRATE 25 MG: 25 TABLET, FILM COATED ORAL at 08:04

## 2022-11-12 RX ADMIN — Medication 5 MG: at 20:31

## 2022-11-12 RX ADMIN — SENNOSIDES AND DOCUSATE SODIUM 1 TABLET: 50; 8.6 TABLET ORAL at 20:31

## 2022-11-12 RX ADMIN — BISACODYL 10 MG: 10 SUPPOSITORY RECTAL at 18:03

## 2022-11-12 RX ADMIN — Medication 25 MG: at 20:31

## 2022-11-12 RX ADMIN — METOPROLOL TARTRATE 25 MG: 25 TABLET, FILM COATED ORAL at 20:31

## 2022-11-12 RX ADMIN — SENNOSIDES AND DOCUSATE SODIUM 1 TABLET: 50; 8.6 TABLET ORAL at 08:04

## 2022-11-12 ASSESSMENT — ACTIVITIES OF DAILY LIVING (ADL)
ADLS_ACUITY_SCORE: 63

## 2022-11-12 NOTE — PLAN OF CARE
Discharge Planner Post-Acute Rehab SLP:      Discharge Plan:  SLP      Precautions: aspiration, fall,      Current Status:  Hearing: WFL  Vision: reading glasses  Communication: mild-mod expressive-receptive language impairments c/b frequent paraphasia, word/sound/syllable substitutions, semantic substitutions within conversation. Expresses basic wants/needs WFL. Mild-moderate dysarthria   Cognition: Unable to assess d/t extent of language impairment. Suspect impaired reasoning/attention noted during assessment   Swallow: Easy to chew (7)/ moderately thick (3) liquid. NO straw. Ensure upright all PO, set up assist and intermittent cueing during meals for R oral clearance     Assessment:  Analzyed pt oropharyngeal swallow function with (7) easy to chew solid and moderately thick liquids, pt demo'd timely mastication and  overall good oral clearance with solids. Pt did occasional bilateral finger sweep independently.Wet vocal quality x 1 after solids this date.  No coughing/throat clear noted. Pt states that he likes the advancement with solids in his diet.     Other Barriers to Discharge (Family Training, etc): diet training pending progress, communication/lang strategies to spouse/family

## 2022-11-12 NOTE — PLAN OF CARE
FOCUS/GOAL  Bowel management, Skin integrity, Carryover of rehab techniques, Psychosocial needs, and Safety management    ASSESSMENT, INTERVENTIONS AND CONTINUING PLAN FOR GOAL:    Goal Outcome Evaluation:        Pt Aox4 this shift. Pleasant and cooperative. No concerns for nurse at this time. Calls appropriately. Needs in reach and safety measures in place. Cont POC. Spouse present for majority of shift  Mobility: A2 ss  Bowel/Bladder:  inconinent of bladder during shift. Received scheduled suppository with results; LBM today  Skin: chest bruise. ZIO patch L side chest  O2: RA  Diet: level 7/3/whole with applesauce or pudding  Psychosocial: appropriate to situation  Pain: denies  Tubes/Lines/Drains: none  Misc: hemiparesis

## 2022-11-12 NOTE — PLAN OF CARE
Discharge Planner Post-Acute Rehab PT:      Discharge Plan: home with home mods and HH PT, assist from wife.     Precautions: falls (leans R with STS, tends to lean to L to compensate for R lean when standing), aphasia (expressive > receptive)     Current Status:  Bed Mobility: modA for RLE assist sup<>sit  Transfer: Recommend RN staff do SPT Ax2 with transfer pole or Florence Stedy. Min<>mod A squat pivot to the L in therapies.  Gait: unsafe  Stairs: unsafe     PASS:  11/8 - 13/36, compensates well using LUE/LLE     Assessment: slowly progressing R hip stabilization in WB. Awaiting more return in R quad/ham     Other Barriers to Discharge (DME, Family Training, etc):   Family training - to schedule. Home measurements sheet provided.  KANE rajput completed 11/11.  DME: K4 rental, hospital bed vs bed rail, ramp to enter home, SB vs WBQC for transfers

## 2022-11-12 NOTE — PLAN OF CARE
FOCUS/GOAL  Medical management    ASSESSMENT, INTERVENTIONS AND CONTINUING PLAN FOR GOAL:  Pt is alert and oriented. Some word finding difficulty noted. No complaints of pain. Assist of 2 pivot or bozena stedy per report. Not oob this shift. Continent of bladder using urinal. Appeared to be sleeping on and off tonight.

## 2022-11-12 NOTE — PROGRESS NOTES
"  [facility]   Acute Rehabilitation Unit  Daily progress note    INTERVAL HISTORY  Tobin Hua was seen and examined at bedside this morning.  No acute events reported overnight. Sleeping better. Denies any chest pain or other pain, palpitations, shortness of breath, cough, nausea.     Functionally,  Hearing: WFL  Vision: reading glasses  Communication: mild-mod expressive-receptive language impairments c/b frequent paraphasia, word/sound/syllable substitutions, semantic substitutions within conversation. Expresses basic wants/needs WFL. Mild-moderate dysarthria   Cognition: Unable to assess d/t extent of language impairment. Suspect impaired reasoning/attention noted during assessment   Swallow: Easy to chew (7)/ moderately thick (3) liquid. NO straw. Ensure upright all PO, set up assist and intermittent cueing during meals for R oral clearance       MEDICATIONS    aspirin  81 mg Oral Daily     bisacodyl  10 mg Rectal Daily     - MEDICATION INSTRUCTIONS -   Does not apply See Admin Instructions     melatonin  5 mg Oral At Bedtime     metoprolol tartrate  25 mg Oral BID     senna-docusate  1-2 tablet Oral BID     traZODone  25 mg Oral At Bedtime        acetaminophen, bisacodyl, lidocaine     PHYSICAL EXAM  /68 (BP Location: Left arm, Patient Position: Semi-Walton's, Cuff Size: Adult Regular)   Pulse 98   Temp (!) 96.4  F (35.8  C) (Oral)   Resp 18   Ht 1.88 m (6' 2\")   Wt 75.8 kg (167 lb 3.2 oz)   SpO2 96%   BMI 21.47 kg/m     Gen: NAD, lying in bed  HEENT: NC/AT, MMM  Cardio: RRR, +murmur, Ziopatch L chest, ecchymoses across L upper chest  Pulm: non-labored on room air  Abd: soft, non-distended, non-tender  Ext: 1+ edema throughout RLE, most prominent distally, no erythema, non-tender, no ongoing warmth.  No edema in LLE.  Neuro/MSK: AAO, global aphasia but improving, +dysarthria, R facial droop, PERRL, EOMI, R hemiparesis - no volitional movement in RUE, 2/5 in R " HF/quad but no distal movement. Neglects right side.    LABS  CBC RESULTS:   Recent Labs   Lab Test 11/10/22  0632 11/08/22  0550 11/07/22  0719   WBC 9.5 10.7 12.6*   RBC 3.71* 3.57* 3.72*   HGB 10.8* 10.5* 10.9*   HCT 33.3* 32.0* 32.9*   MCV 90 90 88   MCH 29.1 29.4 29.3   MCHC 32.4 32.8 33.1   RDW 14.0 14.1 14.0    265 270     Last Basic Metabolic Panel:  Recent Labs   Lab Test 11/10/22  0632 11/08/22  0550 11/07/22  0719    138 140   POTASSIUM 3.9 3.8 4.0   CHLORIDE 103 102 105   CO2 26 28 25   ANIONGAP 10 8 10   GLC 95 130* 106*   BUN 15 16 15   CR 0.61* 0.56* 0.55*   GFRESTIMATED >90 >90 >90   JAILENE 9.3 8.7 8.7         Rehabilitation - continue comprehensive acute inpatient rehabilitation program with multidisciplinary approach including therapies, rehab nursing, and physiatry following. See interval history for updates.      ASSESSMENT AND PLAN  Tobin Hua is a 62 year old right hand dominant male with a past medical history of mitral valve repair (2011), allergic rhinitis, and recent admission 10/21/22-11/1/22 for acute ischemic stroke due to dissection/occlusion of left ICA complicated by hemorrhagic transformation with intraparenchymal and intraventricular hemorrhage, anemia, non-sustained ventricular tachycardia, hypo/hypernatremia, dysphagia, constipation, and headache, who was previously at ARU and discharged back to acute hospital on 11/5/22 due to occlusive RLE DVT, now s/p IVC filter.  He is now re-admitted to ARU on 11/7/22 for multidisciplinary rehabilitation and ongoing medical management.        Admission to acute inpatient rehab 11/07/22.    Impairment group code: Stroke 01.2 (R) Body Involvement (L) Brain: left ICA and L MCA ischemic stroke, c/b hemorrhagic transformation        1. PT, OT and SLP 60 minutes of each on a daily basis, in addition to rehab nursing and close management of physiatrist.       2. Impairment of ADL's: Noted to have R hemiplegia, impaired balance, impaired  coordination, fatigue, R/L visual convergence impairment, impaired sensation, impaired postural control, all affecting his ability to safely and independently perform basic ADLs.  Goal for mod I with basic ADLs, anticipate need for assist with bathing/showering and related transfers.     3. Impairment of mobility:  Noted to have R hemiplegia, impaired balance, impaired coordination, fatigue, R/L visual convergence impairment, impaired sensation, impaired postural control, all affecting his ability to safely and independently perform basic mobility.  Goal for mod I with basic mobility, anticipate to be wheelchair based at discharge.     4. Impairment of cognition/language/swallow:  Noted to have dysphagia, dysarthria, and receptive and expressive aphasia with goals for safe tolerance of least restrictive diet and improved cognitive-linguistic skills to meet/communicate basic needs.     5. Medical Conditions  New actions/orders/updates for today are in blue.     Acute ischemic stroke due to ICA dissection and thrombosis/occlusion s/p tenecteplase, mechanical thrombectomy  C/b hemorrhagic transformation with intraparenchymal and intraventricular hemorrhage   R hemiparesis, R facial droop, dysphagia, aphasia, dysarthria  - Continue ASA 81 mg daily, indefinitely per neurology  - LDL 96, no indication for statin per neuro, no atherosclerosis on MRAs  - SBP goal <140  - Ziopatch in place for 2 weeks at ARU admission (11/1-11/15)  - Continue PT/OT/SLP  - Follow up with neurology     Occlusive RLE DVT  On 11/5/22, noted to have significant edema in RLE compared with LLE.  Bilateral lower extremity Doppler ultrasounds revealed a large occlusive DVT in the right femoral vein extending to the deep veins below the knee and nonocclusive deep venous thrombosis in the right common femoral vein. CTPE negative for PE.  Head CT demonstrates likely stable status of hemorrhagic conversion when compared with previous.  Patient underwent  IR placement of IVC filter on 11/6.  Neurology consulted 11/7 to discuss consideration of anticoagulation.  Per their recs, would consider at approximately 4 weeks after hemorrhage, after repeating head CT to evaluate for any further expansion of bleed.  - Per neurology, can consider anticoagulation approx 4 weeks (~11/21) from his hemorrhage from neurologic perspective.  Plan for updated head CT at that time to show no further expansion of the bleed prior to initiating.  DOAC's would be preferred over warfarin.  Neuro would recommend risk/benefit discussion again with patient and family prior to actual initiation of anticoagulation.  - Follow-up with IR in 3-6 months to have IVC filter removed once   anticoagulation plan is established (sooner is better)     Mild leukocytosis, resolved  Elevated CRP  WBC peaked at 1.52 on 11/4, infectious work-up with UA negative, CXR negative, procal negative, CRP up-trending 150 on 11/7, blood cultures 11/5 NGTD.  Suspect 2/2 DVT, WBC down-trending since, normalized on 11/8.  - Repeat CRP down-trending 150 -> 100 11/10  - Blood cultures NGTD  - Trend CBC.  WBC remain WNL 11/10 at 9.5     Moderate dilation of ascending aorta   Incidentally noted on echo for CVA work-up, involving the sinuses of Valsalva, maximal dimension 4.6 cm.  - Needs outptient follow up in 6 months with CT imaging, can be followed by PCP     Episodes of non-sustained ventricular tachycardia  Demonstrated on telemetry during IP stay.  Started on metoprolol with reduced frequency.  - Continue metoprolol 25 mg BID  - Ziopatch in place at ARU admission     Moderate oropharyngeal dysphagia  At risk for malnutrition  - RD consulted, appreciate assist.  - Continue SLP  - Continue soft and bite-sized (level 6) diet with moderately thick (level 3) liquids.  - Meds in puree  - Swallow recs per SLP: Upright for all eating and drinking.  Relaxed, leisurely rate when eating and drinking.  Check for pocketing on the right  after meals/snacks/medications.  Continue oral care.   - Repeat VFSS as indicated - per SLP likely next week  - Monitor labs, vitals, clinically for signs of dehydration while on thickened liuqids.  11/10: BMP stable, appears to be well-hydrated     Normocytic anemia  Noted drop from Hgb 13 to yohana of 9.9 on 10/23, most recently stable at 10.9 on 11/7.  - Trend CBC.  Hgb stable 11/10 at 10.8     Urinary retention  Urinary incontinence  Noted since ARU admission, requiring intermittent straight catheterization. Wife had noted urinary urgency and some incontinence while at hospital.  11/3 UA negative for infection.  Not noted during recurrent inpatient stay.  - Monitor PVRs at ARU re-admission,  ISC as indicated  - Ongoing urinary incontinence, some urgency.  PVRs do not seem to show any significant retention.  Is waking a few times per night to urinate, but small volumes, may be in part due to poor sleep.  Discussed limiting fluids close to bedtime, voiding before bed, double voids, etc.     Hx mitral valve repair (2012)  Mild mitral regurgitation  - Noted.  Not on PTA anticoagulation, just aspirin, resumed as above     Allergic rhinitis  - Continue PTA Flonase daily     Right renal cortical enhancement  Incidentally noted on CT.  Indeterminate, possibly vascular etiology.   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  Non-painful.  Patient's movement limited by CVA as above.  - Follow-up with PCP to determine need for ortho spine involvement outpatient    Insomnia  Patient reports difficulty both falling and staying asleep.  Denies any PTA issues with sleep.  Melatonin increased 3 mg to 5 mg on 11/9.  - Continue melatonin 5 mg  - Sleep hygiene  - Trial trazodone 25 mg at  started 11/10.   - Mildly prolonged QTc in past (460),  EKG today to monitor.  - Continue to monitor       6. Adjustment  to disability:  Clinical psychology to eval and treat if indicated  7. FEN: soft & bite-sized (level 6); moderately thick (level 3) liquids  8. Bowel: incontinent, recent constipation, monitor, on scheduled and PRN bowel meds, continue bowel program with nightly suppository.    9. Bladder: continent/incontinent due to urgency  10. DVT Prophylaxis: currently no chemical or mechanical prophylaxis per discharging team  11. GI Prophylaxis: not indicated  12. Code: full  13. Disposition: goal for home with 24/7 assist  14. ELOS:  24 days  15. Rehab prognosis:  fair  1. Follow up Appointments on Discharge: PCP, neurology (Dr. Edgard Dave) in 6 weeks, IR in 3-6 months      Doing well. Continue cares and plans outlined.    Isaak Baptiste MD

## 2022-11-12 NOTE — PLAN OF CARE
Goal Outcome Evaluation:      Plan of Care Reviewed With: patient    Overall Patient Progress: no change    FOCUS/GOAL  Bowel management, Bladder management, Pain management, Mobility, Skin integrity, and Safety management    Shift Report: [id number]    ASSESSMENT, INTERVENTIONS AND CONTINUING PLAN FOR GOAL:    Orientation: A/O, able to make needs known  Ambulation/Transfers: A2 using gait belt and bozena stedy  Bowel/Bladder: Mixed continence, bowel program administered - effective  Pain: Denies pain  Diet/Liquids/Pills: L7/L3, pills whole in pudding  Tubes/Lines/Drains:   Skin:    Received shower this evening

## 2022-11-12 NOTE — PLAN OF CARE
Discharge Planner Post-Acute Rehab OT:     Discharge Plan: Home with assist as needed and Home care    Precautions: fall, R shayy, mod thick liquid, aphasia, NMES for R shoulder    Current Status:  ADLs:    Mobility:WC based, Ax1 for transfer with transfer pole to the left side, florence whaley A1-2    Grooming: Set up     Dressing: UB Set up seated EOB, Max A with don/doff shorts/ pants seated EOB, stand with transfer pole at mod to max A to pull pants up; dependent footwear and in sitting    Bathing: Max A    Toileting: Florence whaley to commode, total A for cares  IADLs: wife can take care  Vision/Cognition: aphasia impacting communication    Assessment: AM ADLs completed seated EOB; pt attempting to complete tasks per self as able, max A with LB dsg to don shorts/ pants, difficulty with lifting LEs and thread while seated EOB.; intro UE HEP with clasped hands and tabletop exercises- written instructions on back of SLP handout on wall, wife and pt both aware  Note: NMES not on this day for OT tx due to difficulty getting NMES set and working properly; did have Otterbox on in AM, removed for PM tx to further progress RUE motion with exercises. Wife present for afternoon session, did switch out NMES pads so this may improve connectivity.    Other Barriers to Discharge (DME, Family Training, etc): level of assist, aphasia

## 2022-11-13 ENCOUNTER — APPOINTMENT (OUTPATIENT)
Dept: OCCUPATIONAL THERAPY | Facility: CLINIC | Age: 62
DRG: 056 | End: 2022-11-13
Attending: PHYSICAL MEDICINE & REHABILITATION
Payer: COMMERCIAL

## 2022-11-13 ENCOUNTER — APPOINTMENT (OUTPATIENT)
Dept: SPEECH THERAPY | Facility: CLINIC | Age: 62
DRG: 056 | End: 2022-11-13
Attending: PHYSICAL MEDICINE & REHABILITATION
Payer: COMMERCIAL

## 2022-11-13 ENCOUNTER — APPOINTMENT (OUTPATIENT)
Dept: PHYSICAL THERAPY | Facility: CLINIC | Age: 62
DRG: 056 | End: 2022-11-13
Attending: PHYSICAL MEDICINE & REHABILITATION
Payer: COMMERCIAL

## 2022-11-13 PROCEDURE — 97530 THERAPEUTIC ACTIVITIES: CPT | Mod: GO

## 2022-11-13 PROCEDURE — 97112 NEUROMUSCULAR REEDUCATION: CPT | Mod: GP | Performed by: PHYSICAL THERAPIST

## 2022-11-13 PROCEDURE — 250N000013 HC RX MED GY IP 250 OP 250 PS 637: Performed by: PHYSICIAN ASSISTANT

## 2022-11-13 PROCEDURE — 97535 SELF CARE MNGMENT TRAINING: CPT | Mod: GO

## 2022-11-13 PROCEDURE — 92526 ORAL FUNCTION THERAPY: CPT | Mod: GN

## 2022-11-13 PROCEDURE — 99233 SBSQ HOSP IP/OBS HIGH 50: CPT | Performed by: PHYSICAL MEDICINE & REHABILITATION

## 2022-11-13 PROCEDURE — 128N000003 HC R&B REHAB

## 2022-11-13 RX ADMIN — BISACODYL 10 MG: 10 SUPPOSITORY RECTAL at 19:27

## 2022-11-13 RX ADMIN — METOPROLOL TARTRATE 25 MG: 25 TABLET, FILM COATED ORAL at 20:19

## 2022-11-13 RX ADMIN — Medication 25 MG: at 20:19

## 2022-11-13 RX ADMIN — SENNOSIDES AND DOCUSATE SODIUM 1 TABLET: 50; 8.6 TABLET ORAL at 20:19

## 2022-11-13 RX ADMIN — Medication 5 MG: at 20:19

## 2022-11-13 RX ADMIN — SENNOSIDES AND DOCUSATE SODIUM 1 TABLET: 50; 8.6 TABLET ORAL at 08:12

## 2022-11-13 RX ADMIN — METOPROLOL TARTRATE 25 MG: 25 TABLET, FILM COATED ORAL at 08:12

## 2022-11-13 RX ADMIN — ACETAMINOPHEN 650 MG: 325 TABLET, FILM COATED ORAL at 20:18

## 2022-11-13 RX ADMIN — ASPIRIN 81 MG: 81 TABLET, COATED ORAL at 08:12

## 2022-11-13 ASSESSMENT — ACTIVITIES OF DAILY LIVING (ADL)
ADLS_ACUITY_SCORE: 63
ADLS_ACUITY_SCORE: 59
ADLS_ACUITY_SCORE: 59
ADLS_ACUITY_SCORE: 63
ADLS_ACUITY_SCORE: 59
ADLS_ACUITY_SCORE: 63

## 2022-11-13 NOTE — PLAN OF CARE
FOCUS/GOAL  Medical management    ASSESSMENT, INTERVENTIONS AND CONTINUING PLAN FOR GOAL:  Pt is alert and oriented with word finding difficulty noted. No complaints of pain. Assist of 2 bozena stedy per report. Incontinent of bladder. Staff offering urinal to try to minimize incontinent episodes. Appeared to be sleeping on rounds.

## 2022-11-13 NOTE — PLAN OF CARE
FOCUS/GOAL  Bowel management, Bladder management, Mobility, Carryover of rehab techniques, Psychosocial needs, and Safety management    ASSESSMENT, INTERVENTIONS AND CONTINUING PLAN FOR GOAL:    Goal Outcome Evaluation:           Pt Aox4 this shift. Pleasant and cooperative. No concerns for nurse at this time. Calls appropriately. Needs in reach and safety measures in place. Cont POC. Spouse present for majority of shift  Mobility: A2 with pole  Bowel/Bladder:  inconinent of bladder during shift. Received scheduled suppository with results; LBM today  Skin: chest bruise. ZIO patch L side chest  O2: RA  Diet: level 7/3/whole with applesauce or pudding  Psychosocial: appropriate to situation  Pain: denies  Tubes/Lines/Drains: none  Misc: hemiparesis

## 2022-11-13 NOTE — PLAN OF CARE
Discharge Planner Post-Acute Rehab SLP:      Discharge Plan:  SLP      Precautions: aspiration, fall,      Current Status:  Hearing: WFL  Vision: reading glasses  Communication: mild-mod expressive-receptive language impairments c/b frequent paraphasia, word/sound/syllable substitutions, semantic substitutions within conversation. Expresses basic wants/needs WFL. Mild-moderate dysarthria   Cognition: Unable to assess d/t extent of language impairment. Suspect impaired reasoning/attention noted during assessment   Swallow: Easy to chew (7)/ moderately thick (3) liquid. NO straw. Ensure upright all PO, set up assist and intermittent cueing during meals for R oral clearance     Assessment:  SLP: Following thorough oral care, pt consumed small ice chips x 10 reps with no clinical indicators of aspiration.  During meal, pt exhibits no clinical indicators of aspiration. Following meal, pt performed Nancy maneuver x 10 reps with ice chip break every 2 attempts.  Pt performed CTAR x 10 reps.    Other Barriers to Discharge (Family Training, etc): diet training pending progress, communication/lang strategies to spouse/family

## 2022-11-13 NOTE — PLAN OF CARE
RN [id number]  Pt is alert and oriented x4. Able to make needs known. W/ R sided paralysis. On Easy to chew (L7), moderately thick (L3) diet, no straws. Takes pills whole w/ pudding. Has a shower this evening. Pleasant and cooperative. Call light is in reach, continue w/ POC.

## 2022-11-13 NOTE — PLAN OF CARE
Goal Outcome Evaluation:      Plan of Care Reviewed With: patient    Overall Patient Progress: no change       Orientation: A/O, able to make needs known  Ambulation/Transfers: A2 using gait belt and bozena stedy  Bowel/Bladder: Mixed continence  Pain: Denies pain  Diet/Liquids/Pills: L7/L3, pills whole in pudding  Tubes/Lines/Drains:   Skin: WNL

## 2022-11-13 NOTE — PROGRESS NOTES
"  [facility]   Acute Rehabilitation Unit  Daily progress note    INTERVAL HISTORY  Tobin Hua was seen and examined at bedside this morning. Neck better.  No acute events reported overnight. Sleeping better. Denies any chest pain or other pain, palpitations, shortness of breath, cough, nausea.     Functionally,  Hearing: WFL  Vision: reading glasses  Communication: mild-mod expressive-receptive language impairments c/b frequent paraphasia, word/sound/syllable substitutions, semantic substitutions within conversation. Expresses basic wants/needs WFL. Mild-moderate dysarthria   Cognition: Unable to assess d/t extent of language impairment. Suspect impaired reasoning/attention noted during assessment   Swallow: Easy to chew (7)/ moderately thick (3) liquid. NO straw. Ensure upright all PO, set up assist and intermittent cueing during meals for R oral clearance       MEDICATIONS    aspirin  81 mg Oral Daily     bisacodyl  10 mg Rectal Daily     - MEDICATION INSTRUCTIONS -   Does not apply See Admin Instructions     melatonin  5 mg Oral At Bedtime     metoprolol tartrate  25 mg Oral BID     senna-docusate  1-2 tablet Oral BID     traZODone  25 mg Oral At Bedtime        acetaminophen, bisacodyl, lidocaine     PHYSICAL EXAM  /78 (BP Location: Left arm)   Pulse 104   Temp (!) 96.3  F (35.7  C) (Oral)   Resp 16   Ht 1.88 m (6' 2\")   Wt 75.8 kg (167 lb 3.2 oz)   SpO2 97%   BMI 21.47 kg/m     Gen: NAD, lying in bed  HEENT: NC/AT, MMM  Cardio: RRR, +murmur, Ziopatch L chest, ecchymoses across L upper chest  Pulm: non-labored on room air  Abd: soft, non-distended, non-tender  Ext: 1+ edema throughout RLE, most prominent distally, no erythema, non-tender, no ongoing warmth.  No edema in LLE.  Neuro/MSK: AAO, global aphasia but improving, +dysarthria, R facial droop, PERRL, EOMI, R hemiparesis - no volitional movement in RUE, 2/5 in R HF/quad but no distal movement. Neglects " right side.    LABS  CBC RESULTS:   Recent Labs   Lab Test 11/10/22  0632 11/08/22  0550 11/07/22  0719   WBC 9.5 10.7 12.6*   RBC 3.71* 3.57* 3.72*   HGB 10.8* 10.5* 10.9*   HCT 33.3* 32.0* 32.9*   MCV 90 90 88   MCH 29.1 29.4 29.3   MCHC 32.4 32.8 33.1   RDW 14.0 14.1 14.0    265 270     Last Basic Metabolic Panel:  Recent Labs   Lab Test 11/10/22  0632 11/08/22  0550 11/07/22  0719    138 140   POTASSIUM 3.9 3.8 4.0   CHLORIDE 103 102 105   CO2 26 28 25   ANIONGAP 10 8 10   GLC 95 130* 106*   BUN 15 16 15   CR 0.61* 0.56* 0.55*   GFRESTIMATED >90 >90 >90   JAILENE 9.3 8.7 8.7         Rehabilitation - continue comprehensive acute inpatient rehabilitation program with multidisciplinary approach including therapies, rehab nursing, and physiatry following. See interval history for updates.      ASSESSMENT AND PLAN  Tobin Hua is a 62 year old right hand dominant male with a past medical history of mitral valve repair (2011), allergic rhinitis, and recent admission 10/21/22-11/1/22 for acute ischemic stroke due to dissection/occlusion of left ICA complicated by hemorrhagic transformation with intraparenchymal and intraventricular hemorrhage, anemia, non-sustained ventricular tachycardia, hypo/hypernatremia, dysphagia, constipation, and headache, who was previously at ARU and discharged back to acute hospital on 11/5/22 due to occlusive RLE DVT, now s/p IVC filter.  He is now re-admitted to ARU on 11/7/22 for multidisciplinary rehabilitation and ongoing medical management.        Admission to acute inpatient rehab 11/07/22.    Impairment group code: Stroke 01.2 (R) Body Involvement (L) Brain: left ICA and L MCA ischemic stroke, c/b hemorrhagic transformation        1. PT, OT and SLP 60 minutes of each on a daily basis, in addition to rehab nursing and close management of physiatrist.       2. Impairment of ADL's: Noted to have R hemiplegia, impaired balance, impaired coordination, fatigue, R/L visual  convergence impairment, impaired sensation, impaired postural control, all affecting his ability to safely and independently perform basic ADLs.  Goal for mod I with basic ADLs, anticipate need for assist with bathing/showering and related transfers.     3. Impairment of mobility:  Noted to have R hemiplegia, impaired balance, impaired coordination, fatigue, R/L visual convergence impairment, impaired sensation, impaired postural control, all affecting his ability to safely and independently perform basic mobility.  Goal for mod I with basic mobility, anticipate to be wheelchair based at discharge.     4. Impairment of cognition/language/swallow:  Noted to have dysphagia, dysarthria, and receptive and expressive aphasia with goals for safe tolerance of least restrictive diet and improved cognitive-linguistic skills to meet/communicate basic needs.     5. Medical Conditions  New actions/orders/updates for today are in blue.     Acute ischemic stroke due to ICA dissection and thrombosis/occlusion s/p tenecteplase, mechanical thrombectomy  C/b hemorrhagic transformation with intraparenchymal and intraventricular hemorrhage   R hemiparesis, R facial droop, dysphagia, aphasia, dysarthria  - Continue ASA 81 mg daily, indefinitely per neurology  - LDL 96, no indication for statin per neuro, no atherosclerosis on MRAs  - SBP goal <140  - Ziopatch in place for 2 weeks at ARU admission (11/1-11/15)  - Continue PT/OT/SLP  - Follow up with neurology     Occlusive RLE DVT  On 11/5/22, noted to have significant edema in RLE compared with LLE.  Bilateral lower extremity Doppler ultrasounds revealed a large occlusive DVT in the right femoral vein extending to the deep veins below the knee and nonocclusive deep venous thrombosis in the right common femoral vein. CTPE negative for PE.  Head CT demonstrates likely stable status of hemorrhagic conversion when compared with previous.  Patient underwent IR placement of IVC filter on  11/6.  Neurology consulted 11/7 to discuss consideration of anticoagulation.  Per their recs, would consider at approximately 4 weeks after hemorrhage, after repeating head CT to evaluate for any further expansion of bleed.  - Per neurology, can consider anticoagulation approx 4 weeks (~11/21) from his hemorrhage from neurologic perspective.  Plan for updated head CT at that time to show no further expansion of the bleed prior to initiating.  DOAC's would be preferred over warfarin.  Neuro would recommend risk/benefit discussion again with patient and family prior to actual initiation of anticoagulation.  - Follow-up with IR in 3-6 months to have IVC filter removed once   anticoagulation plan is established (sooner is better)     Mild leukocytosis, resolved  Elevated CRP  WBC peaked at 1.52 on 11/4, infectious work-up with UA negative, CXR negative, procal negative, CRP up-trending 150 on 11/7, blood cultures 11/5 NGTD.  Suspect 2/2 DVT, WBC down-trending since, normalized on 11/8.  - Repeat CRP down-trending 150 -> 100 11/10  - Blood cultures NGTD  - Trend CBC.  WBC remain WNL 11/10 at 9.5     Moderate dilation of ascending aorta   Incidentally noted on echo for CVA work-up, involving the sinuses of Valsalva, maximal dimension 4.6 cm.  - Needs outptient follow up in 6 months with CT imaging, can be followed by PCP     Episodes of non-sustained ventricular tachycardia  Demonstrated on telemetry during IP stay.  Started on metoprolol with reduced frequency.  - Continue metoprolol 25 mg BID  - Ziopatch in place at ARU admission     Moderate oropharyngeal dysphagia  At risk for malnutrition  - RD consulted, appreciate assist.  - Continue SLP  - Continue soft and bite-sized (level 6) diet with moderately thick (level 3) liquids.  - Meds in puree  - Swallow recs per SLP: Upright for all eating and drinking.  Relaxed, leisurely rate when eating and drinking.  Check for pocketing on the right after  meals/snacks/medications.  Continue oral care.   - Repeat VFSS as indicated - per SLP likely next week  - Monitor labs, vitals, clinically for signs of dehydration while on thickened liuqids.  11/10: BMP stable, appears to be well-hydrated     Normocytic anemia  Noted drop from Hgb 13 to yohana of 9.9 on 10/23, most recently stable at 10.9 on 11/7.  - Trend CBC.  Hgb stable 11/10 at 10.8     Urinary retention  Urinary incontinence  Noted since ARU admission, requiring intermittent straight catheterization. Wife had noted urinary urgency and some incontinence while at hospital.  11/3 UA negative for infection.  Not noted during recurrent inpatient stay.  - Monitor PVRs at ARU re-admission,  ISC as indicated  - Ongoing urinary incontinence, some urgency.  PVRs do not seem to show any significant retention.  Is waking a few times per night to urinate, but small volumes, may be in part due to poor sleep.  Discussed limiting fluids close to bedtime, voiding before bed, double voids, etc.     Hx mitral valve repair (2012)  Mild mitral regurgitation  - Noted.  Not on PTA anticoagulation, just aspirin, resumed as above     Allergic rhinitis  - Continue PTA Flonase daily     Right renal cortical enhancement  Incidentally noted on CT.  Indeterminate, possibly vascular etiology.   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  Non-painful.  Patient's movement limited by CVA as above.  - Follow-up with PCP to determine need for ortho spine involvement outpatient    Insomnia  Patient reports difficulty both falling and staying asleep.  Denies any PTA issues with sleep.  Melatonin increased 3 mg to 5 mg on 11/9.  - Continue melatonin 5 mg  - Sleep hygiene  - Trial trazodone 25 mg at  started 11/10.   - Mildly prolonged QTc in past (460),  EKG today to monitor.  - Continue to monitor       6. Adjustment to  disability:  Clinical psychology to eval and treat if indicated  7. FEN: soft & bite-sized (level 6); moderately thick (level 3) liquids  8. Bowel: incontinent, recent constipation, monitor, on scheduled and PRN bowel meds, continue bowel program with nightly suppository.    9. Bladder: continent/incontinent due to urgency  10. DVT Prophylaxis: currently no chemical or mechanical prophylaxis per discharging team  11. GI Prophylaxis: not indicated  12. Code: full  13. Disposition: goal for home with 24/7 assist  14. ELOS:  24 days  15. Rehab prognosis:  fair  1. Follow up Appointments on Discharge: PCP, neurology (Dr. Edgard Dave) in 6 weeks, IR in 3-6 months      Doing well. Continue cares and plans outlined.    Isaak Baptiste MD

## 2022-11-13 NOTE — PLAN OF CARE
GDischarge Planner Post-Acute Rehab PT:      Discharge Plan: home with home mods and HH PT, assist from wife.     Precautions: falls (leans R with STS, tends to lean to L to compensate for R lean when standing), aphasia (expressive > receptive)     Current Status:  Bed Mobility: modA for RLE assist sup<>sit  Transfer: Recommend RN staff do SPT Ax2 with transfer pole or Florence Stedy. Min<>mod A squat pivot to the L in therapies.  Gait: unsafe  Stairs: unsafe     PASS:  11/8 - 13/36, compensates well using LUE/LLE     Assessment: slowly progressing R hip stabilization in WB. Awaiting more return in R quad/ham     Other Barriers to Discharge (DME, Family Training, etc):   Family training - to schedule. Home measurements sheet provided.  KANE rajput completed 11/11.  DME: K4 rental, hospital bed vs bed rail, ramp to enter home, SB vs WBQC for transfers

## 2022-11-13 NOTE — PLAN OF CARE
Discharge Planner Post-Acute Rehab OT:     Discharge Plan: Home with assist as needed and Home care    Precautions: fall, R shayy, mod thick liquid, aphasia, NMES for R shoulder    Current Status:  ADLs:    Mobility:WC based, Ax1 for transfer with transfer pole to the left side, florence whaley A1-2    Grooming: Set up     Dressing: UB Set up seated EOB, Max A with don/doff shorts/ pants seated EOB, stand with transfer pole at mod to max A to pull pants up; dependent footwear and in sitting    Bathing: Max A    Toileting: Florence pricillady to commode, total A for cares  IADLs: wife can take care  Vision/Cognition: aphasia impacting communication    Assessment: TB dsg and oral cares, toileting completed seated, standing EOB; pt attempting to complete tasks per self as able. Continue UE HEP with clasped hands and tabletop exercises- written instructions on back of SLP handout on wall, wife and pt both aware  Note: NMES not on this day for OT tx, but ottobock splint on R shoulder for both sessions    PM tx consisted of addressing broken R armrest on w/c and transfer training; recommend different w/c as pt has progressed out of need for this style of w/c due to improvements in functional status. Message left with PT.    Other Barriers to Discharge (DME, Family Training, etc): level of assist, aphasia

## 2022-11-14 ENCOUNTER — DOCUMENTATION ONLY (OUTPATIENT)
Dept: OTHER | Facility: CLINIC | Age: 62
End: 2022-11-14

## 2022-11-14 ENCOUNTER — APPOINTMENT (OUTPATIENT)
Dept: SPEECH THERAPY | Facility: CLINIC | Age: 62
DRG: 056 | End: 2022-11-14
Attending: PHYSICAL MEDICINE & REHABILITATION
Payer: COMMERCIAL

## 2022-11-14 ENCOUNTER — APPOINTMENT (OUTPATIENT)
Dept: PHYSICAL THERAPY | Facility: CLINIC | Age: 62
DRG: 056 | End: 2022-11-14
Attending: PHYSICAL MEDICINE & REHABILITATION
Payer: COMMERCIAL

## 2022-11-14 ENCOUNTER — APPOINTMENT (OUTPATIENT)
Dept: OCCUPATIONAL THERAPY | Facility: CLINIC | Age: 62
DRG: 056 | End: 2022-11-14
Attending: PHYSICAL MEDICINE & REHABILITATION
Payer: COMMERCIAL

## 2022-11-14 LAB
ANION GAP SERPL CALCULATED.3IONS-SCNC: 4 MMOL/L (ref 3–14)
ATRIAL RATE - MUSE: 99 BPM
BUN SERPL-MCNC: 16 MG/DL (ref 7–30)
CALCIUM SERPL-MCNC: 9.2 MG/DL (ref 8.5–10.1)
CHLORIDE BLD-SCNC: 104 MMOL/L (ref 94–109)
CO2 SERPL-SCNC: 31 MMOL/L (ref 20–32)
CREAT SERPL-MCNC: 0.74 MG/DL (ref 0.66–1.25)
DIASTOLIC BLOOD PRESSURE - MUSE: NORMAL MMHG
ERYTHROCYTE [DISTWIDTH] IN BLOOD BY AUTOMATED COUNT: 14 % (ref 10–15)
GFR SERPL CREATININE-BSD FRML MDRD: >90 ML/MIN/1.73M2
GLUCOSE BLD-MCNC: 90 MG/DL (ref 70–99)
HCT VFR BLD AUTO: 34.6 % (ref 40–53)
HGB BLD-MCNC: 11.1 G/DL (ref 13.3–17.7)
INTERPRETATION ECG - MUSE: NORMAL
MAGNESIUM SERPL-MCNC: 2.4 MG/DL (ref 1.6–2.3)
MCH RBC QN AUTO: 28.8 PG (ref 26.5–33)
MCHC RBC AUTO-ENTMCNC: 32.1 G/DL (ref 31.5–36.5)
MCV RBC AUTO: 90 FL (ref 78–100)
P AXIS - MUSE: 68 DEGREES
PHOSPHATE SERPL-MCNC: 3.6 MG/DL (ref 2.5–4.5)
PLATELET # BLD AUTO: 361 10E3/UL (ref 150–450)
POTASSIUM BLD-SCNC: 4.3 MMOL/L (ref 3.4–5.3)
PR INTERVAL - MUSE: 152 MS
QRS DURATION - MUSE: 74 MS
QT - MUSE: 346 MS
QTC - MUSE: 444 MS
R AXIS - MUSE: 12 DEGREES
RBC # BLD AUTO: 3.85 10E6/UL (ref 4.4–5.9)
SODIUM SERPL-SCNC: 139 MMOL/L (ref 133–144)
SYSTOLIC BLOOD PRESSURE - MUSE: NORMAL MMHG
T AXIS - MUSE: 48 DEGREES
VENTRICULAR RATE- MUSE: 99 BPM
WBC # BLD AUTO: 7.7 10E3/UL (ref 4–11)

## 2022-11-14 PROCEDURE — 128N000003 HC R&B REHAB

## 2022-11-14 PROCEDURE — 84100 ASSAY OF PHOSPHORUS: CPT | Performed by: PHYSICIAN ASSISTANT

## 2022-11-14 PROCEDURE — 85027 COMPLETE CBC AUTOMATED: CPT | Performed by: PHYSICIAN ASSISTANT

## 2022-11-14 PROCEDURE — 36415 COLL VENOUS BLD VENIPUNCTURE: CPT | Performed by: PHYSICIAN ASSISTANT

## 2022-11-14 PROCEDURE — 82310 ASSAY OF CALCIUM: CPT | Performed by: PHYSICIAN ASSISTANT

## 2022-11-14 PROCEDURE — 97112 NEUROMUSCULAR REEDUCATION: CPT | Mod: GO | Performed by: OCCUPATIONAL THERAPIST

## 2022-11-14 PROCEDURE — 99232 SBSQ HOSP IP/OBS MODERATE 35: CPT | Performed by: PHYSICIAN ASSISTANT

## 2022-11-14 PROCEDURE — 97530 THERAPEUTIC ACTIVITIES: CPT | Mod: GP | Performed by: STUDENT IN AN ORGANIZED HEALTH CARE EDUCATION/TRAINING PROGRAM

## 2022-11-14 PROCEDURE — 92526 ORAL FUNCTION THERAPY: CPT | Mod: GN

## 2022-11-14 PROCEDURE — 250N000013 HC RX MED GY IP 250 OP 250 PS 637: Performed by: PHYSICIAN ASSISTANT

## 2022-11-14 PROCEDURE — 83735 ASSAY OF MAGNESIUM: CPT | Performed by: PHYSICIAN ASSISTANT

## 2022-11-14 RX ORDER — TRAZODONE HYDROCHLORIDE 50 MG/1
50 TABLET, FILM COATED ORAL AT BEDTIME
Status: DISCONTINUED | OUTPATIENT
Start: 2022-11-14 | End: 2022-12-01 | Stop reason: HOSPADM

## 2022-11-14 RX ADMIN — SENNOSIDES AND DOCUSATE SODIUM 1 TABLET: 50; 8.6 TABLET ORAL at 08:46

## 2022-11-14 RX ADMIN — Medication 5 MG: at 20:08

## 2022-11-14 RX ADMIN — SENNOSIDES AND DOCUSATE SODIUM 2 TABLET: 50; 8.6 TABLET ORAL at 20:08

## 2022-11-14 RX ADMIN — TRAZODONE HYDROCHLORIDE 50 MG: 50 TABLET ORAL at 20:08

## 2022-11-14 RX ADMIN — ASPIRIN 81 MG: 81 TABLET, COATED ORAL at 08:46

## 2022-11-14 RX ADMIN — METOPROLOL TARTRATE 25 MG: 25 TABLET, FILM COATED ORAL at 08:46

## 2022-11-14 ASSESSMENT — ACTIVITIES OF DAILY LIVING (ADL)
ADLS_ACUITY_SCORE: 59
ADLS_ACUITY_SCORE: 61
ADLS_ACUITY_SCORE: 59
ADLS_ACUITY_SCORE: 61
ADLS_ACUITY_SCORE: 59
ADLS_ACUITY_SCORE: 59
ADLS_ACUITY_SCORE: 61
ADLS_ACUITY_SCORE: 59
ADLS_ACUITY_SCORE: 59
ADLS_ACUITY_SCORE: 61

## 2022-11-14 NOTE — PLAN OF CARE
GDischarge Planner Post-Acute Rehab PT:      Discharge Plan: home with home mods and HH PT, assist from wife.     Precautions: falls (leans R with STS, tends to lean to L to compensate for R lean when standing), aphasia (expressive > receptive)     Current Status:  Bed Mobility: modA for RLE assist sup<>sit  Transfer: Recommend RN staff do SPT Ax2 with transfer pole or Florence Stedy. Min<>mod A squat pivot to the L in therapies.  Gait: unsafe  Stairs: unsafe     PASS:  11/8 - 13/36, compensates well using LUE/LLE     Assessment: Pt continues to be motivated, participate well. Sit<>stands improving.      Other Barriers to Discharge (DME, Family Training, etc):   Family training - to schedule. Home measurements sheet provided.  KANE rajput completed 11/11.  DME: K4 rental, hospital bed vs bed rail, ramp to enter home, SB vs WBQC for transfers

## 2022-11-14 NOTE — PROGRESS NOTES
"  [facility]   Acute Rehabilitation Unit  Daily progress note    INTERVAL HISTORY  Weekend therapy and progress notes reviewed, no acute events reported.    Tobin Hua was seen and examined at bedside this morning.  He reports to be feeling well overall.  Noting daily improvements with therapies.  He states sleep has improved some but still with some difficulty.  He is getting up to urinate at night but does not feel this is most significant factor.  He would like to try increasing trazodone dose given it has seemed to help.  He reports having BM after suppository each evening, though having recurrent BM most evenings after returning to bed.  Denies any abdominal pain or nausea.  He is eating well, happy with advancement in diet.  Denies any chest pain, palpitations, shortness of breath.  Noting some proximal right arm movement.    Functionally, he is making slow progress.  Able to do pivot transfer with transfer pole or bozena stedy and assist of 2 with nursing or min to mod A with therapies.  He is needing set-up for upper body dressing seated, max A with lower body and dependent with footwear.  He is bathing with max A.       MEDICATIONS    aspirin  81 mg Oral Daily     bisacodyl  10 mg Rectal Daily     - MEDICATION INSTRUCTIONS -   Does not apply See Admin Instructions     melatonin  5 mg Oral At Bedtime     metoprolol tartrate  25 mg Oral BID     senna-docusate  1-2 tablet Oral BID     traZODone  25 mg Oral At Bedtime        acetaminophen, bisacodyl, lidocaine     PHYSICAL EXAM  /64   Pulse 74   Temp (!) 96.7  F (35.9  C) (Oral)   Resp 18   Ht 1.88 m (6' 2\")   Wt 75.8 kg (167 lb 3.2 oz)   SpO2 96%   BMI 21.47 kg/m     Gen: NAD, sitting up in chair  HEENT: NC/AT, MMM  Cardio: RRR, +murmur, Ziopatch L chest, ecchymoses across L upper chest  Pulm: non-labored on room air, lungs CTA bilaterally  Abd: soft, non-distended, non-tender  Ext: slight edema throughout RLE " improved from prior exam, no erythema, non-tender, no ongoing warmth.  No edema in LLE.  Neuro/MSK: AAO, global aphasia but improving, +dysarthria, R facial droop, PERRL, EOMI, R hemiparesis - now with some proximal movement in R shoulder.    LABS  CBC RESULTS:   Recent Labs   Lab Test 11/10/22  0632 11/08/22  0550 11/07/22  0719   WBC 9.5 10.7 12.6*   RBC 3.71* 3.57* 3.72*   HGB 10.8* 10.5* 10.9*   HCT 33.3* 32.0* 32.9*   MCV 90 90 88   MCH 29.1 29.4 29.3   MCHC 32.4 32.8 33.1   RDW 14.0 14.1 14.0    265 270     Last Basic Metabolic Panel:  Recent Labs   Lab Test 11/10/22  0632 11/08/22  0550 11/07/22  0719    138 140   POTASSIUM 3.9 3.8 4.0   CHLORIDE 103 102 105   CO2 26 28 25   ANIONGAP 10 8 10   GLC 95 130* 106*   BUN 15 16 15   CR 0.61* 0.56* 0.55*   GFRESTIMATED >90 >90 >90   JAILENE 9.3 8.7 8.7         Rehabilitation - continue comprehensive acute inpatient rehabilitation program with multidisciplinary approach including therapies, rehab nursing, and physiatry following. See interval history for updates.      ASSESSMENT AND PLAN  Tobin Hua is a 62 year old right hand dominant male with a past medical history of mitral valve repair (2011), allergic rhinitis, and recent admission 10/21/22-11/1/22 for acute ischemic stroke due to dissection/occlusion of left ICA complicated by hemorrhagic transformation with intraparenchymal and intraventricular hemorrhage, anemia, non-sustained ventricular tachycardia, hypo/hypernatremia, dysphagia, constipation, and headache, who was previously at ARU and discharged back to acute hospital on 11/5/22 due to occlusive RLE DVT, now s/p IVC filter.  He is now re-admitted to ARU on 11/7/22 for multidisciplinary rehabilitation and ongoing medical management.        Admission to acute inpatient rehab 11/07/22.    Impairment group code: Stroke 01.2 (R) Body Involvement (L) Brain: left ICA and L MCA ischemic stroke, c/b hemorrhagic transformation        1. PT, OT and SLP  60 minutes of each on a daily basis, in addition to rehab nursing and close management of physiatrist.       2. Impairment of ADL's: Noted to have R hemiplegia, impaired balance, impaired coordination, fatigue, R/L visual convergence impairment, impaired sensation, impaired postural control, all affecting his ability to safely and independently perform basic ADLs.  Goal for mod I with basic ADLs, anticipate need for assist with bathing/showering and related transfers.     3. Impairment of mobility:  Noted to have R hemiplegia, impaired balance, impaired coordination, fatigue, R/L visual convergence impairment, impaired sensation, impaired postural control, all affecting his ability to safely and independently perform basic mobility.  Goal for mod I with basic mobility, anticipate to be wheelchair based at discharge.     4. Impairment of cognition/language/swallow:  Noted to have dysphagia, dysarthria, and receptive and expressive aphasia with goals for safe tolerance of least restrictive diet and improved cognitive-linguistic skills to meet/communicate basic needs.     5. Medical Conditions  New actions/orders/updates for today are in blue.     Acute ischemic stroke due to ICA dissection and thrombosis/occlusion s/p tenecteplase, mechanical thrombectomy  C/b hemorrhagic transformation with intraparenchymal and intraventricular hemorrhage   R hemiparesis, R facial droop, dysphagia, aphasia, dysarthria  - Continue ASA 81 mg daily, indefinitely per neurology  - LDL 96, no indication for statin per neuro, no atherosclerosis on MRAs  - SBP goal <140  - Ziopatch in place for 2 weeks at ARU admission (11/1-11/15)  - Continue PT/OT/SLP  - Follow up with neurology     Occlusive RLE DVT  On 11/5/22, noted to have significant edema in RLE compared with LLE.  Bilateral lower extremity Doppler ultrasounds revealed a large occlusive DVT in the right femoral vein extending to the deep veins below the knee and nonocclusive deep  venous thrombosis in the right common femoral vein. CTPE negative for PE.  Head CT demonstrates likely stable status of hemorrhagic conversion when compared with previous.  Patient underwent IR placement of IVC filter on 11/6.  Neurology consulted 11/7 to discuss consideration of anticoagulation.  Per their recs, would consider at approximately 4 weeks after hemorrhage, after repeating head CT to evaluate for any further expansion of bleed.  - Per neurology, can consider anticoagulation approx 4 weeks (~11/21) from his hemorrhage from neurologic perspective.  Plan for updated head CT at that time to show no further expansion of the bleed prior to initiating.  DOAC's would be preferred over warfarin.  Neuro would recommend risk/benefit discussion again with patient and family prior to actual initiation of anticoagulation.  - Follow-up with IR in 3-6 months to have IVC filter removed once   anticoagulation plan is established (sooner is better)     Mild leukocytosis, resolved  Elevated CRP  WBC peaked at 1.52 on 11/4, infectious work-up with UA negative, CXR negative, procal negative, CRP up-trending 150 on 11/7, blood cultures 11/5 NGTD.  Suspect 2/2 DVT, WBC down-trending since, normalized on 11/8.  - Repeat CRP down-trending 150 -> 100 11/10  - Blood cultures NGTD  - Trend CBC.  WBC remain WNL 11/14     Moderate dilation of ascending aorta   Incidentally noted on echo for CVA work-up, involving the sinuses of Valsalva, maximal dimension 4.6 cm.  - Needs outptient follow up in 6 months with CT imaging, can be followed by PCP     Episodes of non-sustained ventricular tachycardia  Demonstrated on telemetry during IP stay.  Started on metoprolol with reduced frequency.  - Continue metoprolol 25 mg BID  - Ziopatch in place at ARU admission  - EKG on 11/11 (eval QTc with start trazodone) with sinus rhythm, PVCs and PACs     Moderate oropharyngeal dysphagia  At risk for malnutrition  - RD consulted, appreciate assist.  -  Continue SLP  - Continue easy to chew (level 7) diet with moderately thick (level 3) liquids.   - Swallow strategies per SLP  - Repeat VFSS as indicated - per SLP likely this week  - Monitor labs, vitals, clinically for signs of dehydration while on thickened liuqids.  11/14: BMP stable, though Cr uptrending slightly 0.5 -> 0.6 -> 0.7.  BUN stable.  Continue to encourage fluids.       Normocytic anemia  Noted drop from Hgb 13 to yohana of 9.9 on 10/23, most recently stable at 10.9 on 11/7.  - Trend CBC.  Hgb stable 11/14 at 11.1     Urinary retention  Urinary incontinence  Noted since ARU admission, requiring intermittent straight catheterization. Wife had noted urinary urgency and some incontinence while at hospital.  11/3 UA negative for infection.  Not noted during recurrent inpatient stay.  - Monitor PVRs at ARU re-admission,  ISC as indicated  - Ongoing urinary incontinence, some urgency.  PVRs do not show any significant retention.  Is waking a few times per night to urinate, but small volumes, may be in part due to poor sleep.  Discussed limiting fluids close to bedtime, voiding before bed, double voids, etc.     Hx mitral valve repair (2012)  Mild mitral regurgitation  - Noted.  Not on PTA anticoagulation, just aspirin, resumed as above     Allergic rhinitis  - Continue PTA Flonase daily     Right renal cortical enhancement  Incidentally noted on CT.  Indeterminate, possibly vascular etiology.   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  Non-painful.  Patient's movement limited by CVA as above.  - Follow-up with PCP to determine need for ortho spine involvement outpatient    Insomnia  Patient reports difficulty both falling and staying asleep.  Denies any PTA issues with sleep.  Melatonin increased 3 mg to 5 mg on 11/9.  - Continue melatonin 5 mg  - Sleep hygiene  - Some relief with  addition of trazodone (started 11/10), but still sleep difficulty.  Increase trazodone to 50 mg at HS.  - Mildly prolonged QTc in past (460),  EKG obtained 11/11 with starting trazodone.  QTc WNL at 444.  - Continue to monitor       6. Adjustment to disability:  Clinical psychology to eval and treat if indicated  7. FEN: easy to chew (level 7); moderately thick (level 3) liquids  8. Bowel: incontinent, recent constipation, monitor, on scheduled and PRN bowel meds, continue bowel program with nightly suppository.  Is having some incomplete emptying with suppository.  Will discuss with nursing re consistency of stool and consider adjusting PO meds vs changing suppository to enema.  9. Bladder: continent/incontinent due to urgency  10. DVT Prophylaxis: currently no chemical or mechanical prophylaxis per discharging team  11. GI Prophylaxis: not indicated  12. Code: full  13. Disposition: goal for home with 24/7 assist  14. ELOS:  24 days  15. Rehab prognosis:  fair  1. Follow up Appointments on Discharge: PCP, neurology (Dr. Edgard Dave) in 6 weeks, IR in 3-6 months        Ashley Campoverde PA-C  Physical Medicine & Rehabilitation

## 2022-11-14 NOTE — PLAN OF CARE
Discharge Planner Post-Acute Rehab SLP:      Discharge Plan:  SLP      Precautions: aspiration, fall,      Current Status:  Hearing: WFL  Vision: reading glasses  Communication: mild-mod expressive-receptive language impairments c/b frequent paraphasia, word/sound/syllable substitutions, semantic substitutions within conversation. Expresses basic wants/needs WFL. Mild-moderate dysarthria   Cognition: Unable to assess d/t extent of language impairment. Suspect impaired reasoning/attention noted during assessment   Swallow: Regular texture, moderately thick (3) liquid. NO straw. Ensure upright all PO, set up assist and intermittent cueing during meals for R oral clearance     Assessment:  Oral cares completed. Pt accepted trials of icechips and thin (0) water by tsp. Pt with no s/sx aspiration with small icechips or tsp water. Larger ice chips resulting in delayed but strong cough. Pt participated in pharyngeal exercises for CTAR + effortful swallow (x20). Benefits from cues to continue to initiate reps and icechips in between. Recommend pt ok for small icechips with 1:1 supervision following free water protocol (FWP) guidelines. Spouse not here for education d/t weather, will complete tomorrow as able.    PM:  Pt seen with meal trial regular texture, moderately thick (3) liquid. Extended mastication (but functional), timely AP transit, min oral residuals clearing with finger/tongue sweep and liquid wash. Pt with improving oral efficiency with intake and overall IND with use of oral clearing strategies. Recommend pt advance to regular texture, continue 3 liquid. Ongoing set up assist needed.    Other Barriers to Discharge (Family Training, etc): diet training pending progress, communication/lang strategies to spouse/family

## 2022-11-14 NOTE — PROGRESS NOTES
Discharge Planner Post-Acute Rehab OT:      Discharge Plan: Home with assist as needed and Home care     Precautions: fall, R shayy, mod thick liquid, aphasia, NMES for R shoulder, remove omno neurexa when in bed   Current Status:  ADLs:    Mobility:WC based, Ax1 for transfer with transfer pole to the left side, florence whaley A1-2    Grooming: Set up seated    Dressing: UB Set up seated EOB, Max A with don/doff shorts/ pants seated EOB, stand with transfer pole at mod to max A to pull pants up; dependent footwear and in sitting    Bathing: Max A    Toileting: Florence judd to commode, total A for cares  IADLs: wife can take care  Vision/Cognition: aphasia impacting communication     Assessment: Unable to use FES d/t technical errors occurring. Focused on RUE WB'ing and trunk control while seated at EOM. Utilized NMES for right shoulder subluxation protocol. Will utilize [employer] tomorrow for postural control.      Other Barriers to Discharge (DME, Family Training, etc): level of assist, aphasia

## 2022-11-14 NOTE — PROGRESS NOTES
FOCUS/GOAL  Medical management    ASSESSMENT, INTERVENTIONS AND CONTINUING PLAN FOR GOAL:  Pt is alert and oriented. No complaints of pain. Assist of 2 bozena whaley. Incontinent of bladder x2 and continent of bladder x1. Pt calls after incontinence. Appeared to be sleeping on rounds.

## 2022-11-14 NOTE — PLAN OF CARE
FOCUS/GOAL  Bowel management, Bladder management, Mobility, Skin integrity, Psychosocial needs, Safety management, and Prevention of secondary complications    ASSESSMENT, INTERVENTIONS AND CONTINUING PLAN FOR GOAL:    Goal Outcome Evaluation:                 Pt Aox4 this shift. Pleasant and cooperative. No concerns for nurse at this time. Calls appropriately. Needs in reach and safety measures in place. Cont POC.   Mobility: A2 with pole  Bowel/Bladder:  mixed continence. LBM 11/13  Skin: chest bruise. ZIO patch L side chest  O2: RA  Diet: upgraded to R texture/level 3 liquids  Psychosocial: appropriate to situation  Pain: denies  Tubes/Lines/Drains: none  Misc: hemiparesis

## 2022-11-15 ENCOUNTER — APPOINTMENT (OUTPATIENT)
Dept: OCCUPATIONAL THERAPY | Facility: CLINIC | Age: 62
DRG: 056 | End: 2022-11-15
Attending: PHYSICAL MEDICINE & REHABILITATION
Payer: COMMERCIAL

## 2022-11-15 ENCOUNTER — APPOINTMENT (OUTPATIENT)
Dept: SPEECH THERAPY | Facility: CLINIC | Age: 62
DRG: 056 | End: 2022-11-15
Attending: PHYSICAL MEDICINE & REHABILITATION
Payer: COMMERCIAL

## 2022-11-15 ENCOUNTER — APPOINTMENT (OUTPATIENT)
Dept: PHYSICAL THERAPY | Facility: CLINIC | Age: 62
DRG: 056 | End: 2022-11-15
Attending: PHYSICAL MEDICINE & REHABILITATION
Payer: COMMERCIAL

## 2022-11-15 ENCOUNTER — APPOINTMENT (OUTPATIENT)
Dept: EDUCATION SERVICES | Facility: CLINIC | Age: 62
DRG: 056 | End: 2022-11-15
Attending: PHYSICAL MEDICINE & REHABILITATION
Payer: COMMERCIAL

## 2022-11-15 PROCEDURE — 92507 TX SP LANG VOICE COMM INDIV: CPT | Mod: GN

## 2022-11-15 PROCEDURE — 999N000125 HC STATISTIC PATIENT MED CONFERENCE < 30 MIN: Performed by: STUDENT IN AN ORGANIZED HEALTH CARE EDUCATION/TRAINING PROGRAM

## 2022-11-15 PROCEDURE — 97112 NEUROMUSCULAR REEDUCATION: CPT | Mod: GO | Performed by: OCCUPATIONAL THERAPIST

## 2022-11-15 PROCEDURE — 97530 THERAPEUTIC ACTIVITIES: CPT | Mod: GP

## 2022-11-15 PROCEDURE — 92526 ORAL FUNCTION THERAPY: CPT | Mod: GN

## 2022-11-15 PROCEDURE — 250N000013 HC RX MED GY IP 250 OP 250 PS 637: Performed by: PHYSICIAN ASSISTANT

## 2022-11-15 PROCEDURE — 97112 NEUROMUSCULAR REEDUCATION: CPT | Mod: GP

## 2022-11-15 PROCEDURE — 99233 SBSQ HOSP IP/OBS HIGH 50: CPT | Performed by: PHYSICIAN ASSISTANT

## 2022-11-15 PROCEDURE — 128N000003 HC R&B REHAB

## 2022-11-15 PROCEDURE — 999N000150 HC STATISTIC PT MED CONFERENCE < 30 MIN

## 2022-11-15 PROCEDURE — 97535 SELF CARE MNGMENT TRAINING: CPT | Mod: GO | Performed by: OCCUPATIONAL THERAPIST

## 2022-11-15 PROCEDURE — 999N000125 HC STATISTIC PATIENT MED CONFERENCE < 30 MIN

## 2022-11-15 RX ADMIN — BISACODYL 10 MG: 10 SUPPOSITORY RECTAL at 18:38

## 2022-11-15 RX ADMIN — Medication 5 MG: at 21:00

## 2022-11-15 RX ADMIN — METOPROLOL TARTRATE 25 MG: 25 TABLET, FILM COATED ORAL at 21:00

## 2022-11-15 RX ADMIN — ASPIRIN 81 MG: 81 TABLET, COATED ORAL at 08:05

## 2022-11-15 RX ADMIN — SENNOSIDES AND DOCUSATE SODIUM 1 TABLET: 50; 8.6 TABLET ORAL at 21:00

## 2022-11-15 RX ADMIN — SENNOSIDES AND DOCUSATE SODIUM 1 TABLET: 50; 8.6 TABLET ORAL at 08:05

## 2022-11-15 RX ADMIN — TRAZODONE HYDROCHLORIDE 50 MG: 50 TABLET ORAL at 21:00

## 2022-11-15 ASSESSMENT — ACTIVITIES OF DAILY LIVING (ADL)
ADLS_ACUITY_SCORE: 61

## 2022-11-15 NOTE — PROGRESS NOTES
"  [facility]   Acute Rehabilitation Unit  Daily progress note    INTERVAL HISTORY  Tobin Hua was seen and examined at bedside this morning during team rounds, daughter and wife present.  No acute events reported overnight.  Patient was very happy to have diet advanced to regular.  He is noting gradual daily progress with therapies, remains eager to get home but also acknowledges wanting to get the most out of therapy.  Continuing to have mixed incontinence of bowel and bladder.  Declined suppository last evening.    Functionally, he is now transferring with use of transfer pole and CGA to min A.  Is getting some slow return in proximal lower extremity.  He is using FES for arm, would like to use for leg if able to anticoagulate.  He is still needing significant assist for lower body dressing and total assist for toileting needs.  SLP advanced to regular diet, started with ice chips with free water protocol.  Anticipate repeating video swallow study in near future, possibly by end of week.  Anticipate to need home modifications and physical assist at discharge.  For full functional updates, see team rounds note from today.       MEDICATIONS    aspirin  81 mg Oral Daily     bisacodyl  10 mg Rectal Daily     - MEDICATION INSTRUCTIONS -   Does not apply See Admin Instructions     melatonin  5 mg Oral At Bedtime     metoprolol tartrate  25 mg Oral BID     senna-docusate  1-2 tablet Oral BID     traZODone  50 mg Oral At Bedtime        acetaminophen, bisacodyl, lidocaine     PHYSICAL EXAM  /66 (BP Location: Left arm, Patient Position: Semi-Walton's)   Pulse 107   Temp (!) 96.6  F (35.9  C) (Oral)   Resp 18   Ht 1.88 m (6' 2\")   Wt 75.8 kg (167 lb 3.2 oz)   SpO2 96%   BMI 21.47 kg/m     Gen: NAD, lying in bed  HEENT: NC/AT, MMM  Cardio: appears well-perfused  Pulm: non-labored on room air  Abd: soft, non-distended, non-tender  Ext: slight edema throughout RLE improved " from prior exam, no erythema, non-tender, no ongoing warmth.  No edema in LLE.  Neuro/MSK: AAO, global aphasia but improving, +dysarthria, R facial droop, PERRL, EOMI, R hemiparesis.  *Full exam deferred today for conversation    LABS  CBC RESULTS:   Recent Labs   Lab Test 11/14/22  0629 11/10/22  0632 11/08/22  0550   WBC 7.7 9.5 10.7   RBC 3.85* 3.71* 3.57*   HGB 11.1* 10.8* 10.5*   HCT 34.6* 33.3* 32.0*   MCV 90 90 90   MCH 28.8 29.1 29.4   MCHC 32.1 32.4 32.8   RDW 14.0 14.0 14.1    325 265     Last Basic Metabolic Panel:  Recent Labs   Lab Test 11/14/22  0629 11/10/22  0632 11/08/22  0550    139 138   POTASSIUM 4.3 3.9 3.8   CHLORIDE 104 103 102   CO2 31 26 28   ANIONGAP 4 10 8   GLC 90 95 130*   BUN 16 15 16   CR 0.74 0.61* 0.56*   GFRESTIMATED >90 >90 >90   JAILENE 9.2 9.3 8.7         Rehabilitation - continue comprehensive acute inpatient rehabilitation program with multidisciplinary approach including therapies, rehab nursing, and physiatry following. See interval history for updates.      ASSESSMENT AND PLAN  Tobin Hua is a 62 year old right hand dominant male with a past medical history of mitral valve repair (2011), allergic rhinitis, and recent admission 10/21/22-11/1/22 for acute ischemic stroke due to dissection/occlusion of left ICA complicated by hemorrhagic transformation with intraparenchymal and intraventricular hemorrhage, anemia, non-sustained ventricular tachycardia, hypo/hypernatremia, dysphagia, constipation, and headache, who was previously at ARU and discharged back to acute hospital on 11/5/22 due to occlusive RLE DVT, now s/p IVC filter.  He is now re-admitted to ARU on 11/7/22 for multidisciplinary rehabilitation and ongoing medical management.        Admission to acute inpatient rehab 11/07/22.    Impairment group code: Stroke 01.2 (R) Body Involvement (L) Brain: left ICA and L MCA ischemic stroke, c/b hemorrhagic transformation        1. PT, OT and SLP 60 minutes of each  on a daily basis, in addition to rehab nursing and close management of physiatrist.       2. Impairment of ADL's: Noted to have R hemiplegia, impaired balance, impaired coordination, fatigue, R/L visual convergence impairment, impaired sensation, impaired postural control, all affecting his ability to safely and independently perform basic ADLs.  Goal for mod I with basic ADLs, anticipate need for assist with bathing/showering and related transfers.     3. Impairment of mobility:  Noted to have R hemiplegia, impaired balance, impaired coordination, fatigue, R/L visual convergence impairment, impaired sensation, impaired postural control, all affecting his ability to safely and independently perform basic mobility.  Goal for mod I with basic mobility, anticipate to be wheelchair based at discharge.     4. Impairment of cognition/language/swallow:  Noted to have dysphagia, dysarthria, and receptive and expressive aphasia with goals for safe tolerance of least restrictive diet and improved cognitive-linguistic skills to meet/communicate basic needs.     5. Medical Conditions  New actions/orders/updates for today are in blue.     Acute ischemic stroke due to ICA dissection and thrombosis/occlusion s/p tenecteplase, mechanical thrombectomy  C/b hemorrhagic transformation with intraparenchymal and intraventricular hemorrhage   R hemiparesis, R facial droop, dysphagia, aphasia, dysarthria  - Continue ASA 81 mg daily, indefinitely per neurology  - LDL 96, no indication for statin per neuro, no atherosclerosis on MRAs  - SBP goal <140  - Ziopatch in place for 2 weeks at ARU admission (11/1-11/15)  - Continue PT/OT/SLP  - Follow up with neurology     Occlusive RLE DVT  On 11/5/22, noted to have significant edema in RLE compared with LLE.  Bilateral lower extremity Doppler ultrasounds revealed a large occlusive DVT in the right femoral vein extending to the deep veins below the knee and nonocclusive deep venous thrombosis in  the right common femoral vein. CTPE negative for PE.  Head CT demonstrates likely stable status of hemorrhagic conversion when compared with previous.  Patient underwent IR placement of IVC filter on 11/6.  Neurology consulted 11/7 to discuss consideration of anticoagulation.  Per their recs, would consider at approximately 4 weeks after hemorrhage, after repeating head CT to evaluate for any further expansion of bleed.  - Per neurology, can consider anticoagulation approx 4 weeks (~11/21) from his hemorrhage from neurologic perspective.  Plan for updated head CT at that time to show no further expansion of the bleed prior to initiating.  DOAC's would be preferred over warfarin.  Neuro would recommend risk/benefit discussion again with patient and family prior to actual initiation of anticoagulation.  - Follow-up with IR in 3-6 months to have IVC filter removed once   anticoagulation plan is established (sooner is better)     Mild leukocytosis, resolved  Elevated CRP  WBC peaked at 1.52 on 11/4, infectious work-up with UA negative, CXR negative, procal negative, CRP up-trending 150 on 11/7, blood cultures 11/5 NGTD.  Suspect 2/2 DVT, WBC down-trending since, normalized on 11/8.  - Repeat CRP down-trending 150 -> 100 11/10  - Blood cultures NGTD  - Trend CBC.  WBC remain WNL 11/14     Moderate dilation of ascending aorta   Incidentally noted on echo for CVA work-up, involving the sinuses of Valsalva, maximal dimension 4.6 cm.  - Needs outptient follow up in 6 months with CT imaging, can be followed by PCP     Episodes of non-sustained ventricular tachycardia  Demonstrated on telemetry during IP stay.  Started on metoprolol with reduced frequency.  - Continue metoprolol 25 mg BID  - Ziopatch in place at ARU admission  - EKG on 11/11 (eval QTc with start trazodone) with sinus rhythm, PVCs and PACs     Moderate oropharyngeal dysphagia  At risk for malnutrition  - RD consulted, appreciate assist.  - Continue SLP  -  Advanced to regular diet per SLP on 11/14, continue moderately thick (level 3) liquids.   - Ice chips per free water procotol  - Swallow strategies per SLP  - Repeat VFSS as indicated - per SLP likely this week  - Monitor labs, vitals, clinically for signs of dehydration while on thickened liuqids.  11/14: BMP stable, though Cr uptrending slightly 0.5 -> 0.6 -> 0.7.  BUN stable.  Continue to encourage fluids.       Normocytic anemia  Noted drop from Hgb 13 to yohana of 9.9 on 10/23, most recently stable at 10s-11s.  - Trend CBC.  Hgb stable 11/14 at 11.1     Urinary retention, resolved  Urinary incontinence  Noted since ARU admission, requiring intermittent straight catheterization. Wife had noted urinary urgency and some incontinence while at hospital. 11/3 UA negative for infection.  Not noted during recurrent inpatient stay.  - Ongoing urinary incontinence, some urgency.  PVRs do not show any significant retention.  Is waking a few times per night to urinate, but small volumes, may be in part due to poor sleep.  Discussed limiting fluids close to bedtime, voiding before bed, double voids, etc.     Hx mitral valve repair (2012)  Mild mitral regurgitation  - Noted.  Not on PTA anticoagulation, just aspirin, resumed as above     Allergic rhinitis  - Continue PTA Flonase daily     Right renal cortical enhancement  Incidentally noted on CT.  Indeterminate, possibly vascular etiology.   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  Non-painful.  Patient's movement limited by CVA as above.  - Follow-up with PCP to determine need for ortho spine involvement outpatient    Insomnia  Patient reports difficulty both falling and staying asleep.  Denies any PTA issues with sleep.  Melatonin increased 3 mg to 5 mg on 11/9.  - Continue melatonin 5 mg  - Sleep hygiene  - Some relief with addition of trazodone  (started 11/10), but still sleep difficulty.  Trazodone increased to 50 mg at HS on 11/14.  Continue to monitor.  - Mildly prolonged QTc in past (460),  EKG obtained 11/11 with starting trazodone.  QTc WNL at 444.  - Continue to monitor       6. Adjustment to disability:  Clinical psychology to eval and treat if indicated  7. FEN: regular diet; moderately thick (level 3) liquids  8. Bowel: incontinent, recent constipation, monitor, on scheduled and PRN bowel meds, continue bowel program with nightly suppository.  Is having some incomplete emptying with suppository.  Declined suppository last night, follow up tomorrow pending results this evening and consider adjusting PO meds vs changing suppository to enema.  9. Bladder: continent/incontinent due to urgency  10. DVT Prophylaxis: currently no chemical or mechanical prophylaxis per discharging team  11. GI Prophylaxis: not indicated  12. Code: full  13. Disposition: goal for home with 24/7 assist  14. ELOS:  24 days  15. Rehab prognosis:  fair  1. Follow up Appointments on Discharge: PCP, neurology (Dr. Edgard Dave) in 6 weeks, IR in 3-6 months        Patient seen and discussed with Dr. Jori Troncoso, PM&R staff physician     Ashley Campoverde PA-C  Physical Medicine & Rehabilitation

## 2022-11-15 NOTE — CARE CONFERENCE
Acute Rehab Care Conference/Team Rounds      Type: Team Rounds    Present: Dr. Jori Troncoso, Ashley Campoverde PA, Noe Waldrop PT, Jeannie Lui OT, Cielo Johnson SLP, Claudia Gonzáles [facility] Hospital[facility], Joy Borrego RD, Capo Witt RN, and Tobin Melita Patient.     Discharge Barriers/Treatment/Education    Rehab Diagnosis: R hemiparesis post CVA     Active Medical Co-morbidities/Prognosis:     Patient Active Problem List   Diagnosis Code     DVT (deep venous thrombosis) (H) I82.409   Aphasia  Dysphagia   HTN    Safety: Pt is alert and oriented calling using call light for assistance. A of 2 florence steady or A of 2 stand pivot with T pole.       Pain: Pt denied pain on evenings.     Medications, Skin, Tubes/Lines: Pt is taking medications whole with pudding, skin intact, no tube/lines in place.     Swallowing/Nutrition: Regular texture, moderately thick (3) liquid. NO straw. Ensure upright all PO, set up assist and intermittent cueing during meals for R oral clearance     Bowel/Bladder:    Psychosocial: , lives with spouse. Both pt and spouse are retired. Indep PTA. No substance abuse, mental health, or financial concerns. Adult children also supportive and local.     ADLs/IADLs:  ADLs:    Mobility:WC based, Ax1 for transfer with transfer pole to the left side, florence whaley A1-2    Grooming: Set up seated    Dressing: UB Set up seated EOB, Max A with don/doff shorts/ pants seated EOB, stand with transfer pole at mod to max A to pull pants up; dependent footwear and in sitting    Bathing: Max A  Toileting: Florence whaley to commode, total A for cares and some incontinence  Pt making slow progress and requires quite a bit of assist still. Pt has no activation on RUE. Working on managing RUE and improving overall function.     Mobility: Pt making steady progress with R hip activation, not quite functional strength but improving. Still lacks functional strength in quads and hams. W/c rental started for K4 with [employer]. Need to  discuss home mods to safely return home including possible ramp installation. Recommend HH PT. Will also need to initiate family training.  Bed Mobility: modA for RLE assist sup<>sit  Transfer: Recommend RN staff do SPT Ax2 with transfer pole or Florence Stedy. Min<>mod A squat pivot to the L in therapies.  Gait: unsafe    Cognition/Language: mild-mod expressive-receptive language impairments c/b frequent paraphasia, word/sound/syllable substitutions, semantic substitutions within conversation. Expresses basic wants/needs WFL. Mild-moderate dysarthria     Community Re-Entry: w/c based    Transportation: requires assist from family an/or public transportation    Decision maker: self    Plan of Care and goals reviewed and updated.    Discharge Plan/Recommendations    Fall Precautions: continue    Patient/Family input to goals: yes     Estimated length of stay: 18 days     Overall plan for the patient: reach a level of mod I       Utilization Review and Continued Stay Justification    Medical Necessity Criteria:    For any criteria that is not met, please document reason and plan for discharge, transfer, or modification of plan of care to address.    Requires intensive rehabilitation program to treat functional deficits?: Yes    Requires 3x per week or greater involvement of rehabilitation physician to oversee rehabilitation program?: Yes    Requires rehabilitation nursing interventions?: Yes    Patient is making functional progress?: Yes    There is a potential for additional functional progress? Yes    Patient is participating in therapy 3 hours per day a minimum of 5 days per week or 15 hours per week in 7 day period?:Yes    Has discharge needs that require coordinated discharge planning approach?:Yes      Barriers/Concerns related to meeting medical necessity criteria:  none    Team Plan to Address Concern:  As needed       Final Physician Sign off    Statement of Approval:  Jori Troncoso, DO      Patient  Goals  Social Work Goals: Confirm discharge recommendations with therapy, coordinate safe discharge plan and remain available to support and assist as needed.    OT Predicted Duration/Target Date for Goal Attainment: 12/05/22  Therapy Frequency (OT): Daily  OT: Hygiene/Grooming: modified independent  OT: Upper Body Dressing: Modified independent  OT: Lower Body Dressing: Minimal assist  OT: Upper Body Bathing: Supervision/stand-by assist  OT: Lower Body Bathing: Minimal assist  OT: Transfer: Supervision/stand-by assist (tub transfer)  OT: Toilet Transfer/Toileting: Supervision/stand-by assist, cleaning and garment management, toilet transfer  OT: Goal 1: Pt will be IND with HEP to improve RUE function for ADL  OT: Goal 2: Family will complete family training to demonstrate ability to assist pt as needed at discharge.     PT Predicted Duration/Target Date for Goal Attainment: 12/02/22  PT Frequency: Daily  PT: Bed Mobility: Modified independent, Rolling, Supine to/from sit (bed rail)  PT: Transfers: Supervision/stand-by assist, Bed to/from chair, Assistive device (stand pivot vs SB transfer)  PT: Wheelchair Mobility: 150 feet, manual wheelchair  PT: Perform aerobic activity with stable cardiovascular response: continuous activity, 15 minutes, NuStep  PT: Goal 1: Car transfer, L WBQC, CGA  PT: Edema education to increase ability to manage edema after discharge from the hospital: Caregiver, Demonstrate, [place], wear schedule, limb positioning, garment/bandage care, Patient  PT: Management of edema bandages: Caregiver, Demonstrate, [place], garment(s)  PT: Functional edema exercise program to reduce limb volume, increase activity tolerance and improve independence with ADL: Patient, Demonstrates, [place], HEP        SLP Predicted Duration/Target Date for Goal Attainment: 11/29/22  Therapy Frequency (SLP Eval): daily  SLP: Communicate basic wants and needs: minimal assist  SLP: Goal 1: Pt will name novel  and functional information within task with min cues 90% accuracy           Nursing Goals  Bowel and Bladder care  Fall prevention   Medication Education  Skin Care protection

## 2022-11-15 NOTE — PLAN OF CARE
FOCUS/GOAL  Bowel management, Bladder management, Pain management and Cognition/Memory/Judgment/Problem solving    ASSESSMENT, INTERVENTIONS AND CONTINUING PLAN FOR GOAL:  Pt is alert and oriented but aphasic, denies fever, chills, chest pain, SOB, N/V, abdominal pain, or new weakness/numbness/tingling, incontinent of bladder overnight, declined bowel program after fail attempt without use of suppository , transferring with assist of 2 pivot transfer with T pole, zio patch on L chest. Apical hear rate irregular, sleeping well throughout the night, no tubes, lines or drains, vs stable, no further care concerns at this time continue with POC.         Goal Outcome Evaluation:Ongoing

## 2022-11-15 NOTE — PLAN OF CARE
GDischarge Planner Post-Acute Rehab PT:      Discharge Plan: home with home mods and HH PT, assist from wife.     Precautions: falls (leans R with STS, tends to lean to L to compensate for R lean when standing), aphasia (expressive > receptive)     Current Status:  Bed Mobility: modA for RLE assist sup<>sit  Transfer: Recommend RN staff do SPT Ax2 with transfer pole or Florence Stedy. Min<>mod A squat pivot to the L in therapies.  Gait: unsafe  Stairs: unsafe     PASS:  11/8 - 13/36, compensates well using LUE/LLE     Assessment: Pt participates well in therapy. His STS are improving with needing faded assistance. Improving on his R weight shifting tall kneeling and standing, however will benefit from further therapy attention.     Other Barriers to Discharge (DME, Family Training, etc):   Family training - to schedule. Home measurements sheet provided.  KANE rajput completed 11/11.  DME: K4 rental, hospital bed vs bed rail, ramp to enter home, SB vs WBQC for transfers

## 2022-11-15 NOTE — PLAN OF CARE
Goal Outcome Evaluation:    Plan of Care Reviewed With: patient    Overall Patient Progress: no change    Outcome Evaluation: Pt continues to be incontinent needing assistance with pad/linen change this morning after incontinent episode, Pt refused BM program last night re-educated on improtance of continuing to do that regularly, no change to skin integrity.    FOCUS/GOAL  Bladder management    ASSESSMENT, INTERVENTIONS AND CONTINUING PLAN FOR GOAL:  Pt Aox4, word finding difficulty.  A2 bozena stedy with nursing.  Denies pain, cough, sob, chest pain, dizziness, N/V, N/T.  Pt had edema to RLE.  Reg/moderately thick, taking pills whole with pudding.  Working with therapies.  Nursing will continue with POC.

## 2022-11-15 NOTE — CONSULTS
The following information has been reviewed with the patient and family:     1. Warning signs of stroke     2. Calling 911 if having warning signs of stroke     3. All modifiable risk factors: hypertension, CAD, atrial fib, diabetes, hypercholesterolemia, smoking, substance abuse, diet, physical inactivity, obesity, sleep apnea.     4. Patient's risk factors for stroke which include: none     5. Follow-up plan for after discharge     6. Discharge medications which include: lopressor,aspirin     In addition, the above information was given to the patient and family in writing as a part of the [facility] Stroke Class Handout.     Learner's response to risk factors / lifestyle modification education: Committment to change Taking steps      Christine M Klisch, RN

## 2022-11-15 NOTE — PROGRESS NOTES
Discharge Planner Post-Acute Rehab OT:      Discharge Plan: Home with assist as needed and Home care     Precautions: fall, R shayy, mod thick liquid, aphasia    Shoulder mgmt: NMES 5-7x a day for shoulder subluxation protocol for RUE. Omno neurexa on with therapy for mobility, remove when seated in W/C or in bed. No ROM with RUE shoulder above 90 degrees.     Current Status:  ADLs:    Mobility:WC based, Ax1 for transfer with transfer pole to the left side, florence whaley A1-2    Grooming: Set up seated    Dressing: UB Set up seated EOB, Max A with don/doff shorts/ pants seated EOB, stand with transfer pole at mod to max A to pull pants up; dependent footwear and in sitting    Bathing: Max A    Toileting: Florence whaley to commode, total A for cares  IADLs: wife can take care  Vision/Cognition: aphasia impacting communication     Assessment: Utilized Xcite for postural control and right scapular stabilization. Pt tolerating well and demonstrating increased scapular stabilization. Will focus on ADLs with transfer pole and continue to increase IND with one handed dressing techniques.     Other Barriers to Discharge (DME, Family Training, etc): level of assist, aphasia

## 2022-11-15 NOTE — PLAN OF CARE
Discharge Planner Post-Acute Rehab SLP:      Discharge Plan:  SLP      Precautions: aspiration, fall,      Current Status:  Hearing: WFL  Vision: reading glasses  Communication: mild-mod expressive-receptive language impairments c/b frequent paraphasia, word/sound/syllable substitutions, semantic substitutions within conversation. Expresses basic wants/needs WFL. Mild-moderate dysarthria   Cognition: Unable to assess d/t extent of language impairment. Suspect impaired reasoning/attention noted during assessment   Swallow: Regular texture, moderately thick (3) liquid. NO straw. Ensure upright all PO, set up assist and intermittent cueing during meals for R oral clearance     Assessment:  Pt participated in expressive/receptive language tasks. Introduced to strategies for word finding via semantic map. Pt practiced implementing strategies given images from [place] [employer]. Pt required overall mod cues to continue to implement across images. Benefited from use of semantic strategies to aid word retrieval and produce target. Emerging success with strategies but needs mod cues to continue to elicit greater productions. Pt often unaware of incorrect productions and given verbal feedback and mod cues able to modify productions. Pt finished breakfast upon arrival. Pt participated in reps pharyngeal exercises for CTAR + effortful swallow. Pt counting out loud, often will count incomplete swallow but will self correct. Occasionally needs min cues to redirect/determine error and complete correct rep    Other Barriers to Discharge (Family Training, etc): diet training pending progress, communication/lang strategies to spouse/family

## 2022-11-15 NOTE — PROGRESS NOTES
CLINICAL NUTRITION SERVICES - ASSESSMENT NOTE     Nutrition Prescription    RECOMMENDATIONS FOR MDs/PROVIDERS TO ORDER:  None today     Malnutrition Status:    Unable to fully determine due to questionable weight accuracy     Recommendations already ordered by Registered Dietitian (RD):  Will update supplement order to [employer] Magic Cup PRN - do not auto send, pt/wife will request    Future/Additional Recommendations:  Continue to monitor meal/supplement intakes, wt/lab trends      REASON FOR ASSESSMENT  Tobin Hua is a/an 62 year old male assessed by the dietitian for LOS    NUTRITION/MEDICAL HISTORY  Per chart review: Pt returns to ARU for rehabilitation in the setting of re-hospitalization for occlusive RLE DVT, other history significant for acute ischemic stroke due to ICA dissection and thrombosis/occlusion s/p tenecteplase, mechanical thrombectomy c/b hemorrhagic transformation with intraparenchymal and intraventricular hemorrhage   R hemiparesis, R facial droop, dysphagia, aphasia, dysarthria. Other past medical history of mitral valve repair (2011) and allergic rhinitis.    Per pt visit: RD is familiar with pt/wife from previous admission, reviewed interventions ordered on Monday per SLP reports, pt/wife confirm having pt be on assist, and pt is accepting of Magic Cup, but is having some fatigue of supplements, so agreeing to change order to PRN. Reviewed weight trends and again question accuracy, pt/wife agreeing pt has had some weight loss, but question accuracy, current bed likely not accurate, will discuss with therapy to obtain weight when pt deemed safe to stand on scale.     CURRENT NUTRITION ORDERS  Diet: Regular, Moderately Thick Liquids (level 3) - No straws, close supervision needed, check mouth frequently for oral residue/pocketing, maintain upright position, minimize distractions  Supplement: RD ordered Magic Cup BID at lunch and dinner meals on 11/14  Intake/Tolerance: % (mostly %  "with only 1 meal noted at 25% since pt returned to ARU)     LABS  Labs reviewed    MEDICATIONS  Medications reviewed    ANTHROPOMETRICS  Height: 188 cm (6' 2\")  Most Recent Weight: 75.8 kg (167 lb 3.2 oz)    IBW: 86.3 kg  BMI: Normal BMI  UBW: Wife confirms some time ago pt's more UBW trend was between 180-190 lbs   Weight History:   11/02/22 75 kg (165 lb 5.5 oz)     76.1 kg (167 lb 12.3 oz) 10/24/2022 - per CE     Weight assessment: Difficult to assess as continue to question accuracy of weights available.     ASSESSED NUTRITION NEEDS - deferred to next week (will request therapy get pt on standing scale when safe to stand)    MALNUTRITION  % Intake: Decreased intake does not meet criteria  % Weight Loss: Unable to assess today   Subcutaneous Fat Loss: Facial region: mild vs age related   Muscle Loss: Temporal: mild vs age related   Fluid Accumulation/Edema: None noted  Malnutrition Diagnosis: Unable to fully determine due to questionable weight accuracy     NUTRITION DIAGNOSIS  Predicted inadequate nutrient intake related to chewing/swallowing difficult as evidenced by continued need for modified diet       INTERVENTIONS  Implementation  Nutrition Education: Reviewed the importance adequate nutritional intakes for continued recovery    Medical food supplement therapy - order updated as above per pt request     Goals  Patient to consume % of nutritionally adequate meal trays TID, or the equivalent with supplements/snacks.     Monitoring/Evaluation  Progress toward goals will be monitored and evaluated per protocol.    Joy Borrego RD, CNSC, LD  [facility] RD pager: [phone]  "

## 2022-11-15 NOTE — PROGRESS NOTES
XCite stim unit for Activity Based Therapy. Skilled set up; determined appropriate FES parameters for each muscle group based off of strong tetanic response of muscle test.  Used the following activities from the software library: General  Intervention and patient response: Pt completed 15 repetitions x2 sets of postural control to promote scapular stabilization with writer guiding through transitional movements.

## 2022-11-16 ENCOUNTER — APPOINTMENT (OUTPATIENT)
Dept: PHYSICAL THERAPY | Facility: CLINIC | Age: 62
DRG: 056 | End: 2022-11-16
Attending: PHYSICAL MEDICINE & REHABILITATION
Payer: COMMERCIAL

## 2022-11-16 ENCOUNTER — APPOINTMENT (OUTPATIENT)
Dept: SPEECH THERAPY | Facility: CLINIC | Age: 62
DRG: 056 | End: 2022-11-16
Attending: PHYSICAL MEDICINE & REHABILITATION
Payer: COMMERCIAL

## 2022-11-16 ENCOUNTER — APPOINTMENT (OUTPATIENT)
Dept: OCCUPATIONAL THERAPY | Facility: CLINIC | Age: 62
DRG: 056 | End: 2022-11-16
Attending: PHYSICAL MEDICINE & REHABILITATION
Payer: COMMERCIAL

## 2022-11-16 PROCEDURE — 128N000003 HC R&B REHAB

## 2022-11-16 PROCEDURE — 92526 ORAL FUNCTION THERAPY: CPT | Mod: GN | Performed by: SPEECH-LANGUAGE PATHOLOGIST

## 2022-11-16 PROCEDURE — 97112 NEUROMUSCULAR REEDUCATION: CPT | Mod: GP | Performed by: PHYSICAL THERAPIST

## 2022-11-16 PROCEDURE — 97535 SELF CARE MNGMENT TRAINING: CPT | Mod: GO

## 2022-11-16 PROCEDURE — 99232 SBSQ HOSP IP/OBS MODERATE 35: CPT | Performed by: PHYSICIAN ASSISTANT

## 2022-11-16 PROCEDURE — 250N000013 HC RX MED GY IP 250 OP 250 PS 637: Performed by: PHYSICIAN ASSISTANT

## 2022-11-16 RX ADMIN — TRAZODONE HYDROCHLORIDE 50 MG: 50 TABLET ORAL at 20:11

## 2022-11-16 RX ADMIN — ASPIRIN 81 MG: 81 TABLET, COATED ORAL at 08:23

## 2022-11-16 RX ADMIN — Medication 5 MG: at 20:11

## 2022-11-16 RX ADMIN — SENNOSIDES AND DOCUSATE SODIUM 1 TABLET: 50; 8.6 TABLET ORAL at 20:11

## 2022-11-16 RX ADMIN — METOPROLOL TARTRATE 25 MG: 25 TABLET, FILM COATED ORAL at 20:11

## 2022-11-16 RX ADMIN — SENNOSIDES AND DOCUSATE SODIUM 1 TABLET: 50; 8.6 TABLET ORAL at 08:23

## 2022-11-16 RX ADMIN — METOPROLOL TARTRATE 25 MG: 25 TABLET, FILM COATED ORAL at 08:23

## 2022-11-16 RX ADMIN — BISACODYL 10 MG: 10 SUPPOSITORY RECTAL at 17:49

## 2022-11-16 ASSESSMENT — ACTIVITIES OF DAILY LIVING (ADL)
ADLS_ACUITY_SCORE: 59
ADLS_ACUITY_SCORE: 55
ADLS_ACUITY_SCORE: 59
ADLS_ACUITY_SCORE: 53
ADLS_ACUITY_SCORE: 59
ADLS_ACUITY_SCORE: 59
ADLS_ACUITY_SCORE: 55
ADLS_ACUITY_SCORE: 61
ADLS_ACUITY_SCORE: 55
ADLS_ACUITY_SCORE: 59
ADLS_ACUITY_SCORE: 61
ADLS_ACUITY_SCORE: 59

## 2022-11-16 NOTE — PROGRESS NOTES
Discharge Planner Post-Acute Rehab OT:      Discharge Plan: Home with assist as needed and Home care     Precautions: fall, R shayy, mod thick liquid, aphasia    Shoulder mgmt: NMES 5-7x a day for shoulder subluxation protocol for RUE. Omno neurexa on with therapy for mobility, remove when seated in W/C or in bed. No ROM with RUE shoulder above 90 degrees.     Current Status:  ADLs:    Mobility:WC based, Ax1 for transfer with transfer pole to the left side, florence whaley A1-2    Grooming: Set up seated    Dressing: UB Set up seated EOB, Mod A with don/doff shorts/ pants seated EOB, stand with transfer pole with assist forRLE knee block to pull up pants; dependent footwear and in sitting    Bathing: Max A    Toileting: Florence whaley to commode, total A for cares  IADLs: wife can take care  Vision/Cognition: aphasia impacting communication     Assessment: pt progressing with LB dsg Mod A w/tsf pole today from EOB for donning pants, and Min A SPT w/ tsf pole to left for t EOB to w/c.     Other Barriers to Discharge (DME, Family Training, etc): level of assist, aphasia

## 2022-11-16 NOTE — PLAN OF CARE
Discharge Planner Post-Acute Rehab SLP:      Discharge Plan:  SLP      Precautions: aspiration, fall,      Current Status:  Hearing: WFL  Vision: reading glasses  Communication: mild-mod expressive-receptive language impairments c/b frequent paraphasia, word/sound/syllable substitutions, semantic substitutions within conversation. Expresses basic wants/needs WFL. Mild-moderate dysarthria   Cognition: Unable to assess d/t extent of language impairment. Suspect impaired reasoning/attention noted during assessment   Swallow: Regular texture, moderately thick (3) liquid. NO straw. Ensure upright all PO, set up assist and intermittent cueing during meals for R oral clearance     Assessment:  Pt asked for free water protocol to be reviewed, simplified pt guide created and placed in sheet protector to be kept at pt's bedside. Pt re-educated in FWP, pt acknowledged comprehension. Set up pt for oral care prior to levels 0 and 2 trials via teaspoon. Pt completed oral care with electric toothbrush, swish and spit with thin water. SLP administered 10x teaspoon size bous thin water, 2x reflexive throat clears post-swallow. SLP administered 6x teaspoon boluses nectar thick, no reflexive throat clears post-swallow. Pt educated in plan for repeat VFSS on 11/18.    Other Barriers to Discharge (Family Training, etc): diet training pending progress, communication/lang strategies to spouse/family

## 2022-11-16 NOTE — PROGRESS NOTES
SPIRITUAL HEALTH SERVICES Progress Note  [facility] Acute Rehab    Visited with pt, spouse Alix, and friends who were visiting. Pt feels he is making good progress with his therapies, said his expected discharge to home will hopefully be around the end of this month. Spouse Alix mentioned that she and Rolf would appreciate receiving Holy Communion on Friday of this week - we had discussed that possibility during my last visit. I will visit again on Friday, and will be prepared to celebrate Holy Communion with pt and spouse.     True Oneal) Miguelito Walker M.Div., [facility]  Staff   Pager [phone]      * [facility] remains available 24/7 for emergent requests/referrals, either by having the switchboard page the on-call  or by entering an ASAP/STAT consult in Epic (this will also page the on-call ). Routine Epic consults receive an initial response within 24 hours.*

## 2022-11-16 NOTE — PLAN OF CARE
FOCUS/GOAL  Bowel management, Bladder management, Pain management, Mobility, Skin integrity, and Safety management    ASSESSMENT, INTERVENTIONS AND CONTINUING PLAN FOR GOAL:  Patient is alert and oriented. Word finding difficulty. Denied pain, headache, dizziness, CP or SOB. Regular/level 3. Incontinent of B/B last BM 11/15 after bowel program. A-2 SPT uses transfer pole/bozena steady w/c. VS WDL. Call light is in reach alarm is on. Staff will continue per POC  Goal Outcome Evaluation:

## 2022-11-16 NOTE — PLAN OF CARE
Goal Outcome Evaluation:      Plan of Care Reviewed With: patient    Overall Patient Progress: no changeOverall Patient Progress: no change    Outcome Evaluation: Pt continues to be incontinent needing assistance with pad/linen change, Pt had BM last night with BM program, no change to skin integrity.    FOCUS/GOAL  Bladder management     ASSESSMENT, INTERVENTIONS AND CONTINUING PLAN FOR GOAL:  Pt Aox4, word finding difficulty.  A2 bozena judd with nursing.  Denies pain, cough, sob, chest pain, dizziness, N/V, N/T.  Pt had edema to RLE.  Reg/moderately thick, taking pills whole with pudding or moderately thick liquids.  Working with therapies.  Nursing will continue with POC.

## 2022-11-16 NOTE — PROGRESS NOTES
"  [facility]   Acute Rehabilitation Unit  Daily progress note    INTERVAL HISTORY  Tobin Hua was seen and examined this morning working with PT.  No acute events reported overnight.  Patient reports that he slept a little better last night.  Notes he did wake a couple times to urinate.  States he is feeling well overall.  Is happy PT making some adjustments to his wheelchair.  He denies any chest or other pain, palpitations, shortness of breath, dizziness, nausea.    Functionally, PT noting STS are improving with needing faded assistance. Improving on his R weight shifting tall kneeling and standing.  OT utilized Xcite for postural control and right scapular stabilization. Pt tolerating well and demonstrating increased scapular stabilization.       MEDICATIONS    aspirin  81 mg Oral Daily     bisacodyl  10 mg Rectal Daily     - MEDICATION INSTRUCTIONS -   Does not apply See Admin Instructions     melatonin  5 mg Oral At Bedtime     metoprolol tartrate  25 mg Oral BID     senna-docusate  1-2 tablet Oral BID     traZODone  50 mg Oral At Bedtime        acetaminophen, bisacodyl, lidocaine     PHYSICAL EXAM  /69 (BP Location: Left arm)   Pulse 89   Temp (!) 96.4  F (35.8  C) (Oral)   Resp 16   Ht 1.88 m (6' 2\")   Wt 75.8 kg (167 lb 3.2 oz)   SpO2 96%   BMI 21.47 kg/m     Gen: NAD, sitting up in chair to standing in parallel bars  HEENT: NC/AT, MMM  Cardio: appears well-perfused  Pulm: non-labored on room air  Abd: soft, non-distended, non-tender  Ext: slight edema throughout RLE improved from prior exam, no erythema, non-tender, no ongoing warmth.  No edema in LLE.  Neuro/MSK: AAO, global aphasia but improving, +dysarthria, R facial droop, PERRL, EOMI, R hemiparesis    LABS  CBC RESULTS:   Recent Labs   Lab Test 11/14/22  0629 11/10/22  0632 11/08/22  0550   WBC 7.7 9.5 10.7   RBC 3.85* 3.71* 3.57*   HGB 11.1* 10.8* 10.5*   HCT 34.6* 33.3* 32.0*   MCV 90 90 90   MCH " 28.8 29.1 29.4   MCHC 32.1 32.4 32.8   RDW 14.0 14.0 14.1    325 265     Last Basic Metabolic Panel:  Recent Labs   Lab Test 11/14/22  0629 11/10/22  0632 11/08/22  0550    139 138   POTASSIUM 4.3 3.9 3.8   CHLORIDE 104 103 102   CO2 31 26 28   ANIONGAP 4 10 8   GLC 90 95 130*   BUN 16 15 16   CR 0.74 0.61* 0.56*   GFRESTIMATED >90 >90 >90   JAILENE 9.2 9.3 8.7         Rehabilitation - continue comprehensive acute inpatient rehabilitation program with multidisciplinary approach including therapies, rehab nursing, and physiatry following. See interval history for updates.      ASSESSMENT AND PLAN  Tobin Hua is a 62 year old right hand dominant male with a past medical history of mitral valve repair (2011), allergic rhinitis, and recent admission 10/21/22-11/1/22 for acute ischemic stroke due to dissection/occlusion of left ICA complicated by hemorrhagic transformation with intraparenchymal and intraventricular hemorrhage, anemia, non-sustained ventricular tachycardia, hypo/hypernatremia, dysphagia, constipation, and headache, who was previously at ARU and discharged back to acute hospital on 11/5/22 due to occlusive RLE DVT, now s/p IVC filter.  He is now re-admitted to ARU on 11/7/22 for multidisciplinary rehabilitation and ongoing medical management.        Admission to acute inpatient rehab 11/07/22.    Impairment group code: Stroke 01.2 (R) Body Involvement (L) Brain: left ICA and L MCA ischemic stroke, c/b hemorrhagic transformation        1. PT, OT and SLP 60 minutes of each on a daily basis, in addition to rehab nursing and close management of physiatrist.       2. Impairment of ADL's: Noted to have R hemiplegia, impaired balance, impaired coordination, fatigue, R/L visual convergence impairment, impaired sensation, impaired postural control, all affecting his ability to safely and independently perform basic ADLs.  Goal for mod I with basic ADLs, anticipate need for assist with bathing/showering  and related transfers.     3. Impairment of mobility:  Noted to have R hemiplegia, impaired balance, impaired coordination, fatigue, R/L visual convergence impairment, impaired sensation, impaired postural control, all affecting his ability to safely and independently perform basic mobility.  Goal for mod I with basic mobility, anticipate to be wheelchair based at discharge.     4. Impairment of cognition/language/swallow:  Noted to have dysphagia, dysarthria, and receptive and expressive aphasia with goals for safe tolerance of least restrictive diet and improved cognitive-linguistic skills to meet/communicate basic needs.     5. Medical Conditions  New actions/orders/updates for today are in blue.     Acute ischemic stroke due to ICA dissection and thrombosis/occlusion s/p tenecteplase, mechanical thrombectomy  C/b hemorrhagic transformation with intraparenchymal and intraventricular hemorrhage   R hemiparesis, R facial droop, dysphagia, aphasia, dysarthria  - Continue ASA 81 mg daily, indefinitely per neurology  - LDL 96, no indication for statin per neuro, no atherosclerosis on MRAs  - SBP goal <140  - Ziopatch in place for 2 weeks at ARU admission, completed and returned on 11/15/22  - Continue PT/OT/SLP  - Follow up with neurology     Occlusive RLE DVT  On 11/5/22, noted to have significant edema in RLE compared with LLE.  Bilateral lower extremity Doppler ultrasounds revealed a large occlusive DVT in the right femoral vein extending to the deep veins below the knee and nonocclusive deep venous thrombosis in the right common femoral vein. CTPE negative for PE.  Head CT demonstrates likely stable status of hemorrhagic conversion when compared with previous.  Patient underwent IR placement of IVC filter on 11/6.  Neurology consulted 11/7 to discuss consideration of anticoagulation.  Per their recs, would consider at approximately 4 weeks after hemorrhage, after repeating head CT to evaluate for any further  expansion of bleed.  - Per neurology, can consider anticoagulation approx 4 weeks (~11/21) from his hemorrhage from neurologic perspective.  Plan for updated head CT at that time to show no further expansion of the bleed prior to initiating.  DOAC's would be preferred over warfarin.  Neuro would recommend risk/benefit discussion again with patient and family prior to actual initiation of anticoagulation.  - Follow-up with IR in 3-6 months to have IVC filter removed once   anticoagulation plan is established (sooner is better)     Mild leukocytosis, resolved  Elevated CRP  WBC peaked at 1.52 on 11/4, infectious work-up with UA negative, CXR negative, procal negative, CRP up-trending 150 on 11/7, blood cultures 11/5 NGTD.  Suspect 2/2 DVT, WBC down-trending since, normalized on 11/8.  - Repeat CRP down-trending 150 -> 100 11/10  - Blood cultures NGTD  - Trend CBC.  WBC remain WNL 11/14     Moderate dilation of ascending aorta   Incidentally noted on echo for CVA work-up, involving the sinuses of Valsalva, maximal dimension 4.6 cm.  - Needs outptient follow up in 6 months with CT imaging, can be followed by PCP     Episodes of non-sustained ventricular tachycardia  Demonstrated on telemetry during IP stay.  Started on metoprolol with reduced frequency.  - Continue metoprolol 25 mg BID  - Ziopatch in place at ARU admission  - EKG on 11/11 (eval QTc with start trazodone) with sinus rhythm, PVCs and PACs     Moderate oropharyngeal dysphagia  At risk for malnutrition  - RD consulted, appreciate assist.  - Continue SLP  - Advanced to regular diet per SLP on 11/14, continue moderately thick (level 3) liquids.   - Ice chips per free water procotol  - Swallow strategies per SLP  - Repeat VFSS as indicated - per SLP likely this week  - Monitor labs, vitals, clinically for signs of dehydration while on thickened liuqids.  11/14: BMP stable, though Cr uptrending slightly 0.5 -> 0.6 -> 0.7.  BUN stable.  Continue to encourage  fluids.       Normocytic anemia  Noted drop from Hgb 13 to yohana of 9.9 on 10/23, most recently stable at 10s-11s.  - Trend CBC.  Hgb stable 11/14 at 11.1     Urinary retention, resolved  Urinary incontinence  Noted since ARU admission, requiring intermittent straight catheterization. Wife had noted urinary urgency and some incontinence while at hospital. 11/3 UA negative for infection.  Not noted during recurrent inpatient stay.  - Ongoing urinary incontinence, some urgency.  PVRs do not show any significant retention.  Is waking a few times per night to urinate, but small volumes, may be in part due to poor sleep.  Discussed limiting fluids close to bedtime, voiding before bed, double voids, etc.     Hx mitral valve repair (2012)  Mild mitral regurgitation  - Noted.  Not on PTA anticoagulation, just aspirin, resumed as above     Allergic rhinitis  - Continue PTA Flonase daily     Right renal cortical enhancement  Incidentally noted on CT.  Indeterminate, possibly vascular etiology.   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  Non-painful.  Patient's movement limited by CVA as above.  - Follow-up with PCP to determine need for ortho spine involvement outpatient    Insomnia  Patient reports difficulty both falling and staying asleep.  Denies any PTA issues with sleep.  Melatonin increased 3 mg to 5 mg on 11/9.  - Continue melatonin 5 mg  - Sleep hygiene  - Some relief with addition of trazodone (started 11/10), but still sleep difficulty.  Trazodone increased to 50 mg at HS on 11/14.  Continue to monitor.  - EKG 11/11 with starting trazodone.  QTc WNL at 444.  - Continue to monitor       6. Adjustment to disability:  Clinical psychology to eval and treat if indicated  7. FEN: regular diet; moderately thick (level 3) liquids  8. Bowel: started on bowel program with nightly suppository, on  scheduled and PRN bowel meds.  Last BM 11/15 s/p suppository.  Continue to monitor as remains incontinent  9. Bladder: continent/incontinent due to urgency  10. DVT Prophylaxis: currently no chemical or mechanical prophylaxis per discharging team  11. GI Prophylaxis: not indicated  12. Code: full  13. Disposition: goal for home with 24/7 assist  14. ELOS:  24 days  15. Rehab prognosis:  fair  1. Follow up Appointments on Discharge: PCP, neurology (Dr. Edgard Dave) in 6 weeks, IR in 3-6 months          Ashley Campoverde PA-C  Physical Medicine & Rehabilitation

## 2022-11-16 NOTE — PLAN OF CARE
Discharge Planner Post-Acute Rehab PT:      Discharge Plan:   home with modifications, home health PT, assist from wife.     Precautions:   falls, aphasia (expressive > receptive)  Lean to R with STS is nearly resolved.     Current Status:  Bed Mobility: Rabia for RLE assist sup<>sit  Transfer: sit <> stand min/mod A, Min<>mod A stand pivot to the L in therapies.  Recommend RN staff do SPT Ax2 with transfer pole or Florence Stedy.   Gait: unsafe  Stairs: unsafe     PASS:  11/8 - 13/36, compensates well using LUE/LLE     Assessment: Pt continues to be motivated and participates well.   Sit<>stands without lean allowing improved trials of transfers to right side.  Able to sit at EOM without vertical deviation.     Other Barriers to Discharge (DME, Family Training, etc):   Family training - to schedule. Home measurements sheet provided.  KANE rajput completed 11/11.  DME: K4 rental, hospital bed vs bed rail, ramp to enter home, SB vs WBQC for transfers

## 2022-11-16 NOTE — PLAN OF CARE
Goal Outcome Evaluation:      Plan of Care Reviewed With: patient    Overall Patient Progress: no changeOverall Patient Progress: no change    Patient remains incontinent of bladder. Tried using the urinal independently but had incontinence already. Denied pain, sob, headache, numbness or tingling. Sleeping well tonight. Repositioned per request. No concerns so far. VSS this morning.

## 2022-11-17 ENCOUNTER — APPOINTMENT (OUTPATIENT)
Dept: OCCUPATIONAL THERAPY | Facility: CLINIC | Age: 62
DRG: 056 | End: 2022-11-17
Attending: PHYSICAL MEDICINE & REHABILITATION
Payer: COMMERCIAL

## 2022-11-17 ENCOUNTER — APPOINTMENT (OUTPATIENT)
Dept: SPEECH THERAPY | Facility: CLINIC | Age: 62
DRG: 056 | End: 2022-11-17
Attending: PHYSICAL MEDICINE & REHABILITATION
Payer: COMMERCIAL

## 2022-11-17 ENCOUNTER — APPOINTMENT (OUTPATIENT)
Dept: PHYSICAL THERAPY | Facility: CLINIC | Age: 62
DRG: 056 | End: 2022-11-17
Attending: PHYSICAL MEDICINE & REHABILITATION
Payer: COMMERCIAL

## 2022-11-17 LAB
ANION GAP SERPL CALCULATED.3IONS-SCNC: 2 MMOL/L (ref 3–14)
BUN SERPL-MCNC: 21 MG/DL (ref 7–30)
CALCIUM SERPL-MCNC: 9.2 MG/DL (ref 8.5–10.1)
CHLORIDE BLD-SCNC: 106 MMOL/L (ref 94–109)
CO2 SERPL-SCNC: 31 MMOL/L (ref 20–32)
CREAT SERPL-MCNC: 0.67 MG/DL (ref 0.66–1.25)
ERYTHROCYTE [DISTWIDTH] IN BLOOD BY AUTOMATED COUNT: 13.8 % (ref 10–15)
GFR SERPL CREATININE-BSD FRML MDRD: >90 ML/MIN/1.73M2
GLUCOSE BLD-MCNC: 91 MG/DL (ref 70–99)
HCT VFR BLD AUTO: 36.1 % (ref 40–53)
HGB BLD-MCNC: 11.3 G/DL (ref 13.3–17.7)
MAGNESIUM SERPL-MCNC: 2.4 MG/DL (ref 1.6–2.3)
MCH RBC QN AUTO: 28.8 PG (ref 26.5–33)
MCHC RBC AUTO-ENTMCNC: 31.3 G/DL (ref 31.5–36.5)
MCV RBC AUTO: 92 FL (ref 78–100)
PHOSPHATE SERPL-MCNC: 3.1 MG/DL (ref 2.5–4.5)
PLATELET # BLD AUTO: 328 10E3/UL (ref 150–450)
POTASSIUM BLD-SCNC: 3.9 MMOL/L (ref 3.4–5.3)
RBC # BLD AUTO: 3.93 10E6/UL (ref 4.4–5.9)
SODIUM SERPL-SCNC: 139 MMOL/L (ref 133–144)
WBC # BLD AUTO: 7.2 10E3/UL (ref 4–11)

## 2022-11-17 PROCEDURE — 83735 ASSAY OF MAGNESIUM: CPT | Performed by: PHYSICIAN ASSISTANT

## 2022-11-17 PROCEDURE — 97535 SELF CARE MNGMENT TRAINING: CPT | Mod: GO | Performed by: STUDENT IN AN ORGANIZED HEALTH CARE EDUCATION/TRAINING PROGRAM

## 2022-11-17 PROCEDURE — 99232 SBSQ HOSP IP/OBS MODERATE 35: CPT | Performed by: PHYSICIAN ASSISTANT

## 2022-11-17 PROCEDURE — 85027 COMPLETE CBC AUTOMATED: CPT | Performed by: PHYSICIAN ASSISTANT

## 2022-11-17 PROCEDURE — 97112 NEUROMUSCULAR REEDUCATION: CPT | Mod: GO | Performed by: STUDENT IN AN ORGANIZED HEALTH CARE EDUCATION/TRAINING PROGRAM

## 2022-11-17 PROCEDURE — 92526 ORAL FUNCTION THERAPY: CPT | Mod: GN

## 2022-11-17 PROCEDURE — 97530 THERAPEUTIC ACTIVITIES: CPT | Mod: GP

## 2022-11-17 PROCEDURE — 80048 BASIC METABOLIC PNL TOTAL CA: CPT | Performed by: PHYSICIAN ASSISTANT

## 2022-11-17 PROCEDURE — 84100 ASSAY OF PHOSPHORUS: CPT | Performed by: PHYSICIAN ASSISTANT

## 2022-11-17 PROCEDURE — 250N000013 HC RX MED GY IP 250 OP 250 PS 637: Performed by: PHYSICIAN ASSISTANT

## 2022-11-17 PROCEDURE — 36415 COLL VENOUS BLD VENIPUNCTURE: CPT | Performed by: PHYSICIAN ASSISTANT

## 2022-11-17 PROCEDURE — 128N000003 HC R&B REHAB

## 2022-11-17 RX ADMIN — METOPROLOL TARTRATE 25 MG: 25 TABLET, FILM COATED ORAL at 07:29

## 2022-11-17 RX ADMIN — METOPROLOL TARTRATE 25 MG: 25 TABLET, FILM COATED ORAL at 19:36

## 2022-11-17 RX ADMIN — SENNOSIDES AND DOCUSATE SODIUM 2 TABLET: 50; 8.6 TABLET ORAL at 19:36

## 2022-11-17 RX ADMIN — Medication 5 MG: at 19:38

## 2022-11-17 RX ADMIN — BISACODYL 10 MG: 10 SUPPOSITORY RECTAL at 18:15

## 2022-11-17 RX ADMIN — SENNOSIDES AND DOCUSATE SODIUM 1 TABLET: 50; 8.6 TABLET ORAL at 07:29

## 2022-11-17 RX ADMIN — ASPIRIN 81 MG: 81 TABLET, COATED ORAL at 07:29

## 2022-11-17 RX ADMIN — TRAZODONE HYDROCHLORIDE 50 MG: 50 TABLET ORAL at 19:38

## 2022-11-17 ASSESSMENT — ACTIVITIES OF DAILY LIVING (ADL)
ADLS_ACUITY_SCORE: 55
ADLS_ACUITY_SCORE: 57
ADLS_ACUITY_SCORE: 55

## 2022-11-17 NOTE — PLAN OF CARE
Discharge Planner Post-Acute Rehab SLP:      Discharge Plan:  SLP      Precautions: aspiration, fall,      Current Status:  Hearing: WFL  Vision: reading glasses  Communication: mild-mod expressive-receptive language impairments c/b frequent paraphasia, word/sound/syllable substitutions, semantic substitutions within conversation. Expresses basic wants/needs WFL. Mild-moderate dysarthria   Cognition: Unable to assess d/t extent of language impairment. Suspect impaired reasoning/attention noted during assessment   Swallow: Regular texture, moderately thick (3) liquid. NO straw. Ensure upright all PO, set up assist and intermittent cueing during meals for R oral clearance     Assessment:  Pt educated re: plan for VFSS tomorrow, procedure, and goals. Verbalized understanding. SLP assisted in completion of oral cares. Pt accepted trials of ice chips, thin (0) and mildly thick (2) liquid by tsp and cup following FWP guidelines. Few instances of very delayed throat clear following level 0 and 2 liquid by cup, otherwise no overt s/sx aspiration. Cannot r/o silent aspiration at bedside. Pt participated in reps pharyngeal exercise for CTAR + effortful swallow and Nancy. Pt using knitting tracker to aid in accuracy and IND with tracking # completed. Required assist to clear to 0 and re set for day. Able to complete x30 reps exercise.    Other Barriers to Discharge (Family Training, etc): diet training pending progress, communication/lang strategies to spouse/family

## 2022-11-17 NOTE — PROGRESS NOTES
Discharge Planner Post-Acute Rehab OT:      Discharge Plan: Home with assist as needed and Home care     Precautions: fall, R shayy, mod thick liquid, aphasia    Shoulder mgmt: NMES 5-7x a day for shoulder subluxation protocol for RUE. Omno neurexa on with therapy for mobility, remove when seated in W/C or in bed. No ROM with RUE shoulder above 90 degrees.     Current Status:  ADLs:    Mobility:WC based, Ax1 for transfer with transfer pole to the left side, florence whaley A1-2    Grooming: Set up seated    Dressing: UB Set up seated EOB, Mod A with don/doff shorts/ pants seated EOB, stand with transfer pole with assist forRLE knee block to pull up pants; dependent footwear and in sitting    Bathing: Max A    Toileting: Florence pleitezmarialuisa to commode, total A for cares  IADLs: wife can take care  Vision/Cognition: aphasia impacting communication     Assessment: Able to initiate FES for RUE. Good response.     Skilled set up on :  Pt dependent for proper placement of electrodes on RUE for optimum muscle contraction, safe positioning on w/c within frame and cushion for prevention of skin breakdown, feet secured to foot pedals, w/c secured to  w/ Q-straints.  Passive motion assessed to ensure proper positioning.      Pt performed 25 minutes of active FES ergometry with 100% stimulation applied to above muscles at 35rpm with .81nm resistance.  This OT adjusted e-stim and cycling parameters in real-time to ensure palpable muscle contractions throughout session.  Please see [email or web address] for further details on patient's stimulation parameters and ergometry outcomes.      Changes in parameters that were part of this treatment:   -resistance  -pulse width, frequency  -amplitude  -pedal speed      Functional outcomes from this intervention include:   -reduced spasticity  -improved sensory awareness and proprioception  -improved muscle strength  -improved motor coordination     Other Barriers to Discharge (DME, Family Training,  etc): level of assist, aphasia

## 2022-11-17 NOTE — PLAN OF CARE
Goal Outcome Evaluation:    Plan of Care Reviewed With: patient    Overall Patient Progress: no change    Outcome Evaluation: Pt continues to be incontinent needing assistance with pad/linen change, Pt had BM last night with BM program, no change to skin integrity.    FOCUS/GOAL  Bladder management     ASSESSMENT, INTERVENTIONS AND CONTINUING PLAN FOR GOAL:  Pt Aox4, word finding difficulty.  A1/2 bozena stedy or T pole with nursing. Denies pain, cough, sob, chest pain, dizziness, N/V, N/T.  Pt had edema to RLE. Reg/moderately thick, taking pills whole with pudding or moderately thick liquids.  Working with therapies.  Nursing will continue with POC.

## 2022-11-17 NOTE — PROGRESS NOTES
"  [facility]   Acute Rehabilitation Unit  Daily progress note    INTERVAL HISTORY  Tobin Hua was seen and examined this morning at bedside.  No acute events reported overnight.  He notes that he is sleeping pretty well, improved from prior.  Still waking a few times overnight to urinate, but better able to fall asleep afterwards.  He reports that he was able to have continent BM after suppository last night and did not recall having recurrent stools afterwards.  He denies any chest pain, palpitations, shortness of breath, dizziness, nausea, abdominal pain.  Had some discussion again regarding risk/benefit of anticoagulation.    Functionally, he is currently needing assist of 1 for transfer with transfer pole, set-up for upper body dressing, mod A for lower body dressing, max A for bathing.       MEDICATIONS    aspirin  81 mg Oral Daily     bisacodyl  10 mg Rectal Daily     - MEDICATION INSTRUCTIONS -   Does not apply See Admin Instructions     melatonin  5 mg Oral At Bedtime     metoprolol tartrate  25 mg Oral BID     senna-docusate  1-2 tablet Oral BID     traZODone  50 mg Oral At Bedtime        acetaminophen, bisacodyl, lidocaine     PHYSICAL EXAM  /72 (BP Location: Left arm)   Pulse 95   Temp (!) 96.6  F (35.9  C) (Oral)   Resp 16   Ht 1.88 m (6' 2\")   Wt 75.8 kg (167 lb 3.2 oz)   SpO2 98%   BMI 21.47 kg/m     Gen: NAD, sitting up in chair  HEENT: NC/AT, MMM  Cardio: RRR, +murmur  Pulm: non-labored on room air, lungs CTA bilaterally  Abd: soft, non-distended, non-tender  Ext: slight edema throughout RLE improved from prior exam, most prominent in foot/ankle, no erythema, non-tender, no warmth.  No edema in LLE.  Neuro/MSK: AAO, global aphasia but improving, +dysarthria, R facial droop, PERRL, EOMI, R hemiparesis - trace proximal movement in right shoulder, did note some bicep activation.  Also with 2/5 in R HF/quad but no distal LE movement.    LABS  CBC " RESULTS:   Recent Labs   Lab Test 11/17/22  0624 11/14/22  0629 11/10/22  0632   WBC 7.2 7.7 9.5   RBC 3.93* 3.85* 3.71*   HGB 11.3* 11.1* 10.8*   HCT 36.1* 34.6* 33.3*   MCV 92 90 90   MCH 28.8 28.8 29.1   MCHC 31.3* 32.1 32.4   RDW 13.8 14.0 14.0    361 325     Last Basic Metabolic Panel:  Recent Labs   Lab Test 11/14/22  0629 11/10/22  0632 11/08/22  0550    139 138   POTASSIUM 4.3 3.9 3.8   CHLORIDE 104 103 102   CO2 31 26 28   ANIONGAP 4 10 8   GLC 90 95 130*   BUN 16 15 16   CR 0.74 0.61* 0.56*   GFRESTIMATED >90 >90 >90   JAILENE 9.2 9.3 8.7         Rehabilitation - continue comprehensive acute inpatient rehabilitation program with multidisciplinary approach including therapies, rehab nursing, and physiatry following. See interval history for updates.      ASSESSMENT AND PLAN  Tobin Hua is a 62 year old right hand dominant male with a past medical history of mitral valve repair (2011), allergic rhinitis, and recent admission 10/21/22-11/1/22 for acute ischemic stroke due to dissection/occlusion of left ICA complicated by hemorrhagic transformation with intraparenchymal and intraventricular hemorrhage, anemia, non-sustained ventricular tachycardia, hypo/hypernatremia, dysphagia, constipation, and headache, who was previously at ARU and discharged back to acute hospital on 11/5/22 due to occlusive RLE DVT, now s/p IVC filter.  He is now re-admitted to ARU on 11/7/22 for multidisciplinary rehabilitation and ongoing medical management.        Admission to acute inpatient rehab 11/07/22.    Impairment group code: Stroke 01.2 (R) Body Involvement (L) Brain: left ICA and L MCA ischemic stroke, c/b hemorrhagic transformation        1. PT, OT and SLP 60 minutes of each on a daily basis, in addition to rehab nursing and close management of physiatrist.       2. Impairment of ADL's: Noted to have R hemiplegia, impaired balance, impaired coordination, fatigue, R/L visual convergence impairment, impaired  sensation, impaired postural control, all affecting his ability to safely and independently perform basic ADLs.  Goal for mod I with basic ADLs, anticipate need for assist with bathing/showering and related transfers.     3. Impairment of mobility:  Noted to have R hemiplegia, impaired balance, impaired coordination, fatigue, R/L visual convergence impairment, impaired sensation, impaired postural control, all affecting his ability to safely and independently perform basic mobility.  Goal for mod I with basic mobility, anticipate to be wheelchair based at discharge.     4. Impairment of cognition/language/swallow:  Noted to have dysphagia, dysarthria, and receptive and expressive aphasia with goals for safe tolerance of least restrictive diet and improved cognitive-linguistic skills to meet/communicate basic needs.     5. Medical Conditions  New actions/orders/updates for today are in blue.     Acute ischemic stroke due to ICA dissection and thrombosis/occlusion s/p tenecteplase, mechanical thrombectomy  C/b hemorrhagic transformation with intraparenchymal and intraventricular hemorrhage   R hemiparesis, R facial droop, dysphagia, aphasia, dysarthria  - Continue ASA 81 mg daily, indefinitely per neurology  - LDL 96, no indication for statin per neuro, no atherosclerosis on MRAs  - SBP goal <140  - Ziopatch in place for 2 weeks at ARU admission, completed and returned on 11/15/22  - Continue PT/OT/SLP  - Follow up with neurology     Occlusive RLE DVT  On 11/5/22, noted to have significant edema in RLE compared with LLE.  Bilateral lower extremity Doppler ultrasounds revealed a large occlusive DVT in the right femoral vein extending to the deep veins below the knee and nonocclusive deep venous thrombosis in the right common femoral vein. CTPE negative for PE.  Head CT demonstrates likely stable status of hemorrhagic conversion when compared with previous.  Patient underwent IR placement of IVC filter on 11/6.   Neurology consulted 11/7 to discuss consideration of anticoagulation.  Per their recs, would consider at approximately 4 weeks after hemorrhage, after repeating head CT to evaluate for any further expansion of bleed.  - Per neurology, can consider anticoagulation approx 4 weeks (~11/21) from his hemorrhage from neurologic perspective.  Plan for updated head CT at that time to show no further expansion of the bleed prior to initiating.  DOAC's would be preferred over warfarin.  Neuro would recommend risk/benefit discussion again with patient and family prior to actual initiation of anticoagulation.  - Follow-up with IR in 3-6 months to have IVC filter removed once   anticoagulation plan is established (sooner is better)     Mild leukocytosis, resolved  Elevated CRP  WBC peaked at 1.52 on 11/4, infectious work-up with UA negative, CXR negative, procal negative, CRP up-trending 150 on 11/7, blood cultures 11/5 NGTD.  Suspect 2/2 DVT, WBC down-trending since, normalized on 11/8.  - Repeat CRP down-trending 150 -> 100 11/10  - Blood cultures NGTD  - Trend CBC.  WBC remain WNL 11/17     Moderate dilation of ascending aorta   Incidentally noted on echo for CVA work-up, involving the sinuses of Valsalva, maximal dimension 4.6 cm.  - Needs outptient follow up in 6 months with CT imaging, can be followed by PCP     Episodes of non-sustained ventricular tachycardia  Demonstrated on telemetry during IP stay.  Started on metoprolol with reduced frequency.  - Continue metoprolol 25 mg BID  - Ziopatch in place at ARU admission  - EKG on 11/11 (eval QTc with start trazodone) with sinus rhythm, PVCs and PACs     Moderate oropharyngeal dysphagia  At risk for malnutrition  - RD consulted, appreciate assist.  - Continue SLP  - Advanced to regular diet per SLP on 11/14, continue moderately thick (level 3) liquids.   - Ice chips per free water procotol  - Swallow strategies per SLP  - Repeat VFSS as indicated - per SLP likely this  week  - Monitor labs, vitals, clinically for signs of dehydration while on thickened liuqids.  11/17: Cr stable 0.67.  BUN up slightly from prior though still WNL.  Continue to encourage fluids.       Normocytic anemia  Noted drop from Hgb 13 to yohana of 9.9 on 10/23, most recently stable at 10s-11s.  - Trend CBC.  Hgb stable 11/17 at 11.3     Urinary retention, resolved  Urinary incontinence  Noted since ARU admission, requiring intermittent straight catheterization. Wife had noted urinary urgency and some incontinence while at hospital. 11/3 UA negative for infection.  Not noted during recurrent inpatient stay.  - Ongoing urinary incontinence, some urgency.  PVRs do not show any significant retention.  Is waking a few times per night to urinate, but small volumes, may be in part due to poor sleep.  Discussed limiting fluids close to bedtime, voiding before bed, double voids, etc.     Hx mitral valve repair (2012)  Mild mitral regurgitation  - Noted.  Not on PTA anticoagulation, just aspirin, resumed as above     Allergic rhinitis  - Continue PTA Flonase daily     Right renal cortical enhancement  Incidentally noted on CT.  Indeterminate, possibly vascular etiology.   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  Non-painful.  Patient's movement limited by CVA as above.  - Follow-up with PCP to determine need for ortho spine involvement outpatient    Insomnia  Patient reports difficulty both falling and staying asleep.  Denies any PTA issues with sleep.  Melatonin increased 3 mg to 5 mg on 11/9.  - Continue melatonin 5 mg  - Sleep hygiene  - Some relief with addition of trazodone (started 11/10), but still sleep difficulty.  Trazodone increased to 50 mg at HS on 11/14.  Continue to monitor.  - EKG 11/11 with starting trazodone.  QTc WNL at 444.  - Continue to monitor       6. Adjustment to  disability:  Clinical psychology to eval and treat if indicated  7. FEN: regular diet; moderately thick (level 3) liquids  8. Bowel: started on bowel program with nightly suppository, on scheduled and PRN bowel meds.  Last BM 11/16 s/p suppository.  Continue to monitor as remains incontinent  9. Bladder: continent/incontinent due to urgency  10. DVT Prophylaxis: currently no chemical or mechanical prophylaxis per discharging team  11. GI Prophylaxis: not indicated  12. Code: full  13. Disposition: goal for home with 24/7 assist  14. ELOS:  24 days  15. Rehab prognosis:  fair  1. Follow up Appointments on Discharge: PCP, neurology (Dr. Edgard Dave) in 6 weeks, IR in 3-6 months          Ashley Campoverde PA-C  Physical Medicine & Rehabilitation

## 2022-11-17 NOTE — PLAN OF CARE
FOCUS/GOAL  Bowel management, Bladder management, Pain management, Mobility, Skin integrity, and Safety management    ASSESSMENT, INTERVENTIONS AND CONTINUING PLAN FOR GOAL:  Patient is alert and oriented. Word finding difficulty. Denied pain, headache, dizziness, CP or SOB. Regular/level 3. Incontinent of B/B last BM 11/16 after bowel program. A-2 SPT uses transfer pole/bozena steady w/c. VS WDL. Call light is in reach alarm is on. Staff will continue per POC.  Goal Outcome Evaluation:

## 2022-11-17 NOTE — PLAN OF CARE
Goal Outcome Evaluation:    Overall Patient Progress: no change    Outcome Evaluation: No change in Pt progress this shift.    Pt is alert and oriented. Mixed continence. LBM 11/16. Ax2 SPT, w/c based. Denied pain, SOB, CP, and n/t. Call light within reach and bed alarms on. Will continue with POC.

## 2022-11-17 NOTE — PLAN OF CARE
Discharge Planner Post-Acute Rehab PT:      Discharge Plan:   home with modifications, home health PT, assist from wife.     Precautions:   falls, aphasia (expressive > receptive)  Lean to R with STS is nearly resolved.     Current Status:  Bed Mobility: Rabia for RLE assist sup<>sit  Transfer: sit <> stand min/mod A, Min-mod A stand pivot to the L in therapies. Working on squat pivots.  Recommend RN staff do SPT Ax2 with transfer pole or Florence Stedy.   Gait: unsafe  Stairs: unsafe     PASS:  11/8 - 13/36, compensates well using LUE/LLE     Assessment: focused on squat pivot transfers, able to perform with skilled setup Rabia focusing on weightshifting over LLE and maintaining that weightshift while pivoting.     Other Barriers to Discharge (DME, Family Training, etc):   Family training - to schedule. Home measurements sheet provided.  KANE rajput completed 11/11.  DME: K4 rental, hospital bed vs bed rail, ramp to enter home, SB vs [facility]QC for transfers

## 2022-11-18 ENCOUNTER — APPOINTMENT (OUTPATIENT)
Dept: PHYSICAL THERAPY | Facility: CLINIC | Age: 62
DRG: 056 | End: 2022-11-18
Attending: PHYSICAL MEDICINE & REHABILITATION
Payer: COMMERCIAL

## 2022-11-18 ENCOUNTER — APPOINTMENT (OUTPATIENT)
Dept: OCCUPATIONAL THERAPY | Facility: CLINIC | Age: 62
DRG: 056 | End: 2022-11-18
Attending: PHYSICAL MEDICINE & REHABILITATION
Payer: COMMERCIAL

## 2022-11-18 ENCOUNTER — APPOINTMENT (OUTPATIENT)
Dept: GENERAL RADIOLOGY | Facility: CLINIC | Age: 62
DRG: 056 | End: 2022-11-18
Attending: PHYSICIAN ASSISTANT
Payer: COMMERCIAL

## 2022-11-18 ENCOUNTER — APPOINTMENT (OUTPATIENT)
Dept: SPEECH THERAPY | Facility: CLINIC | Age: 62
DRG: 056 | End: 2022-11-18
Attending: PHYSICAL MEDICINE & REHABILITATION
Payer: COMMERCIAL

## 2022-11-18 PROCEDURE — 99232 SBSQ HOSP IP/OBS MODERATE 35: CPT | Performed by: PHYSICIAN ASSISTANT

## 2022-11-18 PROCEDURE — 74230 X-RAY XM SWLNG FUNCJ C+: CPT | Mod: 26 | Performed by: STUDENT IN AN ORGANIZED HEALTH CARE EDUCATION/TRAINING PROGRAM

## 2022-11-18 PROCEDURE — 250N000013 HC RX MED GY IP 250 OP 250 PS 637: Performed by: PHYSICIAN ASSISTANT

## 2022-11-18 PROCEDURE — 97112 NEUROMUSCULAR REEDUCATION: CPT | Mod: GO

## 2022-11-18 PROCEDURE — 74230 X-RAY XM SWLNG FUNCJ C+: CPT

## 2022-11-18 PROCEDURE — 97535 SELF CARE MNGMENT TRAINING: CPT | Mod: GO | Performed by: STUDENT IN AN ORGANIZED HEALTH CARE EDUCATION/TRAINING PROGRAM

## 2022-11-18 PROCEDURE — 97530 THERAPEUTIC ACTIVITIES: CPT | Mod: GP | Performed by: STUDENT IN AN ORGANIZED HEALTH CARE EDUCATION/TRAINING PROGRAM

## 2022-11-18 PROCEDURE — 92611 MOTION FLUOROSCOPY/SWALLOW: CPT | Mod: GN | Performed by: SPEECH-LANGUAGE PATHOLOGIST

## 2022-11-18 PROCEDURE — 97112 NEUROMUSCULAR REEDUCATION: CPT | Mod: GP | Performed by: STUDENT IN AN ORGANIZED HEALTH CARE EDUCATION/TRAINING PROGRAM

## 2022-11-18 PROCEDURE — 97110 THERAPEUTIC EXERCISES: CPT | Mod: GP | Performed by: STUDENT IN AN ORGANIZED HEALTH CARE EDUCATION/TRAINING PROGRAM

## 2022-11-18 PROCEDURE — 128N000003 HC R&B REHAB

## 2022-11-18 RX ORDER — BARIUM SULFATE 400 MG/ML
SUSPENSION ORAL ONCE
Status: COMPLETED | OUTPATIENT
Start: 2022-11-18 | End: 2022-11-18

## 2022-11-18 RX ADMIN — METOPROLOL TARTRATE 25 MG: 25 TABLET, FILM COATED ORAL at 08:06

## 2022-11-18 RX ADMIN — BARIUM SULFATE 20 ML: 400 SUSPENSION ORAL at 14:38

## 2022-11-18 RX ADMIN — Medication 5 MG: at 20:26

## 2022-11-18 RX ADMIN — SENNOSIDES AND DOCUSATE SODIUM 1 TABLET: 50; 8.6 TABLET ORAL at 20:26

## 2022-11-18 RX ADMIN — SENNOSIDES AND DOCUSATE SODIUM 2 TABLET: 50; 8.6 TABLET ORAL at 08:06

## 2022-11-18 RX ADMIN — TRAZODONE HYDROCHLORIDE 50 MG: 50 TABLET ORAL at 20:26

## 2022-11-18 RX ADMIN — METOPROLOL TARTRATE 25 MG: 25 TABLET, FILM COATED ORAL at 20:26

## 2022-11-18 RX ADMIN — ASPIRIN 81 MG: 81 TABLET, COATED ORAL at 08:06

## 2022-11-18 RX ADMIN — BISACODYL 10 MG: 10 SUPPOSITORY RECTAL at 17:49

## 2022-11-18 RX ADMIN — BARIUM SULFATE 30 ML: 400 SUSPENSION ORAL at 14:37

## 2022-11-18 ASSESSMENT — ACTIVITIES OF DAILY LIVING (ADL)
ADLS_ACUITY_SCORE: 59
ADLS_ACUITY_SCORE: 57
ADLS_ACUITY_SCORE: 59
ADLS_ACUITY_SCORE: 57
ADLS_ACUITY_SCORE: 59
ADLS_ACUITY_SCORE: 57
ADLS_ACUITY_SCORE: 59
ADLS_ACUITY_SCORE: 59

## 2022-11-18 NOTE — PLAN OF CARE
Goal Outcome Evaluation:  FOCUS/GOAL  Bladder management, Nutrition/Feeding/Swallowing precautions, Medical management, and Reinforcement of self-care/ADL    ASSESSMENT, INTERVENTIONS AND CONTINUING PLAN FOR GOAL:  Patient is alert/oriented x4, has some word finding difficulty but is able to communicate needs appropriately on this shift. Patient denies any pain, VSS, is able to participate well with therapy.  Patient has been voiding today and has been continent, no PVRs done on this shift, continue monitoring as needed.  Video swallow done with SLP at 2pm, no new orders yet at this time, continue with POC.

## 2022-11-18 NOTE — PLAN OF CARE
Goal Outcome Evaluation:    Overall Patient Progress: no change    Outcome Evaluation: No change in Pt progress this shift.    Pt is alert and oriented. Continent of B&B this shift. LB 11/17. Ax2 SPT with T pole or x2 with bozena whaley. Denied pain, SOB, CP, and n/t. Call light within reach - Pt is able to make needs known. Will continue with POC.

## 2022-11-18 NOTE — PROGRESS NOTES
"   11/08/22 0815   Appointment Info   Signing Clinician's Name / Credentials (PT) Noe Waldrop, DPT   Living Environment   People in Home spouse   Current Living Arrangements house   Home Accessibility stairs to enter home   Number of Stairs, Main Entrance 3   Stair Railings, Main Entrance railing on left side (ascending)   Transportation Anticipated family or friend will provide   Living Environment Comments Main level living, 3 [address] and 12 to basement. Bath: tub shower with comfort height toilet. PT: bathroom - raised toilet, vanity on R side, tub shower with fixed shower head. Living room - couch that they report is \"relatively firm.\"   Self-Care   Usual Activity Tolerance excellent   Current Activity Tolerance fair   Equipment Currently Used at Home none   Fall history within last six months yes   Number of times patient has fallen within last six months 1   Activity/Exercise/Self-Care Comment IND PTA   Previous Level of Function/Home Environm   Bathing/Grooming, Premorbid Functional Level independent   Dressing, Premorbid Functional Level independent   Eating/Feeding, Premorbid Functional Level independent   Toileting, Premorbid Functional Level independent   BADLs, Premorbid Functional Level independent   IADLs, Premorbid Functional Level independent   Bed Mobility, Premorbid Functional Level independent   Transfers, Premorbid Functional Level independent   Household Ambulation, Premorbid Functional Level independent   Stairs, Premorbid Functional Level independent   Community Ambulation, Premorbid Functional Level independent   General Information   Onset of Illness/Injury or Date of Surgery 11/02/22  (date of CVA)   Referring Physician Aba   Patient/Family Therapy Goals Statement (PT) To return home safely   Cognition   Affect/Mental Status (Cognition) WFL   Follows Commands (Cognition) WFL   Pain Assessment   Patient Currently in Pain No   Integumentary/Edema   Integumentary/Edema other (describe) " "Diagnosis: Ear infection today    Plan:   ABX start antibiotics today  Take w/ food to help prevent stomach upset   Consider a probiotic to replace good bacteria in GI system and decrease risk of diarrhea   Amoxicillin: Any medication(s) can cause allergic reactions however,   Amoxicillins/PCNs are commonly known to cause a red spotty skin rash that is non-itchy - this is not necessarily an allergy or allergic reaction   True Allergies are more consistent with:   swelling in the face, mouth, throat,   having difficulty breathing  Skin reactions, including hives and itching, and flushed or pale skin.   Low blood pressure   Constriction of your airways and a swollen tongue or throat, which can cause   wheezing and trouble breathing  A weak and rapid pulse,   nausea, vomiting   dizziness or fainting  Monitor for GI upset   Antibiotics eradicate normal healthy bacteria in gut and increase risk of diarrhea }    Normal to have hearing remain \"muffled\", feel \"full\" or have pressure for a month or more after infection subsides   Despite clearing the infection there is often still fluid build up behind the ear that takes longer to absorb and go away.   Tylenol and ibuprofen (>6 months) for pain and fevers   Antihistamines for congestion /rhinorrhea if present   {Given recurrence or resistance, possible patient needs ENT referral for speciality evaluation and possibly Ear Tubes       Monitor for:   Fever of 101F not improving w/ tylenol   New symptoms, especially swelling around the ear or weakness of face muscles  Severe pain  Infection seems to get worse, not better   Neck pain  Your child acts lethargic   Fever don't improve with antibiotics after 48 hours      Acute Otitis Media with Infection  Your child has a middle ear infection (acute otitis media). It's caused by bacteria or viruses.   The middle ear is the space behind the eardrum.   The eustachian tube connects the ear to the nasal passage.   The eustachian tubes "   Integumentary/Edema Comments 1+ pitting edema throughout RLE   Posture    Posture Forward head position;Protracted shoulders   Posture Comments PT: age appropriate   Strength (Manual Muscle Testing)   Strength (Manual Muscle Testing) MMT: Shoulder;MMT: Elbow/Forearm;MMT: Wrist;MMT: Hand (General);MMT: Hip;MMT: Knee;MMT: Ankle   Left Shoulder (Manual Muscle Testing)   Shoulder Flexion - Left Side (5/5) normal,left   Shoulder ABduction - Left Side (5/5) normal,left   Right Shoulder (Manual Muscle Testing)   Shoulder Flexion - Right Side (0/5) zero, right   Shoulder ABduction - Right Side (0/5) zero, right   Left Elbow/Forearm (Manual Muscle Testing)   Elbow Flexion - Left Side (5/5) normal,left   Elbow Extension - Left Side (5/5) normal,left   Right Elbow/Forearm (Manual Muscle Testing)   Elbow Flexion - Right Side (0/5) zero, right   Elbow Extension - Right Side (0/5) zero, right   Left Wrist (Manual Muscle Testing)   Wrist Muscle Testing Results: Left Wrist Flex: 5/5, Ext 5/5   Right Wrist (Manual Muscle Testing)   Wrist Muscle Testing Results: Right Wrist Flex and Ext 0/5   Hand (Manual Muscle Testing)   Hand Muscle Testing Results R 0/5   Left Hip (Manual Muscle Testing)   Hip Flexion - Left Side (4/5) good, left   Hip ABduction - Left Side (4/5) good, left   Right Hip (Manual Muscle Testing)   Hip Flexion - Right Side (1+/5) trace plus, right   Hip Extension - Right Side (1/5) trace, right   Hip ABduction - Right Side (0/5) zero, right   Left Knee (Manual Muscle Testing)   Knee Flexion - Left Side (4/5) good, left   Knee Extension - Left Side (4/5) good, left   Right Knee (Manual Muscle Testing)   Knee Flexion - Right Side (0/5) zero, right   Knee Extension - Right Side (0/5) zero, right   Left Ankle/Foot (Manual Muscle Testing)   Ankle Dorsiflexion - Left Side (4/5) good, left   Ankle Plantarflexion - Left Side (4/5) good, left   Right Ankle/Foot (Manual Muscle Testing)   Ankle Dorsiflexion - Right Side (0/5)  "help drain fluid from the ears. They also keep the air pressure equal inside and outside the ears.   These tubes are shorter and more horizontal in children.   This makes it more likely for the tubes to become blocked.    A blockage lets fluid and pressure build up in the middle ear.   Bacteria or fungi can grow in this fluid and cause an ear infection.  For these reasons ear infections are NOT considered contagious   The main symptom of an ear infection is ear pain.   Other symptoms may include pulling at the ear,   being more fussy than usual, fever, decreased appetite,   and vomiting or diarrhea.   Your child's hearing may also be affected.   *Often your child may have had a respiratory infection first.  An ear infection may clear up on its own, current studies and evidence suggests \"watching and waiting\" before starting antibiotics.   However, your child may need to take medicine/antibiotics if they can not clear the infection on their own.   After the infection goes away, your child may still have fluid in the middle ear. It may take weeks or months for this fluid to go away.   During that time, your child may have temporary hearing loss. But all other symptoms of the earache should be gone.    To apply ear drops:  Put the bottle in warm water if the medicine is kept in the refrigerator. Cold drops in the ear are uncomfortable.  Have your child lie down on a flat surface. Gently hold your child's head to one side.  Remove any drainage from the ear with a clean tissue or cotton swab. Clean only the outer ear. Don't put the cotton swab into the ear canal.  Straighten the ear canal by gently pulling the earlobe up and back.  Keep the dropper a half-inch above the ear canal. This will keep the dropper from becoming contaminated. Put the drops against the side of the ear canal.  Have your child stay lying down for 2 to 3 minutes. This gives time for the medicine to enter the ear canal. If your child doesn't have " pain, gently massage the outer ear near the opening.  Wipe any extra medicine away from the outer ear with a clean cotton ball.     zero, right   Ankle Plantarflexion - Right Side (0/5) zero, right   Balance   Balance other (describe)   Sitting Balance: Static   (good sitting, no significant sway)   Sitting Balance: Dynamic   (fair pelvic/trunk dissociation on L, poor on R)   Sit-to-Stand Balance   (requires assist to correct R lean into standing, tends to post/weightshift over L side for stability)   Standing Balance: Static   (favors weight over L side)   Standing Balance: Dynamic   (poor trunk control with R trunk lean when pivoting, assist needed to maintain balance on R side)   Systems Impairment Contributing to Balance Disturbance somatosensory;neuromuscular   Identified Impairments Contributing to Balance Disturbance impaired coordination;impaired motor control;abnormal muscle tone;impaired postural control;decreased ROM;decreased sensation;impaired sensory feedback;decreased strength   Balance Quick Add Sitting balance: Static;Sitting balance: dynamic;Sit to stand balance;Standing balance: static;Standing balance: dynamic;Systems impairment contributing to balance disturbance;Identified impairments contributing to balance disturbance   Sensory Examination   Sensory Perception other (describe)   Sensory Perception Quick Adds Hot/Cold   Hot/Cold Sensation   LUE w/i normal limits   LLE w/i normal limits   RUE mild impairment   RLE mild impairment   Sensation Light Touch   LUE w/i normal limits   LLE w/i normal limits   RUE mild impairment  (impaired monofilament on dorsal hand)   RLE mild impairment  (impaired monofilament around tibia ant and post, and hypersensitivity on R plantar foot)   Proprioception    LUE w/i normal limits   LLE w/i normal limits   RUE mild impairment  (intact at thumb with high amplitude movement, impaired with small amplitude)   RLE mild impairment  (intact at great toe with high amplitude movement, impaired with small amplitude)   Coordination   Coordination other (see comments)   Coordination Comments PT: limited  by hemiplegia on R.   Muscle Tone   Muscle Tone other (see comments)   Muscle Tone Comments PT: 1-2 beat clonus on R gastroc   Clinical Impression   Criteria for Skilled Therapeutic Intervention Yes, treatment indicated   PT Diagnosis (PT) R hemiplegia 2/2 CVA   Influenced by the following impairments R hemiplegia, balance, coordination, spasticity deficits   Functional limitations due to impairments Bed mob, transfers, gait, stairs   Clinical Presentation (PT Evaluation Complexity) Evolving/Changing   Clinical Presentation Rationale Dense R hemiplegia, mild expressive aphasia, new RLE DVT with edema,   Clinical Decision Making (Complexity) high complexity   Planned Therapy Interventions (PT) balance training;bed mobility training;E-stim;home exercise program;manual therapy techniques;neuromuscular re-education;orthotic fitting/training;patient/family education;postural re-education;stair training;strengthening;stretching;transfer training;wheelchair management/propulsion training;progressive activity/exercise;risk factor education;home program guidelines   Anticipated Equipment Needs at Discharge (PT) gait belt;hospital bed;commode chair;tub bench;transfer board;wheelchair;wheelchair cushion   Risk & Benefits of therapy have been explained evaluation/treatment results reviewed;care plan/treatment goals reviewed;risks/benefits reviewed;current/potential barriers reviewed;participants voiced agreement with care plan;participants included;patient;spouse/significant other   Clinical Impression Comments Tobin will benefit from PT to progress his R sided strength, balance, coordination, and consistency performing stand pivot vs SB transfers for d/c. Goal to be w/c based, will need K4 vs K5 w/c. Recommend HH PT. Will need ramp to enter home safely.   Physical Therapy Goals   PT Frequency Daily   PT Predicted Duration/Target Date for Goal Attainment 12/02/22   PT Goals Bed Mobility;Transfers;Wheelchair Mobility;Aerobic  "Activity;Edema;PT Goal 1   PT: Bed Mobility Modified independent;Rolling;Supine to/from sit  (bed rail)   PT: Transfers Supervision/stand-by assist;Bed to/from chair;Assistive device  (stand pivot vs SB transfer)   PT: Wheelchair Mobility 150 feet;manual wheelchair   PT: Perform aerobic activity with stable cardiovascular response continuous activity;15 minutes;NuStep   PT: Edema education to increase ability to manage edema after discharge from the hospital Caregiver;Demonstrate;[place];wear schedule;limb positioning;garment/bandage care;Patient   PT: Management of edema bandages Caregiver;Demonstrate;[place];garment(s)   PT: Functional edema exercise program to reduce limb volume, increase activity tolerance and improve independence with ADL Patient;Demonstrates;[place];HEP   PT: Goal 1 Car transfer, L WBQC, CGA   Interventions   Interventions Quick Adds Neuromuscular Re-ed;Therapeutic Activity;Therapeutic Procedure;Physical Perf Test/Measures   Therapeutic Activity   Treatment Detail/Skilled Intervention PT: sized TIS w/c 18x18\" with Roho cushion and R shayy lap tray.   Neuromuscular Re-education   Treatment Detail/Skilled Intervention PT: focused on seated posture at edge of mat into STS. Setup: seated EOM, chair in front, therapist to R side. Task: upright trunk for ant pelvic tilt, then STS. Facilitation - R humerus to approximate GH joint and add ER for scapular positioning, R distal quad. Result: STS from 24\" mat table, assist to maintain R hand on back of chair in front but   Physical Performance Test or Measurement (Report Req)    Treatment Detail/Skilled Intervention PT: PASS, see note   PT Discharge Planning   PT Plan PT: FES bike   PT - Acute Rehab Center Time   Individual Time (minutes) - enter zero if not applicable - PT 75  (40 eval, 25 TA, 10 N)   Group Time (minutes) - enter zero if not applicable  - PT 0   Concurrent Time (minutes) - enter zero if not applicable  - PT 0 "   Co-Treatment Time (minutes) - enter zero if not applicable  - PT 0   ARC Total Session Time (minutes) - PT 75   Roll Left and Right   Assistance Needed Set-up / clean-up;Verbal cues   Physical Assistance Level Less than 25%   Comment PT: Rabia to roll to L   Roll Left to Right CARE Score 3   Sit to Lying   Assistance Needed Set-up / clean-up;Verbal cues   Physical Assistance Level 25%-49%   Comment PT: modA for RLE onto mat, using LLE to assist RLE   Sit to Lying CARE Score 3   Lying to Sitting on Side of Bed   Assistance Needed Set-up / clean-up;Verbal cues   Physical Assistance Level Contact guard assist   Comment PT: able to place RLE over EOB, CGA to control RLE descent over EOB   Lying to Sitting CARE Score 4   Sit to Stand   Assistance Needed Set-up / clean-up;Verbal cues   Physical Assistance Level 50%-74%   Comment PT: STS, no AD, maxA, favors L side. STS, transfer pole, CGA. STS, chair in front for assist, Rabia   Sitting to Standing CARE Score 2   Locomotion   Locomotion Comment PT; dependent w/c mob   Walk 10 Feet   Comment PT: limited by R hemiplegia   Reason if not Attempted Safety concerns   Walk 10 Ft. CARE Score 88   Walk 50 Feet with Two Turns   Reason if not Attempted Safety concerns   Walk 50 Ft. CARE Score 88   Walk 150 Feet   Reason if not Attempted Safety concerns   Walk 150 Ft. CARE Score 88   Walking 10 Feet on Uneven Surfaces   Reason if not Attempted Safety concerns   Walking 10 Feet on Uneven Surfaces CARE Score 88   Wheel 50 Feet with Two Turns   Assistance Needed Adaptive equipment   Physical Assistance Level Total assistance   Wheel 50 Feet with Two Turns CARE Score 1   Wheel 150 Feet   Assistance Needed Adaptive equipment   Physical Assistance Level Total assistance   Wheel 150 Feet CARE Score 1   1 Step (Curb)   Reason if not Attempted Safety concerns   1 Step CARE Score 88   4 Steps   Reason if not Attempted Safety concerns   4 Steps CARE Score 88   12 Steps   Reason if not Attempted  Safety concerns   12 Steps CARE Score 88   Picking Up Object   Reason if not Attempted Safety concerns    CARE Score 88   Car Transfer   Reason if not Attempted Safety concerns   Car Transfer CARE Score 88

## 2022-11-18 NOTE — PROGRESS NOTES
"  [facility]   Acute Rehabilitation Unit  Daily progress note    INTERVAL HISTORY  Tobin Hua was seen and examined this morning at bedside, startin PT session.  No acute events reported overnight.  States that he slept well overnight, did wake a couple times to urinate but able to fall back to sleep.  States that he had continent BM in toilet after suppository.  He is reporting some dry mouth this morning, does not always have thickened liquids available at bedside, so will discuss with team.  He is hopeful that may be able to advance liquids pending video swallow study this afternoon, though he does quite well with moderately thick too.  He denies any other concerns or changes.  Denies chest pain, palpitations, shortness of breath, dizziness, nausea, abdominal pain.    Functionally, he is needing assist of 1 to transfer with transfer pole, set-up assist for seated grooming and upper body dressing, mod A for lower body dressing, dependent for footwear, though more independent with lower body in bed.  He needs max A for bathing, total A for toileting cares.  OT noting progress with dressing with modifications.       MEDICATIONS    aspirin  81 mg Oral Daily     bisacodyl  10 mg Rectal Daily     - MEDICATION INSTRUCTIONS -   Does not apply See Admin Instructions     melatonin  5 mg Oral At Bedtime     metoprolol tartrate  25 mg Oral BID     senna-docusate  1-2 tablet Oral BID     traZODone  50 mg Oral At Bedtime        acetaminophen, bisacodyl, lidocaine     PHYSICAL EXAM  /65   Pulse 80   Temp 98.4  F (36.9  C) (Oral)   Resp 16   Ht 1.88 m (6' 2\")   Wt 75.8 kg (167 lb 3.2 oz)   SpO2 98%   BMI 21.47 kg/m     Gen: NAD, sitting up in chair  HEENT: NC/AT, MMM  Cardio: RRR, +murmur  Pulm: non-labored on room air, lungs CTA bilaterally  Abd: soft, non-distended, non-tender  Ext: slight edema throughout RLE improved from prior exam, most prominent in foot/ankle, no " erythema, non-tender, no warmth.  No edema in LLE.  Neuro/MSK: AAO, global aphasia but improving, +dysarthria, R facial droop, PERRL, EOMI, R hemiparesis - trace proximal movement in right shoulder, did note some bicep activation.  Also with 2/5 in R HF/quad but no distal LE movement.    LABS  CBC RESULTS:   Recent Labs   Lab Test 11/17/22 0624 11/14/22  0629 11/10/22  0632   WBC 7.2 7.7 9.5   RBC 3.93* 3.85* 3.71*   HGB 11.3* 11.1* 10.8*   HCT 36.1* 34.6* 33.3*   MCV 92 90 90   MCH 28.8 28.8 29.1   MCHC 31.3* 32.1 32.4   RDW 13.8 14.0 14.0    361 325     Last Basic Metabolic Panel:  Recent Labs   Lab Test 11/17/22  0624 11/14/22  0629 11/10/22  0632    139 139   POTASSIUM 3.9 4.3 3.9   CHLORIDE 106 104 103   CO2 31 31 26   ANIONGAP 2* 4 10   GLC 91 90 95   BUN 21 16 15   CR 0.67 0.74 0.61*   GFRESTIMATED >90 >90 >90   JAILENE 9.2 9.2 9.3         Rehabilitation - continue comprehensive acute inpatient rehabilitation program with multidisciplinary approach including therapies, rehab nursing, and physiatry following. See interval history for updates.      ASSESSMENT AND PLAN  Tobin Hua is a 62 year old right hand dominant male with a past medical history of mitral valve repair (2011), allergic rhinitis, and recent admission 10/21/22-11/1/22 for acute ischemic stroke due to dissection/occlusion of left ICA complicated by hemorrhagic transformation with intraparenchymal and intraventricular hemorrhage, anemia, non-sustained ventricular tachycardia, hypo/hypernatremia, dysphagia, constipation, and headache, who was previously at ARU and discharged back to acute hospital on 11/5/22 due to occlusive RLE DVT, now s/p IVC filter.  He is now re-admitted to ARU on 11/7/22 for multidisciplinary rehabilitation and ongoing medical management.        Admission to acute inpatient rehab 11/07/22.    Impairment group code: Stroke 01.2 (R) Body Involvement (L) Brain: left ICA and L MCA ischemic stroke, c/b hemorrhagic  transformation        1. PT, OT and SLP 60 minutes of each on a daily basis, in addition to rehab nursing and close management of physiatrist.       2. Impairment of ADL's: Noted to have R hemiplegia, impaired balance, impaired coordination, fatigue, R/L visual convergence impairment, impaired sensation, impaired postural control, all affecting his ability to safely and independently perform basic ADLs.  Goal for mod I with basic ADLs, anticipate need for assist with bathing/showering and related transfers.     3. Impairment of mobility:  Noted to have R hemiplegia, impaired balance, impaired coordination, fatigue, R/L visual convergence impairment, impaired sensation, impaired postural control, all affecting his ability to safely and independently perform basic mobility.  Goal for mod I with basic mobility, anticipate to be wheelchair based at discharge.     4. Impairment of cognition/language/swallow:  Noted to have dysphagia, dysarthria, and receptive and expressive aphasia with goals for safe tolerance of least restrictive diet and improved cognitive-linguistic skills to meet/communicate basic needs.     5. Medical Conditions  New actions/orders/updates for today are in blue.     Acute ischemic stroke due to ICA dissection and thrombosis/occlusion s/p tenecteplase, mechanical thrombectomy  C/b hemorrhagic transformation with intraparenchymal and intraventricular hemorrhage   R hemiparesis, R facial droop, dysphagia, aphasia, dysarthria  - Continue ASA 81 mg daily, indefinitely per neurology  - LDL 96, no indication for statin per neuro, no atherosclerosis on MRAs  - SBP goal <140  - Ziopatch in place for 2 weeks at ARU admission, completed and returned on 11/15/22  - Continue PT/OT/SLP  - Follow up with neurology     Occlusive RLE DVT  On 11/5/22, noted to have significant edema in RLE compared with LLE.  Bilateral lower extremity Doppler ultrasounds revealed a large occlusive DVT in the right femoral vein  extending to the deep veins below the knee and nonocclusive deep venous thrombosis in the right common femoral vein. CTPE negative for PE.  Head CT demonstrates likely stable status of hemorrhagic conversion when compared with previous.  Patient underwent IR placement of IVC filter on 11/6.  Neurology consulted 11/7 to discuss consideration of anticoagulation.  Per their recs, would consider at approximately 4 weeks after hemorrhage, after repeating head CT to evaluate for any further expansion of bleed.  - Per neurology, can consider anticoagulation approx 4 weeks (~11/21) from his hemorrhage from neurologic perspective.  Plan for updated head CT at that time to show no further expansion of the bleed prior to initiating.  DOAC's would be preferred over warfarin.  Neuro would recommend risk/benefit discussion again with patient and family prior to actual initiation of anticoagulation.  - Follow-up with IR in 3-6 months to have IVC filter removed once   anticoagulation plan is established (sooner is better)     Mild leukocytosis, resolved  Elevated CRP  WBC peaked at 1.52 on 11/4, infectious work-up with UA negative, CXR negative, procal negative, CRP up-trending 150 on 11/7, blood cultures 11/5 NGTD.  Suspect 2/2 DVT, WBC down-trending since, normalized on 11/8.  - Repeat CRP down-trending 150 -> 100 11/10  - Blood cultures NGTD  - Trend CBC.  WBC remain WNL 11/17     Moderate dilation of ascending aorta   Incidentally noted on echo for CVA work-up, involving the sinuses of Valsalva, maximal dimension 4.6 cm.  - Needs outptient follow up in 6 months with CT imaging, can be followed by PCP     Episodes of non-sustained ventricular tachycardia  Demonstrated on telemetry during IP stay.  Started on metoprolol with reduced frequency.  - Continue metoprolol 25 mg BID  - Ziopatch in place at ARU admission  - EKG on 11/11 (eval QTc with start trazodone) with sinus rhythm, PVCs and PACs     Moderate oropharyngeal  dysphagia  At risk for malnutrition  - RD consulted, appreciate assist.  - Continue SLP  - Advanced to regular diet per SLP on 11/14, continue moderately thick (level 3) liquids.   - Ice chips per free water procotol  - Swallow strategies per SLP  - Repeat VFSS today (11/18)  - Monitor labs, vitals, clinically for signs of dehydration while on thickened liuqids.  11/17: Cr stable 0.67.  BUN up slightly from prior though still WNL.  Continue to encourage fluids.       Normocytic anemia  Noted drop from Hgb 13 to yohana of 9.9 on 10/23, most recently stable at 10s-11s.  - Trend CBC.  Hgb stable 11/17 at 11.3     Urinary retention, resolved  Urinary incontinence  Noted since ARU admission, requiring intermittent straight catheterization. Wife had noted urinary urgency and some incontinence while at hospital. 11/3 UA negative for infection.  Not noted during recurrent inpatient stay.  - Ongoing urinary incontinence, some urgency.  PVRs do not show any significant retention.  Is waking a few times per night to urinate, but small volumes, may be in part due to poor sleep.  Discussed limiting fluids close to bedtime, voiding before bed, double voids, etc.     Hx mitral valve repair (2012)  Mild mitral regurgitation  - Noted.  Not on PTA anticoagulation, just aspirin, resumed as above     Allergic rhinitis  - Continue PTA Flonase daily     Right renal cortical enhancement  Incidentally noted on CT.  Indeterminate, possibly vascular etiology.   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  Non-painful.  Patient's movement limited by CVA as above.  - Follow-up with PCP to determine need for ortho spine involvement outpatient    Insomnia  Patient reports difficulty both falling and staying asleep.  Denies any PTA issues with sleep.  Melatonin increased 3 mg to 5 mg on 11/9.  - Continue melatonin 5 mg  -  Sleep hygiene  - Some relief with addition of trazodone (started 11/10), but still sleep difficulty.  Trazodone increased to 50 mg at HS on 11/14.  Continue to monitor.  - EKG 11/11 with starting trazodone.  QTc WNL at 444.  - Continue to monitor       6. Adjustment to disability:  Clinical psychology to eval and treat if indicated  7. FEN: regular diet; moderately thick (level 3) liquids  8. Bowel: started on bowel program with nightly suppository, on scheduled and PRN bowel meds.  Last BM 11/17 s/p suppository.  Continue to monitor.  9. Bladder: continent/incontinent due to urgency  10. DVT Prophylaxis: currently no chemical or mechanical prophylaxis per discharging team  11. GI Prophylaxis: not indicated  12. Code: full  13. Disposition: goal for home with 24/7 assist  14. ELOS:  24 days  15. Rehab prognosis:  fair  1. Follow up Appointments on Discharge: PCP, neurology (Dr. Edgard Dave) in 6 weeks, IR in 3-6 months        Patient discussed with Dr. Jori Troncoso, PM&R staff physician     Ashley Campoverde PA-C  Physical Medicine & Rehabilitation

## 2022-11-18 NOTE — PROGRESS NOTES
"   11/18/22 1400   Appointment Info   Signing Clinician's Name / Credentials (SLP) Lynette Muniz MS, CCC-SLP   General Information   Onset of Illness/Injury or Date of Surgery 09/21/22   Referring Physician Ashley Campoverde PA-C   Pertinent History of Current Problem per H&P:\"62 year old right hand dominant male with past medical history of mitral valve repair (2011) and allergic rhinitis who was recently admitted 10/21/22 with stroke 2/2 dissection/occlusion of cervical left ICA now s/p mechanical thrombectomy with initial course further complicated by hemorrhagic transformation of prior CVA, moderate aortic dilation, anemia, hyponatremia and later hypernatremia, left neck hematoma, non-sustained ventricular tachycardia, low grade fever, constipation, malnutrition (NG tube removed on 10/30), headache, and dysphagia. \" SLP consulted to re assess language and dysphagia   General Observations Pt seen for repeat swallow study on this date. 2x prior VFSS at outside hospital 10/26 and 11/01. Wife Alix present for VFSS.   Type of Evaluation   Type of Evaluation Swallow Evaluation   General Swallowing Observations   Current Diet/Method of Nutritional Intake (General Swallowing Observations, NIS) regular diet;moderately thick liquids/liquidized (level 3)   Past History of Dysphagia No history of dysphagia prior to CVA. Pt followed by acute SLP at outside hospital, VFSS completed 10/26/2022 and 11/01/2022. Pt demo'd silent aspiration of thins and deep penetration of mildly thick liquids on VFSS 11/01/2022.   Swallowing Evaluation Videofluoroscopic swallow study (VFSS)   VFSS Evaluation   Radiologist Dr. Roberto   Views Taken left lateral   Physical Location of Procedure [facility]   VFSS Textures Trialed thin liquids;mildly thick liquids;moderately thick liquids/liquidized;solid foods   VFSS Eval: Thin Liquid Texture Trial   Mode of Presentation, Thin Liquid cup;straw;self-fed   Order of Presentation 6-9, 11-13   Bolus " Location When Swallow Triggered valleculae   Pharyngeal Phase, Thin Liquid residue in vallecula   Rosenbek's Penetration Aspiration Scale: Thin Liquid Trial Results 3 - contrast remains above the vocal cords, visible residue remains (penetration)   Strategies and Compensations reduce bolus size   Diagnostic Statement Small bolus size with flash penetration and narrow column. Larger bolus size resulted in deep penetration with larger column of contrast. Sip via straw also resulted in deep penetration. No aspiration with thin liquids.   VFSS Eval: Mildly Thick Liquids   Mode of Presentation cup;self-fed   Order of Presentation 3-5   Bolus Location When Swallow Triggered valleculae   Pharyngeal Phase residue in vallecula   Rosenbek's Penetration Aspiration Scale 1 - no aspiration, contrast does not enter airway   Diagnostic Statement No aspiration or penetration.   VFSS Eval: Moderately Thick Liquids   Mode of Presentation cup;self-fed   Order of Presentation 1-2   Bolus Location When Swallow Triggered valleculae   Pharyngeal Phase residue in vallecula  (larger amount of residue than thin or mildly thick trials.)   Rosenbek's Penetration Aspiration Scale 1 - no aspiration, contrast does not enter airway   Diagnostic Statement No aspiration or penetration.   VFSS Evaluation: Solid Food Texture Trial   Mode of Presentation, Solid fed by clinician   Order of Presentation 10   Preparatory Phase WFL   Oral Phase, Solid WFL   Bolus Location When Swallow Triggered valleculae   Pharyngeal Phase, Solid residue in vallecula   Rosenbek's Penetration Aspiration Scale: Solid Food Trial Results 1 - no aspiration, contrast does not enter airway   Strategies and Compensations liquid wash   Diagnostic Statement Thin liquid wash effective to clear residue from valleculae.   Esophageal Phase of Swallow   Patient reports or presents with symptoms of esophageal dysphagia No   Esophageal sweep performed during today s vidofluoroscopic exam   No   Swallowing Recommendations   Diet Consistency Recommendations thin liquids (level 0);regular diet   Supervision Level for Intake distant supervision needed   Mode of Delivery Recommendations bolus size, small;no straws   Postural Recommendations none   Swallowing Maneuver Recommendations alternate food and liquid intake   Monitoring/Assistance Required (Eating/Swallowing) stop eating activities when fatigue is present;monitor for cough or change in vocal quality with intake   Recommended Feeding/Eating Techniques (Swallow Eval) maintain upright sitting position for eating   Medication Administration Recommendations, Swallowing (SLP) whole with ordered liquid consistency.   Instrumental Assessment Recommendations instrumental evaluation not recommended at this time   Clinical Impression   Criteria for Skilled Therapeutic Interventions Met (SLP Eval) Yes, treatment indicated   SLP Diagnosis Mild oropharyngeal dysphagia   Risks & Benefits of therapy have been explained evaluation/treatment results reviewed;care plan/treatment goals reviewed;participants voiced agreement with care plan;participants included;patient;spouse/significant other   Clinical Impression Comments SLP: Pt seen for repeat VFSS on this date. Pt demo'd improved oropharyngeal swallow evidenced by no aspiration with consistencies trialed this day. Pt with flash penetration with small boluses of thin liquid via cup; similar flash penetration with sequential cup sips. Flash penetration was ejected in all but one instance where slight residue/coating remained. Pt with deep penetration with larger bolus of thin and thin via straw. Pt with no aspiration or penetration of mildly thick liquids or moderately thick liquids, solids. Pt with moderate post-swallow residue in valleculae with solid trial. Liquid wash of thin liquid was effective to clear valleculae residue.  Pt swallow triggered at level of valleculae for all consistencies trialed. Recommend  trialing thin liquids with meal and pt and wife education regarding risks/benefits before considering advancing liquid consistency. Ongoing SLP services to assess and determine safest and most efficient level of oral intake, train pt, family, and staff in strategies to increase swallow safety.   SLP Total Evaluation Time   Evaluation, videofluoroscopic eval of swallow function Minutes (77718) 30   SLP Goals   Therapy Frequency (SLP Eval) daily   SLP Predicted Duration/Target Date for Goal Attainment 12/09/22   SLP Discharge Planning   SLP Plan SLP: trial thin liquids (0) at breakfast 11/19. Continue pharyngeal exercises. Tx expressive/receptive language, trial LILIBETH + VNeST.   Total Session Time   Total Session Time (sum of timed and untimed services) 30   SLP - Acute Rehab Center Time   Individual Time (minutes) - enter zero if not applicable - SLP 30   Group Time (minutes) - enter zero if not applicable  - SLP 0   Concurrent Time (minutes) - enter zero if not applicable  - SLP 0   Co-Treatment Time (minutes) - enter zero if not applicable  - SLP 0   ARC Total Session Time (minutes) - SLP 30

## 2022-11-18 NOTE — PROGRESS NOTES
Discharge Planner Post-Acute Rehab OT:      Discharge Plan: Home with assist as needed and Home care     Precautions: fall, R shayy, mod thick liquid, aphasia    Shoulder mgmt: NMES 5-7x a day for shoulder subluxation protocol for RUE. Omno neurexa on with therapy for mobility, remove when seated in W/C or in bed. No ROM with RUE shoulder above 90 degrees.     Current Status:  ADLs:    Mobility:WC based, Ax1 for transfer with transfer pole to the left side, florence whaley A1-2    Grooming: Set up seated    Dressing: UB Set up seated EOB, Mod A with don/doff shorts/ pants seated EOB, stand with transfer pole with assist forRLE knee block to pull up pants; dependent footwear and in sitting, more independence with in bed LB dressing    Bathing: Max A    Toileting: Florence judd to commode, total A for cares  IADLs: wife can take care  Vision/Cognition: aphasia impacting communication     Assessment: ADL completed, pt making progress with dressing with modifications.      Other Barriers to Discharge (DME, Family Training, etc): level of assist, aphasia

## 2022-11-18 NOTE — PLAN OF CARE
Discharge Planner Post-Acute Rehab SLP:      Discharge Plan:  SLP      Precautions: aspiration, fall,      Current Status:  Hearing: WFL  Vision: reading glasses  Communication: mild-mod expressive-receptive language impairments c/b frequent paraphasia, word/sound/syllable substitutions, semantic substitutions within conversation. Expresses basic wants/needs WFL. Mild-moderate dysarthria   Cognition: Unable to assess d/t extent of language impairment. Suspect impaired reasoning/attention noted during assessment   Swallow: Regular texture, moderately thick (3) liquid. NO straw. Ensure upright all PO, set up assist and intermittent cueing during meals for R oral clearance     Assessment:  Pt seen for repeat VFSS on this date. Pt demo'd improved oropharyngeal swallow evidenced by no aspiration with consistencies trialed this day. Pt with flash penetration with small boluses of thin liquid via cup; similar flash penetration with sequential cup sips. Flash penetration was ejected in all but one instance where slight residue/coating remained. Pt with deep penetration with larger bolus of thin and thin via straw. Pt with no aspiration or penetration of mildly thick liquids or moderately thick liquids, solids. Pt with moderate post-swallow residue in valleculae with solid trial. Liquid wash of thin liquid was effective to clear valleculae residue.  Pt swallow triggered at level of valleculae for all consistencies trialed. Recommend trialing thin liquids with meal and pt and wife education regarding risks/benefits before considering advancing liquid consistency. Ongoing SLP services to assess and determine safest and most efficient level of oral intake, train pt, family, and staff in strategies to increase swallow safety.    Other Barriers to Discharge (Family Training, etc): diet training pending progress, communication/lang strategies to spouse/family

## 2022-11-18 NOTE — PLAN OF CARE
"Goal Outcome Evaluation:      Plan of Care Reviewed With: patient    Overall Patient Progress: improvingOverall Patient Progress: improving    VS: /65   Pulse 80   Temp 98.4  F (36.9  C) (Oral)   Resp 16   Ht 1.88 m (6' 2\")   Wt 75.8 kg (167 lb 3.2 oz)   SpO2 98%   BMI 21.47 kg/m       O2: RA, no SOB reported   Output/LBM: Continent, using urinal. Had Bowel program this evening with results.    Activity: 2x/Florence Steady/T-pole   Skin: No concerns   Pain: Denies   CMS: A/O, word finding difficulty - improving   Diet: Reg diet/mod thick liquids   LDA: NA   Additional Info/Plan  Continue with POC     RN Shift [id number]      "

## 2022-11-18 NOTE — PROGRESS NOTES
SPIRITUAL HEALTH SERVICES Progress Note  [facility] ([facility] [place]) Acute Rehab    Saw pt Tobin Hua and spouse Alix as a follow-up  visit. We celebrated sacrament of Holy Communion today.    Illness Narrative - pt and spouse shared their gratitude and even excitement that pt is now able to move his right side a little. They were quite happy to be able to share this improvement with evening bedside nurse.    Distress - Not Discussed.    Coping - Pt's strong family support, his Yazidi tenzin, and his strong sense of hope are all essential for coping.     Meaning-Making - Pt speaks openly and honestly about his challenges, frustrations, hopes, and his profound sense of gratitude for the support he receives from family, friends, and churchy.     Plan - will check in on pt on Monday - I will then be out of hospital until Wednesday November 30.     True Walker M.Div. (Bill), [facility]  Staff   Pager [phone]    * [facility] remains available 24/7 for emergent requests/referrals, either by having the switchboard page the on-call  or by entering an ASAP/STAT consult in Epic (this will also page the on-call ). Routine Epic consults receive an initial response within 24 hours.*

## 2022-11-19 ENCOUNTER — APPOINTMENT (OUTPATIENT)
Dept: PHYSICAL THERAPY | Facility: CLINIC | Age: 62
DRG: 056 | End: 2022-11-19
Attending: PHYSICAL MEDICINE & REHABILITATION
Payer: COMMERCIAL

## 2022-11-19 ENCOUNTER — APPOINTMENT (OUTPATIENT)
Dept: OCCUPATIONAL THERAPY | Facility: CLINIC | Age: 62
DRG: 056 | End: 2022-11-19
Attending: PHYSICAL MEDICINE & REHABILITATION
Payer: COMMERCIAL

## 2022-11-19 ENCOUNTER — APPOINTMENT (OUTPATIENT)
Dept: SPEECH THERAPY | Facility: CLINIC | Age: 62
DRG: 056 | End: 2022-11-19
Attending: PHYSICAL MEDICINE & REHABILITATION
Payer: COMMERCIAL

## 2022-11-19 PROCEDURE — 97110 THERAPEUTIC EXERCISES: CPT | Mod: GP

## 2022-11-19 PROCEDURE — 97535 SELF CARE MNGMENT TRAINING: CPT | Mod: GO

## 2022-11-19 PROCEDURE — 97116 GAIT TRAINING THERAPY: CPT | Mod: GP

## 2022-11-19 PROCEDURE — 97530 THERAPEUTIC ACTIVITIES: CPT | Mod: GP

## 2022-11-19 PROCEDURE — 128N000003 HC R&B REHAB

## 2022-11-19 PROCEDURE — 92526 ORAL FUNCTION THERAPY: CPT | Mod: GN

## 2022-11-19 PROCEDURE — 97112 NEUROMUSCULAR REEDUCATION: CPT | Mod: GP

## 2022-11-19 PROCEDURE — 250N000013 HC RX MED GY IP 250 OP 250 PS 637: Performed by: PHYSICIAN ASSISTANT

## 2022-11-19 PROCEDURE — 92526 ORAL FUNCTION THERAPY: CPT | Mod: GN | Performed by: SPEECH-LANGUAGE PATHOLOGIST

## 2022-11-19 RX ADMIN — METOPROLOL TARTRATE 25 MG: 25 TABLET, FILM COATED ORAL at 08:15

## 2022-11-19 RX ADMIN — ASPIRIN 81 MG: 81 TABLET, COATED ORAL at 08:15

## 2022-11-19 RX ADMIN — METOPROLOL TARTRATE 25 MG: 25 TABLET, FILM COATED ORAL at 20:49

## 2022-11-19 RX ADMIN — Medication 5 MG: at 20:49

## 2022-11-19 RX ADMIN — TRAZODONE HYDROCHLORIDE 50 MG: 50 TABLET ORAL at 20:49

## 2022-11-19 ASSESSMENT — ACTIVITIES OF DAILY LIVING (ADL)
ADLS_ACUITY_SCORE: 59
ADLS_ACUITY_SCORE: 55
ADLS_ACUITY_SCORE: 59
ADLS_ACUITY_SCORE: 59
ADLS_ACUITY_SCORE: 55
ADLS_ACUITY_SCORE: 59
ADLS_ACUITY_SCORE: 55
ADLS_ACUITY_SCORE: 59
ADLS_ACUITY_SCORE: 55

## 2022-11-19 NOTE — PLAN OF CARE
Discharge Planner Post-Acute Rehab SLP:      Discharge Plan:  SLP      Precautions: aspiration, fall,      Current Status:  Hearing: WFL  Vision: reading glasses  Communication: mild-mod expressive-receptive language impairments c/b frequent paraphasia, word/sound/syllable substitutions, semantic substitutions within conversation. Expresses basic wants/needs WFL. Mild-moderate dysarthria   Cognition: Unable to assess d/t extent of language impairment. Suspect impaired reasoning/attention noted during assessment   Swallow: Regular texture, moderately thick (3) liquid. NO straw. Ensure upright all PO, set up assist and intermittent cueing during meals for R oral clearance     Assessment: SLP provided extensive education related to recent VFSS results and risks/benefits of mildly thick liquids vs thin liquids.  Pt has decided to upgrade to mildly thick liquids pending training to ensure small sips.  Pt would then like to advance to thin liquids with comprehension that aspiration risk does exist.  Pt/wife were educated in the importance of thorough oral care to reduce risk of developement of dysphagia related pneumonia.  Both verbalize comprehension and intent to perform thorough oral care.    Other Barriers to Discharge (Family Training, etc): diet training pending progress, communication/lang strategies to spouse/family

## 2022-11-19 NOTE — PLAN OF CARE
Discharge Planner Post-Acute Rehab OT:      Discharge Plan: Home with assist as needed and Home care     Precautions: fall, R shayy, mod thick liquid, aphasia     Shoulder mgmt: NMES 5-7x a day for shoulder subluxation protocol for RUE. Omno neurexa on with therapy for mobility, remove when seated in W/C or in bed. No ROM with RUE shoulder above 90 degrees.      Current Status:  ADLs:    Mobility:WC based, Ax1 for transfer with transfer pole to the left side, florence whaley A1-2    Grooming: Set up seated    Dressing: UB Set up seated EOB, Mod A with don/doff shorts/ pants seated EOB, stand with transfer pole with assist forRLE knee block to pull up pants; dependent footwear and in sitting, more independence with in bed LB dressing    Bathing: Max A    Toileting: Florence judd to commode, total A for cares  IADLs: wife can take care  Vision/Cognition: aphasia impacting communication     Assessment: ADL completed, pt making progress with dressing with modifications.      Other Barriers to Discharge (DME, Family Training, etc): level of assist, aphasia      -15 mins OT schedule conflict

## 2022-11-19 NOTE — PLAN OF CARE
FOCUS/GOAL  Bowel management, Bladder management, Pain management, and Safety management    ASSESSMENT, INTERVENTIONS AND CONTINUING PLAN FOR GOAL:  AOx4. Denies CP, SOB, N/T. Incontinent of bowel overnight. LBM 11/19/22. Continent of bladder. Uses urinal independently. Pt denies pain this shift. Appears to be sleeping well during rounds.     Call light within reach at all times. Continue with POC.

## 2022-11-19 NOTE — PLAN OF CARE
FOCUS/GOAL  Bowel management, Bladder management, Pain management, Mobility, Skin integrity, and Safety management    ASSESSMENT, INTERVENTIONS AND CONTINUING PLAN FOR GOAL:  Patient is alert and oriented. Word finding difficulty. Denied pain, headache, dizziness, CP or SOB. Regular/level 3. Continent of B/B last BM 11/18 after bowel program. A-2 SPT uses transfer pole. VS WDL. Call light is in reach alarm is on. Staff will continue per POC.  Goal Outcome Evaluation:

## 2022-11-19 NOTE — PLAN OF CARE
"Discharge Planner Post-Acute Rehab SLP:      Discharge Plan:  SLP      Precautions: aspiration, fall,      Current Status:  Hearing: WFL  Vision: reading glasses  Communication: mild-mod expressive-receptive language impairments c/b frequent paraphasia, word/sound/syllable substitutions, semantic substitutions within conversation. Expresses basic wants/needs WFL. Mild-moderate dysarthria   Cognition: Unable to assess d/t extent of language impairment. Suspect impaired reasoning/attention noted during assessment   Swallow: Regular texture, moderately thick (3) liquid. NO straw. Ensure upright all PO, set up assist and intermittent cueing during meals for R oral clearance     Assessment:  Facilitated thin liquid trial with regular a.m. meal. Pt educated in recommendation to take small, single sips; SLP demo'd sip size of teaspoon with spoon/liquid. Pt consumed approx 120 cc thin orange juice and 240 cc thin coffee via cup edge with 1x reflexive cough post-swallow. When queried, pt reported \"I went too fast.\" Pt demo'd good implementation of small, single sips over course of meal intake. Pt educated in safe swallow strategies fully upright, small/single sips, alternate solids/liquids, slow rate, occasional throat clear, no straws. Visual aide developed, pt demo'd reading aloud; placed on wall at end of bed, pt acknowledged reading at that distance. Coordination with PM SLP regarding VFSS f/u education and discussion risks/benefits with pt and wife, advance liquids (thin or mildly thick) depending on results of edu/discussion.     Other Barriers to Discharge (Family Training, etc): diet training pending progress, communication/lang strategies to spouse/family         " polyhydramnios

## 2022-11-19 NOTE — PLAN OF CARE
Discharge Planner Post-Acute Rehab PT:      Discharge Plan:   home with modifications, home health PT, assist from wife.     Precautions:   falls, aphasia (expressive > receptive)  Lean to R with STS is nearly resolved.     Current Status:  Bed Mobility: Rabia for RLE assist sup<>sit  Transfer: sit <> stand min/mod A, Min-mod A stand pivot to the L in therapies. Working on squat pivots.  Recommend RN staff do SPT Ax2 with transfer pole or Florence Stedy.   Gait: unsafe  Stairs: unsafe     PASS:  11/8 - 13/36, compensates well using LUE/LLE     Assessment: focused on sit to stand from w/c at rail (L); standing, pre gait, and gait 3-5 feet forward and back with mod A R LE for swing and stance; continue to progress as pt is able.      Other Barriers to Discharge (DME, Family Training, etc):   Family training - to schedule. Home measurements sheet provided.  KANE sarmientoal completed 11/11.  DME: K4 rental, hospital bed vs bed rail, ramp to enter home, SB vs WBQC for transfers

## 2022-11-19 NOTE — PLAN OF CARE
"Goal Outcome Evaluation:      Plan of Care Reviewed With: patient    Overall Patient Progress: improvingOverall Patient Progress: improving    VS: /69 (BP Location: Left arm)   Pulse 95   Temp (!) 95.8  F (35.4  C) (Oral)   Resp 16   Ht 1.88 m (6' 2\")   Wt 75.8 kg (167 lb 3.2 oz)   SpO2 96%   BMI 21.47 kg/m       O2: RA, no SOB reported   Output/LBM: Mixed continence, last BM 11/19     Activity: 2A/ tpole   Skin: Intact,some generalized bruising   Pain: Denies   CMS: A/O, some word finding difficulty   Diet: Reg/Mod thick liquids. Pills whole   LDA:    Additional Info/Plan  Continue with POC       "

## 2022-11-20 ENCOUNTER — APPOINTMENT (OUTPATIENT)
Dept: SPEECH THERAPY | Facility: CLINIC | Age: 62
DRG: 056 | End: 2022-11-20
Attending: PHYSICAL MEDICINE & REHABILITATION
Payer: COMMERCIAL

## 2022-11-20 ENCOUNTER — APPOINTMENT (OUTPATIENT)
Dept: PHYSICAL THERAPY | Facility: CLINIC | Age: 62
DRG: 056 | End: 2022-11-20
Attending: PHYSICAL MEDICINE & REHABILITATION
Payer: COMMERCIAL

## 2022-11-20 ENCOUNTER — APPOINTMENT (OUTPATIENT)
Dept: OCCUPATIONAL THERAPY | Facility: CLINIC | Age: 62
DRG: 056 | End: 2022-11-20
Attending: PHYSICAL MEDICINE & REHABILITATION
Payer: COMMERCIAL

## 2022-11-20 PROCEDURE — 97110 THERAPEUTIC EXERCISES: CPT | Mod: GP

## 2022-11-20 PROCEDURE — 92526 ORAL FUNCTION THERAPY: CPT | Mod: GN

## 2022-11-20 PROCEDURE — 97150 GROUP THERAPEUTIC PROCEDURES: CPT | Mod: GO | Performed by: OCCUPATIONAL THERAPIST

## 2022-11-20 PROCEDURE — 97530 THERAPEUTIC ACTIVITIES: CPT | Mod: GP

## 2022-11-20 PROCEDURE — 128N000003 HC R&B REHAB

## 2022-11-20 PROCEDURE — 250N000013 HC RX MED GY IP 250 OP 250 PS 637: Performed by: PHYSICIAN ASSISTANT

## 2022-11-20 PROCEDURE — 97112 NEUROMUSCULAR REEDUCATION: CPT | Mod: GP

## 2022-11-20 PROCEDURE — 92526 ORAL FUNCTION THERAPY: CPT | Mod: GN | Performed by: SPEECH-LANGUAGE PATHOLOGIST

## 2022-11-20 RX ADMIN — SENNOSIDES AND DOCUSATE SODIUM 1 TABLET: 50; 8.6 TABLET ORAL at 20:22

## 2022-11-20 RX ADMIN — TRAZODONE HYDROCHLORIDE 50 MG: 50 TABLET ORAL at 20:22

## 2022-11-20 RX ADMIN — SENNOSIDES AND DOCUSATE SODIUM 1 TABLET: 50; 8.6 TABLET ORAL at 09:54

## 2022-11-20 RX ADMIN — BISACODYL 10 MG: 10 SUPPOSITORY RECTAL at 18:45

## 2022-11-20 RX ADMIN — ASPIRIN 81 MG: 81 TABLET, COATED ORAL at 09:54

## 2022-11-20 RX ADMIN — METOPROLOL TARTRATE 25 MG: 25 TABLET, FILM COATED ORAL at 20:21

## 2022-11-20 RX ADMIN — Medication 5 MG: at 20:22

## 2022-11-20 ASSESSMENT — ACTIVITIES OF DAILY LIVING (ADL)
ADLS_ACUITY_SCORE: 53
ADLS_ACUITY_SCORE: 55
ADLS_ACUITY_SCORE: 53
ADLS_ACUITY_SCORE: 55
ADLS_ACUITY_SCORE: 53
ADLS_ACUITY_SCORE: 53

## 2022-11-20 NOTE — PLAN OF CARE
Discharge Planner Post-Acute Rehab SLP:      Discharge Plan:  SLP      Precautions: aspiration, fall,      Current Status:  Hearing: WFL  Vision: reading glasses  Communication: mild-mod expressive-receptive language impairments c/b frequent paraphasia, word/sound/syllable substitutions, semantic substitutions within conversation. Expresses basic wants/needs WFL. Mild-moderate dysarthria   Cognition: Unable to assess d/t extent of language impairment. Suspect impaired reasoning/attention noted during assessment   Swallow: Regular texture, moderately thick (3) liquid. NO straw. Ensure upright all PO, set up assist and intermittent cueing during meals for R oral clearance     Assessment:  Following thorough oral care, SLP facilitated training in small sips utilizing a teaspoon for pt to have a reference point.  After this task, pt utilized small sips with thin liquids x 80% of the time independently.  Pt with inconsistent insight into size sip taken.  Pt perfomed swallow strengthening exercises x 3 sets of 10 reps.    Other Barriers to Discharge (Family Training, etc): diet training pending progress, communication/lang strategies to spouse/family

## 2022-11-20 NOTE — PLAN OF CARE
Discharge Planner Post-Acute Rehab PT:      Discharge Plan:   home with modifications, home health PT, assist from wife. Pt will need rental K4, ramp to enter, bed rail, possibly transfer pole.     Precautions:   falls, aphasia (expressive > receptive)  Lean to R with STS is nearly resolved.     Current Status:  Bed Mobility: Rabia for RLE assist sup<>sit  Transfer: sit <> stand min/mod A, Min-mod A stand pivot to the L in therapies. Working on squat pivots.  Recommend RN staff do SPT Ax1 with transfer pole or Florence Paez.   Gait: unsafe  Stairs: unsafe     PASS:  11/8 - 13/36, compensates well using LUE/LLE     Assessment: pt's wife, Alix, discussing ramp installation with [facility] (awaiting eval, said they would be in contact with her by Mon 11/21) and [employer] (awaiting quote to be sent Monday).     Noting trace R quad and ham activation with AAROM supine exercises.    Discussed possible NMES for R quad today, DO requesting waiting until anticoagulation on board, earliest to start would be tomorrow so hoping to initiate NMES/FES on Tuesday.     Other Barriers to Discharge (DME, Family Training, etc):   Family training - to schedule. Home measurements sheet provided.  WC eval completed 11/11.  DME: K4 rental, hospital bed vs bed rail, ramp to enter home, SB vs WBQC for transfers

## 2022-11-20 NOTE — PROGRESS NOTES
"OT: Pt participated in the established \"Transitions Group\" for stroke pts. Highlighting topics such as return to meaningful activities, rehab course, neuroplasticity, follow up therapy, fall prevention, health/wellness, fatigue, depression/anxiety, bowel/bladder considerations w/ community re-integration and caregiver wellness/support. Pt very receptive to class. Pt reports personal goals after discharge are to get home and be more IND.    "

## 2022-11-20 NOTE — PLAN OF CARE
FOCUS/GOAL  Bowel management, Bladder management, Pain management, and Safety management    ASSESSMENT, INTERVENTIONS AND CONTINUING PLAN FOR GOAL:  AOx4. With word-finding difficulty. Denies CP, SOB, N/T. Can be incontinent of B/B. Uses urinal independently. LBM 11/19/22. Refuses bowel meds. Pt denies pain this shift.     Call light within reach at all times. Pt able to make needs known. Continue with POC.

## 2022-11-20 NOTE — PLAN OF CARE
"Goal Outcome Evaluation:      Plan of Care Reviewed With: patient    Overall Patient Progress: improvingOverall Patient Progress: improving    VS: /62 (BP Location: Left arm)   Pulse 58   Temp (!) 95.9  F (35.5  C) (Oral)   Resp 16   Ht 1.88 m (6' 2\")   Wt 75.8 kg (167 lb 3.2 oz)   SpO2 95%   BMI 21.47 kg/m       O2: RA   Output/LBM: Continent, using urinal. LBM 11/19   Activity: 2x T-pole   Skin: No concerns   Pain: Denies this shift   CMS: A/O, word finding difficulty   Diet: Reg, nectar thick   LDA: NA   Additional Info/Plan  Continue with POC           "

## 2022-11-20 NOTE — PLAN OF CARE
Goal Outcome Evaluation:      Plan of Care Reviewed With: patient    Overall Patient Progress: improvingOverall Patient Progress: improving    Patient is alert and oriented x4. Mild word difficulty finding, improved since admission. Uses the call light appropriately. Denies pain, sob, headache, numbness and tingling. Uses the urinal independently w/ staff to empty. No incontinence this shift. Able to weight shift self in bed from side to side. Appears sleeping during rounds.  Will continue poc.

## 2022-11-21 ENCOUNTER — APPOINTMENT (OUTPATIENT)
Dept: ULTRASOUND IMAGING | Facility: CLINIC | Age: 62
DRG: 056 | End: 2022-11-21
Attending: PHYSICIAN ASSISTANT
Payer: COMMERCIAL

## 2022-11-21 ENCOUNTER — APPOINTMENT (OUTPATIENT)
Dept: SPEECH THERAPY | Facility: CLINIC | Age: 62
DRG: 056 | End: 2022-11-21
Attending: PHYSICAL MEDICINE & REHABILITATION
Payer: COMMERCIAL

## 2022-11-21 ENCOUNTER — APPOINTMENT (OUTPATIENT)
Dept: CT IMAGING | Facility: CLINIC | Age: 62
DRG: 056 | End: 2022-11-21
Attending: PHYSICIAN ASSISTANT
Payer: COMMERCIAL

## 2022-11-21 ENCOUNTER — APPOINTMENT (OUTPATIENT)
Dept: OCCUPATIONAL THERAPY | Facility: CLINIC | Age: 62
DRG: 056 | End: 2022-11-21
Attending: PHYSICAL MEDICINE & REHABILITATION
Payer: COMMERCIAL

## 2022-11-21 ENCOUNTER — APPOINTMENT (OUTPATIENT)
Dept: PHYSICAL THERAPY | Facility: CLINIC | Age: 62
DRG: 056 | End: 2022-11-21
Attending: PHYSICAL MEDICINE & REHABILITATION
Payer: COMMERCIAL

## 2022-11-21 LAB
ANION GAP SERPL CALCULATED.3IONS-SCNC: 3 MMOL/L (ref 3–14)
BUN SERPL-MCNC: 15 MG/DL (ref 7–30)
CALCIUM SERPL-MCNC: 8.9 MG/DL (ref 8.5–10.1)
CHLORIDE BLD-SCNC: 106 MMOL/L (ref 94–109)
CO2 SERPL-SCNC: 31 MMOL/L (ref 20–32)
CREAT SERPL-MCNC: 0.71 MG/DL (ref 0.66–1.25)
ERYTHROCYTE [DISTWIDTH] IN BLOOD BY AUTOMATED COUNT: 13.8 % (ref 10–15)
GFR SERPL CREATININE-BSD FRML MDRD: >90 ML/MIN/1.73M2
GLUCOSE BLD-MCNC: 96 MG/DL (ref 70–99)
HCT VFR BLD AUTO: 36.7 % (ref 40–53)
HGB BLD-MCNC: 11.7 G/DL (ref 13.3–17.7)
MAGNESIUM SERPL-MCNC: 2.1 MG/DL (ref 1.6–2.3)
MCH RBC QN AUTO: 28.6 PG (ref 26.5–33)
MCHC RBC AUTO-ENTMCNC: 31.9 G/DL (ref 31.5–36.5)
MCV RBC AUTO: 90 FL (ref 78–100)
PHOSPHATE SERPL-MCNC: 3.4 MG/DL (ref 2.5–4.5)
PLATELET # BLD AUTO: 268 10E3/UL (ref 150–450)
POTASSIUM BLD-SCNC: 3.9 MMOL/L (ref 3.4–5.3)
RADIOLOGIST FLAGS: ABNORMAL
RBC # BLD AUTO: 4.09 10E6/UL (ref 4.4–5.9)
SODIUM SERPL-SCNC: 140 MMOL/L (ref 133–144)
WBC # BLD AUTO: 5.5 10E3/UL (ref 4–11)

## 2022-11-21 PROCEDURE — 85027 COMPLETE CBC AUTOMATED: CPT | Performed by: PHYSICIAN ASSISTANT

## 2022-11-21 PROCEDURE — 92526 ORAL FUNCTION THERAPY: CPT | Mod: GN

## 2022-11-21 PROCEDURE — 97530 THERAPEUTIC ACTIVITIES: CPT | Mod: GP

## 2022-11-21 PROCEDURE — 93971 EXTREMITY STUDY: CPT | Mod: 26 | Performed by: RADIOLOGY

## 2022-11-21 PROCEDURE — 97112 NEUROMUSCULAR REEDUCATION: CPT | Mod: GO | Performed by: STUDENT IN AN ORGANIZED HEALTH CARE EDUCATION/TRAINING PROGRAM

## 2022-11-21 PROCEDURE — 36415 COLL VENOUS BLD VENIPUNCTURE: CPT | Performed by: PHYSICIAN ASSISTANT

## 2022-11-21 PROCEDURE — 128N000003 HC R&B REHAB

## 2022-11-21 PROCEDURE — 92526 ORAL FUNCTION THERAPY: CPT | Mod: GN | Performed by: SPEECH-LANGUAGE PATHOLOGIST

## 2022-11-21 PROCEDURE — 97535 SELF CARE MNGMENT TRAINING: CPT | Mod: GO | Performed by: STUDENT IN AN ORGANIZED HEALTH CARE EDUCATION/TRAINING PROGRAM

## 2022-11-21 PROCEDURE — 70450 CT HEAD/BRAIN W/O DYE: CPT

## 2022-11-21 PROCEDURE — 84100 ASSAY OF PHOSPHORUS: CPT | Performed by: PHYSICIAN ASSISTANT

## 2022-11-21 PROCEDURE — 99232 SBSQ HOSP IP/OBS MODERATE 35: CPT | Performed by: PHYSICIAN ASSISTANT

## 2022-11-21 PROCEDURE — 97112 NEUROMUSCULAR REEDUCATION: CPT | Mod: GP

## 2022-11-21 PROCEDURE — 250N000013 HC RX MED GY IP 250 OP 250 PS 637: Performed by: PHYSICIAN ASSISTANT

## 2022-11-21 PROCEDURE — 93971 EXTREMITY STUDY: CPT | Mod: RT

## 2022-11-21 PROCEDURE — 82310 ASSAY OF CALCIUM: CPT | Performed by: PHYSICIAN ASSISTANT

## 2022-11-21 PROCEDURE — 83735 ASSAY OF MAGNESIUM: CPT | Performed by: PHYSICIAN ASSISTANT

## 2022-11-21 RX ADMIN — Medication 5 MG: at 20:44

## 2022-11-21 RX ADMIN — TRAZODONE HYDROCHLORIDE 50 MG: 50 TABLET ORAL at 20:44

## 2022-11-21 RX ADMIN — ASPIRIN 81 MG: 81 TABLET, COATED ORAL at 08:25

## 2022-11-21 RX ADMIN — SENNOSIDES AND DOCUSATE SODIUM 1 TABLET: 50; 8.6 TABLET ORAL at 08:25

## 2022-11-21 RX ADMIN — SENNOSIDES AND DOCUSATE SODIUM 1 TABLET: 50; 8.6 TABLET ORAL at 20:44

## 2022-11-21 RX ADMIN — BISACODYL 10 MG: 10 SUPPOSITORY RECTAL at 18:37

## 2022-11-21 ASSESSMENT — ACTIVITIES OF DAILY LIVING (ADL)
ADLS_ACUITY_SCORE: 59
ADLS_ACUITY_SCORE: 53
ADLS_ACUITY_SCORE: 55
ADLS_ACUITY_SCORE: 59
ADLS_ACUITY_SCORE: 53
ADLS_ACUITY_SCORE: 55

## 2022-11-21 NOTE — PLAN OF CARE
Discharge Planner Post-Acute Rehab OT:      Discharge Plan: Home with assist as needed and Home care     Precautions: fall, R shayy, mod thick liquid, aphasia     Shoulder mgmt: NMES 5-7x a day for shoulder subluxation protocol for RUE. Omno neurexa on with therapy for mobility, remove when seated in W/C or in bed. No ROM with RUE shoulder above 90 degrees.      Current Status:  ADLs:    Mobility:WC based, Ax1 for transfer with transfer pole to the left side, florence whaley A1-2    Grooming: Set up seated    Dressing: UB Set up seated EOB, Mod A with don/doff shorts/ pants seated EOB, stand with transfer pole with assist forRLE knee block to pull up pants; dependent footwear and in sitting, more independence with in bed LB dressing    Bathing: Max A    Toileting: Florence pricillady to commode, total A for cares  IADLs: wife can take care  Vision/Cognition: aphasia impacting communication     Assessment:  FES for RUE completed.  Skilled set up on :  Pt dependent for proper placement of electrodes on RUE for optimum muscle contraction, safe positioning on w/c within frame and cushion for prevention of skin breakdown, feet secured to foot pedals, w/c secured to  w/ Q-straints.  Passive motion assessed to ensure proper positioning.      Pt performed 32 minutes of active FES ergometry with 100% stimulation applied to above muscles at 35rpm with .81nm resistance.  This OT adjusted e-stim and cycling parameters in real-time to ensure palpable muscle contractions throughout session.  Please see [email or web address] for further details on patient's stimulation parameters and ergometry outcomes.      Changes in parameters that were part of this treatment:   -resistance  -pulse width, frequency  -amplitude  -pedal speed      Functional outcomes from this intervention include:   -reduced spasticity  -improved sensory awareness and proprioception  -improved muscle strength  -improved motor coordination    Other Barriers to Discharge  (DME, Family Training, etc): level of assist, aphasia

## 2022-11-21 NOTE — PLAN OF CARE
Discharge Planner Post-Acute Rehab SLP:      Discharge Plan:  SLP      Precautions: aspiration, fall,      Current Status:  Hearing: WFL  Vision: reading glasses  Communication: mild-mod expressive-receptive language impairments c/b frequent paraphasia, word/sound/syllable substitutions, semantic substitutions within conversation. Expresses basic wants/needs WFL. Mild dysarthria   Cognition: Unable to assess d/t extent of language impairment. Suspect impaired reasoning/attention noted during assessment   Swallow: Regular texture, mildly thick (2) liquid. NO straw. Ensure upright all PO, small/single sips, alternate liquids/solids, occasional throat clear, oral care 3x day.     Assessment: Pt accepted trials of thin (0) water in isolation by tsp to faciliate small size recommended. Despite good size, pt with fast rate of intake resulting in instances of throat clearing and single cough. SLP provided feedback and cues to slow down. Following education, pt able to complete 100% accuracy and no s/sx aspiration. Pt accepted mildly thick (2) liquid by cup, few instances pt taking too large of volume and expressed insight into this following completion of swallow. Pt completed reps of pharyngeal exericses for Patricia x10 wiht min cues.    Other Barriers to Discharge (Family Training, etc): diet training pending progress, communication/lang strategies to spouse/family

## 2022-11-21 NOTE — PLAN OF CARE
FOCUS/GOAL  Bowel management, Bladder management, Pain management, Mobility, Skin integrity, and Safety management    ASSESSMENT, INTERVENTIONS AND CONTINUING PLAN FOR GOAL:  Patient is alert and oriented. Denied pain, headache, dizziness, CP or SOB. Regular/mild thicken liquid. Continent of B/B last BM 11/20 after bowel program. A-2 SPT uses transfer pole. VS WDL. Call light is in reach alarm is on. Staff will continue per POC.  Goal Outcome Evaluation:

## 2022-11-21 NOTE — PROGRESS NOTES
Pt's wife called and wanted to know if there was a service where someone could come out to the home and give a home safety evaluation. SWer discussed role of ARU therapist, home measurements, DME, caregiver training, and that pictures/videos of the home are always helpful to provide. Also discussed HC set up and home safety eval once therapy comes out. Pt wife expressed relief and appreciation for the information. Pt wife also shared that she hopes pt can establish care with Dr. Armando Jason at [facility] in [place], [place]. HUC updated. Will start working on HC and encouraged pt wife to call if additional questions or concerns arise.     Claudia Gonzáles [facility]   [facility]   Direct Phone: [phone]  I   Pager: [phone]  I  Fax: [phone]

## 2022-11-21 NOTE — PLAN OF CARE
Goal Outcome Evaluation:    Plan of Care Reviewed With: patient    Overall Patient Progress: no change    Outcome Evaluation: Pt continues to be having mixed continents needing assistance with pad/linen change or setup with urinal, Pt had BM last night with BM program, no change to skin integrity.    FOCUS/GOAL  Bladder management     ASSESSMENT, INTERVENTIONS AND CONTINUING PLAN FOR GOAL:  Pt Aox4, word finding difficulty.  A1/2 T pole or SPT with nursing, A2 bozena stedy for commode use. Denies pain, cough, sob, chest pain, dizziness, N/V, N/T.  Pt has edema to RLE. US of RLE and head CT ordered and completed on writers shift. PA aware of results. Reg/mildly thick, taking pills whole with thick liquids.  Working with therapies.  Nursing will continue with POC.

## 2022-11-21 NOTE — PROGRESS NOTES
S: Order received to fit patient with a Indira brace  as ordered by .  O/G: Support and stabilize right shoulder  A:  Patient's Bicep and chest was Measured and patient was fit with size small indira brace.  The fit of the indira appears adequate.  P: contact orthotics if any issues arise.   CHRISTEN Golden.

## 2022-11-21 NOTE — PLAN OF CARE
Discharge Planner Post-Acute Rehab SLP:      Discharge Plan:  SLP      Precautions: aspiration, fall,      Current Status:  Hearing: WFL  Vision: reading glasses  Communication: mild-mod expressive-receptive language impairments c/b frequent paraphasia, word/sound/syllable substitutions, semantic substitutions within conversation. Expresses basic wants/needs WFL. Mild dysarthria   Cognition: Unable to assess d/t extent of language impairment. Suspect impaired reasoning/attention noted during assessment   Swallow: Regular texture, mildly thick (2) liquid. NO straw. Ensure upright all PO, small/single sips, alternate liquids/solids, occasional throat clear, oral care 3x day. Free water protocol with thin water via teaspoon.     Assessment: Facilitated discussion with pt and wife regarding modifying free water protocol from ice chips to water with teaspoon; both agreeable. Room guide updated, plan of care order added. Facilitated thin water intake, pt consumed water via teaspoon. Pt demo'd 2x reflexive throat clears. Reviewed VFSS video with pt, pt educated in basic anatomy of swallow, pointed out strategy use vs non strategy use.    Other Barriers to Discharge (Family Training, etc): diet training pending progress, communication/lang strategies to spouse/family

## 2022-11-21 NOTE — PROGRESS NOTES
"  [facility]   Acute Rehabilitation Unit  Daily progress note    INTERVAL HISTORY  Weekend and therapy notes reviewed, no acute events reported.    Tobin Hua was seen and examined this morning at bedside.  He reports that overall he is feeling well.  He was a bit discouraged to learn that ultrasound showed extending area of clot.  He is noting ongoing daily improvements with therapies.  Happy to be on less thick liquids after Friday's video swallow study.  He reports to be sleeping better.  Still waking a few times each night to urinate but able to fall back to sleep easily.  He denies chest pain, palpitations, shortness of breath, dizziness or lightheadedness.  Reports having a BM last night after suppository but it \"took a long time\", \"about an hour\".  He believes this may be related to electing to skip suppository the previous night, says he plans to resume nightly and monitor.  He otherwise denies concerns or questions at this time.    Functionally, he is currently needing assist of 1 for transfers with transfer pole, set-up for seated grooming and upper body dressing, mod A for lower body dressing, max A for bathing.       MEDICATIONS    aspirin  81 mg Oral Daily     bisacodyl  10 mg Rectal Daily     - MEDICATION INSTRUCTIONS -   Does not apply See Admin Instructions     melatonin  5 mg Oral At Bedtime     metoprolol tartrate  25 mg Oral BID     senna-docusate  1-2 tablet Oral BID     traZODone  50 mg Oral At Bedtime        acetaminophen, bisacodyl, lidocaine     PHYSICAL EXAM  /71 (BP Location: Left arm)   Pulse 62   Temp 97  F (36.1  C) (Oral)   Resp 16   Ht 1.88 m (6' 2\")   Wt 77 kg (169 lb 12.1 oz)   SpO2 97%   BMI 21.80 kg/m     Gen: NAD, sitting up in chair  HEENT: NC/AT, MMM  Cardio: RRR, +murmur  Pulm: non-labored on room air, lungs CTA bilaterally  Abd: soft, non-distended, non-tender  Ext: slight edema throughout RLE, no erythema, non-tender, no " warmth.  No edema in LLE.  Neuro/MSK: AAO, global aphasia but improving, +dysarthria, R facial droop, PERRL, EOMI, R hemiparesis    LABS  CBC RESULTS:   Recent Labs   Lab Test 11/21/22  0542 11/17/22  0624 11/14/22  0629   WBC 5.5 7.2 7.7   RBC 4.09* 3.93* 3.85*   HGB 11.7* 11.3* 11.1*   HCT 36.7* 36.1* 34.6*   MCV 90 92 90   MCH 28.6 28.8 28.8   MCHC 31.9 31.3* 32.1   RDW 13.8 13.8 14.0    328 361     Last Basic Metabolic Panel:  Recent Labs   Lab Test 11/21/22  0542 11/17/22  0624 11/14/22  0629    139 139   POTASSIUM 3.9 3.9 4.3   CHLORIDE 106 106 104   CO2 31 31 31   ANIONGAP 3 2* 4   GLC 96 91 90   BUN 15 21 16   CR 0.71 0.67 0.74   GFRESTIMATED >90 >90 >90   JAILENE 8.9 9.2 9.2         Rehabilitation - continue comprehensive acute inpatient rehabilitation program with multidisciplinary approach including therapies, rehab nursing, and physiatry following. See interval history for updates.      ASSESSMENT AND PLAN  Tobin Hua is a 62 year old right hand dominant male with a past medical history of mitral valve repair (2011), allergic rhinitis, and recent admission 10/21/22-11/1/22 for acute ischemic stroke due to dissection/occlusion of left ICA complicated by hemorrhagic transformation with intraparenchymal and intraventricular hemorrhage, anemia, non-sustained ventricular tachycardia, hypo/hypernatremia, dysphagia, constipation, and headache, who was previously at ARU and discharged back to acute hospital on 11/5/22 due to occlusive RLE DVT, now s/p IVC filter.  He is now re-admitted to ARU on 11/7/22 for multidisciplinary rehabilitation and ongoing medical management.        Admission to acute inpatient rehab 11/07/22.    Impairment group code: Stroke 01.2 (R) Body Involvement (L) Brain: left ICA and L MCA ischemic stroke, c/b hemorrhagic transformation        1. PT, OT and SLP 60 minutes of each on a daily basis, in addition to rehab nursing and close management of physiatrist.       2. Impairment  of ADL's: Noted to have R hemiplegia, impaired balance, impaired coordination, fatigue, R/L visual convergence impairment, impaired sensation, impaired postural control, all affecting his ability to safely and independently perform basic ADLs.  Goal for mod I with basic ADLs, anticipate need for assist with bathing/showering and related transfers.     3. Impairment of mobility:  Noted to have R hemiplegia, impaired balance, impaired coordination, fatigue, R/L visual convergence impairment, impaired sensation, impaired postural control, all affecting his ability to safely and independently perform basic mobility.  Goal for mod I with basic mobility, anticipate to be wheelchair based at discharge.     4. Impairment of cognition/language/swallow:  Noted to have dysphagia, dysarthria, and receptive and expressive aphasia with goals for safe tolerance of least restrictive diet and improved cognitive-linguistic skills to meet/communicate basic needs.     5. Medical Conditions  New actions/orders/updates for today are in blue.     Acute ischemic stroke due to ICA dissection and thrombosis/occlusion s/p tenecteplase, mechanical thrombectomy  C/b hemorrhagic transformation with intraparenchymal and intraventricular hemorrhage   R hemiparesis, R facial droop, dysphagia, aphasia, dysarthria  - Continue ASA 81 mg daily, indefinitely per neurology  - LDL 96, no indication for statin per neuro, no atherosclerosis on MRAs  - SBP goal <140  - Ziopatch in place for 2 weeks at ARU admission, completed and returned on 11/15/22  - Continue PT/OT/SLP  - Follow up with neurology     Occlusive RLE DVT  On 11/5/22, noted to have significant edema in RLE compared with LLE.  Bilateral lower extremity Doppler ultrasounds revealed a large occlusive DVT in the right femoral vein extending to the deep veins below the knee and nonocclusive deep venous thrombosis in the right common femoral vein. CTPE negative for PE.  Head CT demonstrates likely  "stable status of hemorrhagic conversion when compared with previous.  Patient underwent IR placement of IVC filter on 11/6.  Neurology consulted 11/7 to discuss consideration of anticoagulation.  Per their recs, would consider at approximately 4 weeks after hemorrhage, after repeating head CT to evaluate for any further expansion of bleed.  - Per neurology, can consider anticoagulation approx 4 weeks (~11/21) from his hemorrhage from neurologic perspective.  Plan for updated head CT at that time to show no further expansion of the bleed prior to initiating.  DOAC's would be preferred over warfarin.  Neuro would recommend risk/benefit discussion again with patient and family prior to actual initiation of anticoagulation.  - Repeat RLE ultrasounds shows increased clot burden with \"new occlusive thrombus within the right profunda femoris vein\" as well as \"unchanged occlusive thrombus extending from the femoral vein to the posterior tibial and peroneal veins, and unchanged nonocclusive thrombus in the right common femoral vein.  - Repeat head CT ordered for today per neurology recs to reevaluate intraparenchymal bleed.  Once resulted, will reach out to neurology to revisit risk vs benefit of initiating anticoagulation, especially given ultrasound results.  - Follow-up with IR in 3-6 months to have IVC filter removed once   anticoagulation plan is established (sooner is better)    Addendum: repeat CT head with improved appearance, expected evolution of intraparenchymal hemorrhage.  Per discussion with stroke neurology, ok to start anticoagulation with DOAC.  Will have pharmacy liaison run test claim Xarelto vs Eliquis, follow up with risk/benefit discussion with patient tomorrow, and plan to initiate if agreeable.     Mild leukocytosis, resolved  Elevated CRP  WBC peaked at 1.52 on 11/4, infectious work-up with UA negative, CXR negative, procal negative, CRP up-trending 150 on 11/7, blood cultures 11/5 NGTD.  Suspect 2/2 " DVT, WBC down-trending since, normalized on 11/8.  - Repeat CRP down-trending 150 -> 100 11/10  - Blood cultures NGTD  - Trend CBC.  WBC remain WNL 11/21     Moderate dilation of ascending aorta   Incidentally noted on echo for CVA work-up, involving the sinuses of Valsalva, maximal dimension 4.6 cm.  - Needs outptient follow up in 6 months with CT imaging, can be followed by PCP     Episodes of non-sustained ventricular tachycardia  Demonstrated on telemetry during IP stay.  Started on metoprolol with reduced frequency.  - Continue metoprolol 25 mg BID  - Ziopatch in place at ARU admission  - EKG on 11/11 (eval QTc with start trazodone) with sinus rhythm, PVCs and PACs     Moderate oropharyngeal dysphagia  At risk for malnutrition  - RD consulted, appreciate assist.  - Continue SLP  - Advanced to regular diet per SLP on 11/14  - Ice chips per free water procotol  - Repeat VFSS 11/18 with improved oropharyngeal swallow evidenced by no aspiration with consistencies trialed this day.  Advanced to mildly thick liquids 11/19 with swallow strategies  - Monitor labs, vitals, clinically for signs of dehydration while on thickened liuqids.  11/21: Cr stable 0.71, BUN improved to 15.  Expect improved hydration on less thick liquids, continue to encourage intake.      Normocytic anemia  Noted drop from Hgb 13 to yohana of 9.9 on 10/23, most recently stable at 10s-11s.  - Trend CBC.  Hgb stable 11/21 at 11.7     Urinary retention, resolved  Urinary incontinence  Noted since ARU admission, requiring intermittent straight catheterization. Wife had noted urinary urgency and some incontinence while at hospital. 11/3 UA negative for infection.  Not noted during recurrent inpatient stay.  - Ongoing intermittent urinary incontinence.    - Still waking a few times each night to urinate but arnulfo to return to sleep more easily.  - Continue behavioral mods, timed toileting, limiting fluids close to bedtime, voiding before bed, double  voids, etc.     Hx mitral valve repair (2012)  Mild mitral regurgitation  - Noted.  Not on PTA anticoagulation, just aspirin, resumed as above     Allergic rhinitis  - Continue PTA Flonase daily     Right renal cortical enhancement  Incidentally noted on CT.  Indeterminate, possibly vascular etiology.   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  Non-painful.  Patient's movement limited by CVA as above.  - Follow-up with PCP to determine need for ortho spine involvement outpatient    Insomnia  Patient reports difficulty both falling and staying asleep.  Denies any PTA issues with sleep.  Melatonin increased 3 mg to 5 mg on 11/9.  - Continue melatonin 5 mg  - Sleep hygiene  - Some relief with addition of trazodone (started 11/10), but still sleep difficulty.  Trazodone increased to 50 mg at HS on 11/14.  Sleep improved/stable since.  Continue to monitor.  - EKG 11/11 with starting trazodone.  QTc WNL at 444.  - Continue to monitor       6. Adjustment to disability:  Clinical psychology to eval and treat if indicated  7. FEN: regular diet; moderately thick (level 3) liquids  8. Bowel: started on bowel program with nightly suppository, on scheduled and PRN bowel meds.  Last BM 11/20 s/p suppository.  Continue to monitor.  9. Bladder: continent/incontinent due to urgency  10. DVT Prophylaxis: currently no chemical or mechanical prophylaxis per discharging team  11. GI Prophylaxis: not indicated  12. Code: full  13. Disposition: goal for home with 24/7 assist  14. ELOS:  24 days  15. Rehab prognosis:  fair  1. Follow up Appointments on Discharge: PCP, neurology (Dr. Edgard Dave) in 6 weeks, IR in 3-6 months        Ashley Campoverde PA-C  Physical Medicine & Rehabilitation

## 2022-11-21 NOTE — PLAN OF CARE
Discharge Planner Post-Acute Rehab PT:      Discharge Plan:   home with modifications, home health PT, assist from wife. Pt will need rental K4, ramp to enter, bed rail, possibly transfer pole.     Precautions:   falls, aphasia (expressive > receptive)  Lean to R with STS is nearly resolved.     Current Status:  Bed Mobility: Rabia for RLE assist sup<>sit  Transfer: sit <> stand min/mod A, Min-mod A stand pivot to the L in therapies. Working on squat pivots.  Recommend RN staff do SPT Ax1 with transfer pole or Florencebob Paez.   Gait: unsafe  Stairs: unsafe     PASS:  11/8 - 13/36, compensates well using LUE/LLE     Assessment: Pt was not able to get started on Xcite due to enlargement of DVT found today, but therapy was still deemed safe. Orthotics brought his Pickett brace to PT visit to determine correct size, will fit tomorrow. His STS transfers have improved in efficiency when he has R knee blocked.      Other Barriers to Discharge (DME, Family Training, etc):   Family training - to schedule. Home measurements sheet provided.  KANE rajput completed 11/11.  DME: K4 rental, hospital bed vs bed rail, ramp to enter home, SB vs [facility]QC for transfers

## 2022-11-21 NOTE — PROGRESS NOTES
SPIRITUAL HEALTH SERVICES Progress Note  [facility] [place] [place]    Brief check-in with pt and spouse - pt was busy with therapies - to let pt/spouse know I will be out until next Tuesday.     True Oneal) Miguelito Walker M.Div., [facility]  Staff   Pager [phone]      * [facility] remains available 24/7 for emergent requests/referrals, either by having the switchboard page the on-call  or by entering an ASAP/STAT consult in Epic (this will also page the on-call ). Routine Epic consults receive an initial response within 24 hours.*

## 2022-11-22 ENCOUNTER — APPOINTMENT (OUTPATIENT)
Dept: SPEECH THERAPY | Facility: CLINIC | Age: 62
DRG: 056 | End: 2022-11-22
Attending: PHYSICAL MEDICINE & REHABILITATION
Payer: COMMERCIAL

## 2022-11-22 ENCOUNTER — APPOINTMENT (OUTPATIENT)
Dept: PHYSICAL THERAPY | Facility: CLINIC | Age: 62
DRG: 056 | End: 2022-11-22
Attending: PHYSICAL MEDICINE & REHABILITATION
Payer: COMMERCIAL

## 2022-11-22 ENCOUNTER — APPOINTMENT (OUTPATIENT)
Dept: OCCUPATIONAL THERAPY | Facility: CLINIC | Age: 62
DRG: 056 | End: 2022-11-22
Attending: PHYSICAL MEDICINE & REHABILITATION
Payer: COMMERCIAL

## 2022-11-22 PROCEDURE — 97530 THERAPEUTIC ACTIVITIES: CPT | Mod: GP | Performed by: PHYSICAL THERAPIST

## 2022-11-22 PROCEDURE — 92507 TX SP LANG VOICE COMM INDIV: CPT | Mod: GN

## 2022-11-22 PROCEDURE — 250N000013 HC RX MED GY IP 250 OP 250 PS 637: Performed by: PHYSICIAN ASSISTANT

## 2022-11-22 PROCEDURE — 999N000150 HC STATISTIC PT MED CONFERENCE < 30 MIN

## 2022-11-22 PROCEDURE — 999N000125 HC STATISTIC PATIENT MED CONFERENCE < 30 MIN: Performed by: STUDENT IN AN ORGANIZED HEALTH CARE EDUCATION/TRAINING PROGRAM

## 2022-11-22 PROCEDURE — 97112 NEUROMUSCULAR REEDUCATION: CPT | Mod: GP | Performed by: PHYSICAL THERAPIST

## 2022-11-22 PROCEDURE — 97535 SELF CARE MNGMENT TRAINING: CPT | Mod: GO | Performed by: STUDENT IN AN ORGANIZED HEALTH CARE EDUCATION/TRAINING PROGRAM

## 2022-11-22 PROCEDURE — 99233 SBSQ HOSP IP/OBS HIGH 50: CPT | Performed by: PHYSICIAN ASSISTANT

## 2022-11-22 PROCEDURE — 999N000125 HC STATISTIC PATIENT MED CONFERENCE < 30 MIN

## 2022-11-22 PROCEDURE — 97530 THERAPEUTIC ACTIVITIES: CPT | Mod: GP

## 2022-11-22 PROCEDURE — 128N000003 HC R&B REHAB

## 2022-11-22 PROCEDURE — 97110 THERAPEUTIC EXERCISES: CPT | Mod: GP | Performed by: PHYSICAL THERAPIST

## 2022-11-22 PROCEDURE — 92526 ORAL FUNCTION THERAPY: CPT | Mod: GN

## 2022-11-22 RX ADMIN — SENNOSIDES AND DOCUSATE SODIUM 1 TABLET: 50; 8.6 TABLET ORAL at 20:07

## 2022-11-22 RX ADMIN — Medication 5 MG: at 20:07

## 2022-11-22 RX ADMIN — RIVAROXABAN 15 MG: 15 TABLET, FILM COATED ORAL at 20:07

## 2022-11-22 RX ADMIN — METOPROLOL TARTRATE 25 MG: 25 TABLET, FILM COATED ORAL at 07:41

## 2022-11-22 RX ADMIN — BISACODYL 10 MG: 10 SUPPOSITORY RECTAL at 20:02

## 2022-11-22 RX ADMIN — ASPIRIN 81 MG: 81 TABLET, COATED ORAL at 07:41

## 2022-11-22 RX ADMIN — TRAZODONE HYDROCHLORIDE 50 MG: 50 TABLET ORAL at 20:07

## 2022-11-22 RX ADMIN — SENNOSIDES AND DOCUSATE SODIUM 1 TABLET: 50; 8.6 TABLET ORAL at 07:41

## 2022-11-22 RX ADMIN — METOPROLOL TARTRATE 25 MG: 25 TABLET, FILM COATED ORAL at 20:07

## 2022-11-22 ASSESSMENT — ACTIVITIES OF DAILY LIVING (ADL)
ADLS_ACUITY_SCORE: 59
ADLS_ACUITY_SCORE: 60
ADLS_ACUITY_SCORE: 60

## 2022-11-22 NOTE — PROGRESS NOTES
"  [facility]   Acute Rehabilitation Unit  Daily progress note    INTERVAL HISTORY  Tobin Hua was seen and examined at bedside this morning during team rounds with wife present.  No acute events reported overnight.  Patient reports that he is feeling well overall.  Notes ongoing gradual progress with therapies.  Discussed results of right leg ultrasound and head CT.  Discussed risks vs benefits of anticoagulation.  Patient and family would like to proceed with initiation.    Functionally, PT is noting some proximal RLE activation  Wheelchair eval completed, anticipate wheelchair based at home.  Patient is making some progress with transfers with transfer pole, therapies will look at clearing wife to assist with these in room.  Hopeful to progress to pivot transfers, which he can do with set-up.  Will start planning some formal family training with significant other, working on equipment for discharge to home. Making progress in speech therapy with both language and swallow.  For full functional updates, see team rounds note from today.       MEDICATIONS    aspirin  81 mg Oral Daily     bisacodyl  10 mg Rectal Daily     - MEDICATION INSTRUCTIONS -   Does not apply See Admin Instructions     melatonin  5 mg Oral At Bedtime     metoprolol tartrate  25 mg Oral BID     senna-docusate  1-2 tablet Oral BID     traZODone  50 mg Oral At Bedtime        acetaminophen, bisacodyl, lidocaine     PHYSICAL EXAM  /49 (BP Location: Left arm, Patient Position: Supine, Cuff Size: Adult Regular)   Pulse 56   Temp (!) 85.9  F (29.9  C) (Oral)   Resp 16   Ht 1.88 m (6' 2\")   Wt 77 kg (169 lb 12.1 oz)   SpO2 99%   BMI 21.80 kg/m     Gen: NAD, lying in bed  HEENT: NC/AT  Cardio: appears well-perfused  Pulm: non-labored on room air  Abd: soft, non-distended, non-tender  Ext: slight edema throughout RLE, no erythema, non-tender, no warmth.  No edema in LLE.  Neuro/MSK: AAO, global aphasia " but improving, +dysarthria, R facial droop, PERRL, EOMI, R hemiparesis  *Full exam deferred today for conversation    LABS  CBC RESULTS:   Recent Labs   Lab Test 11/21/22  0542 11/17/22  0624 11/14/22  0629   WBC 5.5 7.2 7.7   RBC 4.09* 3.93* 3.85*   HGB 11.7* 11.3* 11.1*   HCT 36.7* 36.1* 34.6*   MCV 90 92 90   MCH 28.6 28.8 28.8   MCHC 31.9 31.3* 32.1   RDW 13.8 13.8 14.0    328 361     Last Basic Metabolic Panel:  Recent Labs   Lab Test 11/21/22  0542 11/17/22  0624 11/14/22  0629    139 139   POTASSIUM 3.9 3.9 4.3   CHLORIDE 106 106 104   CO2 31 31 31   ANIONGAP 3 2* 4   GLC 96 91 90   BUN 15 21 16   CR 0.71 0.67 0.74   GFRESTIMATED >90 >90 >90   JAILENE 8.9 9.2 9.2         Rehabilitation - continue comprehensive acute inpatient rehabilitation program with multidisciplinary approach including therapies, rehab nursing, and physiatry following. See interval history for updates.      ASSESSMENT AND PLAN  Tobin Hua is a 62 year old right hand dominant male with a past medical history of mitral valve repair (2011), allergic rhinitis, and recent admission 10/21/22-11/1/22 for acute ischemic stroke due to dissection/occlusion of left ICA complicated by hemorrhagic transformation with intraparenchymal and intraventricular hemorrhage, anemia, non-sustained ventricular tachycardia, hypo/hypernatremia, dysphagia, constipation, and headache, who was previously at ARU and discharged back to acute hospital on 11/5/22 due to occlusive RLE DVT, now s/p IVC filter.  He is now re-admitted to ARU on 11/7/22 for multidisciplinary rehabilitation and ongoing medical management.        Admission to acute inpatient rehab 11/07/22.    Impairment group code: Stroke 01.2 (R) Body Involvement (L) Brain: left ICA and L MCA ischemic stroke, c/b hemorrhagic transformation        1. PT, OT and SLP 60 minutes of each on a daily basis, in addition to rehab nursing and close management of physiatrist.       2. Impairment of ADL's:  Noted to have R hemiplegia, impaired balance, impaired coordination, fatigue, R/L visual convergence impairment, impaired sensation, impaired postural control, all affecting his ability to safely and independently perform basic ADLs.  Goal for mod I with basic ADLs, anticipate need for assist with bathing/showering and related transfers.     3. Impairment of mobility:  Noted to have R hemiplegia, impaired balance, impaired coordination, fatigue, R/L visual convergence impairment, impaired sensation, impaired postural control, all affecting his ability to safely and independently perform basic mobility.  Goal for mod I with basic mobility, anticipate to be wheelchair based at discharge.     4. Impairment of cognition/language/swallow:  Noted to have dysphagia, dysarthria, and receptive and expressive aphasia with goals for safe tolerance of least restrictive diet and improved cognitive-linguistic skills to meet/communicate basic needs.     5. Medical Conditions  New actions/orders/updates for today are in blue.     Acute ischemic stroke due to ICA dissection and thrombosis/occlusion s/p tenecteplase, mechanical thrombectomy  C/b hemorrhagic transformation with intraparenchymal and intraventricular hemorrhage   R hemiparesis, R facial droop, dysphagia, aphasia, dysarthria  - Continue ASA 81 mg daily, indefinitely per neurology  - LDL 96, no indication for statin per neuro, no atherosclerosis on MRAs  - SBP goal <140  - Ziopatch in place for 2 weeks at ARU admission, completed and returned on 11/15/22  - Continue PT/OT/SLP  - Follow up with neurology     Occlusive RLE DVT  On 11/5/22, noted to have significant edema in RLE compared with LLE.  Bilateral lower extremity Doppler ultrasounds revealed a large occlusive DVT in the right femoral vein extending to the deep veins below the knee and nonocclusive deep venous thrombosis in the right common femoral vein. CTPE negative for PE.  Head CT demonstrates likely stable  "status of hemorrhagic conversion when compared with previous.  Patient underwent IR placement of IVC filter on 11/6.  Neurology consulted 11/7 to discuss consideration of anticoagulation.  Per their recs, would consider at approximately 4 weeks after hemorrhage, after repeating head CT to evaluate for any further expansion of bleed.  - Per neurology, can consider anticoagulation approx 4 weeks (~11/21) from his hemorrhage from neurologic perspective.  Plan for updated head CT at that time to show no further expansion of the bleed prior to initiating.  DOAC's would be preferred over warfarin.  Neuro would recommend risk/benefit discussion again with patient and family prior to actual initiation of anticoagulation.  - Repeat RLE ultrasound 11/21 shows increased clot burden with \"new occlusive thrombus within the right profunda femoris vein\" as well as \"unchanged occlusive thrombus extending from the femoral vein to the posterior tibial and peroneal veins, and unchanged nonocclusive thrombus in the right common femoral vein.  - Repeat head CT 11/21 with improved appearance, expected evolution of intraparenchymal hemorrhage.  Per discussion with stroke neurology, ok to start anticoagulation with DOAC.  Will have pharmacy liaison run test claim Xarelto vs Eliquis, follow up with risk/benefit discussion with patient tomorrow, and plan to initiate if agreeable.  - Per discussion with stroke neurology, patient/family re: risks vs benefits, agreed to start anticoagulation.  Will start DOAC today and monitor for any changes in neuro exam or signs of recurrent bleeding.  - Follow-up with IR in 3-6 months to have IVC filter removed once   anticoagulation plan is established (sooner is better)     Mild leukocytosis, resolved  Elevated CRP  WBC peaked at 1.52 on 11/4, infectious work-up with UA negative, CXR negative, procal negative, CRP up-trending 150 on 11/7, blood cultures 11/5 NGTD.  Suspect 2/2 DVT, WBC down-trending " since, normalized on 11/8.  - Repeat CRP down-trending 150 -> 100 11/10  - Blood cultures NGTD  - Trend CBC.  WBC remain WNL 11/21     Moderate dilation of ascending aorta   Incidentally noted on echo for CVA work-up, involving the sinuses of Valsalva, maximal dimension 4.6 cm.  - Needs outptient follow up in 6 months with CT imaging, can be followed by PCP     Episodes of non-sustained ventricular tachycardia  Demonstrated on telemetry during IP stay.  Started on metoprolol with reduced frequency.  - Continue metoprolol 25 mg BID  - Ziopatch in place at ARU admission  - EKG on 11/11 (eval QTc with start trazodone) with sinus rhythm, PVCs and PACs     Moderate oropharyngeal dysphagia  At risk for malnutrition  - RD consulted, appreciate assist.  - Continue SLP  - Advanced to regular diet per SLP on 11/14  - Free water procotol  - Repeat VFSS 11/18 with improved oropharyngeal swallow evidenced by no aspiration with consistencies trialed this day.  Advanced to mildly thick liquids 11/19 with swallow strategies  - Monitor labs, vitals, clinically for signs of dehydration while on thickened liuqids.  11/21: Cr stable 0.71, BUN improved to 15.  Expect improved hydration on less thick liquids, continue to encourage intake.      Normocytic anemia  Noted drop from Hgb 13 to yohana of 9.9 on 10/23, most recently stable at 10s-11s.  - Trend CBC.  Hgb stable 11/21 at 11.7     Urinary retention, resolved  Urinary incontinence, improving  Noted since ARU admission, requiring intermittent straight catheterization. Wife had noted urinary urgency and some incontinence while at hospital. 11/3 UA negative for infection.  Not noted during recurrent inpatient stay.  - Ongoing intermittent urinary incontinence.    - Still waking a few times each night to urinate but arnulfo to return to sleep more easily.  - Continue behavioral mods, timed toileting, limiting fluids close to bedtime, voiding before bed, double voids, etc.     Hx mitral  valve repair (2012)  Mild mitral regurgitation  - Noted.  Not on PTA anticoagulation, just aspirin, resumed as above     Allergic rhinitis  - Continue PTA Flonase daily     Right renal cortical enhancement  Incidentally noted on CT.  Indeterminate, possibly vascular etiology.   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  Non-painful.  Patient's movement limited by CVA as above.  - Follow-up with PCP to determine need for ortho spine involvement outpatient    Insomnia  Patient reports difficulty both falling and staying asleep.  Denies any PTA issues with sleep.  Melatonin increased 3 mg to 5 mg on 11/9.  - Continue melatonin 5 mg  - Sleep hygiene  - Some relief with addition of trazodone (started 11/10), but still sleep difficulty.  Trazodone increased to 50 mg at HS on 11/14.  Sleep improved/stable since.  Continue to monitor.  - EKG 11/11 with starting trazodone.  QTc WNL at 444.  - Continue to monitor       6. Adjustment to disability:  Clinical psychology to eval and treat if indicated  7. FEN: regular diet; moderately thick (level 3) liquids  8. Bowel: started on bowel program with nightly suppository, on scheduled and PRN bowel meds.  Last BM 11/21 s/p suppository.  Continue to monitor.  9. Bladder: continent/incontinent due to urgency  10. DVT Prophylaxis: currently no chemical or mechanical prophylaxis per discharging team  11. GI Prophylaxis: not indicated  12. Code: full  13. Disposition: goal for home with 24/7 assist  14. ELOS:  24 days  15. Rehab prognosis:  fair  1. Follow up Appointments on Discharge: PCP, neurology (Dr. Edgard Dave) in 6 weeks, IR in 3-6 months      Patient seen and discussed with Dr. Jori Troncoso, PM&R staff physician     Ashley Campoverde PA-C  Physical Medicine & Rehabilitation

## 2022-11-22 NOTE — PROGRESS NOTES
Team rounds this morning. Will still target discharge of Thurs 12/01/22 with HC. SW will work on setting up HC. Anticipate insurance will be a barrier, wife notified of that yesterday over the phone. Pt getting established with new PCP, HUC notified, ARU MD will have to follow for HC until PCP established. Talked about discharge Thurs 12/01/22 vs staying a little longer. Will still target Thurs 12/01/22 and see how the next few days go. Home set-up, DME, HC, and family training all needed before discharge home. Therapy following up. This writer will continue to follow.     ADDENDUM: HC referral sent to [facility] today and pending.     ADDENDUM: [facility] declined due to insurance. Liaison will help with sending referrals to partner agencies.     [facility]audia Gonz[facility]les [facility]   [facility]   Direct Phone: [phone]  I   Pager: [phone]  I  Fax: [phone]

## 2022-11-22 NOTE — CONSULTS
Consulted by Ashley Campoverde to run a test claim for Eliquis & Xarelto (for DVT).    Patient has pharmacy benefits through [employer]. Per insurance, the following are covered and preferred under the patient's plans:       Eliquis tabs - $0    Xarelto tabs - $0       Please feel free to contact me with any other test claims, prior authorizations, or insurance questions regarding outpatient medications.     Thanks!      Asha Fam [facility] Liaison  Pronouns: She/Her/Hers    [facility]  [address] [place] [address]  [address] [place] [address] [address] [place], [place] [id number]   [email or web address]  [email or web address]   Phone: [phone]  Pager: [phone]  Fax: [phone]

## 2022-11-22 NOTE — PLAN OF CARE
Discharge Planner Post-Acute Rehab PT:      Discharge Plan:   home with modifications, home health PT, assist from wife. Pt will need rental K4, ramp to enter, bed rail, possibly transfer pole.     Precautions:   falls, aphasia (expressive > receptive)  Lean to R with STS is nearly resolved.     Current Status:  Bed Mobility: Rabia for RLE assist sup<>sit  Transfer: sit <> stand min/mod A, Min-mod A stand pivot to the L in therapies. Working on squat pivots.  Recommend RN staff do SPT Ax1 with transfer pole or Florence Paez.   Gait: unsafe  Stairs: unsafe     PASS:  11/8 - 13/36, compensates well using LUE/LLE  11/23:      Assessment:  Not keeping Nicole brace on due to off gassing of neoprene making him nauseous, his wife took it home to sit in a bag with coffee grounds and orange peel to see if this helps. Transfers progressing, he is able to move both left and right and able to instruct someone new to assisting him.     Other Barriers to Discharge (DME, Family Training, etc):   Family training - to schedule. Home measurements sheet provided.  KANE rajput completed 11/11.  DME: K4 rental, hospital bed vs bed rail, ramp to enter home, SB vs WBQC for transfers

## 2022-11-22 NOTE — PLAN OF CARE
Acute Rehab Care Conference/Team Rounds      Type: Team Rounds    Present: Dr. Jori Troncoso, Ashley Campoverde PA, Noe Waldrop PT, Jeannie Lui OT, Cielo Johnson SLP, Claudia ROGERSSW, Joy Borrego RD, Ara Schaeffer RN, and Tobin Hua Patient.     Discharge Barriers/Treatment/Education    Rehab Diagnosis: R hemiparesis post CVA     Active Medical Co-morbidities/Prognosis:     Patient Active Problem List   Diagnosis Code     DVT (deep venous thrombosis) (H) I82.409   CVA  Aphasia  Dysphagia   HTN      Safety: A1 stand Pivot with pole for transfer, wheelchair based, alert and oriented, calls appropriately    Pain: denied pain    Medications, Skin, Tubes/Lines: takes medication whole with thickened liquid one at a time, no skin issue, no lines/tubes    Swallowing/Nutrition: Regular texture, mildly thick (2) liquid. NO straw. Ensure upright all PO, small/single sips, alternate liquids/solids, occasional throat clear, oral care 3x day. Ok for sips thin water by tsp only with 1:1 supervision following FWP.     Bowel/Bladder: Mix Continence of Bladder, utilized urinal at night, with episode of incontinent brief as well, LBM 11/21/2022, Continent to BSC.     Psychosocial: , lives with spouse. Both pt and spouse are retired. Indep PTA. No substance abuse, mental health, or financial concerns. Adult children also supportive and local.     ADLs/IADLs: Pt is min A for UB cares and mod A for LB dressing and total assist for toileting. MIN A for transfer with transfer pole and need to problem solve toilet transfers and toileting DME better. Pt will require assist at discharge, will need to see specifics regarding pt's home bathroom to see if it is appropriate for toileting. Pt is also mixed continence. Will need to train family and determine appropriate DME prior to discharge. Recommend HH and 24/7 care.     Mobility: Pt demo slowly improving strength in R glut/hip flexor, trace activation in R  quads/hams. Working with wife to setup home prior to discharge home. She is looking into w/c ramp installation ([employer] or [employer]). Completed w/c eval with [facility] [employer], need to figure out delivery date for K4. Recommend HH PT. Will also need to complete family training with wife for assist with squat pivots.  Bed Mobility: Rabia for RLE assist sup<>sit  Transfer: sit <> stand min/mod A, Min-mod A stand pivot to the L in therapies. Working on squat pivots.  Recommend RN staff do SPT Ax1 with transfer pole or Florence Stedy.   Gait: unsafe    Cognition/Language: mild-mod expressive-receptive language impairments c/b frequent paraphasia, word/sound/syllable substitutions, semantic substitutions within conversation. Expresses basic wants/needs WFL. Mild dysarthria. Continues to demonstrate improvement and excellent participation in therapy     Community Re-Entry: w/c based, mod I in home and assist for community    Transportation: requires assist from family    Decision maker: self    Plan of Care and goals reviewed and updated.    Discharge Plan/Recommendations    Fall Precautions: continue    Patient/Family input to goals: yes     Estimated length of stay: 24 days     Overall plan for the patient: reach a level of mod I       Utilization Review and Continued Stay Justification    Medical Necessity Criteria:    For any criteria that is not met, please document reason and plan for discharge, transfer, or modification of plan of care to address.    Requires intensive rehabilitation program to treat functional deficits?: Yes    Requires 3x per week or greater involvement of rehabilitation physician to oversee rehabilitation program?: Yes    Requires rehabilitation nursing interventions?: Yes    Patient is making functional progress?: Yes    There is a potential for additional functional progress? Yes    Patient is participating in therapy 3 hours per day a minimum of 5 days per week or 15 hours per week in  7 day period?:Yes    Has discharge needs that require coordinated discharge planning approach?:Yes      Barriers/Concerns related to meeting medical necessity criteria:  none    Team Plan to Address Concern:  As needed       Final Physician Sign off    Statement of Approval:  Jori Troncoso, DO      Patient Goals  Social Work Goals: Confirm discharge recommendations with therapy, coordinate safe discharge plan and remain available to support and assist as needed.      OT Predicted Duration/Target Date for Goal Attainment: 12/05/22  Therapy Frequency (OT): Daily  OT: Hygiene/Grooming: modified independent  OT: Upper Body Dressing: Modified independent  OT: Lower Body Dressing: Minimal assist  OT: Upper Body Bathing: Supervision/stand-by assist  OT: Lower Body Bathing: Minimal assist  OT: Transfer: Supervision/stand-by assist (tub transfer)  OT: Toilet Transfer/Toileting: Supervision/stand-by assist, cleaning and garment management, toilet transfer  OT: Goal 1: Pt will be IND with HEP to improve RUE function for ADL  OT: Goal 2: Family will complete family training to demonstrate ability to assist pt as needed at discharge.    PT Predicted Duration/Target Date for Goal Attainment: 12/02/22  PT Frequency: Daily  PT: Bed Mobility: Modified independent, Rolling, Supine to/from sit (bed rail)  PT: Transfers: Minimal assist, Bed to/from chair (squat pivot, assist from wife for setup and CGA while pivoting)  PT: Wheelchair Mobility: 150 feet, manual wheelchair  PT: Perform aerobic activity with stable cardiovascular response: continuous activity, 15 minutes, NuStep  PT: Goal 1: Car transfer, squat pivots, CGA with assist for setup  PT: Edema education to increase ability to manage edema after discharge from the hospital:  (discontinued until able to be anticoagulated properly)  PT: Management of edema bandages:  (discontinued until able to be anticoagulated properly)  PT: Functional edema exercise program to reduce  limb volume, increase activity tolerance and improve independence with ADL:  (discontinued until able to be anticoagulated properly)    SLP Predicted Duration/Target Date for Goal Attainment: 12/09/22  Therapy Frequency (SLP Eval): daily  SLP: Communicate basic wants and needs: minimal assist  SLP: Goal 1: Pt will name novel and functional information within task with min cues 90% accuracy  SLP: Pt will tolerate regular texture, thin (0) liquid diet with compensatory strategies           Nursing Goals  Bowel and Bladder care  Fall prevention   Medication Education  Skin Care protection     Patient/Family Goal: Bowel: Pt will regain bladder continence by daily bowel program before d/c from ARU  Patient/Family Goal: Bladder: Pt will regain bladder continence by utilizing timed toileting before discharge from ARU  Goal: Skin Integrity: Pt will remain free from skin breakdown by utilizing weight shifting and frequent repositioning.

## 2022-11-22 NOTE — PLAN OF CARE
Discharge Planner Post-Acute Rehab OT:      Discharge Plan: Home with assist as needed and Home care     Precautions: fall, R shayy, mod thick liquid, aphasia     Shoulder mgmt: NMES 5-7x a day for shoulder subluxation protocol for RUE. Omno neurexa on with therapy for mobility, remove when seated in W/C or in bed. No ROM with RUE shoulder above 90 degrees.    *Alix is approved for using transfer pole to transfer pt    Current Status:  ADLs:    Mobility:WC based, Ax1 for transfer with transfer pole to the left side, florence whaley A1-2    Grooming: Set up seated    Dressing: UB Set up seated EOB, Mod A with don/doff shorts/ pants seated EOB, stand with transfer pole with assist forRLE knee block to pull up pants; dependent footwear and in sitting, more independence with in bed LB dressing    Bathing: Max A    Toileting: Florence whaley to commode, total A for cares  IADLs: wife can take care  Vision/Cognition: aphasia impacting communication     Assessment:  Alix present for session, problem solved bathroom DME and initiated training on stand pivot transfers with the transfer pole. Alix can transfer pt to and from the commode or  with transfer pole at this time. Advised her to order DME now in prep for next weeks discharge. Will need to schedule more family training as well.     Other Barriers to Discharge (DME, Family Training, etc): level of assist, aphasia  Schedule family training for beginning of next week  DME: transfer pole, commode, RTS with arm rests, gait belt, GB for bathroom, bedrail

## 2022-11-22 NOTE — PROGRESS NOTES
CLINICAL NUTRITION SERVICES - REASSESSMENT NOTE     Nutrition Prescription    RECOMMENDATIONS FOR MDs/PROVIDERS TO ORDER:  None today     Malnutrition Status:    Moderate malnutrition in the context of acute illness or injury     Recommendations already ordered by Registered Dietitian (RD):  Update supplement order to Magic Cup or Gelatein/SF Gelatein PRN - do not auto send, pt/wife will request    Update weekly weight order and requested staff now use standing scale weight to assure accuracy of trends     Future/Additional Recommendations:  Continue to monitor meal/supplement intakes, wt/lab trends      EVALUATION OF THE PROGRESS TOWARD GOALS   Diet: Regular, Mildly Thick Liquids (level 2), no straws - Close supervision needed, check mouth frequently for oral residue/pocketing, maintain upright position, minimize distractions  Supplement: Berry Magic Cup PRN - do not auto send, pt/wife will request  Intake: 100% per flow sheets        NEW FINDINGS   MAR reviewed. Labs reviewed. RD visited with pt/wife this afternoon, staff did get new wt, still seems significantly down from family reported more UBW of 180 lbs, discussed overall pt's meal intakes are good, reviewed other options for supplement while continuing on thickened liquids, pt/family agreeing to above.     11/23/22 1500 75.3 kg (165 lb 14.4 oz) Standing scale   11/21/22 0633 77 kg (169 lb 12.1 oz) Bed scale   11/11/22 0652 75.8 kg (167 lb 3.2 oz) --     76.1 kg (167 lb 12.3 oz) 10/24/2022 - per CE     Weight assessment: Non-significant 1.1% weight loss x1 month, possible 15 lb wt loss from pt/family reported UBW of closer to 180 lbs.     MALNUTRITION  % Intake: No decreased intake noted  % Weight Loss: Weight loss does not meet criteria vs possibly significant weight loss from family reports   Subcutaneous Fat Loss: Facial region: mild vs age related   Muscle Loss: Temporal: mild vs age related   Fluid Accumulation/Edema: Trace per flow sheets   Malnutrition  Diagnosis: Moderate malnutrition in the context of acute illness or injury     ASSESSED NUTRITION NEEDS (using current wt of 75.3 kg)  Estimated Energy Needs: 2260 kcals/day (30 kcals/kg)  Justification: Repletion  Estimated Protein Needs: 75 grams protein/day (1 grams of pro/kg)  Justification: Maintenance vs repletion  Estimated Fluid Needs: 1 mL/kcal  Justification: Maintenance    Previous Goals   Patient to consume % of nutritionally adequate meal trays TID, or the equivalent with supplements/snacks  Evaluation: Met    Previous Nutrition Diagnosis  Predicted inadequate nutrient intake related to chewing/swallowing difficult as evidenced by continued need for modified diet     Evaluation: No change    CURRENT NUTRITION DIAGNOSIS  Predicted inadequate nutrient intake related to chewing/swallowing difficult as evidenced by continued need for modified diet      INTERVENTIONS  Implementation  Medical food supplement therapy - order updated as above     Goals  Patient to consume % of nutritionally adequate meal trays TID, or the equivalent with supplements/snacks.    Monitoring/Evaluation  Progress toward goals will be monitored and evaluated per protocol.    Joy Borrego RD, CNSC, LD  [facility] pager: [phone]

## 2022-11-22 NOTE — PLAN OF CARE
FOCUS/GOAL  Bowel management, Bladder management, Pain management, Mobility, Skin integrity, and Safety management    ASSESSMENT, INTERVENTIONS AND CONTINUING PLAN FOR GOAL:  Patient is alert and oriented. Denied pain, headache, dizziness, CP or SOB. Regular/mild thicken liquid. Continent of B/B last BM 11/21 after bowel program. A-1 SPT uses transfer pole. VS WDL. Call light is in reach alarm is on. Staff will continue per POC.  Goal Outcome Evaluation:

## 2022-11-22 NOTE — PLAN OF CARE
Discharge Planner Post-Acute Rehab SLP:      Discharge Plan:  SLP      Precautions: aspiration, fall,      Current Status:  Hearing: WFL  Vision: reading glasses  Communication: mild-mod expressive-receptive language impairments c/b frequent paraphasia, word/sound/syllable substitutions, semantic substitutions within conversation. Expresses basic wants/needs WFL. Mild dysarthria   Cognition: Unable to assess d/t extent of language impairment. Suspect impaired reasoning/attention noted during assessment   Swallow: Regular texture, mildly thick (2) liquid. NO straw. Ensure upright all PO, small/single sips, alternate liquids/solids, occasional throat clear, oral care 3x day. Free water protocol with thin water via teaspoon.     Assessment:  Pt seen with AM meal. Ongoing IND implementation of stratgeies for oral clearance with regular texture. mild impulsivity still noted with mildly thick (2) liquid improving with min-mod cues. No s/sx aspiration. SLP provided education re: ongoing need for smaller sips and recent advancement to thin water following FWP only. Pt with ongoing semantic substitutions, improving insight into this with feedback and cueing.     Other Barriers to Discharge (Family Training, etc): diet training pending progress, communication/lang strategies to spouse/family

## 2022-11-22 NOTE — PLAN OF CARE
FOCUS/GOAL  Medical management    ASSESSMENT, INTERVENTIONS AND CONTINUING PLAN FOR GOAL:  VSS. Was started on OA for DVT on RLE. PLC needed, not ordered this shift. Denied pain. Okay for free water protocol per speech.Continent of bladder used urinal. No BM report. Will continue with POC.  Goal Outcome Evaluation:      Plan of Care Reviewed With: other (see comments)    Overall Patient Progress: no changeOverall Patient Progress: no change    Outcome Evaluation: No change this shift.

## 2022-11-23 ENCOUNTER — APPOINTMENT (OUTPATIENT)
Dept: PHYSICAL THERAPY | Facility: CLINIC | Age: 62
DRG: 056 | End: 2022-11-23
Attending: PHYSICAL MEDICINE & REHABILITATION
Payer: COMMERCIAL

## 2022-11-23 ENCOUNTER — APPOINTMENT (OUTPATIENT)
Dept: OCCUPATIONAL THERAPY | Facility: CLINIC | Age: 62
DRG: 056 | End: 2022-11-23
Attending: PHYSICAL MEDICINE & REHABILITATION
Payer: COMMERCIAL

## 2022-11-23 ENCOUNTER — APPOINTMENT (OUTPATIENT)
Dept: SPEECH THERAPY | Facility: CLINIC | Age: 62
DRG: 056 | End: 2022-11-23
Attending: PHYSICAL MEDICINE & REHABILITATION
Payer: COMMERCIAL

## 2022-11-23 PROCEDURE — 250N000013 HC RX MED GY IP 250 OP 250 PS 637: Performed by: PHYSICIAN ASSISTANT

## 2022-11-23 PROCEDURE — 92507 TX SP LANG VOICE COMM INDIV: CPT | Mod: GN | Performed by: SPEECH-LANGUAGE PATHOLOGIST

## 2022-11-23 PROCEDURE — 99232 SBSQ HOSP IP/OBS MODERATE 35: CPT | Performed by: PHYSICIAN ASSISTANT

## 2022-11-23 PROCEDURE — 97110 THERAPEUTIC EXERCISES: CPT | Mod: GO

## 2022-11-23 PROCEDURE — 97530 THERAPEUTIC ACTIVITIES: CPT | Mod: GP

## 2022-11-23 PROCEDURE — 128N000003 HC R&B REHAB

## 2022-11-23 PROCEDURE — 97535 SELF CARE MNGMENT TRAINING: CPT | Mod: GO

## 2022-11-23 PROCEDURE — 92526 ORAL FUNCTION THERAPY: CPT | Mod: GN | Performed by: SPEECH-LANGUAGE PATHOLOGIST

## 2022-11-23 RX ADMIN — METOPROLOL TARTRATE 25 MG: 25 TABLET, FILM COATED ORAL at 07:43

## 2022-11-23 RX ADMIN — Medication 5 MG: at 20:47

## 2022-11-23 RX ADMIN — ASPIRIN 81 MG: 81 TABLET, COATED ORAL at 07:43

## 2022-11-23 RX ADMIN — RIVAROXABAN 15 MG: 15 TABLET, FILM COATED ORAL at 07:51

## 2022-11-23 RX ADMIN — METOPROLOL TARTRATE 25 MG: 25 TABLET, FILM COATED ORAL at 20:47

## 2022-11-23 RX ADMIN — RIVAROXABAN 15 MG: 15 TABLET, FILM COATED ORAL at 18:50

## 2022-11-23 RX ADMIN — SENNOSIDES AND DOCUSATE SODIUM 1 TABLET: 50; 8.6 TABLET ORAL at 07:43

## 2022-11-23 RX ADMIN — BISACODYL 10 MG: 10 SUPPOSITORY RECTAL at 18:50

## 2022-11-23 RX ADMIN — TRAZODONE HYDROCHLORIDE 50 MG: 50 TABLET ORAL at 20:50

## 2022-11-23 RX ADMIN — SENNOSIDES AND DOCUSATE SODIUM 2 TABLET: 50; 8.6 TABLET ORAL at 20:49

## 2022-11-23 ASSESSMENT — ACTIVITIES OF DAILY LIVING (ADL)
ADLS_ACUITY_SCORE: 60
ADLS_ACUITY_SCORE: 58
ADLS_ACUITY_SCORE: 60
ADLS_ACUITY_SCORE: 58
ADLS_ACUITY_SCORE: 60

## 2022-11-23 NOTE — PLAN OF CARE
Discharge Planner Post-Acute Rehab PT:      Discharge Plan:   home with modifications, home health PT, assist from wife. Pt will need rental K4, ramp to enter, bed rail, possibly transfer pole.     Precautions:   falls, aphasia (expressive > receptive)  Lean to R with STS is nearly resolved.     Current Status:  Bed Mobility: Rabia for RLE assist sup<>sit  Transfer: sit <> stand min/mod A, Min-mod A stand pivot to the L in therapies. Working on squat pivots.  Recommend RN staff do SPT Ax1 with transfer pole or Florence Paez.   Gait: unsafe  Stairs: unsafe     PASS:  11/8 - 13/36, compensates well using LUE/LLE  11/23:      Assessment:  mass practice of squat pivot transfers from w/c <> gym mat with focus on set up and sequencing and initiation of training of Pt's spouse. Pt able to comply with set up and sequencing cues approx 50% of time. Pt displaying improvement w/safety, set up and sequencing w/reptition.     Other Barriers to Discharge (DME, Family Training, etc):   Family training - to schedule. Home measurements sheet provided.  KANE rajput completed 11/11.  DME: K4 rental, hospital bed vs bed rail, ramp to enter home, SB vs WBQC for transfers

## 2022-11-23 NOTE — PLAN OF CARE
FOCUS/GOAL  Medical management    ASSESSMENT, INTERVENTIONS AND CONTINUING PLAN FOR GOAL:  VSS. Was continent of bladder using urinal. No BM to report this shift.  Was started on Xarelto  yesterday. PLC for anticoagulation medication ordered. Denied pain. Will continue with POC.  Goal Outcome Evaluation:      Plan of Care Reviewed With: patient    Overall Patient Progress: no changeOverall Patient Progress: no change    Outcome Evaluation: No change this shift.

## 2022-11-23 NOTE — PLAN OF CARE
FOCUS/GOAL  Medical management    ASSESSMENT, INTERVENTIONS AND CONTINUING PLAN FOR GOAL:  Patient here with Acute ischemic stroke, right side affected  Slept most of the night  No complained of pain, headache, chest pain, N&V, no SOB  Continent of Bladder, utilized urinal at night, No BM this shift  Safety rounding checked completed, 3 side rails UP, bed alarm ON, call light in reach  Continue with POC.   Goal Outcome Evaluation:

## 2022-11-23 NOTE — PLAN OF CARE
Discharge Planner Post-Acute Rehab OT:      Discharge Plan: Home with assist as needed and Home care     Precautions: fall, R shayy, mod thick liquid, aphasia     Shoulder mgmt: NMES 5-7x a day for shoulder subluxation protocol for RUE. Omno neurexa on with therapy for mobility, remove when seated in W/C or in bed. No ROM with RUE shoulder above 90 degrees.    *Alix is approved for using transfer pole to transfer pt    Current Status:  ADLs:    Mobility:WC based, Ax1 for transfer with transfer pole to the left side, florence whaley A1-2    Grooming: Set up seated    Dressing: UB Set up seated EOB, Mod A with don/doff shorts/ pants seated EOB, stand with transfer pole with assist forRLE knee block to pull up pants; dependent footwear and in sitting, more independence with in bed LB dressing    Bathing: Max A    Toileting: Florence whaley to commode, total A for cares  IADLs: wife can take care  Vision/Cognition: aphasia impacting communication     Assessment: initial portion of session focus on ADLs, pt with increased difficulty donning shirt d/t challenge managing long sleeve with RUE.  Facilitated RUE strengthening and postural strengthening to improve function and strength for daily tasks and functional tsfs.  Demo'd transfer to right with tsf pole Ax1 this tx.      Other Barriers to Discharge (DME, Family Training, etc): level of assist, aphasia  Schedule family training for beginning of next week  DME: transfer pole, commode, RTS with arm rests, gait belt, GB for bathroom, bedrail

## 2022-11-23 NOTE — PLAN OF CARE
"Goal Outcome Evaluation:          VS: /66   Pulse 89   Temp (!) 96.2  F (35.7  C) (Oral)   Resp 16   Ht 1.88 m (6' 2\")   Wt 77 kg (169 lb 12.1 oz)   SpO2 98%   BMI 21.80 kg/m     O2: RA   Output: Voids spontaneously   Last BM: Continent, 11/22/22 bowel program today @ 8pm   Activity: Pivot to bedside commode Assist of 1   Skin: WDL   Pain: Denies   CMS: A/Ox4, right sided weakness   Dressing:    Diet: Combination Regular diet, mildly thick liquids (level 2)   Equipment: Personal belongings, bedside commode, ceiling support PT pole   Plan: Continue POC   Additional Info:                     "

## 2022-11-23 NOTE — PROGRESS NOTES
[facility] declined. HC referral sent to [facility] and pending. Insurance anticipated to be a barrier to finding HC.     ADDENDUM: [facility] declined as well.     CAROLINE Gongora   [facility]   Direct Phone: [phone]  I   Pager: [phone]  I  Fax: [phone]

## 2022-11-23 NOTE — PROGRESS NOTES
"  [facility]   Acute Rehabilitation Unit  Daily progress note    INTERVAL HISTORY  Tobin Hua was seen and examined at bedside this afternoon with wife present.  No acute events reported overnight.  Patient reports that he is feeling well overall, making ongoing progress with therapies.  Reports that he noticed some right hand movement earlier today while working with OT.  Also feels right leg is getting stronger and that he is better able to \"converse\".  He denies headache, visual disturbance, chest pain, palpitations, shortness of breath, dizziness, nausea.  Sleeping better.    Functionally, he is currently needing assist of 1 for transfers with transfer pole, set-up assist for seated grooming, set-up for upper body dressing, mod A for lower body dressing, max A for bathing.       MEDICATIONS    aspirin  81 mg Oral Daily     bisacodyl  10 mg Rectal Daily     - MEDICATION INSTRUCTIONS -   Does not apply See Admin Instructions     melatonin  5 mg Oral At Bedtime     metoprolol tartrate  25 mg Oral BID     rivaroxaban ANTICOAGULANT  15 mg Oral BID w/meals    Followed by     [START ON 12/13/2022] rivaroxaban ANTICOAGULANT  20 mg Oral Daily with supper     senna-docusate  1-2 tablet Oral BID     traZODone  50 mg Oral At Bedtime        acetaminophen, bisacodyl, lidocaine     PHYSICAL EXAM  /66   Pulse 89   Temp (!) 96.2  F (35.7  C) (Oral)   Resp 16   Ht 1.88 m (6' 2\")   Wt 77 kg (169 lb 12.1 oz)   SpO2 98%   BMI 21.80 kg/m     Gen: NAD, lying in bed  HEENT: NC/AT  Cardio: RRR, +murmur  Pulm: non-labored on room air, lungs CTA bilaterally  Abd: soft, non-distended, non-tender, bowel sounds present  Ext: slight edema throughout RLE, no erythema, non-tender, no warmth.  No edema in LLE.  Neuro/MSK: AAO, expressive aphasia but improving, R facial droop, PERRL, EOMI, R hemiparesis, improving proximal RLE strength/movement    LABS  CBC RESULTS:   Recent Labs   Lab Test " 11/21/22  0542 11/17/22  0624 11/14/22  0629   WBC 5.5 7.2 7.7   RBC 4.09* 3.93* 3.85*   HGB 11.7* 11.3* 11.1*   HCT 36.7* 36.1* 34.6*   MCV 90 92 90   MCH 28.6 28.8 28.8   MCHC 31.9 31.3* 32.1   RDW 13.8 13.8 14.0    328 361     Last Basic Metabolic Panel:  Recent Labs   Lab Test 11/21/22  0542 11/17/22  0624 11/14/22  0629    139 139   POTASSIUM 3.9 3.9 4.3   CHLORIDE 106 106 104   CO2 31 31 31   ANIONGAP 3 2* 4   GLC 96 91 90   BUN 15 21 16   CR 0.71 0.67 0.74   GFRESTIMATED >90 >90 >90   JAILENE 8.9 9.2 9.2         Rehabilitation - continue comprehensive acute inpatient rehabilitation program with multidisciplinary approach including therapies, rehab nursing, and physiatry following. See interval history for updates.      ASSESSMENT AND PLAN  Tobin Hua is a 62 year old right hand dominant male with a past medical history of mitral valve repair (2011), allergic rhinitis, and recent admission 10/21/22-11/1/22 for acute ischemic stroke due to dissection/occlusion of left ICA complicated by hemorrhagic transformation with intraparenchymal and intraventricular hemorrhage, anemia, non-sustained ventricular tachycardia, hypo/hypernatremia, dysphagia, constipation, and headache, who was previously at ARU and discharged back to acute hospital on 11/5/22 due to occlusive RLE DVT, now s/p IVC filter.  He is now re-admitted to ARU on 11/7/22 for multidisciplinary rehabilitation and ongoing medical management.        Admission to acute inpatient rehab 11/07/22.    Impairment group code: Stroke 01.2 (R) Body Involvement (L) Brain: left ICA and L MCA ischemic stroke, c/b hemorrhagic transformation        1. PT, OT and SLP 60 minutes of each on a daily basis, in addition to rehab nursing and close management of physiatrist.       2. Impairment of ADL's: Noted to have R hemiplegia, impaired balance, impaired coordination, fatigue, R/L visual convergence impairment, impaired sensation, impaired postural control, all  affecting his ability to safely and independently perform basic ADLs.  Goal for mod I with basic ADLs, anticipate need for assist with bathing/showering and related transfers.     3. Impairment of mobility:  Noted to have R hemiplegia, impaired balance, impaired coordination, fatigue, R/L visual convergence impairment, impaired sensation, impaired postural control, all affecting his ability to safely and independently perform basic mobility.  Goal for mod I with basic mobility, anticipate to be wheelchair based at discharge.     4. Impairment of cognition/language/swallow:  Noted to have dysphagia, dysarthria, and receptive and expressive aphasia with goals for safe tolerance of least restrictive diet and improved cognitive-linguistic skills to meet/communicate basic needs.     5. Medical Conditions  New actions/orders/updates for today are in blue.     Acute ischemic stroke due to ICA dissection and thrombosis/occlusion s/p tenecteplase, mechanical thrombectomy  C/b hemorrhagic transformation with intraparenchymal and intraventricular hemorrhage   R hemiparesis, R facial droop, dysphagia, aphasia, dysarthria  - Continue ASA 81 mg daily, indefinitely per neurology  - LDL 96, no indication for statin per neuro, no atherosclerosis on MRAs  - SBP goal <140  - Ziopatch in place for 2 weeks at ARU admission, completed and returned on 11/15/22  - Repeat CT head 11/21 improved as below  - Continue PT/OT/SLP  - Follow up with neurology     Occlusive RLE DVT  On 11/5/22, noted to have significant edema in RLE compared with LLE.  BLE Doppler revealed large occlusive DVT in R femoral vein extending to the deep veins below the knee and nonocclusive DVT in R common femoral vein. CTPE negative for PE.  Head CT demonstrates likely stable status of hemorrhagic conversion when compared with previous.  Patient underwent IR placement of IVC filter on 11/6.  Neurology consulted 11/7 to discuss consideration of anticoagulation.  Per  "their recs, would consider at approximately 4 weeks after hemorrhage, after repeating head CT to evaluate for any further expansion of bleed.  - Repeat RLE ultrasound 11/21 shows increased clot burden with \"new occlusive thrombus within the right profunda femoris vein\" as well as \"unchanged occlusive thrombus extending from the femoral vein to the posterior tibial and peroneal veins, and unchanged nonocclusive thrombus in the right common femoral vein.  - Repeat head CT 11/21 with improved appearance, expected evolution of intraparenchymal hemorrhage.  Per discussion with stroke neurology, ok to start anticoagulation with DOAC.   - Per discussion with stroke neurology, patient/family re: risks vs benefits, 11/22 started Xarelto 15 mg BID x21 days, followed by 20 mg daily.  - Follow-up with IR in 3-6 months to have IVC filter removed once   anticoagulation plan is established (sooner is better)     Mild leukocytosis, resolved  Elevated CRP  WBC peaked at 1.52 on 11/4, infectious work-up with UA negative, CXR negative, procal negative, CRP up-trending 150 on 11/7, blood cultures 11/5 NGTD.  Suspect 2/2 DVT, WBC down-trending since, normalized on 11/8.  - Repeat CRP down-trending 150 -> 100 11/10  - Blood cultures NGTD  - Trend CBC.  WBC remain WNL 11/21     Moderate dilation of ascending aorta   Incidentally noted on echo for CVA work-up, involving the sinuses of Valsalva, maximal dimension 4.6 cm.  - Needs outptient follow up in 6 months with CT imaging, can be followed by PCP     Episodes of non-sustained ventricular tachycardia  Demonstrated on telemetry during IP stay.  Started on metoprolol with reduced frequency.  - Continue metoprolol 25 mg BID  - Ziopatch in place at ARU admission  - EKG on 11/11 (eval QTc with start trazodone) with sinus rhythm, PVCs and PACs     Moderate oropharyngeal dysphagia  At risk for malnutrition  - RD consulted, appreciate assist.  - Continue SLP  - Advanced to regular diet per SLP " on 11/14  - Free water procotol  - Repeat VFSS 11/18 with improved oropharyngeal swallow evidenced by no aspiration with consistencies trialed this day.  Advanced to mildly thick liquids 11/19 with swallow strategies  - Monitor labs, vitals, clinically for signs of dehydration while on thickened liuqids.  11/21: Cr stable 0.71, BUN improved to 15.  Expect improved hydration on less thick liquids, continue to encourage intake.      Normocytic anemia  Noted drop from Hgb 13 to yohana of 9.9 on 10/23, most recently stable at 10s-11s.  - Trend CBC.  Hgb stable 11/21 at 11.7     Urinary retention, resolved  Urinary incontinence, improving  Noted since ARU admission, requiring intermittent straight catheterization. Wife had noted urinary urgency and some incontinence while at hospital. 11/3 UA negative for infection.  Not noted during recurrent inpatient stay.  - Ongoing intermittent urinary incontinence.    - Still waking a few times each night to urinate but arnulfo to return to sleep more easily.  - Continue behavioral mods, timed toileting, limiting fluids close to bedtime, voiding before bed, double voids, etc.     Hx mitral valve repair (2012)  Mild mitral regurgitation  - Noted.  Not on PTA anticoagulation, just aspirin, resumed as above     Allergic rhinitis  - Continue PTA Flonase daily     Right renal cortical enhancement  Incidentally noted on CT.  Indeterminate, possibly vascular etiology.   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  Non-painful.  Patient's movement limited by CVA as above.  - Follow-up with PCP to determine need for ortho spine involvement outpatient    Insomnia, improving  Patient reports difficulty both falling and staying asleep.  Denies any PTA issues with sleep.  Melatonin increased 3 mg to 5 mg on 11/9.  - Continue melatonin 5 mg  - Sleep hygiene  - Some relief with  addition of trazodone (started 11/10), but still sleep difficulty.  Trazodone increased to 50 mg at HS on 11/14.  Sleep improved/stable since.  Continue to monitor.  - EKG 11/11 with starting trazodone.  QTc WNL at 444.  - Continue to monitor       6. Adjustment to disability:  Clinical psychology to eval and treat if indicated  7. FEN: regular diet; moderately thick (level 3) liquids  8. Bowel: started on bowel program with nightly suppository, on scheduled and PRN bowel meds.  Having regular BMs with suppository.  9. Bladder: increasingly continent  10. DVT Prophylaxis: Xarelto started 11/22 as above  11. GI Prophylaxis: not indicated  12. Code: full  13. Disposition: goal for home with 24/7 assist  14. ELOS:  12/1/22  15. Rehab prognosis:  fair  1. Follow up Appointments on Discharge: PCP, neurology (Dr. Edgard Dave) in 6 weeks, IR in 3-6 months      Patient discussed with Dr. Jori Troncoso, PM&R staff physician     Ashley Campoverde PA-C  Physical Medicine & Rehabilitation

## 2022-11-23 NOTE — PLAN OF CARE
Discharge Planner Post-Acute Rehab SLP:      Discharge Plan:  SLP      Precautions: aspiration, fall,      Current Status:  Hearing: WFL  Vision: reading glasses  Communication: mild-mod expressive-receptive language impairments c/b frequent paraphasia, word/sound/syllable substitutions, semantic substitutions within conversation. Expresses basic wants/needs WFL. Mild dysarthria   Cognition: Unable to assess d/t extent of language impairment. Suspect impaired reasoning/attention noted during assessment   Swallow: Regular texture, mildly thick (2) liquid. NO straw. Ensure upright all PO, small/single sips, alternate liquids/solids, occasional throat clear, oral care 3x day. Free water protocol with thin water via teaspoon.     Assessment:  Pt re-educated in rationale for strategy use, exercises with still shots frmo VFSS demo'ing flash penetration with small sips thin, deep penetration with big sips thin, vallecula residue with jacoby cracker. Event depicted written on each picture, strategy to decrease event from occuring.    Pt trained in circumolcution word finding strategy, utilized Semantic Feature Map as visual aide for strategy. Copy of SFA map left for use in future sessions, training wife in strategy.    Other Barriers to Discharge (Family Training, etc): diet training pending progress, communication/lang strategies to spouse/family

## 2022-11-24 LAB
ANION GAP SERPL CALCULATED.3IONS-SCNC: 2 MMOL/L (ref 3–14)
BUN SERPL-MCNC: 20 MG/DL (ref 7–30)
CALCIUM SERPL-MCNC: 9 MG/DL (ref 8.5–10.1)
CHLORIDE BLD-SCNC: 108 MMOL/L (ref 94–109)
CO2 SERPL-SCNC: 31 MMOL/L (ref 20–32)
CREAT SERPL-MCNC: 0.71 MG/DL (ref 0.66–1.25)
ERYTHROCYTE [DISTWIDTH] IN BLOOD BY AUTOMATED COUNT: 14.3 % (ref 10–15)
GFR SERPL CREATININE-BSD FRML MDRD: >90 ML/MIN/1.73M2
GLUCOSE BLD-MCNC: 95 MG/DL (ref 70–99)
HCT VFR BLD AUTO: 37 % (ref 40–53)
HGB BLD-MCNC: 11.7 G/DL (ref 13.3–17.7)
MAGNESIUM SERPL-MCNC: 2.1 MG/DL (ref 1.6–2.3)
MCH RBC QN AUTO: 28.8 PG (ref 26.5–33)
MCHC RBC AUTO-ENTMCNC: 31.6 G/DL (ref 31.5–36.5)
MCV RBC AUTO: 91 FL (ref 78–100)
PHOSPHATE SERPL-MCNC: 4 MG/DL (ref 2.5–4.5)
PLATELET # BLD AUTO: 238 10E3/UL (ref 150–450)
POTASSIUM BLD-SCNC: 4.3 MMOL/L (ref 3.4–5.3)
RBC # BLD AUTO: 4.06 10E6/UL (ref 4.4–5.9)
SODIUM SERPL-SCNC: 141 MMOL/L (ref 133–144)
WBC # BLD AUTO: 6 10E3/UL (ref 4–11)

## 2022-11-24 PROCEDURE — 85027 COMPLETE CBC AUTOMATED: CPT | Performed by: PHYSICIAN ASSISTANT

## 2022-11-24 PROCEDURE — 250N000013 HC RX MED GY IP 250 OP 250 PS 637: Performed by: PHYSICIAN ASSISTANT

## 2022-11-24 PROCEDURE — 84100 ASSAY OF PHOSPHORUS: CPT | Performed by: PHYSICIAN ASSISTANT

## 2022-11-24 PROCEDURE — 36415 COLL VENOUS BLD VENIPUNCTURE: CPT | Performed by: PHYSICIAN ASSISTANT

## 2022-11-24 PROCEDURE — 82310 ASSAY OF CALCIUM: CPT | Performed by: PHYSICIAN ASSISTANT

## 2022-11-24 PROCEDURE — 83735 ASSAY OF MAGNESIUM: CPT | Performed by: PHYSICIAN ASSISTANT

## 2022-11-24 PROCEDURE — 128N000003 HC R&B REHAB

## 2022-11-24 RX ADMIN — SENNOSIDES AND DOCUSATE SODIUM 2 TABLET: 50; 8.6 TABLET ORAL at 08:28

## 2022-11-24 RX ADMIN — Medication 5 MG: at 20:23

## 2022-11-24 RX ADMIN — BISACODYL 10 MG: 10 SUPPOSITORY RECTAL at 19:16

## 2022-11-24 RX ADMIN — METOPROLOL TARTRATE 25 MG: 25 TABLET, FILM COATED ORAL at 08:28

## 2022-11-24 RX ADMIN — SENNOSIDES AND DOCUSATE SODIUM 1 TABLET: 50; 8.6 TABLET ORAL at 20:23

## 2022-11-24 RX ADMIN — RIVAROXABAN 15 MG: 15 TABLET, FILM COATED ORAL at 19:17

## 2022-11-24 RX ADMIN — TRAZODONE HYDROCHLORIDE 50 MG: 50 TABLET ORAL at 20:23

## 2022-11-24 RX ADMIN — ASPIRIN 81 MG: 81 TABLET, COATED ORAL at 08:28

## 2022-11-24 RX ADMIN — RIVAROXABAN 15 MG: 15 TABLET, FILM COATED ORAL at 09:04

## 2022-11-24 ASSESSMENT — ACTIVITIES OF DAILY LIVING (ADL)
ADLS_ACUITY_SCORE: 58

## 2022-11-24 NOTE — PLAN OF CARE
Goal Outcome Evaluation:       Overall Patient Progress: no changeOverall Patient Progress: no change    Outcome Evaluation: Continent of bladder and bowel this pm shift, had a medium bm in the commode after dulcolax suppository. Denies pain. Without skin concerns. Used his call light appropriately and waited for assistance.

## 2022-11-24 NOTE — PLAN OF CARE
FOCUS/GOAL  Medical management    ASSESSMENT, INTERVENTIONS AND CONTINUING PLAN FOR GOAL:  Patient here with Ischemic stroke, right side affected, able to make needs known,  Slept most of the night, awaken in between urinal used  No complained of pain, headache, chest pain, N&V, no SOB  Continent of Bladder, utilized urinal at night, No BM this shift  Safety rounding checked completed, 3 side rails UP, bed alarm ON, call light in reach  Continue with POC.   Goal Outcome Evaluation:

## 2022-11-24 NOTE — PLAN OF CARE
Goal Outcome Evaluation:      Plan of Care Reviewed With: patient    Overall Patient Progress: no changeOverall Patient Progress: no change    Outcome Evaluation: Pt remains continent of bowel and bladder, using urinal. No new skin issues noted. Declines any pain at this time. Using call light appropriately to make needs known.

## 2022-11-25 ENCOUNTER — APPOINTMENT (OUTPATIENT)
Dept: SPEECH THERAPY | Facility: CLINIC | Age: 62
DRG: 056 | End: 2022-11-25
Attending: PHYSICAL MEDICINE & REHABILITATION
Payer: COMMERCIAL

## 2022-11-25 ENCOUNTER — APPOINTMENT (OUTPATIENT)
Dept: OCCUPATIONAL THERAPY | Facility: CLINIC | Age: 62
DRG: 056 | End: 2022-11-25
Attending: PHYSICAL MEDICINE & REHABILITATION
Payer: COMMERCIAL

## 2022-11-25 ENCOUNTER — APPOINTMENT (OUTPATIENT)
Dept: PHYSICAL THERAPY | Facility: CLINIC | Age: 62
DRG: 056 | End: 2022-11-25
Attending: PHYSICAL MEDICINE & REHABILITATION
Payer: COMMERCIAL

## 2022-11-25 PROCEDURE — 97112 NEUROMUSCULAR REEDUCATION: CPT | Mod: GO | Performed by: STUDENT IN AN ORGANIZED HEALTH CARE EDUCATION/TRAINING PROGRAM

## 2022-11-25 PROCEDURE — 97535 SELF CARE MNGMENT TRAINING: CPT | Mod: GO | Performed by: STUDENT IN AN ORGANIZED HEALTH CARE EDUCATION/TRAINING PROGRAM

## 2022-11-25 PROCEDURE — 97530 THERAPEUTIC ACTIVITIES: CPT | Mod: GP | Performed by: STUDENT IN AN ORGANIZED HEALTH CARE EDUCATION/TRAINING PROGRAM

## 2022-11-25 PROCEDURE — 99233 SBSQ HOSP IP/OBS HIGH 50: CPT | Mod: GC | Performed by: PHYSICAL MEDICINE & REHABILITATION

## 2022-11-25 PROCEDURE — 128N000003 HC R&B REHAB

## 2022-11-25 PROCEDURE — 92507 TX SP LANG VOICE COMM INDIV: CPT | Mod: GN

## 2022-11-25 PROCEDURE — 250N000013 HC RX MED GY IP 250 OP 250 PS 637: Performed by: PHYSICIAN ASSISTANT

## 2022-11-25 PROCEDURE — 92507 TX SP LANG VOICE COMM INDIV: CPT | Mod: GN | Performed by: SPEECH-LANGUAGE PATHOLOGIST

## 2022-11-25 RX ADMIN — RIVAROXABAN 15 MG: 15 TABLET, FILM COATED ORAL at 18:39

## 2022-11-25 RX ADMIN — SENNOSIDES AND DOCUSATE SODIUM 1 TABLET: 50; 8.6 TABLET ORAL at 08:37

## 2022-11-25 RX ADMIN — TRAZODONE HYDROCHLORIDE 50 MG: 50 TABLET ORAL at 20:10

## 2022-11-25 RX ADMIN — METOPROLOL TARTRATE 25 MG: 25 TABLET, FILM COATED ORAL at 08:36

## 2022-11-25 RX ADMIN — BISACODYL 10 MG: 10 SUPPOSITORY RECTAL at 18:14

## 2022-11-25 RX ADMIN — RIVAROXABAN 15 MG: 15 TABLET, FILM COATED ORAL at 08:42

## 2022-11-25 RX ADMIN — Medication 5 MG: at 20:10

## 2022-11-25 RX ADMIN — METOPROLOL TARTRATE 25 MG: 25 TABLET, FILM COATED ORAL at 20:10

## 2022-11-25 RX ADMIN — SENNOSIDES AND DOCUSATE SODIUM 2 TABLET: 50; 8.6 TABLET ORAL at 20:10

## 2022-11-25 RX ADMIN — ASPIRIN 81 MG: 81 TABLET, COATED ORAL at 08:36

## 2022-11-25 ASSESSMENT — ACTIVITIES OF DAILY LIVING (ADL)
ADLS_ACUITY_SCORE: 58
ADLS_ACUITY_SCORE: 58
ADLS_ACUITY_SCORE: 59
ADLS_ACUITY_SCORE: 58
ADLS_ACUITY_SCORE: 58
ADLS_ACUITY_SCORE: 59
ADLS_ACUITY_SCORE: 58

## 2022-11-25 NOTE — PLAN OF CARE
Discharge Planner Post-Acute Rehab SLP:      Discharge Plan:  SLP      Precautions: aspiration, fall,      Current Status:  Hearing: WFL  Vision: reading glasses  Communication: mild-mod expressive-receptive language impairments c/b frequent paraphasia, word/sound/syllable substitutions, semantic substitutions within conversation. Expresses basic wants/needs WFL. Mild dysarthria   Cognition: Unable to assess d/t extent of language impairment. Suspect impaired reasoning/attention noted during assessment   Swallow: Regular texture, mildly thick (2) liquid. NO straw. Ensure upright all PO, small/single sips, alternate liquids/solids, occasional throat clear, oral care 3x day. Free water protocol with thin water via teaspoon.     Assessment:  Pt's wife educated in circumlocution word finding strategy with Semantic Feature Analysis (SFA) map. Instructed pt in confrontation naming task with pictures, structured practice opportunities for word finding strategy use. Pt named 60% words independently, incorrect production typically phonemic paraphasias. With structured cues, word finding/picture naming increased to 90%.    Other Barriers to Discharge (Family Training, etc): diet training pending progress, communication/lang strategies to spouse/family

## 2022-11-25 NOTE — PLAN OF CARE
Discharge Planner Post-Acute Rehab PT:      Discharge Plan:   home with modifications, home health PT, assist from wife. Pt will need rental K4, ramp to enter, bed rail, possibly transfer pole.     Precautions:   falls, aphasia (expressive > receptive)  Lean to R with STS is nearly resolved.     Current Status:  Bed Mobility: Rabia for RLE assist sup<>sit  Transfer: sit <> stand min/mod A, Min-mod A stand pivot to the L in therapies. Working on squat pivots.  Recommend RN staff do SPT Ax1 with transfer pole or Florence Stemarialuisa.   Gait: unsafe  Stairs: unsafe     PASS:  11/8 - 13/36, compensates well using LUE/LLE  11/26: **     Assessment:  Introduced slide board to good effect. Will continue to assess squat pivot vs slide board for best method with wife. Pt wc switched from TIS to standard manual wc. Will address wc mobility over the weekend.     Other Barriers to Discharge (DME, Family Training, etc):   Family training - to schedule. Home measurements sheet provided.  WC eval completed 11/11.  DME: K4 rental, hospital bed vs bed rail, ramp to enter home, SB vs WBQC for transfers

## 2022-11-25 NOTE — PLAN OF CARE
FOCUS/GOAL  Bowel management, Bladder management, and Mobility    ASSESSMENT, INTERVENTIONS AND CONTINUING PLAN FOR GOAL:    Pt A&Ox4 but does not use the call light appropriately. Staff found pt back in bed when previously reported he was up in wheel chair with wc on window side of room and bed side table on the opposite far from pt. Pt stated he forgot to call and said his friend helped transfer him before he left the unit. Pt educated on importance of calling for staff when ambulating, chair alarm and bed alarm activated and call light in reach. Regular diet, good appetite 100% w/ breakfast and lunch. Mildly thickened liquids w/ exception of free water, see free water protocol. VSS. Will continue w/ poc.

## 2022-11-25 NOTE — PLAN OF CARE
"/65 (BP Location: Left arm)   Pulse 89   Temp (!) 96.3  F (35.7  C) (Oral)   Resp 16   Ht 1.88 m (6' 2\")   Wt 76.8 kg (169 lb 5 oz)   SpO2 98%   BMI 21.74 kg/m       PT AOx4. No Acute events overnight. Pt denies Chest pain, SOB, N/V. Denies pain. Able to make needs known, calls appropriately. Continue POC      "

## 2022-11-25 NOTE — PROGRESS NOTES
"  [facility]   Acute Rehabilitation Unit  Daily progress note    INTERVAL HISTORY  Tobin Hua was seen and examined at bedside this morning. He reports overall feeling well and making progress. Feels his speech and strength continue to improve. Denies any CP, SOB, abd pain, lightheadedness, nausea. Sleeping well. He has no new concerns today. LBM 11/24.     Functionally, he continues to require needing assist of 1 for transfers with transfer pole, set-up assist for seated grooming, set-up for upper body dressing, mod A for lower body dressing, max A for bathing. Per OT exploring whether squat pivot or stand pivot will be most effective at discharge.        MEDICATIONS    aspirin  81 mg Oral Daily     bisacodyl  10 mg Rectal Daily     - MEDICATION INSTRUCTIONS -   Does not apply See Admin Instructions     melatonin  5 mg Oral At Bedtime     metoprolol tartrate  25 mg Oral BID     rivaroxaban ANTICOAGULANT  15 mg Oral BID w/meals    Followed by     [START ON 12/13/2022] rivaroxaban ANTICOAGULANT  20 mg Oral Daily with supper     senna-docusate  1-2 tablet Oral BID     traZODone  50 mg Oral At Bedtime        acetaminophen, bisacodyl, lidocaine     PHYSICAL EXAM  /89   Pulse 75   Temp 96.9  F (36.1  C) (Oral)   Resp 16   Ht 1.88 m (6' 2\")   Wt 76.8 kg (169 lb 5 oz)   SpO2 100%   BMI 21.74 kg/m     Gen: NAD, lying in bed  HEENT: NC/AT  Cardio: RRR, +murmur  Pulm: non-labored on room air, lungs CTA bilaterally  Abd: soft, non-distended, non-tender, bowel sounds present  Ext: slight edema throughout RLE, no erythema, non-tender, no warmth.  No edema in LLE.  Neuro/MSK: AAO, expressive aphasia, R facial droop, PERRL, EOMI, R hemiparesis, improving proximal RLE strength/movement    LABS  CBC RESULTS:   Recent Labs   Lab Test 11/24/22  0617 11/21/22  0542 11/17/22  0624   WBC 6.0 5.5 7.2   RBC 4.06* 4.09* 3.93*   HGB 11.7* 11.7* 11.3*   HCT 37.0* 36.7* 36.1*   MCV 91 90 92 "   MCH 28.8 28.6 28.8   MCHC 31.6 31.9 31.3*   RDW 14.3 13.8 13.8    268 328     Last Basic Metabolic Panel:  Recent Labs   Lab Test 11/24/22  0617 11/21/22  0542 11/17/22  0624    140 139   POTASSIUM 4.3 3.9 3.9   CHLORIDE 108 106 106   CO2 31 31 31   ANIONGAP 2* 3 2*   GLC 95 96 91   BUN 20 15 21   CR 0.71 0.71 0.67   GFRESTIMATED >90 >90 >90   JAILENE 9.0 8.9 9.2         Rehabilitation - continue comprehensive acute inpatient rehabilitation program with multidisciplinary approach including therapies, rehab nursing, and physiatry following. See interval history for updates.      ASSESSMENT AND PLAN  Tobin Hua is a 62 year old right hand dominant male with a past medical history of mitral valve repair (2011), allergic rhinitis, and recent admission 10/21/22-11/1/22 for acute ischemic stroke due to dissection/occlusion of left ICA complicated by hemorrhagic transformation with intraparenchymal and intraventricular hemorrhage, anemia, non-sustained ventricular tachycardia, hypo/hypernatremia, dysphagia, constipation, and headache, who was previously at ARU and discharged back to acute hospital on 11/5/22 due to occlusive RLE DVT, now s/p IVC filter.  He is now re-admitted to ARU on 11/7/22 for multidisciplinary rehabilitation and ongoing medical management.     - Vitals stable.   - No lab today. Labs on 11/24 were stable.   - Continue ongoing medical management.  - Continue therapies and plan of care.  - Refer to last progress note from primary team for full problems list.  - New issues: none    Patient was staffed with attending PM&R physician, Dr. Storm.     Elroy Mattson DO  PM&R Resident  [facility]  Pager: [phone]

## 2022-11-26 ENCOUNTER — APPOINTMENT (OUTPATIENT)
Dept: PHYSICAL THERAPY | Facility: CLINIC | Age: 62
DRG: 056 | End: 2022-11-26
Attending: PHYSICAL MEDICINE & REHABILITATION
Payer: COMMERCIAL

## 2022-11-26 ENCOUNTER — APPOINTMENT (OUTPATIENT)
Dept: OCCUPATIONAL THERAPY | Facility: CLINIC | Age: 62
DRG: 056 | End: 2022-11-26
Attending: PHYSICAL MEDICINE & REHABILITATION
Payer: COMMERCIAL

## 2022-11-26 ENCOUNTER — APPOINTMENT (OUTPATIENT)
Dept: SPEECH THERAPY | Facility: CLINIC | Age: 62
DRG: 056 | End: 2022-11-26
Attending: PHYSICAL MEDICINE & REHABILITATION
Payer: COMMERCIAL

## 2022-11-26 PROCEDURE — 97535 SELF CARE MNGMENT TRAINING: CPT | Mod: GO | Performed by: STUDENT IN AN ORGANIZED HEALTH CARE EDUCATION/TRAINING PROGRAM

## 2022-11-26 PROCEDURE — 128N000003 HC R&B REHAB

## 2022-11-26 PROCEDURE — 97112 NEUROMUSCULAR REEDUCATION: CPT | Mod: GP | Performed by: PHYSICAL THERAPIST

## 2022-11-26 PROCEDURE — 97112 NEUROMUSCULAR REEDUCATION: CPT | Mod: GO | Performed by: STUDENT IN AN ORGANIZED HEALTH CARE EDUCATION/TRAINING PROGRAM

## 2022-11-26 PROCEDURE — 92507 TX SP LANG VOICE COMM INDIV: CPT | Mod: GN

## 2022-11-26 PROCEDURE — 97530 THERAPEUTIC ACTIVITIES: CPT | Mod: GP | Performed by: PHYSICAL THERAPIST

## 2022-11-26 PROCEDURE — 250N000013 HC RX MED GY IP 250 OP 250 PS 637: Performed by: PHYSICIAN ASSISTANT

## 2022-11-26 PROCEDURE — 92526 ORAL FUNCTION THERAPY: CPT | Mod: GN

## 2022-11-26 RX ADMIN — RIVAROXABAN 15 MG: 15 TABLET, FILM COATED ORAL at 09:21

## 2022-11-26 RX ADMIN — RIVAROXABAN 15 MG: 15 TABLET, FILM COATED ORAL at 18:05

## 2022-11-26 RX ADMIN — SENNOSIDES AND DOCUSATE SODIUM 1 TABLET: 50; 8.6 TABLET ORAL at 20:00

## 2022-11-26 RX ADMIN — METOPROLOL TARTRATE 25 MG: 25 TABLET, FILM COATED ORAL at 20:00

## 2022-11-26 RX ADMIN — BISACODYL 10 MG: 10 SUPPOSITORY RECTAL at 19:58

## 2022-11-26 RX ADMIN — TRAZODONE HYDROCHLORIDE 50 MG: 50 TABLET ORAL at 20:00

## 2022-11-26 RX ADMIN — ASPIRIN 81 MG: 81 TABLET, COATED ORAL at 09:21

## 2022-11-26 RX ADMIN — Medication 5 MG: at 20:00

## 2022-11-26 RX ADMIN — SENNOSIDES AND DOCUSATE SODIUM 1 TABLET: 50; 8.6 TABLET ORAL at 09:21

## 2022-11-26 ASSESSMENT — ACTIVITIES OF DAILY LIVING (ADL)
ADLS_ACUITY_SCORE: 57
ADLS_ACUITY_SCORE: 59
ADLS_ACUITY_SCORE: 57

## 2022-11-26 NOTE — PLAN OF CARE
Goal Outcome Evaluation:    Patient is AOx4, able to communicate and verbalize needs. Has right sided weakness and transfers A1 with T pole, W/C based. On bowel program, continent of bowel after bowel program using the bedside commode. Mixed incontinence with bladder and uses urinal. Patient is on regular diet, pills whole with mildly thick liquid. Skin okay save for bruising on the R. Chest where the Ziopatch was previously. Denied pain, CP or SOB. Continue with POC.

## 2022-11-26 NOTE — PLAN OF CARE
Discharge Planner Post-Acute Rehab OT:      Discharge Plan: Home with assist as needed and Home care     Precautions: fall, R shayy, mod thick liquid, aphasia     Shoulder mgmt: NMES 5-7x a day for shoulder subluxation protocol for RUE. Omno neurexa on with therapy for mobility, remove when seated in W/C or in bed. No ROM with RUE shoulder above 90 degrees.    *Alix is approved for using transfer pole to transfer pt    Current Status:  ADLs:    Mobility:WC based, Ax1 for transfer with transfer poleand squat pivot using the bedrail, bozena whaley A1-2    Grooming: Set up seated    Dressing: UB Set up seated EOB, Mod A with don/doff shorts/ pants seated EOB, stand with transfer pole with assist forRLE knee block to pull up pants; dependent footwear and in sitting, more independence with in bed LB dressing    Bathing: Max A    Toileting: Bozena pricillady to commode, total A for cares  IADLs: wife can take care  Vision/Cognition: aphasia impacting communication     Assessment: Pt improving in transfers and working on neuro re ed of RUE via WB and other strategies.     Other Barriers to Discharge (DME, Family Training, etc): level of assist, aphasia  Schedule family training for beginning of next week  DME: transfer pole, commode, RTS with arm rests, gait belt, GB for bathroom, bedrail

## 2022-11-26 NOTE — PLAN OF CARE
Discharge Planner Post-Acute Rehab SLP:      Discharge Plan:  SLP      Precautions: aspiration, fall,      Current Status:  Hearing: WFL  Vision: reading glasses  Communication: mild-mod expressive-receptive language impairments c/b frequent paraphasia, word/sound/syllable substitutions, semantic substitutions within conversation. Expresses basic wants/needs WFL. Mild dysarthria   Cognition: Unable to assess d/t extent of language impairment. Suspect impaired reasoning/attention noted during assessment   Swallow: Regular texture, mildly thick (2) liquid. NO straw. Ensure upright all PO, small/single sips, alternate liquids/solids, occasional throat clear, oral care 3x day. Free water protocol with thin water via teaspoon.     Assessment: Following thorough oral care, Pt demonstrated use of small sips, slow rate with occasional cue.  Pt performed pharyngeal strengthening exercises including effortful swallow x 10 reps.    Other Barriers to Discharge (Family Training, etc): diet training pending progress, communication/lang strategies to spouse/family

## 2022-11-26 NOTE — PLAN OF CARE
FOCUS/GOAL  Medical management    ASSESSMENT, INTERVENTIONS AND CONTINUING PLAN FOR GOAL:  Slept trough the night w/o concerns. Voided using urinal. No BM this shift. Denied pain.  Will continue with POC.  Goal Outcome Evaluation:      Plan of Care Reviewed With: other (see comments)          Outcome Evaluation: No change

## 2022-11-26 NOTE — PLAN OF CARE
FOCUS/GOAL  Bowel management, Bladder management, Medication management, Pain management, Medical management, Mobility, Skin integrity, and Safety management    ASSESSMENT, INTERVENTIONS AND CONTINUING PLAN FOR GOAL:    Pt is alert and oriented x 4. Vital signs stable, slightly hypotensive. Denied pain, nausea and vomiting, numbness or tingling, shortness of breath. Alarms on, side rails up x 3 for safety. Call light within reach, able to make needs known. Pt is on bowel program, last bm 11/25, voiding well in urinal. Reg diet, mildly thickened liquids. No new concerns at this time. Will continue with plan of care.

## 2022-11-26 NOTE — PLAN OF CARE
Discharge Planner Post-Acute Rehab SLP:      Discharge Plan:  SLP      Precautions: aspiration, fall,      Current Status:  Hearing: WFL  Vision: reading glasses  Communication: mild-mod expressive-receptive language impairments c/b frequent paraphasia, word/sound/syllable substitutions, semantic substitutions within conversation. Expresses basic wants/needs WFL. Mild dysarthria   Cognition: Unable to assess d/t extent of language impairment. Suspect impaired reasoning/attention noted during assessment   Swallow: Regular texture, mildly thick (2) liquid. NO straw. Ensure upright all PO, small/single sips, alternate liquids/solids, occasional throat clear, oral care 3x day. Free water protocol with thin water via teaspoon.     Assessment: Following thorough oral care, Pt demonstrated use of small sips, slow rate with occasional cue.  Pt performed pharyngeal strengthening exercises including effortful swallow x 10 reps.    PM: Pt participated in expressive/receptive language task given subtests from Famous Protocol. Pt with ongoing semantic substitutions and final sound paraphasia. Pt benefits from pausing and redirecting to breakdown target and generate correct expression.    Other Barriers to Discharge (Family Training, etc): diet training pending progress, communication/lang strategies to spouse/family

## 2022-11-26 NOTE — PLAN OF CARE
Discharge Planner Post-Acute Rehab PT:      Discharge Plan:   home with modifications, home health PT, assist from wife. Pt will need rental K4, ramp to enter, bed rail, possibly transfer pole.     Precautions:   falls, aphasia (expressive > receptive)  Lean to R with STS is nearly resolved.     Current Status:  Bed Mobility: Rabia for RLE assist sup<>sit  Transfer: sit <> stand min/mod A, Min-mod A stand pivot to the L in therapies. Working on squat pivots.  Recommend RN staff do SPT Ax1 with transfer pole or Florence Stedy.   Gait: unsafe  Stairs: unsafe     PASS:  11/8 - 13/36, compensates well using LUE/LLE  11/27: **     Assessment:  Wife present for transfer training.  Continues to improve overall control.  Initiated car transfer without SB, but pt and wife aware it is an option if needed.     Other Barriers to Discharge (DME, Family Training, etc):   Family training - to schedule. Home measurements sheet provided.  KANE rajput completed 11/11.  DME: K4 rental, hospital bed vs bed rail, ramp to enter home, SB vs WBQC for transfers

## 2022-11-27 ENCOUNTER — APPOINTMENT (OUTPATIENT)
Dept: OCCUPATIONAL THERAPY | Facility: CLINIC | Age: 62
DRG: 056 | End: 2022-11-27
Attending: PHYSICAL MEDICINE & REHABILITATION
Payer: COMMERCIAL

## 2022-11-27 ENCOUNTER — APPOINTMENT (OUTPATIENT)
Dept: PHYSICAL THERAPY | Facility: CLINIC | Age: 62
DRG: 056 | End: 2022-11-27
Attending: PHYSICAL MEDICINE & REHABILITATION
Payer: COMMERCIAL

## 2022-11-27 ENCOUNTER — APPOINTMENT (OUTPATIENT)
Dept: SPEECH THERAPY | Facility: CLINIC | Age: 62
DRG: 056 | End: 2022-11-27
Attending: PHYSICAL MEDICINE & REHABILITATION
Payer: COMMERCIAL

## 2022-11-27 PROCEDURE — 250N000013 HC RX MED GY IP 250 OP 250 PS 637: Performed by: PHYSICIAN ASSISTANT

## 2022-11-27 PROCEDURE — 97530 THERAPEUTIC ACTIVITIES: CPT | Mod: GP | Performed by: PHYSICAL THERAPIST

## 2022-11-27 PROCEDURE — 92507 TX SP LANG VOICE COMM INDIV: CPT | Mod: GN

## 2022-11-27 PROCEDURE — 97112 NEUROMUSCULAR REEDUCATION: CPT | Mod: GO | Performed by: STUDENT IN AN ORGANIZED HEALTH CARE EDUCATION/TRAINING PROGRAM

## 2022-11-27 PROCEDURE — 128N000003 HC R&B REHAB

## 2022-11-27 PROCEDURE — 97112 NEUROMUSCULAR REEDUCATION: CPT | Mod: GP | Performed by: PHYSICAL THERAPIST

## 2022-11-27 PROCEDURE — 97535 SELF CARE MNGMENT TRAINING: CPT | Mod: GO | Performed by: STUDENT IN AN ORGANIZED HEALTH CARE EDUCATION/TRAINING PROGRAM

## 2022-11-27 RX ADMIN — METOPROLOL TARTRATE 25 MG: 25 TABLET, FILM COATED ORAL at 08:21

## 2022-11-27 RX ADMIN — BISACODYL 10 MG: 10 SUPPOSITORY RECTAL at 18:48

## 2022-11-27 RX ADMIN — SENNOSIDES AND DOCUSATE SODIUM 1 TABLET: 50; 8.6 TABLET ORAL at 08:21

## 2022-11-27 RX ADMIN — SENNOSIDES AND DOCUSATE SODIUM 2 TABLET: 50; 8.6 TABLET ORAL at 20:04

## 2022-11-27 RX ADMIN — RIVAROXABAN 15 MG: 15 TABLET, FILM COATED ORAL at 09:49

## 2022-11-27 RX ADMIN — Medication 5 MG: at 20:04

## 2022-11-27 RX ADMIN — RIVAROXABAN 15 MG: 15 TABLET, FILM COATED ORAL at 18:48

## 2022-11-27 RX ADMIN — TRAZODONE HYDROCHLORIDE 50 MG: 50 TABLET ORAL at 20:04

## 2022-11-27 RX ADMIN — ASPIRIN 81 MG: 81 TABLET, COATED ORAL at 08:21

## 2022-11-27 ASSESSMENT — ACTIVITIES OF DAILY LIVING (ADL)
ADLS_ACUITY_SCORE: 57

## 2022-11-27 NOTE — PLAN OF CARE
Discharge Planner Post-Acute Rehab PT:      Discharge Plan:   home with modifications, home health PT, assist from wife. Pt will need rental K4, ramp to enter, bed rail, possibly transfer pole.     Precautions:   falls, aphasia (expressive > receptive)  Lean to R with STS is nearly resolved.     Current Status:  Bed Mobility: Rabia for RLE assist sup<>sit  Transfer: sit <> stand min/mod A, Min-mod A stand pivot to the L in therapies. Working on squat pivots.  Recommend RN staff do SPT Ax1 with transfer pole or Florence Paez.   Gait: unsafe  Stairs: unsafe     PASS:  11/8 - 13/36, compensates well using LUE/LLE  11/28: **     Assessment:  Wife present for transfer training.  Improving with mass practice squat pivot transfer demonstrating better control to affected side.  On pace for discharge     Other Barriers to Discharge (DME, Family Training, etc):   Family training - to schedule. Home measurements sheet provided.  KANE rajput completed 11/11.  DME: K4 rental, hospital bed vs bed rail, ramp to enter home, SB vs WBQC for transfers

## 2022-11-27 NOTE — PLAN OF CARE
Discharge Planner Post-Acute Rehab OT:      Discharge Plan: Home with assist as needed and Home care     Precautions: fall, R shayy, mod thick liquid, aphasia     Shoulder mgmt: NMES 5-7x a day for shoulder subluxation protocol for RUE. Omno neurexa on with therapy for mobility, remove when seated in W/C or in bed. No ROM with RUE shoulder above 90 degrees.    *Alix is approved for using transfer pole to transfer pt    Current Status:  ADLs:    Mobility:WC based, Ax1 for transfer with transfer poleand squat pivot using the bedrail, bozena whaley A1-2    Grooming: Set up seated    Dressing: UB Set up seated EOB, Mod A with don/doff shorts/ pants seated EOB, stand with transfer pole with assist forRLE knee block to pull up pants; dependent footwear and in sitting, more independence with in bed LB dressing    Bathing: Max A    Toileting: transfer pole to commode, total A for cares  IADLs: wife can take care  Vision/Cognition: aphasia impacting communication     Assessment: Used xcite for R side neuro re ed.   XCite stim unit for Activity Based Therapy. Skilled set up; determined appropriate FES parameters for each muscle group based off of strong tetanic response of muscle test.  Used the following activities from the software library: grasp and release and postural control  Intervention and patient response:great response, therapist assist with shoulder support    Other Barriers to Discharge (DME, Family Training, etc): level of assist, aphasia  Schedule family training for beginning of next week  DME: transfer pole, commode, RTS with arm rests, gait belt, GB for bathroom, bedrail

## 2022-11-27 NOTE — PROGRESS NOTES
"  [facility]   Acute Rehabilitation Unit  Daily progress note    INTERVAL HISTORY  Tobin Hua was seen and examined at bedside this morning. Had a good night. Feels he is making functional progress. Has no new concerns. LBM 11/27 early this morning per his report. Denies any CP, HA, new weakness/numbness, new/worsening b/b dysfunction.     Functionally, per PT on 11/26:   \"Current Status:  Bed Mobility: Rabia for RLE assist sup<>sit  Transfer: sit <> stand min/mod A, Min-mod A stand pivot to the L in therapies. Working on squat pivots.  Recommend RN staff do SPT Ax1 with transfer pole or Bozena Stedy.   Gait: unsafe  Stairs: unsafe\"    Per OT on 11/26  Current Status:  ADLs:    Mobility:WC based, Ax1 for transfer with transfer poleand squat pivot using the bedrail, bozena whaley A1-2    Grooming: Set up seated    Dressing: UB Set up seated EOB, Mod A with don/doff shorts/ pants seated EOB, stand with transfer pole with assist forRLE knee block to pull up pants; dependent footwear and in sitting, more independence with in bed LB dressing    Bathing: Max A    Toileting: Bozena stedy to commode, total A for cares    Per SLP on 11/26  Current Status:  Hearing: WFL  Vision: reading glasses  Communication: mild-mod expressive-receptive language impairments c/b frequent paraphasia, word/sound/syllable substitutions, semantic substitutions within conversation. Expresses basic wants/needs WFL. Mild dysarthria   Cognition: Unable to assess d/t extent of language impairment. Suspect impaired reasoning/attention noted during assessment   Swallow: Regular texture, mildly thick (2) liquid. NO straw. Ensure upright all PO, small/single sips, alternate liquids/solids, occasional throat clear, oral care 3x day. Free water protocol with thin water via teaspoon.    MEDICATIONS    aspirin  81 mg Oral Daily     bisacodyl  10 mg Rectal Daily     - MEDICATION INSTRUCTIONS -   Does not apply See Admin " "Instructions     melatonin  5 mg Oral At Bedtime     metoprolol tartrate  25 mg Oral BID     rivaroxaban ANTICOAGULANT  15 mg Oral BID w/meals    Followed by     [START ON 12/13/2022] rivaroxaban ANTICOAGULANT  20 mg Oral Daily with supper     senna-docusate  1-2 tablet Oral BID     traZODone  50 mg Oral At Bedtime        acetaminophen, bisacodyl, lidocaine     PHYSICAL EXAM  /75   Pulse 82   Temp (!) 96.5  F (35.8  C) (Oral)   Resp 16   Ht 1.88 m (6' 2\")   Wt 76.8 kg (169 lb 5 oz)   SpO2 96%   BMI 21.74 kg/m     Gen: NAD, lying in bed  HEENT: NC/AT  Cardio: RRR, +murmur  Pulm: non-labored on room air, lungs CTA bilaterally  Abd: soft, non-distended, non-tender, bowel sounds present  Ext: slight edema throughout RLE, no erythema, non-tender, no warmth.  No edema in LLE.  Neuro/MSK: AAO, expressive aphasia, R facial droop, PERRL, EOMI, flaccid right hemiparesis    LABS  CBC RESULTS:   Recent Labs   Lab Test 11/24/22  0617 11/21/22  0542 11/17/22  0624   WBC 6.0 5.5 7.2   RBC 4.06* 4.09* 3.93*   HGB 11.7* 11.7* 11.3*   HCT 37.0* 36.7* 36.1*   MCV 91 90 92   MCH 28.8 28.6 28.8   MCHC 31.6 31.9 31.3*   RDW 14.3 13.8 13.8    268 328     Last Basic Metabolic Panel:  Recent Labs   Lab Test 11/24/22  0617 11/21/22  0542 11/17/22  0624    140 139   POTASSIUM 4.3 3.9 3.9   CHLORIDE 108 106 106   CO2 31 31 31   ANIONGAP 2* 3 2*   GLC 95 96 91   BUN 20 15 21   CR 0.71 0.71 0.67   GFRESTIMATED >90 >90 >90   JAILENE 9.0 8.9 9.2         Rehabilitation - continue comprehensive acute inpatient rehabilitation program with multidisciplinary approach including therapies, rehab nursing, and physiatry following. See interval history for updates.      ASSESSMENT AND PLAN  Tobin Hua is a 62 year old right hand dominant male with a past medical history of mitral valve repair (2011), allergic rhinitis, and recent admission 10/21/22-11/1/22 for acute ischemic stroke due to dissection/occlusion of left ICA complicated " by hemorrhagic transformation with intraparenchymal and intraventricular hemorrhage, anemia, non-sustained ventricular tachycardia, hypo/hypernatremia, dysphagia, constipation, and headache, who was previously at ARU and discharged back to acute hospital on 11/5/22 due to occlusive RLE DVT, now s/p IVC filter.  He is now re-admitted to ARU on 11/7/22 for multidisciplinary rehabilitation and ongoing medical management.     - Vitals stable.   - No lab today. Labs on 11/24 were stable.   - Continue ongoing medical management.  - Continue therapies and plan of care.  - Refer to last progress note from primary team for full problems list.  - New issues: none    Patient was staffed with attending PM&R physician, Dr. Storm.     Elroy Mattson DO  PM&R Resident  [facility]  Pager: [phone]

## 2022-11-27 NOTE — PLAN OF CARE
Pt aox4. Slept through most of night. R sided weakness, not oob this shift. Cont of bladder and uses urinal. Denies pain, SOB, nausea, numbness and tingling. Regular diet, mildly thick liquids.

## 2022-11-27 NOTE — PLAN OF CARE
FOCUS/GOAL  Bowel management, Bladder management, Medication management, Pain management, Medical management, Mobility, Skin integrity, and Safety management     ASSESSMENT, INTERVENTIONS AND CONTINUING PLAN FOR GOAL:     Pt is alert and oriented x 4. Vital signs stable. Denied pain, nausea and vomiting, numbness or tingling, shortness of breath. Alarms on, side rails up x 3 for safety. Call light within reach, able to make needs known. Pt is on bowel program, last bm 11/26, voiding well in urinal. Reg diet, mildly thickened liquids. No new concerns at this time. Will continue with plan of care.

## 2022-11-27 NOTE — PLAN OF CARE
Discharge Planner Post-Acute Rehab SLP:      Discharge Plan:  SLP      Precautions: aspiration, fall,      Current Status:  Hearing: WFL  Vision: reading glasses  Communication: mild-mod expressive-receptive language impairments c/b frequent paraphasia, word/sound/syllable substitutions, semantic substitutions within conversation. Expresses basic wants/needs WFL. Mild dysarthria   Cognition: Unable to assess d/t extent of language impairment. Suspect impaired reasoning/attention noted during assessment   Swallow: Regular texture, mildly thick (2) liquid. NO straw. Ensure upright all PO, small/single sips, alternate liquids/solids, occasional throat clear, oral care 3x day. Free water protocol with thin water via teaspoon.     Assessment:  SLP provided education on semantic feature analysis/use of gesture for improved word retrieval.  Pt demonstrated comprehension of the strategy and utilized with moderate cues.  Pt demonstrates inconsistent phonemic and semantc paraphasias.    PM:  Pt participated in expressive/receptive language task given subtests from Famous Protocol. Pt with ongoing semantic substitutions and final sound paraphasia. SLP provided training regarding pausing and thinking prior to verbalizing to reduce instances of paraphasias.  Pt was noted, per wife, to have improved success compared to previous day.    Other Barriers to Discharge (Family Training, etc): diet training pending progress, communication/lang strategies to spouse/family

## 2022-11-28 ENCOUNTER — APPOINTMENT (OUTPATIENT)
Dept: OCCUPATIONAL THERAPY | Facility: CLINIC | Age: 62
DRG: 056 | End: 2022-11-28
Attending: PHYSICAL MEDICINE & REHABILITATION
Payer: COMMERCIAL

## 2022-11-28 ENCOUNTER — APPOINTMENT (OUTPATIENT)
Dept: SPEECH THERAPY | Facility: CLINIC | Age: 62
DRG: 056 | End: 2022-11-28
Attending: PHYSICAL MEDICINE & REHABILITATION
Payer: COMMERCIAL

## 2022-11-28 ENCOUNTER — APPOINTMENT (OUTPATIENT)
Dept: PHYSICAL THERAPY | Facility: CLINIC | Age: 62
DRG: 056 | End: 2022-11-28
Attending: PHYSICAL MEDICINE & REHABILITATION
Payer: COMMERCIAL

## 2022-11-28 ENCOUNTER — APPOINTMENT (OUTPATIENT)
Dept: EDUCATION SERVICES | Facility: CLINIC | Age: 62
DRG: 056 | End: 2022-11-28
Attending: PHYSICAL MEDICINE & REHABILITATION
Payer: COMMERCIAL

## 2022-11-28 LAB
ANION GAP SERPL CALCULATED.3IONS-SCNC: 6 MMOL/L (ref 3–14)
BUN SERPL-MCNC: 18 MG/DL (ref 7–30)
CALCIUM SERPL-MCNC: 9.4 MG/DL (ref 8.5–10.1)
CHLORIDE BLD-SCNC: 107 MMOL/L (ref 94–109)
CO2 SERPL-SCNC: 27 MMOL/L (ref 20–32)
CREAT SERPL-MCNC: 0.73 MG/DL (ref 0.66–1.25)
ERYTHROCYTE [DISTWIDTH] IN BLOOD BY AUTOMATED COUNT: 14.3 % (ref 10–15)
GFR SERPL CREATININE-BSD FRML MDRD: >90 ML/MIN/1.73M2
GLUCOSE BLD-MCNC: 91 MG/DL (ref 70–99)
HCT VFR BLD AUTO: 40.1 % (ref 40–53)
HGB BLD-MCNC: 12.7 G/DL (ref 13.3–17.7)
MAGNESIUM SERPL-MCNC: 2 MG/DL (ref 1.6–2.3)
MCH RBC QN AUTO: 28.8 PG (ref 26.5–33)
MCHC RBC AUTO-ENTMCNC: 31.7 G/DL (ref 31.5–36.5)
MCV RBC AUTO: 91 FL (ref 78–100)
PHOSPHATE SERPL-MCNC: 3.8 MG/DL (ref 2.5–4.5)
PLATELET # BLD AUTO: 250 10E3/UL (ref 150–450)
POTASSIUM BLD-SCNC: 3.7 MMOL/L (ref 3.4–5.3)
RBC # BLD AUTO: 4.41 10E6/UL (ref 4.4–5.9)
SODIUM SERPL-SCNC: 140 MMOL/L (ref 133–144)
WBC # BLD AUTO: 5.7 10E3/UL (ref 4–11)

## 2022-11-28 PROCEDURE — 97530 THERAPEUTIC ACTIVITIES: CPT | Mod: GP

## 2022-11-28 PROCEDURE — 83735 ASSAY OF MAGNESIUM: CPT | Performed by: PHYSICIAN ASSISTANT

## 2022-11-28 PROCEDURE — 97535 SELF CARE MNGMENT TRAINING: CPT | Mod: GO | Performed by: STUDENT IN AN ORGANIZED HEALTH CARE EDUCATION/TRAINING PROGRAM

## 2022-11-28 PROCEDURE — 250N000013 HC RX MED GY IP 250 OP 250 PS 637: Performed by: PHYSICIAN ASSISTANT

## 2022-11-28 PROCEDURE — 92507 TX SP LANG VOICE COMM INDIV: CPT | Mod: GN

## 2022-11-28 PROCEDURE — 97112 NEUROMUSCULAR REEDUCATION: CPT | Mod: GP

## 2022-11-28 PROCEDURE — 36415 COLL VENOUS BLD VENIPUNCTURE: CPT | Performed by: PHYSICIAN ASSISTANT

## 2022-11-28 PROCEDURE — 85027 COMPLETE CBC AUTOMATED: CPT | Performed by: PHYSICIAN ASSISTANT

## 2022-11-28 PROCEDURE — 92526 ORAL FUNCTION THERAPY: CPT | Mod: GN

## 2022-11-28 PROCEDURE — 99232 SBSQ HOSP IP/OBS MODERATE 35: CPT | Performed by: PHYSICIAN ASSISTANT

## 2022-11-28 PROCEDURE — 80048 BASIC METABOLIC PNL TOTAL CA: CPT | Performed by: PHYSICIAN ASSISTANT

## 2022-11-28 PROCEDURE — 97112 NEUROMUSCULAR REEDUCATION: CPT | Mod: GO | Performed by: STUDENT IN AN ORGANIZED HEALTH CARE EDUCATION/TRAINING PROGRAM

## 2022-11-28 PROCEDURE — 84100 ASSAY OF PHOSPHORUS: CPT | Performed by: PHYSICIAN ASSISTANT

## 2022-11-28 PROCEDURE — 128N000003 HC R&B REHAB

## 2022-11-28 RX ORDER — SENNOSIDES 8.6 MG
1 TABLET ORAL 2 TIMES DAILY
Status: DISCONTINUED | OUTPATIENT
Start: 2022-11-28 | End: 2022-12-01 | Stop reason: HOSPADM

## 2022-11-28 RX ORDER — DOCUSATE SODIUM 100 MG/1
100 CAPSULE, LIQUID FILLED ORAL 2 TIMES DAILY
Status: DISCONTINUED | OUTPATIENT
Start: 2022-11-28 | End: 2022-12-01 | Stop reason: HOSPADM

## 2022-11-28 RX ADMIN — RIVAROXABAN 15 MG: 15 TABLET, FILM COATED ORAL at 08:12

## 2022-11-28 RX ADMIN — RIVAROXABAN 15 MG: 15 TABLET, FILM COATED ORAL at 18:07

## 2022-11-28 RX ADMIN — DOCUSATE SODIUM 100 MG: 100 CAPSULE, LIQUID FILLED ORAL at 20:11

## 2022-11-28 RX ADMIN — TRAZODONE HYDROCHLORIDE 50 MG: 50 TABLET ORAL at 20:11

## 2022-11-28 RX ADMIN — METOPROLOL TARTRATE 25 MG: 25 TABLET, FILM COATED ORAL at 20:11

## 2022-11-28 RX ADMIN — ASPIRIN 81 MG: 81 TABLET, COATED ORAL at 08:12

## 2022-11-28 RX ADMIN — SENNOSIDES AND DOCUSATE SODIUM 2 TABLET: 50; 8.6 TABLET ORAL at 08:12

## 2022-11-28 RX ADMIN — SENNOSIDES 1 TABLET: 8.6 TABLET, FILM COATED ORAL at 12:01

## 2022-11-28 RX ADMIN — Medication 5 MG: at 20:12

## 2022-11-28 RX ADMIN — BISACODYL 10 MG: 10 SUPPOSITORY RECTAL at 18:08

## 2022-11-28 RX ADMIN — METOPROLOL TARTRATE 25 MG: 25 TABLET, FILM COATED ORAL at 08:12

## 2022-11-28 RX ADMIN — SENNOSIDES 1 TABLET: 8.6 TABLET, FILM COATED ORAL at 20:11

## 2022-11-28 RX ADMIN — DOCUSATE SODIUM 100 MG: 100 CAPSULE, LIQUID FILLED ORAL at 12:01

## 2022-11-28 ASSESSMENT — ACTIVITIES OF DAILY LIVING (ADL)
ADLS_ACUITY_SCORE: 57

## 2022-11-28 NOTE — CONSULTS
Met with patient and his wife Alix for Xarelto education. Discussed how to take the medication, signs and symptoms of bleeding and clotting, medication interactions, preventing injury, and when to call with concerns. Patient was forgetful during class, but Alix was able to answer all teachback questions without difficulty. Alix verbalized understanding of the information given.     Literature given: Guide to the Newer Oral Anticoagulants: Medicines to Treat and Prevent Blood Clots

## 2022-11-28 NOTE — CARE PLAN
XCite stim unit for Activity Based Therapy.     Skilled set up to place electrodes on R quads/tib ant and R quad/hams; determined appropriate FES parameters for each muscle group based off of strong tetanic response of muscle test and/or sensory feedback.    Xcite Activities Included: LAQ and STS    Intervention and patient response: no adverse reaction to stim. Noting good reaction to stim with RLE.    Please see [email or web address] for further details on patient's stimulation parameters.

## 2022-11-28 NOTE — PROGRESS NOTES
"  [facility]   Acute Rehabilitation Unit  Daily progress note    INTERVAL HISTORY  Weekend and therapy notes reviewed, no acute events reported.    Tobin Hua was seen and examined at bedside this morning.   Overall he reports to be feeling well.  He is noting ongoing gradual progress, feels transfers are improving.  He is feeling more prepared for discharge to home later this week, although also with some expected anxieties about it.  He denies any chest pain, palpitations, shortness of breath, nausea, abdominal pain.  Is continuing to have regular BMs typically with nightly suppository, though still takes quite some time, up to an hour or more, and stools can be hard.    Functionally, he is improving with pivot transfers, better control to affected side.  Therapies have been working with wife on transfer training, planning for more formal family training this week.  On track for discharge.       MEDICATIONS    aspirin  81 mg Oral Daily     bisacodyl  10 mg Rectal Daily     - MEDICATION INSTRUCTIONS -   Does not apply See Admin Instructions     melatonin  5 mg Oral At Bedtime     metoprolol tartrate  25 mg Oral BID     rivaroxaban ANTICOAGULANT  15 mg Oral BID w/meals    Followed by     [START ON 12/13/2022] rivaroxaban ANTICOAGULANT  20 mg Oral Daily with supper     senna-docusate  1-2 tablet Oral BID     traZODone  50 mg Oral At Bedtime        acetaminophen, bisacodyl, lidocaine     PHYSICAL EXAM  /68   Pulse 68   Temp (!) 95.9  F (35.5  C) (Oral)   Resp 16   Ht 1.88 m (6' 2\")   Wt 76.8 kg (169 lb 5 oz)   SpO2 97%   BMI 21.74 kg/m     Gen: NAD, sitting up in chair  HEENT: NC/AT  Cardio: RRR, +murmur  Pulm: non-labored on room air, lungs CTA bilaterally  Abd: soft, non-distended, non-tender, bowel sounds present  Ext: slight edema throughout RLE, no erythema, non-tender, no warmth.  No edema in LLE.  R shoulder ottobock.  Neuro/MSK: AAO, expressive aphasia but " improving, R facial droop, PERRL, EOMI, R hemiparesis: RUE with trace shoulder, bicep and finger flexor activation.  HF and quads also firing but no antigravity strength.    LABS  CBC RESULTS:   Recent Labs   Lab Test 11/24/22 0617 11/21/22  0542 11/17/22  0624   WBC 6.0 5.5 7.2   RBC 4.06* 4.09* 3.93*   HGB 11.7* 11.7* 11.3*   HCT 37.0* 36.7* 36.1*   MCV 91 90 92   MCH 28.8 28.6 28.8   MCHC 31.6 31.9 31.3*   RDW 14.3 13.8 13.8    268 328     Last Basic Metabolic Panel:  Recent Labs   Lab Test 11/24/22 0617 11/21/22  0542 11/17/22  0624    140 139   POTASSIUM 4.3 3.9 3.9   CHLORIDE 108 106 106   CO2 31 31 31   ANIONGAP 2* 3 2*   GLC 95 96 91   BUN 20 15 21   CR 0.71 0.71 0.67   GFRESTIMATED >90 >90 >90   JAILENE 9.0 8.9 9.2         Rehabilitation - continue comprehensive acute inpatient rehabilitation program with multidisciplinary approach including therapies, rehab nursing, and physiatry following. See interval history for updates.      ASSESSMENT AND PLAN  Tobin Hua is a 62 year old right hand dominant male with a past medical history of mitral valve repair (2011), allergic rhinitis, and recent admission 10/21/22-11/1/22 for acute ischemic stroke due to dissection/occlusion of left ICA complicated by hemorrhagic transformation with intraparenchymal and intraventricular hemorrhage, anemia, non-sustained ventricular tachycardia, hypo/hypernatremia, dysphagia, constipation, and headache, who was previously at ARU and discharged back to acute hospital on 11/5/22 due to occlusive RLE DVT, now s/p IVC filter.  He is now re-admitted to ARU on 11/7/22 for multidisciplinary rehabilitation and ongoing medical management.        Admission to acute inpatient rehab 11/07/22.    Impairment group code: Stroke 01.2 (R) Body Involvement (L) Brain: left ICA and L MCA ischemic stroke, c/b hemorrhagic transformation        1. PT, OT and SLP 60 minutes of each on a daily basis, in addition to rehab nursing and close  management of physiatrist.       2. Impairment of ADL's: Noted to have R hemiplegia, impaired balance, impaired coordination, fatigue, R/L visual convergence impairment, impaired sensation, impaired postural control, all affecting his ability to safely and independently perform basic ADLs.  Goal for mod I with basic ADLs, anticipate need for assist with bathing/showering and related transfers.     3. Impairment of mobility:  Noted to have R hemiplegia, impaired balance, impaired coordination, fatigue, R/L visual convergence impairment, impaired sensation, impaired postural control, all affecting his ability to safely and independently perform basic mobility.  Goal for mod I with basic mobility, anticipate to be wheelchair based at discharge.     4. Impairment of cognition/language/swallow:  Noted to have dysphagia, dysarthria, and receptive and expressive aphasia with goals for safe tolerance of least restrictive diet and improved cognitive-linguistic skills to meet/communicate basic needs.     5. Medical Conditions  New actions/orders/updates for today are in blue.     Acute ischemic stroke due to ICA dissection and thrombosis/occlusion s/p tenecteplase, mechanical thrombectomy  C/b hemorrhagic transformation with intraparenchymal and intraventricular hemorrhage   R hemiparesis, R facial droop, dysphagia, aphasia, dysarthria  - Continue ASA 81 mg daily, indefinitely per neurology  - LDL 96, no indication for statin per neuro, no atherosclerosis on MRAs  - SBP goal <140  - Ziopatch in place for 2 weeks at ARU admission, completed and returned on 11/15/22  - Repeat CT head 11/21 improved as below  - Continue PT/OT/SLP  - Follow up with neurology     Occlusive RLE DVT  On 11/5/22, noted to have significant edema in RLE compared with LLE.  BLE Doppler revealed large occlusive DVT in R femoral vein extending to the deep veins below the knee and nonocclusive DVT in R common femoral vein. CTPE negative for PE.  Head  "CT demonstrates likely stable status of hemorrhagic conversion when compared with previous.  Patient underwent IR placement of IVC filter on 11/6.  Neurology consulted 11/7 to discuss consideration of anticoagulation.  Per their recs, would consider at approximately 4 weeks after hemorrhage, after repeating head CT to evaluate for any further expansion of bleed.  - Repeat RLE ultrasound 11/21 shows increased clot burden with \"new occlusive thrombus within the right profunda femoris vein\" as well as \"unchanged occlusive thrombus extending from the femoral vein to the posterior tibial and peroneal veins, and unchanged nonocclusive thrombus in the right common femoral vein.  - Repeat head CT 11/21 with improved appearance, expected evolution of intraparenchymal hemorrhage.  Per discussion with stroke neurology, ok to start anticoagulation with DOAC.   - Per discussion with stroke neurology, patient/family re: risks vs benefits, 11/22 started Xarelto 15 mg BID x21 days, followed by 20 mg daily.  - Follow-up with IR in 3-6 months to have IVC filter removed once   anticoagulation plan is established (sooner is better)     Mild leukocytosis, resolved  Elevated CRP  WBC peaked at 1.52 on 11/4, infectious work-up with UA negative, CXR negative, procal negative, CRP up-trending 150 on 11/7, blood cultures 11/5 NGTD.  Suspect 2/2 DVT, WBC down-trending since, normalized on 11/8.  - Repeat CRP down-trending 150 -> 100 11/10  - Blood cultures NGTD  - Trend CBC.  WBC remain WNL 11/28      Moderate dilation of ascending aorta   Incidentally noted on echo for CVA work-up, involving the sinuses of Valsalva, maximal dimension 4.6 cm.  - Needs outptient follow up in 6 months with CT imaging, can be followed by PCP     Episodes of non-sustained ventricular tachycardia  Demonstrated on telemetry during IP stay.  Started on metoprolol with reduced frequency.  - Continue metoprolol 25 mg BID  - Ziopatch in place at ARU admission  - EKG on " 11/11 (eval QTc with start trazodone) with sinus rhythm, PVCs and PACs     Moderate oropharyngeal dysphagia  Moderate malnutrition in the context of acute illness or injury   - RD consulted, appreciate assist.  - Continue SLP  - Advanced to regular diet per SLP on 11/14  - Free water procotol  - Repeat VFSS 11/18 with improved oropharyngeal swallow evidenced by no aspiration with consistencies trialed this day.  Advanced to mildly thick liquids 11/19 with swallow strategies    Normocytic anemia  Noted drop from Hgb 13 to yohana of 9.9 on 10/23, most recently stable at 10s-11s.  - Trend CBC.  Hgb stable at 12.7 on 11/28     Urinary retention, resolved  Urinary incontinence, improving  Noted since ARU admission, requiring intermittent straight catheterization. Wife had noted urinary urgency and some incontinence while at hospital. 11/3 UA negative for infection.  Not noted during recurrent inpatient stay.  - Ongoing intermittent urinary incontinence.    - Still waking a few times each night to urinate but arnulfo to return to sleep more easily.  - Continue behavioral mods, timed toileting, limiting fluids close to bedtime, voiding before bed, double voids, etc.     Hx mitral valve repair (2012)  Mild mitral regurgitation  - Noted.  Not on PTA anticoagulation, just aspirin, resumed as above     Allergic rhinitis  - Continue PTA Flonase daily     Right renal cortical enhancement  Incidentally noted on CT.  Indeterminate, possibly vascular etiology.   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  Non-painful.  Patient's movement limited by CVA as above.  - Follow-up with PCP to determine need for ortho spine involvement outpatient    Insomnia, improving  Patient reports difficulty both falling and staying asleep.  Denies any PTA issues with sleep.  Melatonin increased 3 mg to 5 mg on 11/9.  - Continue  melatonin 5 mg  - Sleep hygiene  - Some relief with addition of trazodone (started 11/10), but still sleep difficulty.  Trazodone increased to 50 mg at HS on 11/14.  Sleep improved/stable since.  Continue to monitor.  - EKG 11/11 with starting trazodone.  QTc WNL at 444.  - Continue to monitor       6. Adjustment to disability:  Clinical psychology to eval and treat if indicated  7. FEN: regular diet; mildy thick liquids  8. Bowel: started on bowel program with nightly suppository, on scheduled and PRN bowel meds.  Having regular BMs with suppository but is taking up to an hour and stools hard.  Will separate senna and continue BID, increase docusate to 100 mg BID.  Monitor.  9. Bladder: increasingly continent  10. DVT Prophylaxis: Xarelto started 11/22 as above  11. GI Prophylaxis: not indicated  12. Code: full  13. Disposition: goal for home with 24/7 assist  14. ELOS:  12/1/22  15. Rehab prognosis:  fair  1. Follow up Appointments on Discharge: PCP, neurology (Dr. Edgard Dave) in 6 weeks, IR in 3-6 months        Ashley Campoverde PA-C  Physical Medicine & Rehabilitation

## 2022-11-28 NOTE — PROGRESS NOTES
HC updates listed below. Will update this note throughout the day.     DECLINED:   [facility]   [facility]   [facility]   [facility]   [facility]   [facility]   [facility]   [facility]  [facility]  [facility]  [facility]    PENDING/SENT TODAY:   [facility]   [facility] ([facility] (ph:[phone] fx:[phone])  [facility] (ph:[phone] fx:[phone])  [facility] (ph:[phone] fx: [phone])   [facility] (ph:[phone] fx: [phone])  [facility]  (ph:[phone] fx:[phone])    Will continue to follow and will update team and pt/pt wife once HC secured. Insurance is a barrier to finding HC. Targeting discharge Thursday 12/01/2022.     CAROLINE Gongora   [facility]   Direct Phone: [phone]  I   Pager: [phone]  I  Fax: [phone]

## 2022-11-28 NOTE — PLAN OF CARE
Discharge Planner Post-Acute Rehab OT:      Discharge Plan: Home with assist as needed and Home care     Precautions: fall, R shayy, mod thick liquid, aphasia     Shoulder mgmt: NMES 5-7x a day for shoulder subluxation protocol for RUE. Omno neurexa on with therapy for mobility, remove when seated in W/C or in bed. No ROM with RUE shoulder above 90 degrees.    *[facility] is approved for using transfer pole to transfer pt    Current Status:  ADLs:    Mobility:WC based, Ax1 for transfer with transfer poleand squat pivot using the bedrail, bozena whaley A1-2    Grooming: Set up seated    Dressing: UB Set up seated EOB, Mod A with don/doff shorts/ pants seated EOB, stand with transfer pole with assist forRLE knee block to pull up pants; dependent footwear and in sitting, more independence with in bed LB dressing    Bathing: Max A    Toileting: transfer pole to commode, total A for cares  IADLs: wife can take care  Vision/Cognition: aphasia impacting communication     Assessment: ADL and transfers completed. Pt using squat pivot and bedrail in prep for discharge home. Need to get in touch with [facility] to schedule formal training for therest of the week in anticipation of discharge.     Used xcite for R side neuro re ed.   XCite stim unit for Activity Based Therapy. Skilled set up; determined appropriate FES parameters for each muscle group based off of strong tetanic response of muscle test.  Used the following activities from the software library: grasp and release   Intervention and patient response:great response, pt actually able to grasp x2 with max effort afterwards. Pt also getting emerging elbow extension    Other Barriers to Discharge (DME, Family Training, etc): level of assist, aphasia  Schedule family training for beginning of next week  DME: transfer pole, commode, RTS with arm rests, gait belt, GB for bathroom, bedrail

## 2022-11-28 NOTE — PLAN OF CARE
Discharge Planner Post-Acute Rehab SLP:      Discharge Plan:  SLP      Precautions: aspiration, fall,      Current Status:  Hearing: WFL  Vision: reading glasses  Communication: mild-mod expressive-receptive language impairments c/b frequent paraphasia, word/sound/syllable substitutions, semantic substitutions within conversation. Expresses basic wants/needs WFL. Mild dysarthria   Cognition: Unable to assess d/t extent of language impairment. Suspect impaired reasoning/attention noted during assessment   Swallow: Regular texture, mildly thick (2) liquid. NO straw. Ensure upright all PO, small/single sips, alternate liquids/solids, occasional throat clear, oral care 3x day. Free water protocol with thin water via teaspoon.     Assessment:   Pt demonstrated improved use of semantic feature analysis as a word retrieval technique and overall communication technique to assist listener in helping pt identify target words.  Pt performed the technique x 90% of the time with min cues for use of the visual aid.  Pt performed pharyngeal strengthening exercises including CTAR with effortful swallow x 2 sets of 10 reps and Nancy maneuver x 2 sets of 10 reps.  Pt demonstrated use of tsp sized sips of thin liquids with occasional cue after thorough oral care.    Other Barriers to Discharge (Family Training, etc): diet training pending progress, communication/lang strategies to spouse/family

## 2022-11-28 NOTE — PLAN OF CARE
Pt is alert and oriented x4 with substancial expressive aphasia. Following directions well and calling for needs. Denies fever, chills, chest pain, SOB, N/V, abdominal pain, or new weakness/numbness/tingling. Continent of bladder using urinal, bowel program was unsuccessful , transferring with assist of 1 SPT with T pole to [facility], sleeping well throughout the night, no tubes, lines or drains, vs stable, no further care concerns at this time continue with POC.

## 2022-11-28 NOTE — PLAN OF CARE
FOCUS/GOAL  Medical management    ASSESSMENT, INTERVENTIONS AND CONTINUING PLAN FOR GOAL:  Vss. Continent of bladder using urinal. No BM this shift. Received Senna and  Docusate per order. Denied pain. Will continue with POC.  Goal Outcome Evaluation:      Plan of Care Reviewed With: patient    Overall Patient Progress: no changeOverall Patient Progress: no change    Outcome Evaluation: No change

## 2022-11-29 ENCOUNTER — APPOINTMENT (OUTPATIENT)
Dept: OCCUPATIONAL THERAPY | Facility: CLINIC | Age: 62
DRG: 056 | End: 2022-11-29
Attending: PHYSICAL MEDICINE & REHABILITATION
Payer: COMMERCIAL

## 2022-11-29 ENCOUNTER — APPOINTMENT (OUTPATIENT)
Dept: SPEECH THERAPY | Facility: CLINIC | Age: 62
DRG: 056 | End: 2022-11-29
Attending: PHYSICAL MEDICINE & REHABILITATION
Payer: COMMERCIAL

## 2022-11-29 ENCOUNTER — APPOINTMENT (OUTPATIENT)
Dept: PHYSICAL THERAPY | Facility: CLINIC | Age: 62
DRG: 056 | End: 2022-11-29
Attending: PHYSICAL MEDICINE & REHABILITATION
Payer: COMMERCIAL

## 2022-11-29 PROCEDURE — 250N000013 HC RX MED GY IP 250 OP 250 PS 637: Performed by: PHYSICIAN ASSISTANT

## 2022-11-29 PROCEDURE — 128N000003 HC R&B REHAB

## 2022-11-29 PROCEDURE — 999N000125 HC STATISTIC PATIENT MED CONFERENCE < 30 MIN: Performed by: STUDENT IN AN ORGANIZED HEALTH CARE EDUCATION/TRAINING PROGRAM

## 2022-11-29 PROCEDURE — 97535 SELF CARE MNGMENT TRAINING: CPT | Mod: GO | Performed by: STUDENT IN AN ORGANIZED HEALTH CARE EDUCATION/TRAINING PROGRAM

## 2022-11-29 PROCEDURE — L1932 AFO RIG ANT TIB PREFAB TCF/=: HCPCS

## 2022-11-29 PROCEDURE — 99233 SBSQ HOSP IP/OBS HIGH 50: CPT | Performed by: PHYSICIAN ASSISTANT

## 2022-11-29 PROCEDURE — 92526 ORAL FUNCTION THERAPY: CPT | Mod: GN

## 2022-11-29 PROCEDURE — 97112 NEUROMUSCULAR REEDUCATION: CPT | Mod: GP

## 2022-11-29 PROCEDURE — 999N000125 HC STATISTIC PATIENT MED CONFERENCE < 30 MIN

## 2022-11-29 PROCEDURE — 97530 THERAPEUTIC ACTIVITIES: CPT | Mod: GP

## 2022-11-29 PROCEDURE — 999N000150 HC STATISTIC PT MED CONFERENCE < 30 MIN

## 2022-11-29 PROCEDURE — 92507 TX SP LANG VOICE COMM INDIV: CPT | Mod: GN

## 2022-11-29 RX ADMIN — METOPROLOL TARTRATE 25 MG: 25 TABLET, FILM COATED ORAL at 08:22

## 2022-11-29 RX ADMIN — TRAZODONE HYDROCHLORIDE 50 MG: 50 TABLET ORAL at 20:37

## 2022-11-29 RX ADMIN — SENNOSIDES 1 TABLET: 8.6 TABLET, FILM COATED ORAL at 20:37

## 2022-11-29 RX ADMIN — DOCUSATE SODIUM 100 MG: 100 CAPSULE, LIQUID FILLED ORAL at 08:22

## 2022-11-29 RX ADMIN — ASPIRIN 81 MG: 81 TABLET, COATED ORAL at 08:21

## 2022-11-29 RX ADMIN — DOCUSATE SODIUM 100 MG: 100 CAPSULE, LIQUID FILLED ORAL at 20:37

## 2022-11-29 RX ADMIN — SENNOSIDES 1 TABLET: 8.6 TABLET, FILM COATED ORAL at 08:22

## 2022-11-29 RX ADMIN — METOPROLOL TARTRATE 25 MG: 25 TABLET, FILM COATED ORAL at 20:37

## 2022-11-29 RX ADMIN — Medication 5 MG: at 20:36

## 2022-11-29 RX ADMIN — RIVAROXABAN 15 MG: 15 TABLET, FILM COATED ORAL at 08:22

## 2022-11-29 RX ADMIN — BISACODYL 10 MG: 10 SUPPOSITORY RECTAL at 18:12

## 2022-11-29 RX ADMIN — RIVAROXABAN 15 MG: 15 TABLET, FILM COATED ORAL at 18:12

## 2022-11-29 ASSESSMENT — ACTIVITIES OF DAILY LIVING (ADL)
ADLS_ACUITY_SCORE: 57
ADLS_ACUITY_SCORE: 57
ADLS_ACUITY_SCORE: 59
ADLS_ACUITY_SCORE: 57
ADLS_ACUITY_SCORE: 59
ADLS_ACUITY_SCORE: 59
ADLS_ACUITY_SCORE: 57

## 2022-11-29 NOTE — PLAN OF CARE
Discharge Planner Post-Acute Rehab OT:      Discharge Plan: Home with assist as needed and Home care     Precautions: fall, R shayy, mod thick liquid, aphasia     Shoulder mgmt: NMES 5-7x a day for shoulder subluxation protocol for RUE. Omno neurexa on with therapy for mobility, remove when seated in W/C or in bed. No ROM with RUE shoulder above 90 degrees.    *[facility] is approved for using transfer pole to transfer pt    Current Status:  ADLs:    Mobility:WC based, Ax1 for transfer with transfer poleand squat pivot using the bedrail, bozena whaley A1-2    Grooming: Set up seated    Dressing: UB Set up seated EOB, Mod A with don/doff shorts/ pants seated EOB, stand with transfer pole with assist forRLE knee block to pull up pants; dependent footwear and in sitting, more independence with in bed LB dressing    Bathing: Max A    Toileting: transfer pole to commode, total A for cares  IADLs: wife can take care  Vision/Cognition: aphasia impacting communication     Assessment: Family training completed with wife and they are comfortable with transfers. They have DME already and are going to hold off on shower transfers at this time. On track for discharge.    Other Barriers to Discharge (DME, Family Training, etc): level of assist, aphasia  Schedule family training for beginning of next week  DME: transfer pole, commode, RTS with arm rests, gait belt, GB for bathroom, bedrail

## 2022-11-29 NOTE — PLAN OF CARE
FOCUS/GOAL  Bowel management, Bladder management, Pain management, Mobility, Skin integrity, and Safety management    ASSESSMENT, INTERVENTIONS AND CONTINUING PLAN FOR GOAL:  Patient is alert and oriented. Expressive aphasia/word finding difficulty. Denied pain, headache, dizziness, CP or SOB. Regular/mild thicken liquid. Continent of B/B last BM 11/28 after bowel program had large BM.  A-1 SPT to commode/w/c. VS WDL. No care concern at this time. Call light is in reach alarm is on. Staff will continue per POC.   Goal Outcome Evaluation:

## 2022-11-29 NOTE — CARE CONFERENCE
XCite stim unit for Activity Based Therapy.     Skilled set up to place electrodes on R gluts, quads, hams, tib ant and gastroc; determined appropriate FES parameters for each muscle group based off of strong tetanic response of muscle test and/or sensory feedback.    Xcite Activities Included: standing mini squats    Intervention and patient response: no adverse reaction to stim. Noting intermittent R quad activation and R glut med activation but unable to consistently activate. Still lacks functional strength in RLE to use during stand/squat pivots.    Please see [email or web address] for further details on patient's stimulation parameters.

## 2022-11-29 NOTE — PLAN OF CARE
Discharge Planner Post-Acute Rehab SLP:      Discharge Plan:  SLP      Precautions: aspiration, fall,      Current Status:  Hearing: WFL  Vision: reading glasses  Communication: mild-mod expressive-receptive language impairments c/b frequent paraphasia, word/sound/syllable substitutions, semantic substitutions within conversation. Expresses basic wants/needs WFL. Mild dysarthria   Cognition: Unable to assess d/t extent of language impairment. Suspect impaired reasoning/attention noted during assessment   Swallow: Regular texture, mildly thick (2) liquid. NO straw. Ensure upright all PO, small/single sips, alternate liquids/solids, occasional throat clear, oral care 3x day. Free water protocol with thin water via teaspoon.     Assessment: Pt participated in a verbal expression task in the form of finish the sentence. Pt completed task with 100% accuracy independently. Pt then participated in another verbal expression task in the form of stating a word to match the read aloud definition. Pt able to complete task with 90% accuracy requiring minimal phonemic cueing and one moderate semantic cue. Lastly, pt participated in name game where pt was asked to state a certain number of items in each category. Pt able to complete task but requiring occasional phonemic cueing to produce the correct word.    Other Barriers to Discharge (Family Training, etc): diet training pending progress, communication/lang strategies to spouse/family

## 2022-11-29 NOTE — PLAN OF CARE
Acute Rehab Care Conference/Team Rounds      Type: Team Rounds    Present: Dr. Jori Troncoso, Ashley Campoverde PA, Noe Pascual PT, Jeannie Lui OT, Cielo Johnson SLP, Claudia VELAZQUEZ, Joy Borrego RD, Ara Schaeffer RN, and Tobin Hua Patient.     Discharge Barriers/Treatment/Education    Rehab Diagnosis: R hemiparesis post CVA     Active Medical Co-morbidities/Prognosis:      Patient Active Problem List   Diagnosis Code     DVT (deep venous thrombosis) (H) I82.409   dysphagia  Gait instability  Aphasia       Safety: Pt is aphasic. Transfer w/ A2 SPT, wheelchair based.     Pain: No complain of pain.    Medications, Skin, Tubes/Lines: Can take pills whole 1 at a time. No skin issues. No lines/drains.     Swallowing/Nutrition: Regular texture, mildly thick (2) liquid. NO straw. Ensure upright all PO, small/single sips, alternate liquids/solids, occasional throat clear, oral care 3x day. Free water protocol with thin water via teaspoon. Demonstrating progress with taking small sips, may be close to advancement but still requires cues to initiate this correctly. Recommend ongoing SLP     Bowel/Bladder: Can be mix  continent/incontinent of B/B. LBM 11/28/22. Pt is on bowel program.    Psychosocial: , lives with spouse. Both pt and spouse are retired. Indep PTA. No substance abuse, mental health, or financial concerns. Adult children also supportive and local.     ADLs/IADLs: Pt is making good progress, pt can get dressed with set up to min A, IND with seated grooming, Assist with toileting. Pt has progressed to min A for transfers. Working on training pt's wife for transfers and ADL as pt will require 24/7 assist. Pt starting to get activation in LUE but not functional. Recommend commode, RTS, GB, bedrail and WC. Will defer tub transfer to . Recommend  OT for home safety and continuation of therapy.     Mobility: Slowly improving R side strength, noting increased R quad activation with pt  "able to partially extend R knee (MMT 2/5) yesterday. Pt's wife able to assist with setting up transfers and reports she \"feels good about taking care of his day-to-day needs at home.\" Awaiting arrival of BALWINDER leal w/c. Pt having ramp installed at home tomorrow, 11/30. Recommend  PT.  Bed Mobility: Rabia for RLE assist sup<>sit using bed rail  Transfer: sit <> stand Rabia; squat pivot L and R Rabia-CGA with intermittent vc's for reminders to setup correctly prior to transferring.  Gait: unsafe d/t R hemiplegia      Cognition/Language: mild-mod expressive-receptive language impairments c/b frequent paraphasia, word/sound/syllable substitutions, semantic substitutions within conversation. Expresses basic wants/needs WFL. Mild dysarthria. Expressive language improving, emerging increase in insight.  Ongoing SLP    Community Re-Entry: w/c    Transportation: assist from family    Decision maker: self    Plan of Care and goals reviewed and updated.    Discharge Plan/Recommendations    Fall Precautions: continue    Patient/Family input to goals: reach a level of mod I     Estimated length of stay: 18 days     Overall plan for the patient: reach a level of mod I       Utilization Review and Continued Stay Justification    Medical Necessity Criteria:    For any criteria that is not met, please document reason and plan for discharge, transfer, or modification of plan of care to address.    Requires intensive rehabilitation program to treat functional deficits?: Yes    Requires 3x per week or greater involvement of rehabilitation physician to oversee rehabilitation program?: Yes    Requires rehabilitation nursing interventions?: Yes    Patient is making functional progress?: Yes    There is a potential for additional functional progress? Yes    Patient is participating in therapy 3 hours per day a minimum of 5 days per week or 15 hours per week in 7 day period?:Yes    Has discharge needs that require coordinated discharge " planning approach?:Yes      Barriers/Concerns related to meeting medical necessity criteria:  none    Team Plan to Address Concern:  As needed       Final Physician Sign off    Statement of Approval:  Jori Troncoso, DO      Patient Goals  Social Work Goals: Working on HC, insurance is a barrier to finding accepting HC agency.     Therapy Frequency (OT): Daily  OT: Hygiene/Grooming: modified independent  OT: Upper Body Dressing: Modified independent  OT: Lower Body Dressing: Minimal assist  OT: Upper Body Bathing: Supervision/stand-by assist  OT: Lower Body Bathing: Minimal assist  OT: Transfer: Supervision/stand-by assist (tub transfer)  OT: Toilet Transfer/Toileting: Supervision/stand-by assist, cleaning and garment management, toilet transfer  OT: Goal 1: Pt will be IND with HEP to improve RUE function for ADL  OT: Goal 2: Family will complete family training to demonstrate ability to assist pt as needed at discharge.    PT Predicted Duration/Target Date for Goal Attainment: 12/02/22  PT Frequency: Daily  PT: Bed Mobility: Supervision/stand-by assist  PT: Transfers: Minimal assist (squat pivot with assist from wife)  PT: Wheelchair Mobility: 150 feet, manual wheelchair  PT: Perform aerobic activity with stable cardiovascular response: continuous activity, 15 minutes, NuStep  PT: Goal 1: Car transfer, squat pivot, Rabia from wife  PT: Edema education to increase ability to manage edema after discharge from the hospital: Caregiver, Demonstrate, [place], wear schedule, garment/bandage care (don/doff tubigrip)  PT: Management of edema bandages: Caregiver, Demonstrate, [place], garment(s) (using tubigrip)  PT: Functional edema exercise program to reduce limb volume, increase activity tolerance and improve independence with ADL:  (discontinued)    SLP Predicted Duration/Target Date for Goal Attainment: 12/09/22  Therapy Frequency (SLP Eval): daily  SLP: Communicate basic wants and needs: minimal  assist  SLP: Goal 1: Pt will name novel and functional information within task with min cues 90% accuracy           Nursing Goals  Bowel and Bladder care  Fall prevention   Medication Education  Skin Care protection     Patient/Family Goal: Bowel: Pt will regain bladder continence by daily bowel program before d/c from ARU  Patient/Family Goal: Bladder: Pt will regain bladder continence by utilizing timed toileting before discharge from ARU  Goal: Skin Integrity: Pt will remain free from skin breakdown by utilizing weight shifting and frequent repositioning.

## 2022-11-29 NOTE — PROGRESS NOTES
"  [facility]   Acute Rehabilitation Unit  Daily progress note    INTERVAL HISTORY  Tobin Hua was seen and examined at bedside this morning during team rounds, with wife present.  No acute events reported overnight.   Patient reports to be feeling well overall, eager to get home though some nervousness about the transition.  Reviewed recommendations for bowel program, brace use, edema management at home.  Will continue caregiver training with spouse.    Functionally, patient is making ongoing progress.  Family training has been going well.  Did start estim for RLE yesterday and will continue while at ARU and likely once in outpatient setting.  OT noted some return in distal R arm.  SLP noting significant improvement with language, though still with paraphasias.  Will follow-up with patient and family regarding recs for diet at discharge.  For full functional updates, see team rounds note from today.       MEDICATIONS    aspirin  81 mg Oral Daily     bisacodyl  10 mg Rectal Daily     - MEDICATION INSTRUCTIONS -   Does not apply See Admin Instructions     docusate sodium  100 mg Oral BID     melatonin  5 mg Oral At Bedtime     metoprolol tartrate  25 mg Oral BID     rivaroxaban ANTICOAGULANT  15 mg Oral BID w/meals    Followed by     [START ON 12/13/2022] rivaroxaban ANTICOAGULANT  20 mg Oral Daily with supper     sennosides  1 tablet Oral BID     traZODone  50 mg Oral At Bedtime        acetaminophen, bisacodyl, lidocaine     PHYSICAL EXAM  /69 (BP Location: Left arm, Patient Position: Supine, Cuff Size: Adult Regular)   Pulse 65   Temp (!) 96.1  F (35.6  C) (Oral)   Resp 16   Ht 1.88 m (6' 2\")   Wt 76.8 kg (169 lb 5 oz)   SpO2 95%   BMI 21.74 kg/m     Gen: NAD, sitting up in chair  HEENT: NC/AT  Cardio: appears well-perfused  Pulm: non-labored on room air  Abd: soft, non-distended, non-tender  Ext: mild edema throughout RLE, tubigrip in place, no erythema, " non-tender, no warmth.  No edema in LLE.  R shoulder ottobock.  Neuro/MSK: AAO, expressive aphasia but improving, R facial droop, PERRL, EOMI, R hemiparesis.  *Full exam deferred today for conversation    LABS  CBC RESULTS:   Recent Labs   Lab Test 11/28/22  0755 11/24/22  0617 11/21/22  0542   WBC 5.7 6.0 5.5   RBC 4.41 4.06* 4.09*   HGB 12.7* 11.7* 11.7*   HCT 40.1 37.0* 36.7*   MCV 91 91 90   MCH 28.8 28.8 28.6   MCHC 31.7 31.6 31.9   RDW 14.3 14.3 13.8    238 268     Last Basic Metabolic Panel:  Recent Labs   Lab Test 11/28/22  0755 11/24/22  0617 11/21/22  0542    141 140   POTASSIUM 3.7 4.3 3.9   CHLORIDE 107 108 106   CO2 27 31 31   ANIONGAP 6 2* 3   GLC 91 95 96   BUN 18 20 15   CR 0.73 0.71 0.71   GFRESTIMATED >90 >90 >90   JAILENE 9.4 9.0 8.9         Rehabilitation - continue comprehensive acute inpatient rehabilitation program with multidisciplinary approach including therapies, rehab nursing, and physiatry following. See interval history for updates.      ASSESSMENT AND PLAN  Tobin Hua is a 62 year old right hand dominant male with a past medical history of mitral valve repair (2011), allergic rhinitis, and recent admission 10/21/22-11/1/22 for acute ischemic stroke due to dissection/occlusion of left ICA complicated by hemorrhagic transformation with intraparenchymal and intraventricular hemorrhage, anemia, non-sustained ventricular tachycardia, hypo/hypernatremia, dysphagia, constipation, and headache, who was previously at ARU and discharged back to acute hospital on 11/5/22 due to occlusive RLE DVT, now s/p IVC filter.  He is now re-admitted to ARU on 11/7/22 for multidisciplinary rehabilitation and ongoing medical management.        Admission to acute inpatient rehab 11/07/22.    Impairment group code: Stroke 01.2 (R) Body Involvement (L) Brain: left ICA and L MCA ischemic stroke, c/b hemorrhagic transformation        1. PT, OT and SLP 60 minutes of each on a daily basis, in addition to  rehab nursing and close management of physiatrist.       2. Impairment of ADL's: Noted to have R hemiplegia, impaired balance, impaired coordination, fatigue, R/L visual convergence impairment, impaired sensation, impaired postural control, all affecting his ability to safely and independently perform basic ADLs.  Goal for mod I with basic ADLs, anticipate need for assist with bathing/showering and related transfers.     3. Impairment of mobility:  Noted to have R hemiplegia, impaired balance, impaired coordination, fatigue, R/L visual convergence impairment, impaired sensation, impaired postural control, all affecting his ability to safely and independently perform basic mobility.  Goal for mod I with basic mobility, anticipate to be wheelchair based at discharge.     4. Impairment of cognition/language/swallow:  Noted to have dysphagia, dysarthria, and receptive and expressive aphasia with goals for safe tolerance of least restrictive diet and improved cognitive-linguistic skills to meet/communicate basic needs.     5. Medical Conditions  New actions/orders/updates for today are in blue.     Acute ischemic stroke due to ICA dissection and thrombosis/occlusion s/p tenecteplase, mechanical thrombectomy  C/b hemorrhagic transformation with intraparenchymal and intraventricular hemorrhage   R hemiparesis, R facial droop, dysphagia, aphasia, dysarthria  - Continue ASA 81 mg daily, indefinitely per neurology  - LDL 96, no indication for statin per neuro, no atherosclerosis on MRAs  - SBP goal <140  - Ziopatch in place for 2 weeks at ARU admission, completed and returned on 11/15/22  - Repeat CT head 11/21 improved as below  - Continue PT/OT/SLP  - Follow up with neurology     Occlusive RLE DVT  On 11/5/22, noted to have significant edema in RLE compared with LLE.  BLE Doppler revealed large occlusive DVT in R femoral vein extending to the deep veins below the knee and nonocclusive DVT in R common femoral vein. CTPE  "negative for PE.  Head CT demonstrates likely stable status of hemorrhagic conversion when compared with previous.  Patient underwent IR placement of IVC filter on 11/6.  Neurology consulted 11/7 to discuss consideration of anticoagulation.  Per their recs, would consider at approximately 4 weeks after hemorrhage, after repeating head CT to evaluate for any further expansion of bleed.  - Repeat RLE ultrasound 11/21 shows increased clot burden with \"new occlusive thrombus within the right profunda femoris vein\" as well as \"unchanged occlusive thrombus extending from the femoral vein to the posterior tibial and peroneal veins, and unchanged nonocclusive thrombus in the right common femoral vein.  - Repeat head CT 11/21 with improved appearance, expected evolution of intraparenchymal hemorrhage.  Per discussion with stroke neurology, ok to start anticoagulation with DOAC.   - Per discussion with stroke neurology, patient/family re: risks vs benefits, 11/22 started Xarelto 15 mg BID x21 days, followed by 20 mg daily.  - Follow-up with IR in 3-6 months to have IVC filter removed once   anticoagulation plan is established (sooner is better)     Mild leukocytosis, resolved  Elevated CRP  WBC peaked at 1.52 on 11/4, infectious work-up with UA negative, CXR negative, procal negative, CRP up-trending 150 on 11/7, blood cultures 11/5 NGTD.  Suspect 2/2 DVT, WBC down-trending since, normalized on 11/8.  - Repeat CRP down-trending 150 -> 100 11/10  - Blood cultures NGTD  - Trend CBC.  WBC remain WNL 11/28      Moderate dilation of ascending aorta   Incidentally noted on echo for CVA work-up, involving the sinuses of Valsalva, maximal dimension 4.6 cm.  - Needs outptient follow up in 6 months with CT imaging, can be followed by PCP     Episodes of non-sustained ventricular tachycardia  Demonstrated on telemetry during IP stay.  Started on metoprolol with reduced frequency.  - Continue metoprolol 25 mg BID  - Ziopatch in place at " ARU admission  - EKG on 11/11 (eval QTc with start trazodone) with sinus rhythm, PVCs and PACs     Moderate oropharyngeal dysphagia  Moderate malnutrition in the context of acute illness or injury   - RD consulted, appreciate assist.  - Continue SLP  - Advanced to regular diet per SLP on 11/14  - Free water procotol  - Repeat VFSS 11/18 with improved oropharyngeal swallow evidenced by no aspiration with consistencies trialed this day.  Advanced to mildly thick liquids 11/19 with swallow strategies    Normocytic anemia  Noted drop from Hgb 13 to yohana of 9.9 on 10/23, most recently stable at 10s-11s.  - Trend CBC.  Hgb stable at 12.7 on 11/28     Urinary retention, resolved  Urinary incontinence, improving  Noted since ARU admission, requiring intermittent straight catheterization. Wife had noted urinary urgency and some incontinence while at hospital. 11/3 UA negative for infection.  Not noted during recurrent inpatient stay.  - Ongoing intermittent urinary incontinence.    - Still waking a few times each night to urinate but arnulfo to return to sleep more easily.  - Continue behavioral mods, timed toileting, limiting fluids close to bedtime, voiding before bed, double voids, etc.     Hx mitral valve repair (2012)  Mild mitral regurgitation  - Noted.  Not on PTA anticoagulation, just aspirin, resumed as above     Allergic rhinitis  - Continue PTA Flonase daily     Right renal cortical enhancement  Incidentally noted on CT.  Indeterminate, possibly vascular etiology.   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  Non-painful.  Patient's movement limited by CVA as above.  - Follow-up with PCP to determine need for ortho spine involvement outpatient    Insomnia, improving  Patient reports difficulty both falling and staying asleep.  Denies any PTA issues with sleep.  Melatonin increased 3 mg to 5 mg  on 11/9.  - Continue melatonin 5 mg  - Sleep hygiene  - Some relief with addition of trazodone (started 11/10), but still sleep difficulty.  Trazodone increased to 50 mg at HS on 11/14.  Sleep improved/stable since.  Continue to monitor.  - EKG 11/11 with starting trazodone.  QTc WNL at 444.  - Continue to monitor       6. Adjustment to disability:  Clinical psychology to eval and treat if indicated  7. FEN: regular diet; mildy thick liquids  8. Bowel: started on bowel program with nightly suppository, on scheduled and PRN bowel meds.  Having regular BMs with suppository but is taking up to an hour and stools hard.  11/28  senna and continue BID, increased docusate to 100 mg BID.  Monitor.  Will also need caregiver training for wife on bowel program.  9. Bladder: increasingly continent  10. DVT Prophylaxis: Xarelto started 11/22 as above  11. GI Prophylaxis: not indicated  12. Code: full  13. Disposition: goal for home with 24/7 assist  14. ELOS:  12/1/22  15. Rehab prognosis:  fair  1. Follow up Appointments on Discharge: PCP, neurology (Dr. Edgard Dave) in 6 weeks, IR in 3-6 months      Patient seen and discussed with Dr. Jori Troncoso, PM&R staff physician     Ashley Campoverde PA-C  Physical Medicine & Rehabilitation

## 2022-11-29 NOTE — PLAN OF CARE
FOCUS/GOAL  Medical management    ASSESSMENT, INTERVENTIONS AND CONTINUING PLAN FOR GOAL:  Patient is stable, making same progress toward goals. Diet upgraded to Regular thin liquid. Denied pain, tingling or numbeness. Ate 100% of his meal. Was continent of bladder using urinal. No BM to report. Will  Continue with POC.  Goal Outcome Evaluation:      Plan of Care Reviewed With: patient    Overall Patient Progress: improvingOverall Patient Progress: improving    Outcome Evaluation: Making progress.

## 2022-11-29 NOTE — PROGRESS NOTES
"SPIRITUAL HEALTH SERVICES Progress Note  [facility] ([facility]) Acute Rehab    Saw pt Tobin Hua and spouse Alix as a follow-up  visit. I have not seen pt for a week.    Patient/Family Understanding of Illness and Goals of Care - pt and spouse both expressed gratitude for Tobin's continued progress:  spouse: \"if you look at it just how he does from day to day, you don't see so much progress, but when we look at how far Tobin as come since he got here, he's really made a lot of progress.\" Pt said \"they say now I will be able to go home this coming Thursday, so that feels really good...\"     Distress and Loss - not indicated today.    Strengths, Coping, and Resources  - family, friends, and tenzin community are greatly appreciated by pt and spouse. They also expressed gratitude for the care pt has received on ARU.    Meaning, Beliefs, and Spirituality - pt's Latter-day tenzin very important for coping - prayer was welcomed. We talked about how in this season of Anabaptism, it is a time of new beginnings for pt.    Plan of Care - will see pt/family again this week before discharge.    True Oneal) Miguelito Walker M.Div., [facility]  Staff   Pager [phone]    * [facility] remains available 24/7 for emergent requests/referrals, either by having the switchboard page the on-call  or by entering an ASAP/STAT consult in Epic (this will also page the on-call ). Routine Epic consults receive an initial response within 24 hours.*    "

## 2022-11-29 NOTE — PROGRESS NOTES
"With pt and pt wife's permission upon admission to ARU, referral to the [facility] completed today. Information given to pt/pt spouse and information also added in AVS.     [facility] accepted for HC services and will provide RN, PT, OT, SLP, and HA. IDT notified of this during team rounds this morning. SOC not until next week. Due to establishing care with new PCP at discharge, ARU provider to sign HC orders until PCP established. Dr. Troncoso notified of this during team rounds today and agreeable. HUC setting up PCP apt with preferred provider, information in AVS. [facility] has access to pt EMR and no need to fax clinicals or orders at time of discharge.     Met with pt and pt wife at bedside. HC updates and contact information provided. Pt and pt wife ok with later SOC and aware that [facility] will contact wife to coordinate first HC visit. Denied questions or concerns for SW at this time. Wife encouraged to come on day of discharge around 9-9:30am and transport home. IRF questions completed. No additional SW needs.     Home Health Care:   [facility]  PH: [phone]  Fax: [phone]   Nurse, physical therapy, occupational therapy, speech therapy, home health aide (2x/week)     IRF-JERAMY Pain Assessment  Pain Effect on Sleep  \"Over the past 5 days, how much of the time has pain made it hard for you to sleep at night?\"    0. Does not apply - I have not had any pain or hurting in the past 5 days     Pain Interference with Therapy Activities  \"Over the past 5 days, how often have you limited your participation in rehabilitation therapy sessions due to pain?\"   0. Does not apply - I have not had any pain or hurting in the past 5 days    Claudia Gonzáles [facility] Acute Rehab   Direct Phone: [phone]  I   Pager: [phone]  I  Fax: [phone]    "

## 2022-11-29 NOTE — PLAN OF CARE
Discharge Planner Post-Acute Rehab SLP:      Discharge Plan: HH SLP      Precautions: aspiration, fall,      Current Status:  Hearing: WFL  Vision: reading glasses  Communication: mild-mod expressive-receptive language impairments c/b frequent paraphasia, word/sound/syllable substitutions, semantic substitutions within conversation. Expresses basic wants/needs WFL. Mild dysarthria   Cognition: Unable to assess d/t extent of language impairment. Suspect impaired reasoning/attention noted during assessment   Swallow: Regular texture, thin (0) liquid. NO straw. Ensure upright all PO, small/single sips, alternate liquids/solids, occasional throat clear, oral care 3x day.      Assessment: Session changed d/t spouse leaving early d/t weather. Pt and spouse participated in extensive education re: progress in therapy, ongoing inconsistent follow through with small sips, risks of aspiration following larger sips, and developing plan moving forward. Pt and spouse both verbalized understanding of risks of aspiration with larger sip thin (0) liquid but express favor to QOL and transition to 0 liquid. SLP reviewed plan for  and likely waiting a week to begin. SLP also introduced option to purchase Provale cup to ensure small sip consistent. Spouse verbalized intent to buy for home. Pt accepted small sips 0 liquid by cup in isolation and with onset of PO, no s/sx aspiration noted. Recommend pt continue regular texture, advance to thin (0) liquid. Ensure upright all PO, SMALL SINGLE sips, no straws.    Other Barriers to Discharge (Family Training, etc): diet training pending progress, communication/lang strategies to spouse/family

## 2022-11-30 ENCOUNTER — APPOINTMENT (OUTPATIENT)
Dept: SPEECH THERAPY | Facility: CLINIC | Age: 62
DRG: 056 | End: 2022-11-30
Attending: PHYSICAL MEDICINE & REHABILITATION
Payer: COMMERCIAL

## 2022-11-30 ENCOUNTER — APPOINTMENT (OUTPATIENT)
Dept: OCCUPATIONAL THERAPY | Facility: CLINIC | Age: 62
DRG: 056 | End: 2022-11-30
Attending: PHYSICAL MEDICINE & REHABILITATION
Payer: COMMERCIAL

## 2022-11-30 ENCOUNTER — APPOINTMENT (OUTPATIENT)
Dept: PHYSICAL THERAPY | Facility: CLINIC | Age: 62
DRG: 056 | End: 2022-11-30
Attending: PHYSICAL MEDICINE & REHABILITATION
Payer: COMMERCIAL

## 2022-11-30 DIAGNOSIS — I82.411 ACUTE DEEP VEIN THROMBOSIS (DVT) OF FEMORAL VEIN OF RIGHT LOWER EXTREMITY (H): ICD-10-CM

## 2022-11-30 DIAGNOSIS — Z95.828 S/P IVC FILTER: ICD-10-CM

## 2022-11-30 PROCEDURE — 128N000003 HC R&B REHAB

## 2022-11-30 PROCEDURE — 92526 ORAL FUNCTION THERAPY: CPT | Mod: GN | Performed by: SPEECH-LANGUAGE PATHOLOGIST

## 2022-11-30 PROCEDURE — 92507 TX SP LANG VOICE COMM INDIV: CPT | Mod: GN

## 2022-11-30 PROCEDURE — 250N000013 HC RX MED GY IP 250 OP 250 PS 637: Performed by: PHYSICIAN ASSISTANT

## 2022-11-30 PROCEDURE — 97535 SELF CARE MNGMENT TRAINING: CPT | Mod: GO

## 2022-11-30 PROCEDURE — 97530 THERAPEUTIC ACTIVITIES: CPT | Mod: GP | Performed by: STUDENT IN AN ORGANIZED HEALTH CARE EDUCATION/TRAINING PROGRAM

## 2022-11-30 PROCEDURE — 99232 SBSQ HOSP IP/OBS MODERATE 35: CPT | Performed by: PHYSICIAN ASSISTANT

## 2022-11-30 RX ORDER — SENNOSIDES 8.6 MG
1 TABLET ORAL 2 TIMES DAILY
Qty: 60 TABLET | Refills: 0 | Status: SHIPPED | OUTPATIENT
Start: 2022-11-30

## 2022-11-30 RX ORDER — METOPROLOL TARTRATE 25 MG/1
25 TABLET, FILM COATED ORAL 2 TIMES DAILY
Qty: 60 TABLET | Refills: 0 | Status: SHIPPED | OUTPATIENT
Start: 2022-11-30

## 2022-11-30 RX ORDER — BISACODYL 10 MG
10 SUPPOSITORY, RECTAL RECTAL DAILY
Qty: 30 SUPPOSITORY | Refills: 0 | Status: SHIPPED | OUTPATIENT
Start: 2022-11-30

## 2022-11-30 RX ORDER — DOCUSATE SODIUM 100 MG/1
100 CAPSULE, LIQUID FILLED ORAL 2 TIMES DAILY
Qty: 60 CAPSULE | Refills: 0 | Status: SHIPPED | OUTPATIENT
Start: 2022-11-30

## 2022-11-30 RX ADMIN — SENNOSIDES 1 TABLET: 8.6 TABLET, FILM COATED ORAL at 21:39

## 2022-11-30 RX ADMIN — RIVAROXABAN 15 MG: 15 TABLET, FILM COATED ORAL at 19:22

## 2022-11-30 RX ADMIN — SENNOSIDES 1 TABLET: 8.6 TABLET, FILM COATED ORAL at 08:18

## 2022-11-30 RX ADMIN — TRAZODONE HYDROCHLORIDE 50 MG: 50 TABLET ORAL at 21:39

## 2022-11-30 RX ADMIN — RIVAROXABAN 15 MG: 15 TABLET, FILM COATED ORAL at 08:18

## 2022-11-30 RX ADMIN — METOPROLOL TARTRATE 25 MG: 25 TABLET, FILM COATED ORAL at 21:39

## 2022-11-30 RX ADMIN — ASPIRIN 81 MG: 81 TABLET, COATED ORAL at 08:18

## 2022-11-30 RX ADMIN — DOCUSATE SODIUM 100 MG: 100 CAPSULE, LIQUID FILLED ORAL at 08:18

## 2022-11-30 RX ADMIN — DOCUSATE SODIUM 100 MG: 100 CAPSULE, LIQUID FILLED ORAL at 21:39

## 2022-11-30 RX ADMIN — BISACODYL 10 MG: 10 SUPPOSITORY RECTAL at 19:22

## 2022-11-30 RX ADMIN — Medication 5 MG: at 21:39

## 2022-11-30 ASSESSMENT — ACTIVITIES OF DAILY LIVING (ADL)
ADLS_ACUITY_SCORE: 59

## 2022-11-30 NOTE — DISCHARGE SUMMARY
[facility]   Acute Rehabilitation Unit  Discharge summary     Date of Admission: 11/7/2022  Date of Discharge: 12/1/2022  Disposition: home with family assist and home care nursing and therapy  Primary Care Physician: [facility]  Attending physician: Jori Troncoso, DO      DISCHARGE DIAGNOSIS      Acute ischemic stroke c/b hemorrhagic transformation     R hemiparesis, R facial droop, dysphagia, aphasia, dysarthria    Occlusive RLE DVT s/p IVC filter placement    Moderate dilation of ascending aorta     Moderate malnutrition in the context of acute illness or injury    Normocytic anemia    Hx mitral valve repair    Right renal cortical enhancement    Compression deformities of T9, L1, 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  He was re-admitted to ARU on 11/7/22 for multidisciplinary rehabilitation and ongoing medical management.    Rehab and medical course as below.    REHABILITATION COURSE  PT: Discharged to home.     Progress towards therapy goal(s). See goals on Care Plan in [facility] electronic health record for goal details.  Goals partially met.  Barriers to achieving goals:   requiring assist from wife at d/c.     Therapy recommendation(s):    Continued therapy is recommended.  Rationale/Recommendations:  continue PT to progress R side strength and coordination via exercise and NMES, work towards therapeutic amb  and possibly functional amb in the coming months.     Summary:  Bed Mobility: Rabia for RLE assist sup<>sit, assist provided by wife  Transfer: squat pivot L and R minAx1, wife able to assist  Gait: unsafe     DME:  K4 w/c - issued loaner from [employer]. Worked with Leonel Larson, OTR/L, [employer] to obtain necessary w/c, [phone].  Bedrail - not necessary for home, but wife can assist  Ramp - had ramp installed by [employer]    OT: Pt reports he will be discharging home tomorrow with support of wife and services.      Progress towards therapy goal(s). See goals on Care Plan in [facility] electronic health record for goal details.  Goals partially met.  Barriers to achieving goals:   discharge from facility and R sided weakness.  Pt does well to direct his cares, manage his nonfunctional RUE and participates well with his ADL and mobility.   Th provides up to min A with ADL and transfers, cues for RLE positioning. Family training completed and AE needs met.       Therapy recommendation(s):    Continued therapy is recommended.  Rationale/Recommendations:  Pt is a good candidate for skilled OT.  He is progressing, motivated and goal directed.  Anticipate he will con't to progress with ADL and transfer given ongoing OT tx to increase his IND in his home setting.    SLP: Discharged to home with home therapy.     Progress towards therapy goal(s). See goals on Care Plan in [facility] electronic health record for goal details.  Goals partially met.  Barriers to achieving goals:   discharge from facility.     Therapy recommendation(s):    Continued therapy is recommended.  Rationale/Recommendations:  Follow up use of swallow strategies, completion of exercises upn return home; continue expressive language interventions.   Patient now consuming regular solids and thin liquids (0), NO straw. Ensure upright all PO, small/single sips, alternate liquids/solids, occasional throat clear, oral care 3x day. Pt trained in  "pharyngeal swallow exercises Effortful Swallow, BRI Cruz. Pt word finding/expressive language improving, continues to produce semantic and phonemic paraphasia, have anomic events.    MEDICAL COURSE    Acute ischemic stroke due to ICA dissection and thrombosis/occlusion s/p tenecteplase, mechanical thrombectomy  C/b hemorrhagic transformation with intraparenchymal and intraventricular hemorrhage   R hemiparesis, R facial droop, dysphagia, aphasia, dysarthria  - Continue ASA 81 mg daily, indefinitely per neurology  - LDL 96, no indication for statin per neuro, no atherosclerosis on MRAs  - SBP goal <140  - Ziopatch in place for 2 weeks at ARU admission, completed and returned on 11/15/22  - Repeat CT head 11/21 improved as below  - Continue PT/OT/SLP  - Follow up with neurology     Occlusive RLE DVT  On 11/5/22, noted to have significant edema in RLE compared with LLE.  BLE Doppler revealed large occlusive DVT in R femoral vein extending to the deep veins below the knee and nonocclusive DVT in R common femoral vein. CTPE negative for PE.  Head CT demonstrates likely stable status of hemorrhagic conversion when compared with previous.  Patient underwent IR placement of IVC filter on 11/6.  Neurology consulted 11/7 to discuss consideration of anticoagulation.  Per their recs, would consider at approximately 4 weeks after hemorrhage, after repeating head CT to evaluate for any further expansion of bleed.  - Repeat RLE ultrasound 11/21 shows increased clot burden with \"new occlusive thrombus within the right profunda femoris vein\" as well as \"unchanged occlusive thrombus extending from the femoral vein to the posterior tibial and peroneal veins, and unchanged nonocclusive thrombus in the right common femoral vein.  - Repeat head CT 11/21 with improved appearance, expected evolution of intraparenchymal hemorrhage.  Per discussion with stroke neurology, ok to start anticoagulation with DOAC.   - Per discussion with " stroke neurology, patient/family re: risks vs benefits, 11/22 started Xarelto 15 mg BID x21 days, followed by 20 mg daily.  - Follow-up with IR to have IVC filter removed now that on anticoagulation  - Repeat RLE ultrasound for monitoring of DVT in ~2 weeks.     Mild leukocytosis, resolved  Elevated CRP  WBC peaked at 1.52 on 11/4, infectious work-up with UA negative, CXR negative, procal negative, CRP up-trending 150 on 11/7, blood cultures 11/5 NGTD.  Suspect 2/2 DVT, WBC down-trending since, normalized on 11/8.  Repeat CRP down-trending 150 -> 100 11/10.  Negative blood cultures.     Moderate dilation of ascending aorta   Incidentally noted on echo for CVA work-up, involving the sinuses of Valsalva, maximal dimension 4.6 cm.  - Needs outptient follow up in 6 months with CT imaging, can be followed by PCP     Episodes of non-sustained ventricular tachycardia  Demonstrated on telemetry during IP stay.  Started on metoprolol with reduced frequency.  - Continue metoprolol 25 mg BID  - Ziopatch in place at ARU admission, completed  - EKG on 11/11 (eval QTc with start trazodone) with sinus rhythm, PVCs and PACs     Moderate oropharyngeal dysphagia, improving  Moderate malnutrition in the context of acute illness or injury  Advance to regular diet on 11/14.  Repeat VFSS 11/18 with improved oropharyngeal swallow evidenced by no aspiration with consistencies trialed this day.  Advanced to mildly thick liquids 11/19 with swallow strategies.  - RD consulted, appreciate assist.  - Continue SLP  - Continue regular diet, advanced to thin liquids per SLP on 11/29.  Recommend pt continue regular texture, advance to thin (0) liquid. Ensure upright all PO, SMALL SINGLE sips, no straws.     Normocytic anemia  Noted drop from Hgb 13 to yohana of 9.9 on 10/23.  Most recently stable  at 12.0 on 12/1.  - Follow up with PCP, repeat CBC as indicated     Urinary retention, resolved  Urinary incontinence, resolved  11/3 UA negative for  infection.  Suspect neurogenic, immobility, as well as language deficits initially making it difficult to communicate.  Still waking a few times each night to urinate but able to return to sleep more easily.  Improved continence with behavioral mods, timed toileting, limiting fluids close to bedtime, voiding before bed, double voids, etc.     Hx mitral valve repair (2012)  Mild mitral regurgitation  Not on PTA anticoagulation, just aspirin, resumed as above.  Now on anticoagulation for DVT.     Allergic rhinitis  - Continue PTA Flonase daily     Right renal cortical enhancement  Incidentally noted on CT.  Indeterminate, possibly vascular etiology.   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  Non-painful.  Patient's movement limited by CVA as above.  - Follow-up with PCP to determine need for ortho spine involvement outpatient     Insomnia, improved  Patient reports difficulty both falling and staying asleep.  Denies any PTA issues with sleep.  Melatonin increased 3 mg to 5 mg on 11/9.  Started on trazodone 11/10, increased to 50 mg on 11/14.  Sleep improved/stable since.  Patient feels sleep not likely to be an issue at discharge and would like to stop trazodone.  - Continue melatonin 5 mg PRN    DISCHARGE MEDICATIONS  Current Discharge Medication List      START taking these medications    Details   docusate sodium (COLACE) 100 MG capsule Take 1 capsule (100 mg) by mouth 2 times daily  Qty: 60 capsule, Refills: 0    Associated Diagnoses: Slow transit constipation      !! rivaroxaban ANTICOAGULANT (XARELTO) 15 MG TABS tablet Take 1 tablet (15 mg) by mouth 2 times daily (with meals) for 23 doses Followed by 20 mg daily.  Qty: 23 tablet, Refills: 0    Associated Diagnoses: Acute deep vein thrombosis (DVT) of femoral vein of right lower extremity (H)      !! rivaroxaban ANTICOAGULANT (XARELTO)  20 MG TABS tablet Take 1 tablet (20 mg) by mouth daily (with dinner) Starting on 12/13 when you have completed loading dose of 15 mg twice daily.  Qty: 30 tablet, Refills: 0    Associated Diagnoses: Acute deep vein thrombosis (DVT) of femoral vein of right lower extremity (H)      sennosides (SENOKOT) 8.6 MG tablet Take 1 tablet by mouth 2 times daily  Qty: 60 tablet, Refills: 0    Associated Diagnoses: Slow transit constipation       !! - Potential duplicate medications found. Please discuss with provider.      CONTINUE these medications which have CHANGED    Details   bisacodyl (DULCOLAX) 10 MG suppository Place 1 suppository (10 mg) rectally daily  Qty: 30 suppository, Refills: 0    Associated Diagnoses: Slow transit constipation      melatonin 5 MG tablet Take 1 tablet (5 mg) by mouth nightly as needed for sleep  Qty: 15 tablet, Refills: 0    Associated Diagnoses: Cerebrovascular accident (CVA) due to occlusion of left carotid artery (H)      metoprolol tartrate (LOPRESSOR) 25 MG tablet Take 1 tablet (25 mg) by mouth 2 times daily  Qty: 60 tablet, Refills: 0    Associated Diagnoses: Cerebrovascular accident (CVA) due to occlusion of left carotid artery (H)         CONTINUE these medications which have NOT CHANGED    Details   acetaminophen (TYLENOL) 325 MG tablet Take 1-2 tablets (325-650 mg) by mouth every 4 hours as needed for mild pain or fever    Associated Diagnoses: Hemorrhagic stroke (H)      aspirin (ASA) 81 MG EC tablet Take 1 tablet (81 mg) by mouth daily    Associated Diagnoses: Hemorrhagic stroke (H)         STOP taking these medications       fluticasone (FLONASE) 50 MCG/ACT nasal spray Comments:   Reason for Stopping:         lidocaine (LMX4) 4 % external cream Comments:   Reason for Stopping:         lidocaine (XYLOCAINE) 2 % external gel Comments:   Reason for Stopping:         lidocaine 1 % SOLN Comments:   Reason for Stopping:         polyethylene glycol (MIRALAX) 17 g packet Comments:   Reason  for Stopping:         senna-docusate (SENOKOT-S/PERICOLACE) 8.6-50 MG tablet Comments:   Reason for Stopping:                 DISCHARGE INSTRUCTIONS AND FOLLOW UP  Discharge Procedure Orders   IR Referral   Standing Status: Future Number of Occurrences: 1 Standing Exp. Date: 02/28/23     Order Specific Question Answer Comments   What is the reason for the IR order request? Other    Specify what procedural imaging exam do you need performed? Follow-up on IVC filter, plans for removal    Patients clinical information/history? S/p IVC filter placement on 11/5/22 for DVT, unable to be anticoagulated initially due to ICH, now anticoagulated, follow up for plans for removal    Is there a specific location the exam needs to be scheduled at? No specific location required    Call Back # [phone]    Is the patient on aspirin, Plavix or blood thinners? Yes - Patient may be asked to stop taking medications      Home Care Referral   Referral Priority: Routine: Next available opening Referral Type: Home Health Therapies & Aides   Number of Visits Requested: 1     Reason for your hospital stay   Order Comments: You were admitted for rehabilitation following a stroke, with course further complicated by a DVT (clot in your right leg) which required placement of IVC filter, but you were then able to return and complete inpatient rehab course.     Activity   Order Comments: Your activity upon discharge: activity as tolerated and as directed by therapies.  We recommend ongoing use of wheelchair for mobility and assistance from family as recommended by therapy team during caregiver training.  We have referred you to home therapy to continue to progress function and independence.     Order Specific Question Answer Comments   Is discharge order? Yes      Follow Up and recommended labs and tests   Order Comments: Follow up with primary care provider, [facility], within 7-14 days for hospital follow- up.  The following  labs/tests are recommended: repeat RLE ultrasound to monitor DVT in about 2 weeks, repeat CT in 6 months to monitor dilation of ascending aorta, renal protocol MRI for further evaluation of right renal cortical enhancement.    Follow up with stroke neurology in 4-6 weeks.    Follow up with IR (interventional radiology) to discuss removal of IVC filter now that you are on therapeutic anticoagulation.     Monitor and record   Order Comments: blood pressure daily and bring record to next appointment.     Diet   Order Comments: Follow this diet upon discharge: Regular Diet; Thin Liquids (level 0) (NO STRAW); No Straws.  Ensure upright all intake, small/single sips, alternate liquids/solids, occasional throat clear, oral care 3x day.     Order Specific Question Answer Comments   Is discharge order? Yes           PHYSICAL EXAMINATION    Most recent Vital Signs:   Vitals:    11/30/22 1128 11/30/22 1655 11/30/22 2100 12/01/22 0743   BP: 105/78 99/71 102/66 117/68   BP Location: Left arm Left arm Left arm Left arm   Patient Position: Chair  Supine    Cuff Size: Adult Regular  Adult Regular    Pulse:  88 94 84   Resp:  16  16   Temp:  96.8  F (36  C) 96.8  F (36  C) (!) 96.4  F (35.8  C)   TempSrc:  Oral Oral Oral   SpO2:  98% 97% 98%   Weight:       Height:           Gen: NAD, pleasant, seated upright in chair  HEENT: NC/AT, MMM  Cardio: RRR, no murmurs  Pulm: non-labored on room air, lungs CTA bilaterally  Abd: soft, non-tender, non-distended, bowel sounds present  Extr: slight edema in right lower extremity  Neuro/MSK: AAOx3, PERRL, EOMI, right facial droop, aphasia with intermittent paraphasias but generally much improved and able to express basic ideas, mild dysarthria, RUE with omoneurexa, strength emerging 2/5 in shoulder and elbow flexion, trace finger flexion though delayed, some finger flexor and wrist flexor tone.  RLE with activation of quads and hip flexors but no anti-gravity strength, AFO.  Decreased sensation  to light touch in RUE compared to left, intact in BLE.    60 minutes spent in discharge, including >50% in counseling and coordination of care, medication review and plan of care recommended on follow up.     Patient was evaluated on day of discharge by attending physician, Jori Troncoso DO, who agrees with plan of care.    Discharge summary was forwarded to [facility] (PCP) at the time of discharge, so as to bridge from hospital to outpatient care.     It was our pleasure to care for Tobin Hua during this hospitalization. Please do not hesitate to contact me should there be questions regarding the hospital course or discharge plan.          Ashley Campoverde PA-C  Physical Medicine and Rehabilitation

## 2022-11-30 NOTE — PLAN OF CARE
Discharge Planner Post-Acute Rehab SLP:      Discharge Plan:  SLP      Precautions: aspiration, fall,      Current Status:  Hearing: WFL  Vision: reading glasses  Communication: mild-mod expressive-receptive language impairments c/b frequent paraphasia, word/sound/syllable substitutions, semantic substitutions within conversation. Expresses basic wants/needs WFL. Mild dysarthria   Cognition: Unable to assess d/t extent of language impairment. Suspect impaired reasoning/attention noted during assessment   Swallow: Regular texture, thin (0) liquid. NO straw. Ensure upright all PO, small/single sips, alternate liquids/solids, occasional throat clear, oral care 3x day.      Assessment: Pt completed speech/lang tasks with 60-75% accuracy with min cues and mod-max cues needed to achieve 80% accuracy. Semantic cues and probing questions were most helpful with patient's tangential speech and perseveration. He identifies and corrects approx 75% of errors made.     Other Barriers to Discharge (Family Training, etc): diet training pending progress, communication/lang strategies to spouse/family

## 2022-11-30 NOTE — PLAN OF CARE
FOCUS/GOAL  Bowel management, Bladder management, Pain management, Mobility, Skin integrity, and Safety management    ASSESSMENT, INTERVENTIONS AND CONTINUING PLAN FOR GOAL:  Patient is alert and oriented. Expressive aphasia/word finding difficulty. Denied pain, headache, dizziness, CP or SOB. Regular/thin. Continent of B/B last BM 11/29 after bowel program had large BM.  A-1 SPT to commode/w/c. VS WDL. No care concern at this time. Call light is in reach alarm is on. Staff will continue per POC.  Goal Outcome Evaluation:

## 2022-11-30 NOTE — PROGRESS NOTES
"  [facility]   Acute Rehabilitation Unit  Daily progress note    INTERVAL HISTORY  Tobin Hua was seen and examined at bedside this afternoon, with wife present.  No acute events reported overnight.   Reports he is feeling well overall.  Looking forward to discharge home tomorrow, though some nervousness.  Spend time reviewing all discharge medications, follow-up appointments, therapies, etc.  Denies other concerns or questions at this time.    Functionally, Pt completed speech/lang tasks with 60-75% accuracy with min cues and mod-max cues needed to achieve 80% accuracy. Semantic cues and probing questions were most helpful with patient's tangential speech and perseveration. He identifies and corrects approx 75% of errors made.        MEDICATIONS    aspirin  81 mg Oral Daily     bisacodyl  10 mg Rectal Daily     - MEDICATION INSTRUCTIONS -   Does not apply See Admin Instructions     docusate sodium  100 mg Oral BID     melatonin  5 mg Oral At Bedtime     metoprolol tartrate  25 mg Oral BID     rivaroxaban ANTICOAGULANT  15 mg Oral BID w/meals    Followed by     [START ON 12/13/2022] rivaroxaban ANTICOAGULANT  20 mg Oral Daily with supper     sennosides  1 tablet Oral BID     traZODone  50 mg Oral At Bedtime        acetaminophen, bisacodyl, lidocaine     PHYSICAL EXAM  /73 (BP Location: Left arm)   Pulse 89   Temp (!) 96.1  F (35.6  C) (Oral)   Resp 16   Ht 1.88 m (6' 2\")   Wt 76.8 kg (169 lb 5 oz)   SpO2 98%   BMI 21.74 kg/m     Gen: NAD, sitting up in chair  HEENT: NC/AT  Cardio: appears well-perfused  Pulm: non-labored on room air  Abd: soft, non-distended, non-tender  Ext: mild edema throughout RLE, tubigrip in place, no erythema, non-tender, no warmth.  No edema in LLE.  R shoulder ottobock.  Neuro/MSK: AAO, expressive aphasia but improving, R facial droop, PERRL, EOMI, R hemiparesis.    LABS  CBC RESULTS:   Recent Labs   Lab Test 11/28/22  0755 11/24/22  0617 " 11/21/22  0542   WBC 5.7 6.0 5.5   RBC 4.41 4.06* 4.09*   HGB 12.7* 11.7* 11.7*   HCT 40.1 37.0* 36.7*   MCV 91 91 90   MCH 28.8 28.8 28.6   MCHC 31.7 31.6 31.9   RDW 14.3 14.3 13.8    238 268     Last Basic Metabolic Panel:  Recent Labs   Lab Test 11/28/22  0755 11/24/22  0617 11/21/22  0542    141 140   POTASSIUM 3.7 4.3 3.9   CHLORIDE 107 108 106   CO2 27 31 31   ANIONGAP 6 2* 3   GLC 91 95 96   BUN 18 20 15   CR 0.73 0.71 0.71   GFRESTIMATED >90 >90 >90   JAILENE 9.4 9.0 8.9         Rehabilitation - continue comprehensive acute inpatient rehabilitation program with multidisciplinary approach including therapies, rehab nursing, and physiatry following. See interval history for updates.      ASSESSMENT AND PLAN  Tobin Hua is a 62 year old right hand dominant male with a past medical history of mitral valve repair (2011), allergic rhinitis, and recent admission 10/21/22-11/1/22 for acute ischemic stroke due to dissection/occlusion of left ICA complicated by hemorrhagic transformation with intraparenchymal and intraventricular hemorrhage, anemia, non-sustained ventricular tachycardia, hypo/hypernatremia, dysphagia, constipation, and headache, who was previously at ARU and discharged back to acute hospital on 11/5/22 due to occlusive RLE DVT, now s/p IVC filter.  He is now re-admitted to ARU on 11/7/22 for multidisciplinary rehabilitation and ongoing medical management.        Admission to acute inpatient rehab 11/07/22.    Impairment group code: Stroke 01.2 (R) Body Involvement (L) Brain: left ICA and L MCA ischemic stroke, c/b hemorrhagic transformation        1. PT, OT and SLP 60 minutes of each on a daily basis, in addition to rehab nursing and close management of physiatrist.       2. Impairment of ADL's: Noted to have R hemiplegia, impaired balance, impaired coordination, fatigue, R/L visual convergence impairment, impaired sensation, impaired postural control, all affecting his ability to safely  and independently perform basic ADLs.  Goal for mod I with basic ADLs, anticipate need for assist with bathing/showering and related transfers.     3. Impairment of mobility:  Noted to have R hemiplegia, impaired balance, impaired coordination, fatigue, R/L visual convergence impairment, impaired sensation, impaired postural control, all affecting his ability to safely and independently perform basic mobility.  Goal for mod I with basic mobility, anticipate to be wheelchair based at discharge.     4. Impairment of cognition/language/swallow:  Noted to have dysphagia, dysarthria, and receptive and expressive aphasia with goals for safe tolerance of least restrictive diet and improved cognitive-linguistic skills to meet/communicate basic needs.     5. Medical Conditions  New actions/orders/updates for today are in blue.     Acute ischemic stroke due to ICA dissection and thrombosis/occlusion s/p tenecteplase, mechanical thrombectomy  C/b hemorrhagic transformation with intraparenchymal and intraventricular hemorrhage   R hemiparesis, R facial droop, dysphagia, aphasia, dysarthria  - Continue ASA 81 mg daily, indefinitely per neurology  - LDL 96, no indication for statin per neuro, no atherosclerosis on MRAs  - SBP goal <140  - Ziopatch in place for 2 weeks at ARU admission, completed and returned on 11/15/22  - Repeat CT head 11/21 improved as below  - Continue PT/OT/SLP  - Follow up with neurology     Occlusive RLE DVT  On 11/5/22, noted to have significant edema in RLE compared with LLE.  BLE Doppler revealed large occlusive DVT in R femoral vein extending to the deep veins below the knee and nonocclusive DVT in R common femoral vein. CTPE negative for PE.  Head CT demonstrates likely stable status of hemorrhagic conversion when compared with previous.  Patient underwent IR placement of IVC filter on 11/6.  Neurology consulted 11/7 to discuss consideration of anticoagulation.  Per their recs, would consider at  "approximately 4 weeks after hemorrhage, after repeating head CT to evaluate for any further expansion of bleed.  - Repeat RLE ultrasound 11/21 shows increased clot burden with \"new occlusive thrombus within the right profunda femoris vein\" as well as \"unchanged occlusive thrombus extending from the femoral vein to the posterior tibial and peroneal veins, and unchanged nonocclusive thrombus in the right common femoral vein.  - Repeat head CT 11/21 with improved appearance, expected evolution of intraparenchymal hemorrhage.  Per discussion with stroke neurology, ok to start anticoagulation with DOAC.   - Per discussion with stroke neurology, patient/family re: risks vs benefits, 11/22 started Xarelto 15 mg BID x21 days, followed by 20 mg daily.  - Follow-up with IR in 3-6 months to have IVC filter removed once   anticoagulation plan is established (sooner is better)     Mild leukocytosis, resolved  Elevated CRP  WBC peaked at 1.52 on 11/4, infectious work-up with UA negative, CXR negative, procal negative, CRP up-trending 150 on 11/7, blood cultures 11/5 NGTD.  Suspect 2/2 DVT, WBC down-trending since, normalized on 11/8.  Repeat CRP down-trending 150 -> 100 11/10.  Negative blood cultures.     Moderate dilation of ascending aorta   Incidentally noted on echo for CVA work-up, involving the sinuses of Valsalva, maximal dimension 4.6 cm.  - Needs outptient follow up in 6 months with CT imaging, can be followed by PCP     Episodes of non-sustained ventricular tachycardia  Demonstrated on telemetry during IP stay.  Started on metoprolol with reduced frequency.  - Continue metoprolol 25 mg BID  - Ziopatch in place at ARU admission  - EKG on 11/11 (eval QTc with start trazodone) with sinus rhythm, PVCs and PACs     Moderate oropharyngeal dysphagia  Moderate malnutrition in the context of acute illness or injury  Advance to regular diet on 11/14.  Repeat VFSS 11/18 with improved oropharyngeal swallow evidenced by no " aspiration with consistencies trialed this day.  Advanced to mildly thick liquids 11/19 with swallow strategies.  - RD consulted, appreciate assist.  - Continue SLP  - Continue regular diet, advanced to thin liquids per SLP on 11/29.  Recommend pt continue regular texture, advance to thin (0) liquid. Ensure upright all PO, SMALL SINGLE sips, no straws.    Normocytic anemia  Noted drop from Hgb 13 to yohana of 9.9 on 10/23.  Most recently stable  at 12.7 on 11/28.  - Trend CBC     Urinary retention, resolved  Urinary incontinence, improving  Noted since ARU admission, requiring intermittent straight catheterization. Wife had noted urinary urgency and some incontinence while at hospital. 11/3 UA negative for infection.  Not noted during recurrent inpatient stay.  - Ongoing intermittent urinary incontinence.    - Still waking a few times each night to urinate but arnulfo to return to sleep more easily.  - Continue behavioral mods, timed toileting, limiting fluids close to bedtime, voiding before bed, double voids, etc.     Hx mitral valve repair (2012)  Mild mitral regurgitation  - Noted.  Not on PTA anticoagulation, just aspirin, resumed as above.  Now on anticoagulation for DVT.     Allergic rhinitis  - Continue PTA Flonase daily     Right renal cortical enhancement  Incidentally noted on CT.  Indeterminate, possibly vascular etiology.   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  Non-painful.  Patient's movement limited by CVA as above.  - Follow-up with PCP to determine need for ortho spine involvement outpatient    Insomnia, improving  Patient reports difficulty both falling and staying asleep.  Denies any PTA issues with sleep.  Melatonin increased 3 mg to 5 mg on 11/9.  - Continue melatonin 5 mg  - Sleep hygiene  - Some relief with addition of trazodone (started 11/10), but still sleep  difficulty.  Trazodone increased to 50 mg at HS on 11/14.  Sleep improved/stable since.  Continue to monitor.  - EKG 11/11 with starting trazodone.  QTc WNL at 444.  - Continue to monitor       6. Adjustment to disability:  Clinical psychology to eval and treat if indicated  7. FEN: regular diet; mildy thick liquids  8. Bowel: started on bowel program with nightly suppository, on scheduled and PRN bowel meds.  Having regular BMs with suppository but is taking up to an hour and stools hard.  11/28  senna and continue BID, increased docusate to 100 mg BID.   9. Bladder: increasingly continent  10. DVT Prophylaxis: Xarelto started 11/22 as above  11. GI Prophylaxis: not indicated  12. Code: full  13. Disposition: home with family assist, home care  14. ELOS:  12/1/22  15. Rehab prognosis:  fair  1. Follow up Appointments on Discharge: PCP, neurology (Dr. Edgard Dave) in 6 weeks, IR in 3-6 months      Patient discussed with Dr. Jori Troncoso, PM&R staff physician     Ashley Campoverde PA-C  Physical Medicine & Rehabilitation

## 2022-11-30 NOTE — PLAN OF CARE
FOCUS/GOAL  Medical management    ASSESSMENT, INTERVENTIONS AND CONTINUING PLAN FOR GOAL:  Pt is alert. Use call light for needs. Right sided weakness. Denies pain or new numbness on affected side. Use urinal independently at bedside. Slept intermittently. Cont w/ POC.  Goal Outcome Evaluation:      Plan of Care Reviewed With: patient    Overall Patient Progress: no changeOverall Patient Progress: no change

## 2022-11-30 NOTE — CONSULTS
Outpatient IR Referral    Patient is a 63 y/o male with a PMH of IVC filter placement 11/6/22 for DVT  unable to be anticoagulated initially due to ICH, now anticoagulated, follow up for plans for removal. On Xarelto started 11/22/22.        IVC filter placement 11/6/22 Impression:  Uncomplicated image guided placement of a retrievable IVC filter.  Filter should be removed as soon as the patient can resume  anticoagulation or therapeutic anticoagulation is no longer necessary.    Procedure order placed.    Patient is currently in patient at rehab, will plan procedure post discharge.     Primary team Ashley [facility] made aware of IR recommendations via epic messaging.      JAYJAY Valdez CNP  Interventional Radiology   IR on-call pager: [phone]

## 2022-11-30 NOTE — PLAN OF CARE
FOCUS/GOAL  Medical management    ASSESSMENT, INTERVENTIONS AND CONTINUING PLAN FOR GOAL:  Soft BP this morning, normalized at recheck. Metoprolol held per parameters. Denied pain. Continent of bladder using urinal. No BM this shift. LBM earlier today. Will continue with POC.  Goal Outcome Evaluation:      Plan of Care Reviewed With: patient    Overall Patient Progress: improvingOverall Patient Progress: improving    Outcome Evaluation: making progress.

## 2022-11-30 NOTE — DISCHARGE SUMMARY
Occupational Therapy Discharge Summary    Reason for therapy discharge:    Pt reports he will be discharging home tomorrow with support of wife and services.     Progress towards therapy goal(s). See goals on Care Plan in [facility] electronic health record for goal details.  Goals partially met.  Barriers to achieving goals:   discharge from facility and R sided weakness.  Pt does well to direct his cares, manage his nonfunctional RUE and participates well with his ADL and mobility.   Th provides up to min A with ADL and transfers, cues for RLE positioning. Family training completed and AE needs met.      Therapy recommendation(s):    Continued therapy is recommended.  Rationale/Recommendations:  Pt is a good candidate for skilled OT.  He is progressing, motivated and goal directed.  Anticipate he will con't to progress with ADL and transfer given ongoing OT tx to increase his IND in his home setting. .

## 2022-11-30 NOTE — PROGRESS NOTES
"SPIRITUAL HEALTH SERVICES Progress Note  [facility] ([facility]) Acute Rehab    Saw pt Tobin Hua and spouse Alix - I have followed pt throughout his hospital stay.    Patient/Family Understanding of Illness and Goals of Care - pt/spouse said that discharge to home is scheduled for tomorrow. They said they need to decide whether to wait for home care to start at home, or to go to clinic for outpatient care.     Distress and Loss - pt/spouse both expressed concern about \"making the right decision\", but overall feel quite positive about pt's progress.    Strengths, Coping, and Resources  - pt feels very well supported by family, friends, [employer]an; and feels quite positive about continuing his therapies.     Meaning, Beliefs, and Spirituality - pt's Sikhism tenzin very important and central to his spirituality. Prayer and a sung blessing were welcomed today.    Plan of Care - No further needs indicated before discharge.     True Walker M.Div. (Bill), [facility]  Staff   Pager [phone]    * [facility] remains available 24/7 for emergent requests/referrals, either by having the switchboard page the on-call  or by entering an ASAP/STAT consult in Epic (this will also page the on-call ). Routine Epic consults receive an initial response within 24 hours.*    "

## 2022-11-30 NOTE — PROGRESS NOTES
Speech Language Therapy Discharge Summary    Reason for therapy discharge:    Discharged to home with home therapy.    Progress towards therapy goal(s). See goals on Care Plan in [facility] electronic health record for goal details.  Goals partially met.  Barriers to achieving goals:   discharge from facility.    Therapy recommendation(s):    Continued therapy is recommended.  Rationale/Recommendations:  Follow up use of swallow strategies, completion of exercises upn return home; continue expressive language interventions.   Patient now consuming regular solids and thin liquids (0), NO straw. Ensure upright all PO, small/single sips, alternate liquids/solids, occasional throat clear, oral care 3x day. Pt trained in pharyngeal swallow exercises Effortful Swallow, BRI Cruz. Pt word finding/expressive language improving, continues to produce semantic and phonemic paraphasia, have anomic events.

## 2022-12-01 VITALS
HEART RATE: 84 BPM | RESPIRATION RATE: 16 BRPM | DIASTOLIC BLOOD PRESSURE: 68 MMHG | BODY MASS INDEX: 21.73 KG/M2 | SYSTOLIC BLOOD PRESSURE: 117 MMHG | OXYGEN SATURATION: 98 % | HEIGHT: 74 IN | WEIGHT: 169.31 LBS | TEMPERATURE: 96.4 F

## 2022-12-01 LAB
ANION GAP SERPL CALCULATED.3IONS-SCNC: 6 MMOL/L (ref 3–14)
BUN SERPL-MCNC: 18 MG/DL (ref 7–30)
CALCIUM SERPL-MCNC: 9.2 MG/DL (ref 8.5–10.1)
CHLORIDE BLD-SCNC: 109 MMOL/L (ref 94–109)
CO2 SERPL-SCNC: 29 MMOL/L (ref 20–32)
CREAT SERPL-MCNC: 0.75 MG/DL (ref 0.66–1.25)
ERYTHROCYTE [DISTWIDTH] IN BLOOD BY AUTOMATED COUNT: 14.4 % (ref 10–15)
GFR SERPL CREATININE-BSD FRML MDRD: >90 ML/MIN/1.73M2
GLUCOSE BLD-MCNC: 88 MG/DL (ref 70–99)
HCT VFR BLD AUTO: 37.4 % (ref 40–53)
HGB BLD-MCNC: 12 G/DL (ref 13.3–17.7)
MAGNESIUM SERPL-MCNC: 2 MG/DL (ref 1.6–2.3)
MCH RBC QN AUTO: 29 PG (ref 26.5–33)
MCHC RBC AUTO-ENTMCNC: 32.1 G/DL (ref 31.5–36.5)
MCV RBC AUTO: 90 FL (ref 78–100)
PHOSPHATE SERPL-MCNC: 3.7 MG/DL (ref 2.5–4.5)
PLATELET # BLD AUTO: 244 10E3/UL (ref 150–450)
POTASSIUM BLD-SCNC: 3.9 MMOL/L (ref 3.4–5.3)
RBC # BLD AUTO: 4.14 10E6/UL (ref 4.4–5.9)
SODIUM SERPL-SCNC: 144 MMOL/L (ref 133–144)
WBC # BLD AUTO: 5.5 10E3/UL (ref 4–11)

## 2022-12-01 PROCEDURE — 85027 COMPLETE CBC AUTOMATED: CPT | Performed by: PHYSICIAN ASSISTANT

## 2022-12-01 PROCEDURE — 83735 ASSAY OF MAGNESIUM: CPT | Performed by: PHYSICIAN ASSISTANT

## 2022-12-01 PROCEDURE — 99239 HOSP IP/OBS DSCHRG MGMT >30: CPT | Performed by: PHYSICIAN ASSISTANT

## 2022-12-01 PROCEDURE — 80048 BASIC METABOLIC PNL TOTAL CA: CPT | Performed by: PHYSICIAN ASSISTANT

## 2022-12-01 PROCEDURE — 84100 ASSAY OF PHOSPHORUS: CPT | Performed by: PHYSICIAN ASSISTANT

## 2022-12-01 PROCEDURE — 250N000013 HC RX MED GY IP 250 OP 250 PS 637: Performed by: PHYSICIAN ASSISTANT

## 2022-12-01 PROCEDURE — 36415 COLL VENOUS BLD VENIPUNCTURE: CPT | Performed by: PHYSICIAN ASSISTANT

## 2022-12-01 RX ADMIN — RIVAROXABAN 15 MG: 15 TABLET, FILM COATED ORAL at 08:46

## 2022-12-01 RX ADMIN — METOPROLOL TARTRATE 25 MG: 25 TABLET, FILM COATED ORAL at 08:46

## 2022-12-01 RX ADMIN — SENNOSIDES 1 TABLET: 8.6 TABLET, FILM COATED ORAL at 08:47

## 2022-12-01 RX ADMIN — DOCUSATE SODIUM 100 MG: 100 CAPSULE, LIQUID FILLED ORAL at 08:46

## 2022-12-01 RX ADMIN — ASPIRIN 81 MG: 81 TABLET, COATED ORAL at 08:46

## 2022-12-01 ASSESSMENT — ACTIVITIES OF DAILY LIVING (ADL)
ADLS_ACUITY_SCORE: 59

## 2022-12-01 NOTE — PROGRESS NOTES
S: patient seen today at [facility] for fitting of right blur rocker AFO    O/G: (prevent drop foot)(assist in ambulation)    A: patient seen today for fitting of a right blue rocker AFO. Patient has been instructed on proper donning/doffing, use, care and break-in period.  Patient observed in the brace and both the patient and I are satisfied with the fit and function of the AFO at this time.     P: patient has been instructed to contact us if any issues arise.   Shady FERREIRALO.

## 2022-12-01 NOTE — PLAN OF CARE
Pt aox4, A1-2 pivot (not oob with me this shift). Continent of bowel (uses commode) and bladder (uses urinal). R-sided weakness and word-finding difficulty d/t stroke. Pt slept most of night.

## 2022-12-01 NOTE — PLAN OF CARE
Physical Therapy Discharge Summary    Reason for therapy discharge:    Discharged to home.    Progress towards therapy goal(s). See goals on Care Plan in [facility] electronic health record for goal details.  Goals partially met.  Barriers to achieving goals:   requiring assist from wife at d/c.    Therapy recommendation(s):    Continued therapy is recommended.  Rationale/Recommendations:  continue PT to progress R side strength and coordination via exercise and NMES, work towards therapeutic amb and possibly functional amb in the coming months.    Summary:  Bed Mobility: Rabia for RLE assist sup<>sit, assist provided by wife  Transfer: squat pivot L and R minAx1, wife able to assist  Gait: unsafe    DME:  K4 w/c - issued loaner from [employer]. Worked with Leonel Larson, OTR/L, [employer] to obtain necessary w/c, [phone].  Bedrail - not necessary for home, but wife can assist  Ramp - had ramp installed by [employer]

## 2022-12-01 NOTE — PLAN OF CARE
Goal Outcome Evaluation:      Plan of Care Reviewed With: patient    Overall Patient Progress: improving    Outcome Evaluation: Pt is discharging to home today.    FOCUS/GOAL  Discharge planning    ASSESSMENT, INTERVENTIONS AND CONTINUING PLAN FOR GOAL:  Pt discharging to home today with significant other.  VSS, understood all discharge instructions and had no further questions.  Sent with medications and all personal items.  Wheeled down to family vehicle, A1 transfer into car and left at 1053.

## 2022-12-03 ENCOUNTER — PATIENT OUTREACH (OUTPATIENT)
Dept: CARE COORDINATION | Facility: CLINIC | Age: 62
End: 2022-12-03

## 2022-12-03 NOTE — PROGRESS NOTES
Clinic Care Coordination Contact  [facility]/Voicemail       Clinical Data: Care Coordinator Outreach  Outreach attempted x 2.  Left message on patient's voicemail with call back information and requested return call.  Plan: Care Coordinator will do no further outreaches at this time.            MELL Pina  [phone]  [facility] Resource [facility]

## 2022-12-05 NOTE — PROGRESS NOTES
__________________________________________________________    ___________________________________  ESTABLISHED PATIENT NEUROLOGY NOTE    DATE OF VISIT: 12/7/2022  MRN: [id number]  PATIENT NAME: Tobin Hua  YOB: 1960      Chief Complaint: Patient presents with:  Stroke: DVT, Hospital follow up      SUBJECTIVE:                                                      HISTORY OF PRESENT ILLNESS:  Tobin Hua is a 62 year old male presenting for follow-up for an acute ischemic stroke due to dissection/occlusion of left ICA, which was complicated by hemorrhagic conversion with intraparenchymal and intraventricular hemorrhage.     He was hospitalized at [facility] from 10/21/2022-11/1/2022. Prior to the hospital stay, he had a past medical history of mitral valve repair    Who presented to the ER due to inability to move his right side or speak. Wife found patient on the ground, unable to speak or rise. Had just seen him a few minutes prior and he was normal.    Underwent CT head which showed Hyperdensity in the left cavernous ICA and MCA suggestive of thrombus. CTA head/neck showed Complete occlusion of the left ICA beginning 1 cm distal to the carotid bifurcation, No appreciable contrast opacification involving the left M1 segment, MCA bifurcation, and proximal M2 branches. More distal arborization appears patent, which may be secondary to collateral flow. Tortuosity of the right ICA with focal ectasia/fusiform aneurysmal dilation at the level of C1. TPA pushed in ER without change in deficits. Plan for thrombectomy with IR.    Takes 81mg aspirin daily    Hospital Course: Tobin was admitted to the ICU status post tenecteplase on 10/21. This was followed by mechanical thrombectomy by interventional radiology on 10/22. Unfortunately, patient developed hemorrhagic transformation of stroke on 10/24. He was managed conservatively, no neurosurgery indicated. Patient gradually improved clinically. Daily  baby aspirin eventually resumed on 10/28.  He did have some episodes of non-sustained ventricular tachycardia. He was started on metoprolol with reduction in incidence. He is not a candidate for stronger anticoagulation due to his intracranial bleed    [facility]. On 11/5/2022, RLE significantly swollen compared with LLE. Bilateral lower extremity Doppler ultrasounds revealed a large occlusive DVT in the right femoral vein extending to the deep veins below the knee and nonocclusive deep venous thrombosis in the right common femoral vein. CTPE negative for PE. Head CT demonstrates likely stable status of hemorrhagic conversion when compared with read of previous CT Head from [facility], though official comparison is pending. Patient underwent IR placement of IVC filter on 11/6 and was stable at the time of discharge with pain reasonably controlled on tylenol.    Today 12/07/22  Tobin presented to the clinic for stroke follow-up. His wife Alix accompanied him to the appointment.  She reports that he saw a neurologist at [facility] who started him on Xarelto 2 weeks ago.  He has a follow-up CTA today and they plan to continue to follow with the neurologist at [facility], which is closer to their home.  They present today to see if there was any further recommendations for his care.  After describing the symptoms that brought him into the hospital, he states that his symptoms were close to fully resolved prior to the hemorrhagic stroke. The hemorrhagic transformation of stroke  left him with right arm and leg weakness and some dysphagia and aphasia.  On a scale from 0-10, 0 being at time of stroke and 10 being baseline prior to stroke,  He feels his right arm is 0-1 out of 10, his right leg is 2 out of 10.  He is able to stand and pivot with assist of 1 and gait belt.  He moved to a regular diet with modifications per speech.  He takes small bites of food and small sips of thin liquids with his  meals.  He states that he still has some difficulty with finding the proper words, but feels this is improving.  He is currently working with PT/OT and speech.  PT x3 times a week, OT x2 times a week and they are setting up speech therapy o/p.  He denies any visual changes.    Tobin was seen by a neurologist at [facility].  This is close to their home and where they plan to continue to follow-up.  He was started on Xarelto 2 weeks ago and is scheduled for a CTA today.  They state that he is taking metoprolol for rate control.  He denies any elevated blood pressure.  His LDL was 96.  He is not on a statin.  He is currently taking Xarelto and a baby aspirin daily.  The neurologist he is seeing at [facility] is working with cardiology to discuss medications, per patient.      Psycho-Social History: Tobin Hua currently lives [facility] [place], with wife. Highest level of education Masters in English. Employment status:Retired     Patient does not smoke, rare alcohol use, no recreational drug use.  Currently, patient denies feeling depressed, denies feeling anhedonia, denies suicidal  thoughts, and denies having feelings of excessive guilt/worthlessness.  We reviewed importance of mental and emotional wellbeing and impact on health.      Current Medications:   acetaminophen (TYLENOL) 325 MG tablet, Take 1-2 tablets (325-650 mg) by mouth every 4 hours as needed for mild pain or fever  aspirin (ASA) 81 MG EC tablet, Take 1 tablet (81 mg) by mouth daily  bisacodyl (DULCOLAX) 10 MG suppository, Place 1 suppository (10 mg) rectally daily  docusate sodium (COLACE) 100 MG capsule, Take 1 capsule (100 mg) by mouth 2 times daily  melatonin 5 MG tablet, Take 1 tablet (5 mg) by mouth nightly as needed for sleep  metoprolol tartrate (LOPRESSOR) 25 MG tablet, Take 1 tablet (25 mg) by mouth 2 times daily  rivaroxaban ANTICOAGULANT (XARELTO) 15 MG TABS tablet, Take 1 tablet (15 mg) by mouth 2 times daily (with  "meals) for 23 doses Followed by 20 mg daily.  [START ON 12/13/2022] rivaroxaban ANTICOAGULANT (XARELTO) 20 MG TABS tablet, Take 1 tablet (20 mg) by mouth daily (with dinner) Starting on 12/13 when you have completed loading dose of 15 mg twice daily.  sennosides (SENOKOT) 8.6 MG tablet, Take 1 tablet by mouth 2 times daily    No current facility-administered medications on file prior to visit.    Past Medical History:   Patient  has no past medical history on file.  Surgical History:  He  has a past surgical history that includes IR IVC Filter Placement (11/6/2022).  Family and Social History:  Reviewed, and he  reports that he has quit smoking. His smoking use included cigarettes. He does not have any smokeless tobacco history on file.  Reviewed, and family history is not on file.    RECENT DIAGNOSTIC STUDIES:   Labs:    Coagulation studies:  No lab results found.     Lipid panel:  No lab results found.    HbA1C:  No lab results found.    Troponin:  No lab results found.  No lab results found.  No lab results found.    Imaging:   EXAM: CT HEAD W/O CONTRAST  11/21/2022 1:00 PM   IMPRESSION:  Continued evolution of the left cerebral hemisphere  intraparenchymal hemorrhage with resolution of midline shift and  stable local mass effect.     REVIEW OF SYSTEMS:                                                      10-point review of systems is negative except as mentioned above in HPI.    EXAM:                                                      Physical Exam:   Vitals: /85   Pulse 75   Ht 1.88 m (6' 2\")   Wt 76.7 kg (169 lb)   BMI 21.70 kg/m    BMI= Body mass index is 21.7 kg/m .  GENERAL: NAD.  HEENT: NC/AT.  PULM: Non-labored breathing. LS clear  Neurologic:  MENTAL STATUS: Alert, attentive. Speech is clear, though mild aphagia noted. Normal comprehension. Normal concentration. Adequate fund of knowledge.   CRANIAL NERVES:  Visual fields intact to confrontation. Pupils equally, round and reactive to light. " Facial sensation and movement normal. EOM full. Hearing intact to conversation. Trapezius strength intact on left . Palate moves symmetrically. Tongue midline.  MOTOR: 5/5 in proximal and distal muscle groups of upper and lower extremities on left. 0-1/5 on left upper and 2/5 on left lower. Tone and bulk normal.  test:  right- 0/5, left 5/5  DTRs: Intact and symmetric in biceps, BR and patellae.   SENSATION: Normal light touch throughout.   COORDINATION: Normal finger nose finger on left. Finger tapping normal on left. Knee heel shin normal on left.  STATION AND GAIT: deferred for safety  right hand-dominant.      ASSESSMENT and PLAN:                                                      Assessment:    ICD-10-CM    1. History of stroke  Z86.73         Tobin Hua is a 62 year old male presenting for follow-up for an acute ischemic stroke due to dissection/occlusion of left ICA, which was complicated by hemorrhagic conversion with intraparenchymal and intraventricular hemorrhage. He was hospitalized at [facility] from 10/21/2022-11/1/2022. Prior to the hospital stay, he had a past medical history of mitral valve repair. He presented to the ER due to inability to move his right side or speak. CT head which showed Hyperdensity in the left cavernous ICA and MCA suggestive of thrombus. CTA head/neck showed Complete occlusion of the left ICA beginning 1 cm distal to the carotid bifurcation, No appreciable contrast opacification involving the left M1 segment, MCA bifurcation, and proximal M2 branches.    He was admitted to the ICU status post tenecteplase on 10/21. This was followed by mechanical thrombectomy by interventional radiology on 10/22. Unfortunately, patient developed hemorrhagic transformation of stroke on 10/24. Admitted to [facility] on 11/1/2022. On 11/5/2022, RLE significantly swollen compared with LLE. Bilateral lower extremity Doppler ultrasounds revealed a large occlusive DVT in the right  femoral vein extending to the deep veins below the knee and nonocclusive deep venous thrombosis in the right common femoral vein. Patient underwent IR placement of IVC filter on 11/6.    Tobin arrived for follow-up to discuss any further recommendations.  He saw a neurologist through [facility] on 12/2/2022.  He plans to follow-up through their clinic since that is closer to his home.  They have started him on Xarelto and have follow-up imaging scheduled today.  I encouraged him to continue to work with PT/OT and speech for symptom management and follow-up with any other recommendations through his primary neurology team.  His wife Alix states that he may have had an episode of increased snoring with apnea while sleeping.  She does not feel he snores often but we will continue to monitor and update her primary care provider for a sleep study if needed.  The Neurology team through [facility] is working with cardiology to discuss medications (plavix/asa).  No other concerns at this time.  Patient can follow-up at [facility] with their neurology team as recommended at his last appointment with them. Tobin understands and agrees with this plan    Plan:     Symptom management  - Continue pt/ot/speech    Diagnostic workup  - Updated CTA today through [facility]    Secondary prevention  Xarelto started through his primary neurologist 2 weeks ago.  Tobin is having his second CT scan today and they will follow-up with his primary neurologist at [facility] for continued management     Lifestyle  - mediterranean diet    Risk Factors     Elevated cholesterol levels   LDL: 96  - Goal < 70 mg/dl  - Continue preventive therapy: Talk to who will follow you for continued management of risk factors.  They may consider starting a statin    --- Plan to follow-up with your primary care provider to manage your vascular risk factors  --- Please feel free to reach out if you have any further questions or concerns.  --- Seek immediate medical  attention if an emergency arises or if your health becomes progressively worse.     For any acute neurologic deficits he was advised to  go directly to the hospital rather than call the clinic.    It was a pleasure to meet you today!     Total Time: Total time spent for face to face visit, reviewing labs/imaging studies, counseling and coordination of care was: 45 Minutes spent on the date of the encounter doing chart review, review of test results, patient visit, documentation and discussion with other provider(s)       This note was dictated using voice recognition software.  Any grammatical or context distortions are unintentional and inherent to the software.    Irma Jaimes, DNP, APRN, CNP  [facility]

## 2022-12-07 ENCOUNTER — OFFICE VISIT (OUTPATIENT)
Dept: NEUROLOGY | Facility: CLINIC | Age: 62
End: 2022-12-07
Payer: COMMERCIAL

## 2022-12-07 VITALS
SYSTOLIC BLOOD PRESSURE: 110 MMHG | HEIGHT: 74 IN | WEIGHT: 169 LBS | BODY MASS INDEX: 21.69 KG/M2 | DIASTOLIC BLOOD PRESSURE: 85 MMHG | HEART RATE: 75 BPM

## 2022-12-07 DIAGNOSIS — Z86.73 HISTORY OF STROKE: Primary | ICD-10-CM

## 2022-12-07 PROCEDURE — 99204 OFFICE O/P NEW MOD 45 MIN: CPT

## 2022-12-07 NOTE — PATIENT INSTRUCTIONS
Plan:     Symptom management  - Continue pt/ot/speech    Diagnostic workup  - Updated CTA today through [facility]    Secondary prevention  Xarelto started through his primary neurologist 2 weeks ago.  Tobin is having his second CT scan today and they will follow-up with his primary neurologist at [facility] for continued management     Lifestyle  - Mediterranean diet  - Increased physical activity     Risk Factors     Elevated cholesterol levels   LDL: 96  - Goal < 70 mg/dl  - Continue preventive therapy: Talk to who will follow you for continued management of risk factors.  They may consider starting a statin    --- Plan to follow-up with your primary care provider to manage your vascular risk factors  --- Please feel free to reach out if you have any further questions or concerns.  --- Seek immediate medical attention if an emergency arises or if your health becomes progressively worse.     For any acute neurologic deficits he was advised to  go directly to the hospital rather than call the clinic.    It was a pleasure to meet you today!

## 2022-12-07 NOTE — LETTER
12/7/2022         RE: Tobin Hua  [address] [address]        Dear Colleague,    Thank you for referring your patient, Tobin Hua, to the [facility] [place]. Please see a copy of my visit note below.    __________________________________________________________    ___________________________________  ESTABLISHED PATIENT NEUROLOGY NOTE    DATE OF VISIT: 12/7/2022  MRN: [id number]  PATIENT NAME: Tobin Hua  YOB: 1960      Chief Complaint: Patient presents with:  Stroke: DVT, Hospital follow up      SUBJECTIVE:                                                      HISTORY OF PRESENT ILLNESS:  Tobin Hua is a 62 year old male presenting for follow-up for an acute ischemic stroke due to dissection/occlusion of left ICA, which was complicated by hemorrhagic conversion with intraparenchymal and intraventricular hemorrhage.     He was hospitalized at [facility] from 10/21/2022-11/1/2022. Prior to the hospital stay, he had a past medical history of mitral valve repair    Who presented to the ER due to inability to move his right side or speak. Wife found patient on the ground, unable to speak or rise. Had just seen him a few minutes prior and he was normal.    Underwent CT head which showed Hyperdensity in the left cavernous ICA and MCA suggestive of thrombus. CTA head/neck showed Complete occlusion of the left ICA beginning 1 cm distal to the carotid bifurcation, No appreciable contrast opacification involving the left M1 segment, MCA bifurcation, and proximal M2 branches. More distal arborization appears patent, which may be secondary to collateral flow. Tortuosity of the right ICA with focal ectasia/fusiform aneurysmal dilation at the level of C1. TPA pushed in ER without change in deficits. Plan for thrombectomy with IR.    Takes 81mg aspirin daily    Hospital Course: Tobin was admitted to the ICU status post tenecteplase on 10/21. This was  followed by mechanical thrombectomy by interventional radiology on 10/22. Unfortunately, patient developed hemorrhagic transformation of stroke on 10/24. He was managed conservatively, no neurosurgery indicated. Patient gradually improved clinically. Daily baby aspirin eventually resumed on 10/28.  He did have some episodes of non-sustained ventricular tachycardia. He was started on metoprolol with reduction in incidence. He is not a candidate for stronger anticoagulation due to his intracranial bleed    [facility]. On 11/5/2022, RLE significantly swollen compared with LLE. Bilateral lower extremity Doppler ultrasounds revealed a large occlusive DVT in the right femoral vein extending to the deep veins below the knee and nonocclusive deep venous thrombosis in the right common femoral vein. CTPE negative for PE. Head CT demonstrates likely stable status of hemorrhagic conversion when compared with read of previous CT Head from [facility], though official comparison is pending. Patient underwent IR placement of IVC filter on 11/6 and was stable at the time of discharge with pain reasonably controlled on tylenol.    Today 12/07/22  Tobin presented to the clinic for stroke follow-up. His wife Alix accompanied him to the appointment.  She reports that he saw a neurologist at [facility] who started him on Xarelto 2 weeks ago.  He has a follow-up CTA today and they plan to continue to follow with the neurologist at [facility], which is closer to their home.  They present today to see if there was any further recommendations for his care.  After describing the symptoms that brought him into the hospital, he states that his symptoms were close to fully resolved prior to the hemorrhagic stroke. The hemorrhagic transformation of stroke  left him with right arm and leg weakness and some dysphagia and aphasia.  On a scale from 0-10, 0 being at time of stroke and 10 being baseline prior to stroke,  He  feels his right arm is 0-1 out of 10, his right leg is 2 out of 10.  He is able to stand and pivot with assist of 1 and gait belt.  He moved to a regular diet with modifications per speech.  He takes small bites of food and small sips of thin liquids with his meals.  He states that he still has some difficulty with finding the proper words, but feels this is improving.  He is currently working with PT/OT and speech.  PT x3 times a week, OT x2 times a week and they are setting up speech therapy o/p.  He denies any visual changes.    Tobin was seen by a neurologist at [facility].  This is close to their home and where they plan to continue to follow-up.  He was started on Xarelto 2 weeks ago and is scheduled for a CTA today.  They state that he is taking metoprolol for rate control.  He denies any elevated blood pressure.  His LDL was 96.  He is not on a statin.  He is currently taking Xarelto and a baby aspirin daily.  The neurologist he is seeing at [facility] is working with cardiology to discuss medications, per patient.      Psycho-Social History: Tobin Hua currently lives [facility] [place] [place], with wife. Highest level of education Masters in English. Employment status:Retired     Patient does not smoke, rare alcohol use, no recreational drug use.  Currently, patient denies feeling depressed, denies feeling anhedonia, denies suicidal  thoughts, and denies having feelings of excessive guilt/worthlessness.  We reviewed importance of mental and emotional wellbeing and impact on health.      Current Medications:   acetaminophen (TYLENOL) 325 MG tablet, Take 1-2 tablets (325-650 mg) by mouth every 4 hours as needed for mild pain or fever  aspirin (ASA) 81 MG EC tablet, Take 1 tablet (81 mg) by mouth daily  bisacodyl (DULCOLAX) 10 MG suppository, Place 1 suppository (10 mg) rectally daily  docusate sodium (COLACE) 100 MG capsule, Take 1 capsule (100 mg) by mouth 2 times daily  melatonin 5 MG tablet,  "Take 1 tablet (5 mg) by mouth nightly as needed for sleep  metoprolol tartrate (LOPRESSOR) 25 MG tablet, Take 1 tablet (25 mg) by mouth 2 times daily  rivaroxaban ANTICOAGULANT (XARELTO) 15 MG TABS tablet, Take 1 tablet (15 mg) by mouth 2 times daily (with meals) for 23 doses Followed by 20 mg daily.  [START ON 12/13/2022] rivaroxaban ANTICOAGULANT (XARELTO) 20 MG TABS tablet, Take 1 tablet (20 mg) by mouth daily (with dinner) Starting on 12/13 when you have completed loading dose of 15 mg twice daily.  sennosides (SENOKOT) 8.6 MG tablet, Take 1 tablet by mouth 2 times daily    No current facility-administered medications on file prior to visit.    Past Medical History:   Patient  has no past medical history on file.  Surgical History:  He  has a past surgical history that includes IR IVC Filter Placement (11/6/2022).  Family and Social History:  Reviewed, and he  reports that he has quit smoking. His smoking use included cigarettes. He does not have any smokeless tobacco history on file.  Reviewed, and family history is not on file.    RECENT DIAGNOSTIC STUDIES:   Labs:    Coagulation studies:  No lab results found.     Lipid panel:  No lab results found.    HbA1C:  No lab results found.    Troponin:  No lab results found.  No lab results found.  No lab results found.    Imaging:   EXAM: CT HEAD W/O CONTRAST  11/21/2022 1:00 PM   IMPRESSION:  Continued evolution of the left cerebral hemisphere  intraparenchymal hemorrhage with resolution of midline shift and  stable local mass effect.     REVIEW OF SYSTEMS:                                                      10-point review of systems is negative except as mentioned above in HPI.    EXAM:                                                      Physical Exam:   Vitals: /85   Pulse 75   Ht 1.88 m (6' 2\")   Wt 76.7 kg (169 lb)   BMI 21.70 kg/m    BMI= Body mass index is 21.7 kg/m .  GENERAL: NAD.  HEENT: NC/AT.  PULM: Non-labored breathing. LS " clear  Neurologic:  MENTAL STATUS: Alert, attentive. Speech is clear, though mild aphagia noted. Normal comprehension. Normal concentration. Adequate fund of knowledge.   CRANIAL NERVES:  Visual fields intact to confrontation. Pupils equally, round and reactive to light. Facial sensation and movement normal. EOM full. Hearing intact to conversation. Trapezius strength intact on left . Palate moves symmetrically. Tongue midline.  MOTOR: 5/5 in proximal and distal muscle groups of upper and lower extremities on left. 0-1/5 on left upper and 2/5 on left lower. Tone and bulk normal.  test:  right- 0/5, left 5/5  DTRs: Intact and symmetric in biceps, BR and patellae.   SENSATION: Normal light touch throughout.   COORDINATION: Normal finger nose finger on left. Finger tapping normal on left. Knee heel shin normal on left.  STATION AND GAIT: deferred for safety  right hand-dominant.      ASSESSMENT and PLAN:                                                      Assessment:    ICD-10-CM    1. History of stroke  Z86.73         Tobin Hua is a 62 year old male presenting for follow-up for an acute ischemic stroke due to dissection/occlusion of left ICA, which was complicated by hemorrhagic conversion with intraparenchymal and intraventricular hemorrhage. He was hospitalized at [facility] from 10/21/2022-11/1/2022. Prior to the hospital stay, he had a past medical history of mitral valve repair. He presented to the ER due to inability to move his right side or speak. CT head which showed Hyperdensity in the left cavernous ICA and MCA suggestive of thrombus. CTA head/neck showed Complete occlusion of the left ICA beginning 1 cm distal to the carotid bifurcation, No appreciable contrast opacification involving the left M1 segment, MCA bifurcation, and proximal M2 branches.    He was admitted to the ICU status post tenecteplase on 10/21. This was followed by mechanical thrombectomy by interventional radiology on  10/22. Unfortunately, patient developed hemorrhagic transformation of stroke on 10/24. Admitted to [facility] on 11/1/2022. On 11/5/2022, RLE significantly swollen compared with LLE. Bilateral lower extremity Doppler ultrasounds revealed a large occlusive DVT in the right femoral vein extending to the deep veins below the knee and nonocclusive deep venous thrombosis in the right common femoral vein. Patient underwent IR placement of IVC filter on 11/6.    Tobin arrived for follow-up to discuss any further recommendations.  He saw a neurologist through [facility] on 12/2/2022.  He plans to follow-up through their clinic since that is closer to his home.  They have started him on Xarelto and have follow-up imaging scheduled today.  I encouraged him to continue to work with PT/OT and speech for symptom management and follow-up with any other recommendations through his primary neurology team.  His wife Alix states that he may have had an episode of increased snoring with apnea while sleeping.  She does not feel he snores often but we will continue to monitor and update her primary care provider for a sleep study if needed.  The Neurology team through [facility] is working with cardiology to discuss medications (plavix/asa).  No other concerns at this time.  Patient can follow-up at [facility] with their neurology team as recommended at his last appointment with them. Tobin understands and agrees with this plan    Plan:     Symptom management  - Continue pt/ot/speech    Diagnostic workup  - Updated CTA today through [facility]    Secondary prevention  Xarelto started through his primary neurologist 2 weeks ago.  Tobin is having his second CT scan today and they will follow-up with his primary neurologist at [facility] for continued management     Lifestyle  - mediterranean diet    Risk Factors     Elevated cholesterol levels   LDL: 96  - Goal < 70 mg/dl  - Continue preventive therapy: Talk to who will follow you for  continued management of risk factors.  They may consider starting a statin    --- Plan to follow-up with your primary care provider to manage your vascular risk factors  --- Please feel free to reach out if you have any further questions or concerns.  --- Seek immediate medical attention if an emergency arises or if your health becomes progressively worse.     For any acute neurologic deficits he was advised to  go directly to the hospital rather than call the clinic.    It was a pleasure to meet you today!     Total Time: Total time spent for face to face visit, reviewing labs/imaging studies, counseling and coordination of care was: 45 Minutes spent on the date of the encounter doing chart review, review of test results, patient visit, documentation and discussion with other provider(s)       This note was dictated using voice recognition software.  Any grammatical or context distortions are unintentional and inherent to the software.    Irma Jaimes, PRINCE, APRN, CNP  [facility]       Again, thank you for allowing me to participate in the care of your patient.        Sincerely,        JAYJAY Cohen CNP

## 2022-12-07 NOTE — NURSING NOTE
Chief Complaint   Patient presents with     Stroke     DVT, Hospital follow up     Ana Cristina Walker on 12/7/2022 at 9:09 AM

## 2022-12-20 ENCOUNTER — TELEPHONE (OUTPATIENT)
Dept: INTERVENTIONAL RADIOLOGY/VASCULAR | Facility: CLINIC | Age: 62
End: 2022-12-20

## 2023-02-27 ENCOUNTER — TELEPHONE (OUTPATIENT)
Dept: INTERVENTIONAL RADIOLOGY/VASCULAR | Facility: CLINIC | Age: 63
End: 2023-02-27
Payer: COMMERCIAL

## 2023-03-03 ENCOUNTER — APPOINTMENT (OUTPATIENT)
Dept: MEDSURG UNIT | Facility: CLINIC | Age: 63
End: 2023-03-03
Attending: RADIOLOGY
Payer: COMMERCIAL

## 2023-03-03 ENCOUNTER — HOSPITAL ENCOUNTER (OUTPATIENT)
Facility: CLINIC | Age: 63
Discharge: HOME | End: 2023-03-03
Attending: RADIOLOGY | Admitting: RADIOLOGY
Payer: COMMERCIAL

## 2023-03-03 ENCOUNTER — APPOINTMENT (OUTPATIENT)
Dept: INTERVENTIONAL RADIOLOGY/VASCULAR | Facility: CLINIC | Age: 63
End: 2023-03-03
Attending: PHYSICIAN ASSISTANT
Payer: COMMERCIAL

## 2023-03-03 VITALS
WEIGHT: 167 LBS | HEART RATE: 65 BPM | BODY MASS INDEX: 21.43 KG/M2 | RESPIRATION RATE: 16 BRPM | SYSTOLIC BLOOD PRESSURE: 108 MMHG | DIASTOLIC BLOOD PRESSURE: 70 MMHG | OXYGEN SATURATION: 98 % | HEIGHT: 74 IN | TEMPERATURE: 97.9 F

## 2023-03-03 DIAGNOSIS — I82.411 ACUTE DEEP VEIN THROMBOSIS (DVT) OF FEMORAL VEIN OF RIGHT LOWER EXTREMITY (H): ICD-10-CM

## 2023-03-03 DIAGNOSIS — Z95.828 S/P IVC FILTER: ICD-10-CM

## 2023-03-03 LAB
ANION GAP SERPL CALCULATED.3IONS-SCNC: 8 MMOL/L (ref 7–15)
BUN SERPL-MCNC: 12.1 MG/DL (ref 8–23)
CALCIUM SERPL-MCNC: 9.7 MG/DL (ref 8.8–10.2)
CHLORIDE SERPL-SCNC: 103 MMOL/L (ref 98–107)
CREAT SERPL-MCNC: 0.75 MG/DL (ref 0.67–1.17)
DEPRECATED HCO3 PLAS-SCNC: 27 MMOL/L (ref 22–29)
ERYTHROCYTE [DISTWIDTH] IN BLOOD BY AUTOMATED COUNT: 14.3 % (ref 10–15)
GFR SERPL CREATININE-BSD FRML MDRD: >90 ML/MIN/1.73M2
GLUCOSE SERPL-MCNC: 91 MG/DL (ref 70–99)
HCT VFR BLD AUTO: 45.6 % (ref 40–53)
HGB BLD-MCNC: 14.4 G/DL (ref 13.3–17.7)
INR PPP: 1.18 (ref 0.85–1.15)
MCH RBC QN AUTO: 27.7 PG (ref 26.5–33)
MCHC RBC AUTO-ENTMCNC: 31.6 G/DL (ref 31.5–36.5)
MCV RBC AUTO: 88 FL (ref 78–100)
PLATELET # BLD AUTO: 190 10E3/UL (ref 150–450)
POTASSIUM SERPL-SCNC: 4 MMOL/L (ref 3.4–5.3)
RBC # BLD AUTO: 5.2 10E6/UL (ref 4.4–5.9)
SODIUM SERPL-SCNC: 138 MMOL/L (ref 136–145)
WBC # BLD AUTO: 5.1 10E3/UL (ref 4–11)

## 2023-03-03 PROCEDURE — 250N000009 HC RX 250: Performed by: STUDENT IN AN ORGANIZED HEALTH CARE EDUCATION/TRAINING PROGRAM

## 2023-03-03 PROCEDURE — 99152 MOD SED SAME PHYS/QHP 5/>YRS: CPT | Performed by: RADIOLOGY

## 2023-03-03 PROCEDURE — 250N000011 HC RX IP 250 OP 636: Performed by: NURSE PRACTITIONER

## 2023-03-03 PROCEDURE — 258N000003 HC RX IP 258 OP 636: Performed by: NURSE PRACTITIONER

## 2023-03-03 PROCEDURE — 82374 ASSAY BLOOD CARBON DIOXIDE: CPT | Performed by: NURSE PRACTITIONER

## 2023-03-03 PROCEDURE — 37193 REM ENDOVAS VENA CAVA FILTER: CPT | Performed by: RADIOLOGY

## 2023-03-03 PROCEDURE — 999N000142 HC STATISTIC PROCEDURE PREP ONLY

## 2023-03-03 PROCEDURE — 36415 COLL VENOUS BLD VENIPUNCTURE: CPT | Performed by: NURSE PRACTITIONER

## 2023-03-03 PROCEDURE — C1769 GUIDE WIRE: HCPCS

## 2023-03-03 PROCEDURE — 250N000011 HC RX IP 250 OP 636: Performed by: STUDENT IN AN ORGANIZED HEALTH CARE EDUCATION/TRAINING PROGRAM

## 2023-03-03 PROCEDURE — 255N000002 HC RX 255 OP 636: Performed by: RADIOLOGY

## 2023-03-03 PROCEDURE — 999N000133 HC STATISTIC PP CARE STAGE 2

## 2023-03-03 PROCEDURE — 272N000192 HC ACCESSORY CR2

## 2023-03-03 PROCEDURE — 85027 COMPLETE CBC AUTOMATED: CPT | Performed by: NURSE PRACTITIONER

## 2023-03-03 PROCEDURE — 99152 MOD SED SAME PHYS/QHP 5/>YRS: CPT

## 2023-03-03 PROCEDURE — 272N000504 HC NEEDLE CR4

## 2023-03-03 PROCEDURE — 85610 PROTHROMBIN TIME: CPT | Performed by: NURSE PRACTITIONER

## 2023-03-03 RX ORDER — HEPARIN SODIUM 200 [USP'U]/100ML
1 INJECTION, SOLUTION INTRAVENOUS CONTINUOUS PRN
Status: DISCONTINUED | OUTPATIENT
Start: 2023-03-03 | End: 2023-03-08 | Stop reason: HOSPADM

## 2023-03-03 RX ORDER — FLUMAZENIL 0.1 MG/ML
0.2 INJECTION, SOLUTION INTRAVENOUS
Status: DISCONTINUED | OUTPATIENT
Start: 2023-03-03 | End: 2023-03-08 | Stop reason: HOSPADM

## 2023-03-03 RX ORDER — LIDOCAINE 40 MG/G
CREAM TOPICAL
Status: DISCONTINUED | OUTPATIENT
Start: 2023-03-03 | End: 2023-03-08 | Stop reason: HOSPADM

## 2023-03-03 RX ORDER — NALOXONE HYDROCHLORIDE 0.4 MG/ML
0.4 INJECTION, SOLUTION INTRAMUSCULAR; INTRAVENOUS; SUBCUTANEOUS
Status: DISCONTINUED | OUTPATIENT
Start: 2023-03-03 | End: 2023-03-08 | Stop reason: HOSPADM

## 2023-03-03 RX ORDER — GABAPENTIN 300 MG/1
300 CAPSULE ORAL 3 TIMES DAILY
COMMUNITY

## 2023-03-03 RX ORDER — IODIXANOL 320 MG/ML
100 INJECTION, SOLUTION INTRAVASCULAR ONCE
Status: COMPLETED | OUTPATIENT
Start: 2023-03-03 | End: 2023-03-03

## 2023-03-03 RX ORDER — SODIUM CHLORIDE 9 MG/ML
INJECTION, SOLUTION INTRAVENOUS CONTINUOUS
Status: DISCONTINUED | OUTPATIENT
Start: 2023-03-03 | End: 2023-03-08 | Stop reason: HOSPADM

## 2023-03-03 RX ORDER — NALOXONE HYDROCHLORIDE 0.4 MG/ML
0.2 INJECTION, SOLUTION INTRAMUSCULAR; INTRAVENOUS; SUBCUTANEOUS
Status: DISCONTINUED | OUTPATIENT
Start: 2023-03-03 | End: 2023-03-08 | Stop reason: HOSPADM

## 2023-03-03 RX ORDER — FENTANYL CITRATE 50 UG/ML
25-50 INJECTION, SOLUTION INTRAMUSCULAR; INTRAVENOUS EVERY 5 MIN PRN
Status: DISCONTINUED | OUTPATIENT
Start: 2023-03-03 | End: 2023-03-08 | Stop reason: HOSPADM

## 2023-03-03 RX ADMIN — MIDAZOLAM 0.5 MG: 1 INJECTION INTRAMUSCULAR; INTRAVENOUS at 12:53

## 2023-03-03 RX ADMIN — IODIXANOL 20 ML: 320 INJECTION, SOLUTION INTRAVASCULAR at 13:02

## 2023-03-03 RX ADMIN — SODIUM CHLORIDE: 9 INJECTION, SOLUTION INTRAVENOUS at 10:33

## 2023-03-03 RX ADMIN — HEPARIN SODIUM 1 BAG: 200 INJECTION, SOLUTION INTRAVENOUS at 12:56

## 2023-03-03 RX ADMIN — MIDAZOLAM 0.5 MG: 1 INJECTION INTRAMUSCULAR; INTRAVENOUS at 12:48

## 2023-03-03 RX ADMIN — FENTANYL CITRATE 25 MCG: 50 INJECTION, SOLUTION INTRAMUSCULAR; INTRAVENOUS at 12:53

## 2023-03-03 RX ADMIN — LIDOCAINE HYDROCHLORIDE 4 ML: 10 INJECTION, SOLUTION EPIDURAL; INFILTRATION; INTRACAUDAL; PERINEURAL at 12:50

## 2023-03-03 RX ADMIN — FENTANYL CITRATE 25 MCG: 50 INJECTION, SOLUTION INTRAMUSCULAR; INTRAVENOUS at 12:48

## 2023-03-03 ASSESSMENT — ACTIVITIES OF DAILY LIVING (ADL)
ADLS_ACUITY_SCORE: 39

## 2023-03-03 NOTE — PROGRESS NOTES
Arrived back to Unit 2a from IVC filter removal procedure in IR. VSS. Denies pain. R neck procedural site C/D/I; no bleeding/hematoma noted. Tolerating PO. Wife at bedside. Pt stable.

## 2023-03-03 NOTE — DISCHARGE INSTRUCTIONS
[facility]  Going Home after IVC Filter Removal                                                     After you go home:  Drink plenty of fluids.  Resume your normal diet  Do not scrub the procedure site vigorously for 3 days  No lotion or powder to the puncture site for 3 days  DO NOT do any strenuous exercise or lifting greater than 10 pounds for at least 2 days following your procedure  HOB elevated to at least 30 degrees. Do not lay flat for at least 2 hours after you go home.    IF YOU WERE GIVEN SEDATION  No driving or alcoholic beverages today  Do not make any important or legal decisions today  We recommend an adult stay with you for the first 24 hours    CALL THE PHYSICIAN IF:  Monitor neck site for bleeding, swelling. If any bleeding or swelling immediately apply pressure and call the doctor.  If you notice any changes in your voice or breathing you should call your doctor immediately & come to the closest Emergency Department.  Do Not Drive Yourself.  You develop nausea or vomiting  You develop hives or a rash or any unexplained itching    ADDITIONAL INSTRUCTIONS: none    [facility] INTERVENTIONAL RADIOLOGY DEPARTMENT          Procedure Physician: Dr. Blair, Dr. Juárez           Date of procedure: March 3, 2023          Telephone numbers:     [phone]      Monday-Friday 7:30 am to 4:00 pm                                              [phone]  After 4:00 pm Monday-Friday, Weekends & Holidays. Ask the  to contact the Interventional Radiologist on call.Someone is available  24 hours/day        [facility] toll free number: [phone]  Monday-Friday 8:00 am to 4:30 pm

## 2023-03-03 NOTE — PROGRESS NOTES
Arrived to Unit 2a for IVC filter removal procedure in IR. AFVSS. Denies pain. WC dependent. Labs processing. Pt's wife, Alix, at bedside. Pt stable; ready for consent.

## 2023-03-03 NOTE — PRE-PROCEDURE
GENERAL PRE-PROCEDURE:   Procedure:  IVC filter removal  Date/Time:  3/3/2023 11:18 AM    Written consent obtained?: Yes    Risks and benefits: Risks, benefits and alternatives were discussed    Consent given by:  Patient  Patient states understanding of procedure being performed: Yes    Patient's understanding of procedure matches consent: Yes    Procedure consent matches procedure scheduled: Yes    Expected level of sedation:  Moderate  Appropriately NPO:  Yes  ASA Class:  3  Mallampati  :  Grade 2- soft palate, base of uvula, tonsillar pillars, and portion of posterior pharyngeal wall visible  Lungs:  Lungs clear with good breath sounds bilaterally  Heart:  Normal heart sounds and rate  History & Physical reviewed:  History and physical reviewed and no updates needed  Statement of review:  I have reviewed the lab findings, diagnostic data, medications, and the plan for sedation

## 2023-03-03 NOTE — IR NOTE
Patient Name: Tobin Hua  Medical Record Number: [id number]  Today's Date: 3/3/2023    Procedure: inferior vena cava filter removal  Proceduralist: Dr VERONICA Blair, Dr SHAHNAZ Juárez    Sedation start time: 1248  Sedation end time: 1303  Sedation medications administered: 1 mg versed, 50 mcg fentanyl  Total sedation time: 15 min  Sedation Notes: none     Procedure start time: 1239  Procedure end time: 1303    Report given to: RN 2A  : none    Other Notes: Pt arrived to IR room 4 from . Consent reviewed, pt confirmed. Pt denies any questions or concerns regarding procedure. Pt positioned supine and monitored per protocol. Right internal jugular accessed. Manual pressure held upon sheath removal. Hemostasis achieved at 1305. Right neck site cleansed and dressed per protocol. Pt tolerated procedure without any noted complications. Pt transferred back to .

## 2023-03-03 NOTE — PROGRESS NOTES
Patient discharged from  2A s/p IVC filter removal. VSS, R neck site C/D/I, no bleeding or hematoma. Voided. IV removed. Up and in wheelchair.  Discharge instructions gone over with patient and wife.

## 2023-03-03 NOTE — PROCEDURES
[facility]    Procedure: IR Procedure Note    Date/Time: 3/3/2023 1:08 PM  Performed by: Yuan Juárez MD  Authorized by: Vijay Blair MD       UNIVERSAL PROTOCOL   Site Marked: NA  Prior Images Obtained and Reviewed:  Yes  Required items: Required blood products, implants, devices and special equipment available    Patient identity confirmed:  Verbally with patient, arm band, provided demographic data and hospital-assigned identification number  Patient was reevaluated immediately before administering moderate or deep sedation or anesthesia  Confirmation Checklist:  Patient's identity using two indicators, relevant allergies, procedure was appropriate and matched the consent or emergent situation and correct equipment/implants were available  Time out: Immediately prior to the procedure a time out was called    Universal Protocol: the Joint Commission Universal Protocol was followed    Preparation: Patient was prepped and draped in usual sterile fashion       ANESTHESIA    Anesthesia: Local infiltration  Local Anesthetic:  Lidocaine 1% without epinephrine      SEDATION  Patient Sedated: Yes    Sedation:  Fentanyl and midazolam  Vital signs: Vital signs monitored during sedation    See dictated procedure note for full details.  Findings: IVC filter removal    Specimens: none    Complications: None    Condition: Stable    Plan: IVC filter removal      PROCEDURE  Describe Procedure: IVC filter removal  Patient Tolerance:  Patient tolerated the procedure well with no immediate complications  Length of time physician/provider present for 1:1 monitoring during sedation: 15

## 2023-03-04 ASSESSMENT — ACTIVITIES OF DAILY LIVING (ADL)
ADLS_ACUITY_SCORE: 39

## 2023-03-05 ASSESSMENT — ACTIVITIES OF DAILY LIVING (ADL)
ADLS_ACUITY_SCORE: 39

## 2023-03-06 ASSESSMENT — ACTIVITIES OF DAILY LIVING (ADL)
ADLS_ACUITY_SCORE: 39

## 2023-03-07 ASSESSMENT — ACTIVITIES OF DAILY LIVING (ADL)
ADLS_ACUITY_SCORE: 39

## 2023-05-21 ENCOUNTER — HEALTH MAINTENANCE LETTER (OUTPATIENT)
Age: 63
End: 2023-05-21

## 2023-10-22 ENCOUNTER — HEALTH MAINTENANCE LETTER (OUTPATIENT)
Age: 63
End: 2023-10-22

## 2024-12-15 ENCOUNTER — HEALTH MAINTENANCE LETTER (OUTPATIENT)
Age: 64
End: 2024-12-15

## 2025-03-29 ENCOUNTER — VIRTUAL VISIT (OUTPATIENT)
Dept: NEUROSURGERY | Facility: CLINIC | Age: 65
End: 2025-03-29
Payer: COMMERCIAL

## 2025-03-29 DIAGNOSIS — Z86.73 CHRONIC ARTERIAL ISCHEMIC STROKE: Primary | ICD-10-CM

## 2025-03-29 NOTE — LETTER
3/29/2025       RE: Tobin Hua  [address]  [facility] [address]     Dear Colleague,    Thank you for referring your patient, Tobin Hua, to the [facility] [place] [facility]. Please see a copy of my visit note below.    Neurosurgery Clinic Note    Reason for Visit: vivistim    History of Present Illness  Tobin Hua is a 64-year-old right-handed male who initially presented on October 21, 2022, with a left internal carotid artery (ICA) dissection/occlusion and large left-hemisphere ischemic stroke, later complicated by hemorrhagic transformation with intraventricular extension. He underwent emergent mechanical thrombectomy--requiring a direct carotid puncture due to difficult vascular anatomy--and an inpatient hospitalization notable for right-sided weakness, right facial droop, and aphasia. He also developed a significant right lower-extremity deep vein thrombosis (DVT) necessitating IVC filter placement and subsequent rivaroxaban therapy once his intracranial bleed stabilized.    Following discharge from the acute hospital, he completed a multidisciplinary inpatient rehabilitation course from November 7 to December 1, 2022, during which he achieved partial recovery in right-sided strength (about 2/5 at the shoulder/elbow and trace finger flexion) and improved speech production despite mild dysarthria and expressive aphasia. At home, he continued with outpatient therapies and exercises under the guidance of his wife, who provides daily assistance. Over time, his right leg function has improved to the point where he can ambulate with a cane, and sometimes without, though his right arm remains significantly limited, with only minimal active hand function. He has discontinued anticoagulation (no longer on Xarelto) and now takes only low-dose aspirin, occasional acetaminophen, and stool softeners as needed.    During today s  visit, Tobin and his wife express interest in pursuing Vivistim to further augment his upper-extremity recovery. They note his occupational therapist has strongly advocated for this therapy, given his consistent effort, ongoing improvement, and ability to engage in structured rehabilitation. Aside from a minimally invasive mitral valve repair in 2012, he reports no other major comorbidities or surgical complications. He denies any current tobacco or heavy alcohol use, and he has not required seizure medications. Cognitively, he continues to experience mild aphasia but articulates goals of regaining more dexterity in his right hand. Living at home in [place] with his wife, he remains highly motivated to maximize function and is eager to learn more about the Vivistim procedure, its potential benefits, and associated risks.    He denies any history of infections or difficulties with surgeries.    PMH: Left hemisphere stroke, aphasia, hemiparesis    No past medical history on file.    Past Surgical History:   Procedure Laterality Date     IR CAROTID CEREBRAL ANGIOGRAM BILATERAL  10/21/2022     IR IVC FILTER PLACEMENT  11/6/2022     IR IVC FILTER REMOVAL  3/3/2023     2012 mitral valve repair        No family history on file.    Lives in [place] with his wife. 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      Nonsmoker  Occasional drinker      Social History     Socioeconomic History     Marital status:      Spouse name: Not on file     Number of children: Not on file     Years of education: Not on file     Highest education level: Not on file   Occupational History     Not on file   Tobacco Use     Smoking status: Former     Types: Cigarettes     Smokeless tobacco: Never   Substance and Sexual Activity     Alcohol use: Not on file     Drug use: Not on file     Sexual activity: Not on file   Other Topics Concern     Not on file   Social History Narrative     Not on file      Social Drivers of Health     Financial Resource Strain: Not At Risk (5/24/2023)    [facility]    Financial Resource Strain      Is it hard for you to pay for the very basics like food, housing, medical care or heating?: No   Food Insecurity: Not At Risk (5/24/2023)    [facility] from [facility]    Food Insecurity      Does your food run out before you have the money to buy more?: No   Transportation Needs: Not At Risk (5/24/2023)    [facility] from [facility]    Transportation Needs      Does a lack of transportation keep you from your medical appointments or from getting your medications?: No   Physical Activity: Not on file   Stress: Not on file   Social Connections: Not on file   Interpersonal Safety: Not on file   Housing Stability: Not on file        No Known Allergies    Current Outpatient Medications   Medication Sig Dispense Refill     acetaminophen (TYLENOL) 325 MG tablet Take 1-2 tablets (325-650 mg) by mouth every 4 hours as needed for mild pain or fever       aspirin (ASA) 81 MG EC tablet Take 1 tablet (81 mg) by mouth daily       docusate sodium (COLACE) 100 MG capsule Take 1 capsule (100 mg) by mouth 2 times daily 60 capsule 0     No current facility-administered medications for this visit.               Imaging: my interpretations only  MRI 2023: Left carotid artery is recanalized, distal tortuosity         Assessment and Plan   Tobin Hua is a 64 year old male with a history of a large left-sided ischemic stroke stemming from a left internal carotid artery dissection on October 21, 2022. Despite initial near-complete right-sided paralysis, he has demonstrated gradual improvement through inpatient and outpatient rehabilitation, now ambulating with a cane and showing partial active movement in the right shoulder and elbow. However, hand function remains severely limited, and he continues to experience mild aphasia and dysarthria. He previously developed a right  lower-extremity DVT, necessitating IVC filter placement and short-term rivaroxaban, but has since transitioned to low-dose aspirin without other chronic medications. Past medical history is notable for a minimally invasive mitral valve repair in 2012, with no major ongoing cardiac issues. Currently, he and his wife are strongly interested in Vivistim (vagus nerve stimulation) to enhance his upper-extremity recovery, particularly given his demonstrated motivation, continued incremental gains, and his occupational therapist s recommendation. He presents today in stable medical condition, reporting no new comorbidities or complications, and appears to be a suitable candidate for advanced rehabilitation strategies targeting improved right arm and hand function. He understands that he will need to be cleared for being off of ASA for 2 weeks for surgery and that his previous history of neck hematoma from the carotid puncture puts him at a higher risk of failure to dissect the vagus nerve sufficiently or of vagus nerve injury due to adhesions.    We discussed at length the alternatives, potential benefits, and potentials risks of VNS therapy. We broke the risks down into procedural risks, risks of general anesthesia, and risks of stimulation. We specifically discussed the risk of typical and atypical pain, hemorrhage, postoperative hemorrhage and hematoma including the need for surgical evacuation, infection including serious infection of the neck or chest, need for further surgeries, vocal cord palsy or paralysis, damage to cranial nerves, damage to the carotid artery with or without stroke, damage to the jugular veins, MI, risks of anesthesia, hoarseness, vocal cord dysfunction, sensation or pain around the anchors and non efficacy. We also discussed the MRI conditionality of the device including inability to obtain thoracic or breast imaging with MRI and the typical modalities that could be used. We discussed the  importance of monitoring battery use and the need for future replacements.       PCP visit  Will need to stop ASA 1 week prior and hold for one week after surgery  Left vivistim implant (VNS)  Patient would like to be contacted for any available applicable research studies.    Armando Lebron MD  Department of Neurosurgery  [facility]      Again, thank you for allowing me to participate in the care of your patient.      Sincerely,    Armando Lebron MD

## 2025-03-29 NOTE — PROGRESS NOTES
Neurosurgery Clinic Note    Reason for Visit: vivistim    History of Present Illness  Tobin Hua is a 64-year-old right-handed male who initially presented on October 21, 2022, with a left internal carotid artery (ICA) dissection/occlusion and large left-hemisphere ischemic stroke, later complicated by hemorrhagic transformation with intraventricular extension. He underwent emergent mechanical thrombectomy--requiring a direct carotid puncture due to difficult vascular anatomy--and an inpatient hospitalization notable for right-sided weakness, right facial droop, and aphasia. He also developed a significant right lower-extremity deep vein thrombosis (DVT) necessitating IVC filter placement and subsequent rivaroxaban therapy once his intracranial bleed stabilized.    Following discharge from the acute hospital, he completed a multidisciplinary inpatient rehabilitation course from November 7 to December 1, 2022, during which he achieved partial recovery in right-sided strength (about 2/5 at the shoulder/elbow and trace finger flexion) and improved speech production despite mild dysarthria and expressive aphasia. At home, he continued with outpatient therapies and exercises under the guidance of his wife, who provides daily assistance. Over time, his right leg function has improved to the point where he can ambulate with a cane, and sometimes without, though his right arm remains significantly limited, with only minimal active hand function. He has discontinued anticoagulation (no longer on Xarelto) and now takes only low-dose aspirin, occasional acetaminophen, and stool softeners as needed.    During today s visit, Tobin and his wife express interest in pursuing Vivistim to further augment his upper-extremity recovery. They note his occupational therapist has strongly advocated for this therapy, given his consistent effort, ongoing improvement, and ability to engage in structured rehabilitation. Aside from a minimally  invasive mitral valve repair in 2012, he reports no other major comorbidities or surgical complications. He denies any current tobacco or heavy alcohol use, and he has not required seizure medications. Cognitively, he continues to experience mild aphasia but articulates goals of regaining more dexterity in his right hand. Living at home in [place] with his wife, he remains highly motivated to maximize function and is eager to learn more about the Vivistim procedure, its potential benefits, and associated risks.    He denies any history of infections or difficulties with surgeries.    PMH: Left hemisphere stroke, aphasia, hemiparesis    No past medical history on file.    Past Surgical History:   Procedure Laterality Date    IR CAROTID CEREBRAL ANGIOGRAM BILATERAL  10/21/2022    IR IVC FILTER PLACEMENT  11/6/2022    IR IVC FILTER REMOVAL  3/3/2023     2012 mitral valve repair        No family history on file.    Lives in [place] with his wife. 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      Nonsmoker  Occasional drinker      Social History     Socioeconomic History    Marital status:      Spouse name: Not on file    Number of children: Not on file    Years of education: Not on file    Highest education level: Not on file   Occupational History    Not on file   Tobacco Use    Smoking status: Former     Types: Cigarettes    Smokeless tobacco: Never   Substance and Sexual Activity    Alcohol use: Not on file    Drug use: Not on file    Sexual activity: Not on file   Other Topics Concern    Not on file   Social History Narrative    Not on file     Social Drivers of Health     Financial Resource Strain: Not At Risk (5/24/2023)    Received from [facility]    Financial Resource Strain     Is it hard for you to pay for the very basics like food, housing, medical care or heating?: No   Food Insecurity: Not At Risk (5/24/2023)    Received from [facility]    Food Insecurity      Does your food run out before you have the money to buy more?: No   Transportation Needs: Not At Risk (5/24/2023)    Received from [facility]    Transportation Needs     Does a lack of transportation keep you from your medical appointments or from getting your medications?: No   Physical Activity: Not on file   Stress: Not on file   Social Connections: Not on file   Interpersonal Safety: Not on file   Housing Stability: Not on file        No Known Allergies    Current Outpatient Medications   Medication Sig Dispense Refill    acetaminophen (TYLENOL) 325 MG tablet Take 1-2 tablets (325-650 mg) by mouth every 4 hours as needed for mild pain or fever      aspirin (ASA) 81 MG EC tablet Take 1 tablet (81 mg) by mouth daily      docusate sodium (COLACE) 100 MG capsule Take 1 capsule (100 mg) by mouth 2 times daily 60 capsule 0     No current facility-administered medications for this visit.               Imaging: my interpretations only  MRI 2023: Left carotid artery is recanalized, distal tortuosity         Assessment and Plan   Tobin Hua is a 64 year old male with a history of a large left-sided ischemic stroke stemming from a left internal carotid artery dissection on October 21, 2022. Despite initial near-complete right-sided paralysis, he has demonstrated gradual improvement through inpatient and outpatient rehabilitation, now ambulating with a cane and showing partial active movement in the right shoulder and elbow. However, hand function remains severely limited, and he continues to experience mild aphasia and dysarthria. He previously developed a right lower-extremity DVT, necessitating IVC filter placement and short-term rivaroxaban, but has since transitioned to low-dose aspirin without other chronic medications. Past medical history is notable for a minimally invasive mitral valve repair in 2012, with no major ongoing cardiac issues. Currently, he and his wife are strongly interested in GENIAC  (vagus nerve stimulation) to enhance his upper-extremity recovery, particularly given his demonstrated motivation, continued incremental gains, and his occupational therapist s recommendation. He presents today in stable medical condition, reporting no new comorbidities or complications, and appears to be a suitable candidate for advanced rehabilitation strategies targeting improved right arm and hand function. He understands that he will need to be cleared for being off of ASA for 2 weeks for surgery and that his previous history of neck hematoma from the carotid puncture puts him at a higher risk of failure to dissect the vagus nerve sufficiently or of vagus nerve injury due to adhesions.    We discussed at length the alternatives, potential benefits, and potentials risks of VNS therapy. We broke the risks down into procedural risks, risks of general anesthesia, and risks of stimulation. We specifically discussed the risk of typical and atypical pain, hemorrhage, postoperative hemorrhage and hematoma including the need for surgical evacuation, infection including serious infection of the neck or chest, need for further surgeries, vocal cord palsy or paralysis, damage to cranial nerves, damage to the carotid artery with or without stroke, damage to the jugular veins, MI, risks of anesthesia, hoarseness, vocal cord dysfunction, sensation or pain around the anchors and non efficacy. We also discussed the MRI conditionality of the device including inability to obtain thoracic or breast imaging with MRI and the typical modalities that could be used. We discussed the importance of monitoring battery use and the need for future replacements.       PCP visit  Will need to stop ASA 1 week prior and hold for one week after surgery  Left vivistim implant (VNS)  Patient would like to be contacted for any available applicable research studies.    Armando Lebron MD  Department of Neurosurgery  [facility]

## 2025-04-15 NOTE — TELEPHONE ENCOUNTER
FUTURE VISIT INFORMATION      SURGERY INFORMATION:  Date: 5/21/25  Location: [facility] OR  Surgeon:  Armando Lebron MD  Anesthesia Type:  General   Procedure: Vagus nerve stimulator placement with placement of generator/battery over left chest wall  Consult: 3/29/25    RECORDS REQUESTED FROM:       Primary Care Provider: [facility]     Pertinent Medical History:Hemiparesis affecting right side s/p stroke; Chronic DVT; S/p mitral valve repair; PSVT; Dilated aortic root     Most recent EKG+ Tracing: 10/7/22    Most recent ECHO: 9/18/24 -     Most recent Coronary Angiogram: 6/12/12 -

## 2025-04-30 ENCOUNTER — DOCUMENTATION ONLY (OUTPATIENT)
Dept: NEUROSURGERY | Facility: CLINIC | Age: 65
End: 2025-04-30
Payer: COMMERCIAL

## 2025-05-01 ENCOUNTER — TELEPHONE (OUTPATIENT)
Dept: NEUROSURGERY | Facility: CLINIC | Age: 65
End: 2025-05-01
Payer: COMMERCIAL

## 2025-05-01 NOTE — TELEPHONE ENCOUNTER
Spoke with pt's spouse to let her know that insurance has denied coverage for Vivistim VNS so we will have to postpone pt's surgery while we appeal. Once we have authorization, we will reschedule surgery and all associated appts. Spouse expressed understanding and has no further questions at this time.

## 2025-05-07 ENCOUNTER — PRE VISIT (OUTPATIENT)
Dept: SURGERY | Facility: CLINIC | Age: 65
End: 2025-05-07

## 2025-06-18 ENCOUNTER — TELEPHONE (OUTPATIENT)
Dept: NEUROSURGERY | Facility: CLINIC | Age: 65
End: 2025-06-18
Payer: COMMERCIAL

## 2025-06-18 NOTE — TELEPHONE ENCOUNTER
I called patient in regards to surgery with Dr. Armando Lebron. I was unable to reach patient, but a voicemail and a call back number were left on patients voicemail.    Molly Duran on 6/18/2025 at 1:49 PM

## 2025-06-29 ENCOUNTER — HEALTH MAINTENANCE LETTER (OUTPATIENT)
Age: 65
End: 2025-06-29

## 2025-07-01 NOTE — TELEPHONE ENCOUNTER
FUTURE VISIT INFORMATION      SURGERY INFORMATION:  Date: 10/24/25  Location: [facility]  Surgeon:  Armando Lebron MD   Anesthesia Type:  General  Procedure: Vagus nerve stimulator placement with placement of generator/battery over left chest wall   Consult: 3/29/25    RECORDS REQUESTED FROM:       Primary Care Provider:  Armando Jason    Pertinent Medical History: stroke, Dilated aortic root, PSVT, S/P MVR, Mitral regurgitation due to cusp prolapse, Acute blood loss anemia, Long term current use of anticoagulant, Heart murmur      Most recent EKG+ Tracin22, internal    Most recent ECHO: 24, [facility]    Most recent Coronary Angiogram: 12, [facility]

## 2025-10-08 ENCOUNTER — PRE VISIT (OUTPATIENT)
Dept: SURGERY | Facility: CLINIC | Age: 65
End: 2025-10-08

## (undated) RX ORDER — FENTANYL CITRATE 50 UG/ML
INJECTION, SOLUTION INTRAMUSCULAR; INTRAVENOUS
Status: DISPENSED
Start: 2022-11-06

## (undated) RX ORDER — FENTANYL CITRATE 50 UG/ML
INJECTION, SOLUTION INTRAMUSCULAR; INTRAVENOUS
Status: DISPENSED
Start: 2023-03-03

## (undated) RX ORDER — LIDOCAINE HYDROCHLORIDE 10 MG/ML
INJECTION, SOLUTION EPIDURAL; INFILTRATION; INTRACAUDAL; PERINEURAL
Status: DISPENSED
Start: 2023-03-03

## (undated) RX ORDER — SODIUM CHLORIDE 9 MG/ML
INJECTION, SOLUTION INTRAVENOUS
Status: DISPENSED
Start: 2023-03-03

## (undated) RX ORDER — HEPARIN SODIUM 200 [USP'U]/100ML
INJECTION, SOLUTION INTRAVENOUS
Status: DISPENSED
Start: 2023-03-03